# Patient Record
Sex: FEMALE | Race: WHITE | NOT HISPANIC OR LATINO | Employment: FULL TIME | URBAN - METROPOLITAN AREA
[De-identification: names, ages, dates, MRNs, and addresses within clinical notes are randomized per-mention and may not be internally consistent; named-entity substitution may affect disease eponyms.]

---

## 2017-01-06 ENCOUNTER — ALLSCRIPTS OFFICE VISIT (OUTPATIENT)
Dept: OTHER | Facility: OTHER | Age: 32
End: 2017-01-06

## 2017-01-06 PROCEDURE — G0145 SCR C/V CYTO,THINLAYER,RESCR: HCPCS | Performed by: OBSTETRICS & GYNECOLOGY

## 2017-01-06 PROCEDURE — 87624 HPV HI-RISK TYP POOLED RSLT: CPT | Performed by: OBSTETRICS & GYNECOLOGY

## 2017-01-10 ENCOUNTER — LAB CONVERSION - ENCOUNTER (OUTPATIENT)
Dept: OTHER | Facility: OTHER | Age: 32
End: 2017-01-10

## 2017-01-10 LAB — PAP (HISTORICAL): NORMAL

## 2017-01-15 ENCOUNTER — LAB REQUISITION (OUTPATIENT)
Dept: LAB | Facility: HOSPITAL | Age: 32
End: 2017-01-15
Payer: COMMERCIAL

## 2017-01-15 DIAGNOSIS — Z01.419 ENCOUNTER FOR GYNECOLOGICAL EXAMINATION WITHOUT ABNORMAL FINDING: ICD-10-CM

## 2017-01-15 DIAGNOSIS — Z11.51 ENCOUNTER FOR SCREENING FOR HUMAN PAPILLOMAVIRUS (HPV): ICD-10-CM

## 2017-01-19 LAB
HPV RRNA GENITAL QL NAA+PROBE: NORMAL
LAB AP GYN PRIMARY INTERPRETATION: NORMAL
Lab: NORMAL

## 2017-01-26 ENCOUNTER — HOSPITAL ENCOUNTER (INPATIENT)
Facility: HOSPITAL | Age: 32
LOS: 8 days | Discharge: HOME | DRG: 337 | End: 2017-02-05
Attending: EMERGENCY MEDICINE | Admitting: SURGERY
Payer: COMMERCIAL

## 2017-01-26 DIAGNOSIS — R10.13 EPIGASTRIC PAIN: ICD-10-CM

## 2017-01-26 DIAGNOSIS — R10.84 GENERALIZED ABDOMINAL PAIN: Primary | ICD-10-CM

## 2017-01-26 DIAGNOSIS — R11.0 NAUSEA: ICD-10-CM

## 2017-01-26 LAB
BASOPHILS # BLD AUTO: 0.04 THOUSANDS/ΜL (ref 0–0.1)
BASOPHILS NFR BLD AUTO: 0 % (ref 0–1)
EOSINOPHIL # BLD AUTO: 0.13 THOUSAND/ΜL (ref 0–0.61)
EOSINOPHIL NFR BLD AUTO: 1 % (ref 0–6)
ERYTHROCYTE [DISTWIDTH] IN BLOOD BY AUTOMATED COUNT: 12.4 % (ref 11.6–15.1)
EXT BILIRUBIN, UA: NORMAL
EXT BLOOD URINE: NORMAL
EXT GLUCOSE, UA: NORMAL
EXT KETONES: 160
EXT NITRITE, UA: NORMAL
EXT PH, UA: 5
EXT PROTEIN, UA: NORMAL
EXT SPECIFIC GRAVITY, UA: 1030
EXT UROBILINOGEN: 0.2
HCG UR QL: NEGATIVE
HCT VFR BLD AUTO: 41.9 % (ref 34.8–46.1)
HGB BLD-MCNC: 14.1 G/DL (ref 11.5–15.4)
LYMPHOCYTES # BLD AUTO: 1.09 THOUSANDS/ΜL (ref 0.6–4.47)
LYMPHOCYTES NFR BLD AUTO: 8 % (ref 14–44)
MCH RBC QN AUTO: 30.3 PG (ref 26.8–34.3)
MCHC RBC AUTO-ENTMCNC: 33.7 G/DL (ref 31.4–37.4)
MCV RBC AUTO: 90 FL (ref 82–98)
MONOCYTES # BLD AUTO: 0.48 THOUSAND/ΜL (ref 0.17–1.22)
MONOCYTES NFR BLD AUTO: 4 % (ref 4–12)
NEUTROPHILS # BLD AUTO: 11.81 THOUSANDS/ΜL (ref 1.85–7.62)
NEUTS SEG NFR BLD AUTO: 87 % (ref 43–75)
PLATELET # BLD AUTO: 201 THOUSANDS/UL (ref 149–390)
PMV BLD AUTO: 11.7 FL (ref 8.9–12.7)
RBC # BLD AUTO: 4.66 MILLION/UL (ref 3.81–5.12)
WBC # BLD AUTO: 13.55 THOUSAND/UL (ref 4.31–10.16)
WBC # BLD EST: NORMAL 10*3/UL

## 2017-01-26 PROCEDURE — 80053 COMPREHEN METABOLIC PANEL: CPT | Performed by: EMERGENCY MEDICINE

## 2017-01-26 PROCEDURE — 96374 THER/PROPH/DIAG INJ IV PUSH: CPT

## 2017-01-26 PROCEDURE — 83690 ASSAY OF LIPASE: CPT | Performed by: EMERGENCY MEDICINE

## 2017-01-26 PROCEDURE — 96375 TX/PRO/DX INJ NEW DRUG ADDON: CPT

## 2017-01-26 PROCEDURE — 81002 URINALYSIS NONAUTO W/O SCOPE: CPT | Performed by: EMERGENCY MEDICINE

## 2017-01-26 PROCEDURE — 85025 COMPLETE CBC W/AUTO DIFF WBC: CPT | Performed by: EMERGENCY MEDICINE

## 2017-01-26 PROCEDURE — 81025 URINE PREGNANCY TEST: CPT | Performed by: EMERGENCY MEDICINE

## 2017-01-26 PROCEDURE — 36415 COLL VENOUS BLD VENIPUNCTURE: CPT | Performed by: EMERGENCY MEDICINE

## 2017-01-26 RX ORDER — SPIRONOLACTONE 50 MG/1
50 TABLET, FILM COATED ORAL DAILY
COMMUNITY
End: 2018-02-09

## 2017-01-26 RX ORDER — DIPHENHYDRAMINE HYDROCHLORIDE 50 MG/ML
25 INJECTION INTRAMUSCULAR; INTRAVENOUS ONCE
Status: COMPLETED | OUTPATIENT
Start: 2017-01-26 | End: 2017-01-26

## 2017-01-26 RX ADMIN — HYDROMORPHONE HYDROCHLORIDE 1 MG: 1 INJECTION, SOLUTION INTRAMUSCULAR; INTRAVENOUS; SUBCUTANEOUS at 23:15

## 2017-01-26 RX ADMIN — DIPHENHYDRAMINE HYDROCHLORIDE 25 MG: 50 INJECTION, SOLUTION INTRAMUSCULAR; INTRAVENOUS at 23:14

## 2017-01-27 ENCOUNTER — APPOINTMENT (EMERGENCY)
Dept: CT IMAGING | Facility: HOSPITAL | Age: 32
DRG: 337 | End: 2017-01-27
Payer: COMMERCIAL

## 2017-01-27 PROBLEM — R10.9 ABDOMINAL PAIN: Status: ACTIVE | Noted: 2017-01-27

## 2017-01-27 PROBLEM — D72.829 LEUKOCYTOSIS: Status: ACTIVE | Noted: 2017-01-27

## 2017-01-27 LAB
ALBUMIN SERPL BCP-MCNC: 4.7 G/DL (ref 3.5–5)
ALP SERPL-CCNC: 48 U/L (ref 46–116)
ALT SERPL W P-5'-P-CCNC: 32 U/L (ref 12–78)
ANION GAP SERPL CALCULATED.3IONS-SCNC: 12 MMOL/L (ref 4–13)
ANION GAP SERPL CALCULATED.3IONS-SCNC: 9 MMOL/L (ref 4–13)
AST SERPL W P-5'-P-CCNC: 27 U/L (ref 5–45)
BILIRUB SERPL-MCNC: 0.7 MG/DL (ref 0.2–1)
BUN SERPL-MCNC: 14 MG/DL (ref 5–25)
BUN SERPL-MCNC: 9 MG/DL (ref 5–25)
CALCIUM SERPL-MCNC: 8.6 MG/DL (ref 8.3–10.1)
CALCIUM SERPL-MCNC: 9.4 MG/DL (ref 8.3–10.1)
CHLORIDE SERPL-SCNC: 100 MMOL/L (ref 100–108)
CHLORIDE SERPL-SCNC: 104 MMOL/L (ref 100–108)
CO2 SERPL-SCNC: 24 MMOL/L (ref 21–32)
CO2 SERPL-SCNC: 25 MMOL/L (ref 21–32)
CREAT SERPL-MCNC: 0.62 MG/DL (ref 0.6–1.3)
CREAT SERPL-MCNC: 0.75 MG/DL (ref 0.6–1.3)
ERYTHROCYTE [DISTWIDTH] IN BLOOD BY AUTOMATED COUNT: 12.3 % (ref 11.6–15.1)
GFR SERPL CREATININE-BSD FRML MDRD: >60 ML/MIN/1.73SQ M
GFR SERPL CREATININE-BSD FRML MDRD: >60 ML/MIN/1.73SQ M
GLUCOSE SERPL-MCNC: 111 MG/DL (ref 65–140)
GLUCOSE SERPL-MCNC: 130 MG/DL (ref 65–140)
HCT VFR BLD AUTO: 38.5 % (ref 34.8–46.1)
HGB BLD-MCNC: 12.7 G/DL (ref 11.5–15.4)
LIPASE SERPL-CCNC: 122 U/L (ref 73–393)
MCH RBC QN AUTO: 30 PG (ref 26.8–34.3)
MCHC RBC AUTO-ENTMCNC: 33 G/DL (ref 31.4–37.4)
MCV RBC AUTO: 91 FL (ref 82–98)
PLATELET # BLD AUTO: 191 THOUSANDS/UL (ref 149–390)
PMV BLD AUTO: 11 FL (ref 8.9–12.7)
POTASSIUM SERPL-SCNC: 3.8 MMOL/L (ref 3.5–5.3)
POTASSIUM SERPL-SCNC: 3.8 MMOL/L (ref 3.5–5.3)
PROT SERPL-MCNC: 8 G/DL (ref 6.4–8.2)
RBC # BLD AUTO: 4.24 MILLION/UL (ref 3.81–5.12)
SODIUM SERPL-SCNC: 137 MMOL/L (ref 136–145)
SODIUM SERPL-SCNC: 137 MMOL/L (ref 136–145)
WBC # BLD AUTO: 8 THOUSAND/UL (ref 4.31–10.16)

## 2017-01-27 PROCEDURE — 80048 BASIC METABOLIC PNL TOTAL CA: CPT | Performed by: HOSPITALIST

## 2017-01-27 PROCEDURE — C9113 INJ PANTOPRAZOLE SODIUM, VIA: HCPCS | Performed by: PHYSICIAN ASSISTANT

## 2017-01-27 PROCEDURE — 96375 TX/PRO/DX INJ NEW DRUG ADDON: CPT

## 2017-01-27 PROCEDURE — 74177 CT ABD & PELVIS W/CONTRAST: CPT

## 2017-01-27 PROCEDURE — 96361 HYDRATE IV INFUSION ADD-ON: CPT

## 2017-01-27 PROCEDURE — 99285 EMERGENCY DEPT VISIT HI MDM: CPT

## 2017-01-27 PROCEDURE — 96376 TX/PRO/DX INJ SAME DRUG ADON: CPT

## 2017-01-27 PROCEDURE — 85027 COMPLETE CBC AUTOMATED: CPT | Performed by: HOSPITALIST

## 2017-01-27 RX ORDER — ONDANSETRON 2 MG/ML
4 INJECTION INTRAMUSCULAR; INTRAVENOUS EVERY 6 HOURS PRN
Status: DISCONTINUED | OUTPATIENT
Start: 2017-01-27 | End: 2017-01-27

## 2017-01-27 RX ORDER — DEXTROSE AND SODIUM CHLORIDE 5; .45 G/100ML; G/100ML
150 INJECTION, SOLUTION INTRAVENOUS CONTINUOUS
Status: DISCONTINUED | OUTPATIENT
Start: 2017-01-27 | End: 2017-01-29

## 2017-01-27 RX ORDER — ONDANSETRON 2 MG/ML
INJECTION INTRAMUSCULAR; INTRAVENOUS
Status: DISPENSED
Start: 2017-01-27 | End: 2017-01-27

## 2017-01-27 RX ORDER — ACETAMINOPHEN 325 MG/1
650 TABLET ORAL EVERY 6 HOURS PRN
Status: DISCONTINUED | OUTPATIENT
Start: 2017-01-27 | End: 2017-02-05 | Stop reason: HOSPADM

## 2017-01-27 RX ORDER — SODIUM CHLORIDE 9 MG/ML
125 INJECTION, SOLUTION INTRAVENOUS CONTINUOUS
Status: DISCONTINUED | OUTPATIENT
Start: 2017-01-27 | End: 2017-01-29

## 2017-01-27 RX ORDER — DEXTROSE AND SODIUM CHLORIDE 5; .45 G/100ML; G/100ML
100 INJECTION, SOLUTION INTRAVENOUS CONTINUOUS
Status: DISCONTINUED | OUTPATIENT
Start: 2017-01-27 | End: 2017-01-27

## 2017-01-27 RX ORDER — PROMETHAZINE HYDROCHLORIDE 25 MG/ML
12.5 INJECTION, SOLUTION INTRAMUSCULAR; INTRAVENOUS EVERY 6 HOURS PRN
Status: DISCONTINUED | OUTPATIENT
Start: 2017-01-27 | End: 2017-02-05 | Stop reason: HOSPADM

## 2017-01-27 RX ORDER — SPIRONOLACTONE 25 MG/1
50 TABLET ORAL DAILY
Status: DISCONTINUED | OUTPATIENT
Start: 2017-01-27 | End: 2017-01-29

## 2017-01-27 RX ORDER — ONDANSETRON 2 MG/ML
4 INJECTION INTRAMUSCULAR; INTRAVENOUS ONCE
Status: COMPLETED | OUTPATIENT
Start: 2017-01-27 | End: 2017-01-27

## 2017-01-27 RX ORDER — ONDANSETRON 2 MG/ML
4 INJECTION INTRAMUSCULAR; INTRAVENOUS EVERY 4 HOURS PRN
Status: DISCONTINUED | OUTPATIENT
Start: 2017-01-27 | End: 2017-02-04

## 2017-01-27 RX ORDER — PANTOPRAZOLE SODIUM 40 MG/1
40 INJECTION, POWDER, FOR SOLUTION INTRAVENOUS EVERY 12 HOURS SCHEDULED
Status: DISCONTINUED | OUTPATIENT
Start: 2017-01-27 | End: 2017-02-05 | Stop reason: HOSPADM

## 2017-01-27 RX ADMIN — ONDANSETRON 4 MG: 2 INJECTION INTRAMUSCULAR; INTRAVENOUS at 10:48

## 2017-01-27 RX ADMIN — HYDROMORPHONE HYDROCHLORIDE 1 MG: 1 INJECTION, SOLUTION INTRAMUSCULAR; INTRAVENOUS; SUBCUTANEOUS at 04:08

## 2017-01-27 RX ADMIN — ONDANSETRON 4 MG: 2 INJECTION INTRAMUSCULAR; INTRAVENOUS at 04:08

## 2017-01-27 RX ADMIN — HYDROMORPHONE HYDROCHLORIDE 1 MG: 1 INJECTION, SOLUTION INTRAMUSCULAR; INTRAVENOUS; SUBCUTANEOUS at 01:44

## 2017-01-27 RX ADMIN — SODIUM CHLORIDE 1000 ML: 0.9 INJECTION, SOLUTION INTRAVENOUS at 00:53

## 2017-01-27 RX ADMIN — ONDANSETRON 4 MG: 2 INJECTION INTRAMUSCULAR; INTRAVENOUS at 01:44

## 2017-01-27 RX ADMIN — ONDANSETRON 4 MG: 2 INJECTION INTRAMUSCULAR; INTRAVENOUS at 07:32

## 2017-01-27 RX ADMIN — PANTOPRAZOLE SODIUM 40 MG: 40 INJECTION, POWDER, FOR SOLUTION INTRAVENOUS at 21:25

## 2017-01-27 RX ADMIN — ONDANSETRON 4 MG: 2 INJECTION INTRAMUSCULAR; INTRAVENOUS at 19:45

## 2017-01-27 RX ADMIN — HYDROMORPHONE HYDROCHLORIDE 1 MG: 1 INJECTION, SOLUTION INTRAMUSCULAR; INTRAVENOUS; SUBCUTANEOUS at 21:29

## 2017-01-27 RX ADMIN — IOHEXOL 50 ML: 240 INJECTION, SOLUTION INTRATHECAL; INTRAVASCULAR; INTRAVENOUS; ORAL at 00:53

## 2017-01-27 RX ADMIN — DEXTROSE AND SODIUM CHLORIDE 150 ML/HR: 5; .45 INJECTION, SOLUTION INTRAVENOUS at 13:03

## 2017-01-27 RX ADMIN — HYDROMORPHONE HYDROCHLORIDE 1 MG: 1 INJECTION, SOLUTION INTRAMUSCULAR; INTRAVENOUS; SUBCUTANEOUS at 17:52

## 2017-01-27 RX ADMIN — IOHEXOL 100 ML: 350 INJECTION, SOLUTION INTRAVENOUS at 02:47

## 2017-01-27 RX ADMIN — SODIUM CHLORIDE 125 ML/HR: 0.9 INJECTION, SOLUTION INTRAVENOUS at 04:09

## 2017-01-27 RX ADMIN — DEXTROSE AND SODIUM CHLORIDE 100 ML/HR: 5; .45 INJECTION, SOLUTION INTRAVENOUS at 05:27

## 2017-01-27 RX ADMIN — HYDROMORPHONE HYDROCHLORIDE 1 MG: 1 INJECTION, SOLUTION INTRAMUSCULAR; INTRAVENOUS; SUBCUTANEOUS at 07:33

## 2017-01-27 RX ADMIN — ONDANSETRON 4 MG: 2 INJECTION INTRAMUSCULAR; INTRAVENOUS at 14:49

## 2017-01-27 RX ADMIN — HYDROMORPHONE HYDROCHLORIDE 1 MG: 1 INJECTION, SOLUTION INTRAMUSCULAR; INTRAVENOUS; SUBCUTANEOUS at 14:51

## 2017-01-27 RX ADMIN — HYDROMORPHONE HYDROCHLORIDE 1 MG: 1 INJECTION, SOLUTION INTRAMUSCULAR; INTRAVENOUS; SUBCUTANEOUS at 10:48

## 2017-01-28 ENCOUNTER — APPOINTMENT (OUTPATIENT)
Dept: RADIOLOGY | Facility: HOSPITAL | Age: 32
DRG: 337 | End: 2017-01-28
Payer: COMMERCIAL

## 2017-01-28 PROCEDURE — C9113 INJ PANTOPRAZOLE SODIUM, VIA: HCPCS | Performed by: PHYSICIAN ASSISTANT

## 2017-01-28 PROCEDURE — 74022 RADEX COMPL AQT ABD SERIES: CPT

## 2017-01-28 RX ADMIN — HYDROMORPHONE HYDROCHLORIDE 1 MG: 1 INJECTION, SOLUTION INTRAMUSCULAR; INTRAVENOUS; SUBCUTANEOUS at 14:24

## 2017-01-28 RX ADMIN — HYDROMORPHONE HYDROCHLORIDE 1 MG: 1 INJECTION, SOLUTION INTRAMUSCULAR; INTRAVENOUS; SUBCUTANEOUS at 07:40

## 2017-01-28 RX ADMIN — PROMETHAZINE HYDROCHLORIDE 12.5 MG: 25 INJECTION INTRAMUSCULAR; INTRAVENOUS at 10:22

## 2017-01-28 RX ADMIN — HYDROMORPHONE HYDROCHLORIDE 1 MG: 1 INJECTION, SOLUTION INTRAMUSCULAR; INTRAVENOUS; SUBCUTANEOUS at 17:27

## 2017-01-28 RX ADMIN — ONDANSETRON 4 MG: 2 INJECTION INTRAMUSCULAR; INTRAVENOUS at 17:27

## 2017-01-28 RX ADMIN — ONDANSETRON 4 MG: 2 INJECTION INTRAMUSCULAR; INTRAVENOUS at 11:56

## 2017-01-28 RX ADMIN — HYDROMORPHONE HYDROCHLORIDE 1 MG: 1 INJECTION, SOLUTION INTRAMUSCULAR; INTRAVENOUS; SUBCUTANEOUS at 04:44

## 2017-01-28 RX ADMIN — HYDROMORPHONE HYDROCHLORIDE 1 MG: 1 INJECTION, SOLUTION INTRAMUSCULAR; INTRAVENOUS; SUBCUTANEOUS at 11:56

## 2017-01-28 RX ADMIN — SODIUM CHLORIDE 125 ML/HR: 0.9 INJECTION, SOLUTION INTRAVENOUS at 17:28

## 2017-01-28 RX ADMIN — PROMETHAZINE HYDROCHLORIDE 12.5 MG: 25 INJECTION INTRAMUSCULAR; INTRAVENOUS at 01:42

## 2017-01-28 RX ADMIN — PROMETHAZINE HYDROCHLORIDE 12.5 MG: 25 INJECTION INTRAMUSCULAR; INTRAVENOUS at 20:05

## 2017-01-28 RX ADMIN — PANTOPRAZOLE SODIUM 40 MG: 40 INJECTION, POWDER, FOR SOLUTION INTRAVENOUS at 10:22

## 2017-01-28 RX ADMIN — HYDROMORPHONE HYDROCHLORIDE 1 MG: 1 INJECTION, SOLUTION INTRAMUSCULAR; INTRAVENOUS; SUBCUTANEOUS at 20:06

## 2017-01-28 RX ADMIN — HYDROMORPHONE HYDROCHLORIDE 1 MG: 1 INJECTION, SOLUTION INTRAMUSCULAR; INTRAVENOUS; SUBCUTANEOUS at 22:38

## 2017-01-28 RX ADMIN — HYDROMORPHONE HYDROCHLORIDE 1 MG: 1 INJECTION, SOLUTION INTRAMUSCULAR; INTRAVENOUS; SUBCUTANEOUS at 01:43

## 2017-01-28 RX ADMIN — ONDANSETRON 4 MG: 2 INJECTION INTRAMUSCULAR; INTRAVENOUS at 22:38

## 2017-01-28 RX ADMIN — ONDANSETRON 4 MG: 2 INJECTION INTRAMUSCULAR; INTRAVENOUS at 07:40

## 2017-01-28 RX ADMIN — PANTOPRAZOLE SODIUM 40 MG: 40 INJECTION, POWDER, FOR SOLUTION INTRAVENOUS at 20:05

## 2017-01-29 ENCOUNTER — APPOINTMENT (INPATIENT)
Dept: CT IMAGING | Facility: HOSPITAL | Age: 32
DRG: 337 | End: 2017-01-29
Payer: COMMERCIAL

## 2017-01-29 ENCOUNTER — APPOINTMENT (INPATIENT)
Dept: RADIOLOGY | Facility: HOSPITAL | Age: 32
DRG: 337 | End: 2017-01-29
Payer: COMMERCIAL

## 2017-01-29 LAB
ALBUMIN SERPL BCP-MCNC: 3.4 G/DL (ref 3.5–5)
ALP SERPL-CCNC: 45 U/L (ref 46–116)
ALT SERPL W P-5'-P-CCNC: 18 U/L (ref 12–78)
ANION GAP SERPL CALCULATED.3IONS-SCNC: 7 MMOL/L (ref 4–13)
AST SERPL W P-5'-P-CCNC: 12 U/L (ref 5–45)
BILIRUB SERPL-MCNC: 0.5 MG/DL (ref 0.2–1)
BUN SERPL-MCNC: 5 MG/DL (ref 5–25)
CALCIUM SERPL-MCNC: 8.3 MG/DL (ref 8.3–10.1)
CHLORIDE SERPL-SCNC: 104 MMOL/L (ref 100–108)
CO2 SERPL-SCNC: 29 MMOL/L (ref 21–32)
CREAT SERPL-MCNC: 0.69 MG/DL (ref 0.6–1.3)
ERYTHROCYTE [DISTWIDTH] IN BLOOD BY AUTOMATED COUNT: 12.3 % (ref 11.6–15.1)
GFR SERPL CREATININE-BSD FRML MDRD: >60 ML/MIN/1.73SQ M
GLUCOSE SERPL-MCNC: 112 MG/DL (ref 65–140)
HCT VFR BLD AUTO: 39.4 % (ref 34.8–46.1)
HGB BLD-MCNC: 12.6 G/DL (ref 11.5–15.4)
MCH RBC QN AUTO: 29.5 PG (ref 26.8–34.3)
MCHC RBC AUTO-ENTMCNC: 32 G/DL (ref 31.4–37.4)
MCV RBC AUTO: 92 FL (ref 82–98)
PLATELET # BLD AUTO: 195 THOUSANDS/UL (ref 149–390)
PMV BLD AUTO: 11.2 FL (ref 8.9–12.7)
POTASSIUM SERPL-SCNC: 3.4 MMOL/L (ref 3.5–5.3)
PROT SERPL-MCNC: 6.5 G/DL (ref 6.4–8.2)
RBC # BLD AUTO: 4.27 MILLION/UL (ref 3.81–5.12)
SODIUM SERPL-SCNC: 140 MMOL/L (ref 136–145)
WBC # BLD AUTO: 6.41 THOUSAND/UL (ref 4.31–10.16)

## 2017-01-29 PROCEDURE — 74022 RADEX COMPL AQT ABD SERIES: CPT

## 2017-01-29 PROCEDURE — 85027 COMPLETE CBC AUTOMATED: CPT | Performed by: FAMILY MEDICINE

## 2017-01-29 PROCEDURE — C9113 INJ PANTOPRAZOLE SODIUM, VIA: HCPCS | Performed by: PHYSICIAN ASSISTANT

## 2017-01-29 PROCEDURE — 80053 COMPREHEN METABOLIC PANEL: CPT | Performed by: FAMILY MEDICINE

## 2017-01-29 PROCEDURE — 74177 CT ABD & PELVIS W/CONTRAST: CPT

## 2017-01-29 RX ORDER — POTASSIUM CHLORIDE 14.9 MG/ML
20 INJECTION INTRAVENOUS ONCE
Status: DISCONTINUED | OUTPATIENT
Start: 2017-01-29 | End: 2017-01-29

## 2017-01-29 RX ORDER — DEXTROSE, SODIUM CHLORIDE, AND POTASSIUM CHLORIDE 5; .45; .22 G/100ML; G/100ML; G/100ML
125 INJECTION INTRAVENOUS CONTINUOUS
Status: DISCONTINUED | OUTPATIENT
Start: 2017-01-29 | End: 2017-01-30

## 2017-01-29 RX ADMIN — HYDROMORPHONE HYDROCHLORIDE 1 MG: 1 INJECTION, SOLUTION INTRAMUSCULAR; INTRAVENOUS; SUBCUTANEOUS at 15:01

## 2017-01-29 RX ADMIN — HYDROMORPHONE HYDROCHLORIDE 1 MG: 1 INJECTION, SOLUTION INTRAMUSCULAR; INTRAVENOUS; SUBCUTANEOUS at 12:02

## 2017-01-29 RX ADMIN — HYDROMORPHONE HYDROCHLORIDE 1 MG: 1 INJECTION, SOLUTION INTRAMUSCULAR; INTRAVENOUS; SUBCUTANEOUS at 01:32

## 2017-01-29 RX ADMIN — PROMETHAZINE HYDROCHLORIDE 12.5 MG: 25 INJECTION INTRAMUSCULAR; INTRAVENOUS at 18:51

## 2017-01-29 RX ADMIN — PROMETHAZINE HYDROCHLORIDE 12.5 MG: 25 INJECTION INTRAMUSCULAR; INTRAVENOUS at 12:58

## 2017-01-29 RX ADMIN — ONDANSETRON 4 MG: 2 INJECTION INTRAMUSCULAR; INTRAVENOUS at 11:31

## 2017-01-29 RX ADMIN — ONDANSETRON 4 MG: 2 INJECTION INTRAMUSCULAR; INTRAVENOUS at 15:01

## 2017-01-29 RX ADMIN — PROMETHAZINE HYDROCHLORIDE 12.5 MG: 25 INJECTION INTRAMUSCULAR; INTRAVENOUS at 07:07

## 2017-01-29 RX ADMIN — ONDANSETRON 4 MG: 2 INJECTION INTRAMUSCULAR; INTRAVENOUS at 04:42

## 2017-01-29 RX ADMIN — HYDROMORPHONE HYDROCHLORIDE 1 MG: 1 INJECTION, SOLUTION INTRAMUSCULAR; INTRAVENOUS; SUBCUTANEOUS at 07:07

## 2017-01-29 RX ADMIN — HYDROMORPHONE HYDROCHLORIDE 1 MG: 1 INJECTION, SOLUTION INTRAMUSCULAR; INTRAVENOUS; SUBCUTANEOUS at 04:41

## 2017-01-29 RX ADMIN — Medication 1 SPRAY: at 09:33

## 2017-01-29 RX ADMIN — HYDROMORPHONE HYDROCHLORIDE 1 MG: 1 INJECTION, SOLUTION INTRAMUSCULAR; INTRAVENOUS; SUBCUTANEOUS at 09:26

## 2017-01-29 RX ADMIN — ONDANSETRON 4 MG: 2 INJECTION INTRAMUSCULAR; INTRAVENOUS at 09:26

## 2017-01-29 RX ADMIN — DEXTROSE, SODIUM CHLORIDE, AND POTASSIUM CHLORIDE 125 ML/HR: 5; .45; .22 INJECTION INTRAVENOUS at 09:20

## 2017-01-29 RX ADMIN — ONDANSETRON 4 MG: 2 INJECTION INTRAMUSCULAR; INTRAVENOUS at 21:48

## 2017-01-29 RX ADMIN — DEXTROSE, SODIUM CHLORIDE, AND POTASSIUM CHLORIDE 125 ML/HR: 5; .45; .22 INJECTION INTRAVENOUS at 18:51

## 2017-01-29 RX ADMIN — HYDROMORPHONE HYDROCHLORIDE 1 MG: 1 INJECTION, SOLUTION INTRAMUSCULAR; INTRAVENOUS; SUBCUTANEOUS at 18:50

## 2017-01-29 RX ADMIN — PANTOPRAZOLE SODIUM 40 MG: 40 INJECTION, POWDER, FOR SOLUTION INTRAVENOUS at 21:47

## 2017-01-29 RX ADMIN — SODIUM CHLORIDE 125 ML/HR: 0.9 INJECTION, SOLUTION INTRAVENOUS at 01:41

## 2017-01-29 RX ADMIN — IOHEXOL 100 ML: 350 INJECTION, SOLUTION INTRAVENOUS at 11:38

## 2017-01-29 RX ADMIN — PROMETHAZINE HYDROCHLORIDE 12.5 MG: 25 INJECTION INTRAMUSCULAR; INTRAVENOUS at 01:32

## 2017-01-29 RX ADMIN — PANTOPRAZOLE SODIUM 40 MG: 40 INJECTION, POWDER, FOR SOLUTION INTRAVENOUS at 09:27

## 2017-01-30 ENCOUNTER — APPOINTMENT (INPATIENT)
Dept: RADIOLOGY | Facility: HOSPITAL | Age: 32
DRG: 337 | End: 2017-01-30
Payer: COMMERCIAL

## 2017-01-30 LAB
ANION GAP SERPL CALCULATED.3IONS-SCNC: 7 MMOL/L (ref 4–13)
BUN SERPL-MCNC: 6 MG/DL (ref 5–25)
CALCIUM SERPL-MCNC: 8.6 MG/DL (ref 8.3–10.1)
CHLORIDE SERPL-SCNC: 102 MMOL/L (ref 100–108)
CO2 SERPL-SCNC: 27 MMOL/L (ref 21–32)
CREAT SERPL-MCNC: 0.65 MG/DL (ref 0.6–1.3)
GFR SERPL CREATININE-BSD FRML MDRD: >60 ML/MIN/1.73SQ M
GLUCOSE SERPL-MCNC: 122 MG/DL (ref 65–140)
GLUCOSE SERPL-MCNC: 84 MG/DL (ref 65–140)
MAGNESIUM SERPL-MCNC: 1.8 MG/DL (ref 1.6–2.6)
POTASSIUM SERPL-SCNC: 4 MMOL/L (ref 3.5–5.3)
SODIUM SERPL-SCNC: 136 MMOL/L (ref 136–145)

## 2017-01-30 PROCEDURE — C9113 INJ PANTOPRAZOLE SODIUM, VIA: HCPCS | Performed by: PHYSICIAN ASSISTANT

## 2017-01-30 PROCEDURE — 74022 RADEX COMPL AQT ABD SERIES: CPT

## 2017-01-30 PROCEDURE — 83735 ASSAY OF MAGNESIUM: CPT | Performed by: FAMILY MEDICINE

## 2017-01-30 PROCEDURE — 80048 BASIC METABOLIC PNL TOTAL CA: CPT | Performed by: FAMILY MEDICINE

## 2017-01-30 PROCEDURE — 82948 REAGENT STRIP/BLOOD GLUCOSE: CPT

## 2017-01-30 RX ORDER — SODIUM CHLORIDE, SODIUM LACTATE, POTASSIUM CHLORIDE, CALCIUM CHLORIDE 600; 310; 30; 20 MG/100ML; MG/100ML; MG/100ML; MG/100ML
125 INJECTION, SOLUTION INTRAVENOUS CONTINUOUS
Status: DISCONTINUED | OUTPATIENT
Start: 2017-01-30 | End: 2017-01-31

## 2017-01-30 RX ADMIN — HYDROMORPHONE HYDROCHLORIDE 1 MG: 1 INJECTION, SOLUTION INTRAMUSCULAR; INTRAVENOUS; SUBCUTANEOUS at 00:38

## 2017-01-30 RX ADMIN — PANTOPRAZOLE SODIUM 40 MG: 40 INJECTION, POWDER, FOR SOLUTION INTRAVENOUS at 21:09

## 2017-01-30 RX ADMIN — HYDROMORPHONE HYDROCHLORIDE 1 MG: 1 INJECTION, SOLUTION INTRAMUSCULAR; INTRAVENOUS; SUBCUTANEOUS at 22:09

## 2017-01-30 RX ADMIN — PROMETHAZINE HYDROCHLORIDE 12.5 MG: 25 INJECTION INTRAMUSCULAR; INTRAVENOUS at 00:34

## 2017-01-30 RX ADMIN — PROMETHAZINE HYDROCHLORIDE 12.5 MG: 25 INJECTION INTRAMUSCULAR; INTRAVENOUS at 22:16

## 2017-01-30 RX ADMIN — ONDANSETRON 4 MG: 2 INJECTION INTRAMUSCULAR; INTRAVENOUS at 11:02

## 2017-01-30 RX ADMIN — DEXTROSE, SODIUM CHLORIDE, AND POTASSIUM CHLORIDE 125 ML/HR: 5; .45; .22 INJECTION INTRAVENOUS at 03:19

## 2017-01-30 RX ADMIN — ONDANSETRON 4 MG: 2 INJECTION INTRAMUSCULAR; INTRAVENOUS at 14:46

## 2017-01-30 RX ADMIN — HYDROMORPHONE HYDROCHLORIDE 1 MG: 1 INJECTION, SOLUTION INTRAMUSCULAR; INTRAVENOUS; SUBCUTANEOUS at 14:47

## 2017-01-30 RX ADMIN — SODIUM CHLORIDE, SODIUM LACTATE, POTASSIUM CHLORIDE, AND CALCIUM CHLORIDE 125 ML/HR: .6; .31; .03; .02 INJECTION, SOLUTION INTRAVENOUS at 08:16

## 2017-01-30 RX ADMIN — SODIUM CHLORIDE, SODIUM LACTATE, POTASSIUM CHLORIDE, AND CALCIUM CHLORIDE 125 ML/HR: .6; .31; .03; .02 INJECTION, SOLUTION INTRAVENOUS at 16:50

## 2017-01-30 RX ADMIN — PROMETHAZINE HYDROCHLORIDE 12.5 MG: 25 INJECTION INTRAMUSCULAR; INTRAVENOUS at 06:36

## 2017-01-30 RX ADMIN — PANTOPRAZOLE SODIUM 40 MG: 40 INJECTION, POWDER, FOR SOLUTION INTRAVENOUS at 08:17

## 2017-01-31 ENCOUNTER — ANESTHESIA (INPATIENT)
Dept: PERIOP | Facility: HOSPITAL | Age: 32
DRG: 337 | End: 2017-01-31
Payer: COMMERCIAL

## 2017-01-31 ENCOUNTER — ANESTHESIA EVENT (INPATIENT)
Dept: PERIOP | Facility: HOSPITAL | Age: 32
DRG: 337 | End: 2017-01-31
Payer: COMMERCIAL

## 2017-01-31 ENCOUNTER — APPOINTMENT (INPATIENT)
Dept: RADIOLOGY | Facility: HOSPITAL | Age: 32
DRG: 337 | End: 2017-01-31
Payer: COMMERCIAL

## 2017-01-31 PROBLEM — K56.609 BOWEL OBSTRUCTION (HCC): Status: ACTIVE | Noted: 2017-01-31

## 2017-01-31 LAB
ABO GROUP BLD: NORMAL
ANION GAP SERPL CALCULATED.3IONS-SCNC: 11 MMOL/L (ref 4–13)
BILIRUB UR QL STRIP: NEGATIVE
BLD GP AB SCN SERPL QL: NEGATIVE
BUN SERPL-MCNC: 9 MG/DL (ref 5–25)
CALCIUM SERPL-MCNC: 9 MG/DL (ref 8.3–10.1)
CHLORIDE SERPL-SCNC: 100 MMOL/L (ref 100–108)
CLARITY UR: ABNORMAL
CO2 SERPL-SCNC: 26 MMOL/L (ref 21–32)
COLOR UR: YELLOW
CREAT SERPL-MCNC: 0.6 MG/DL (ref 0.6–1.3)
GFR SERPL CREATININE-BSD FRML MDRD: >60 ML/MIN/1.73SQ M
GLUCOSE SERPL-MCNC: 75 MG/DL (ref 65–140)
GLUCOSE UR STRIP-MCNC: NEGATIVE MG/DL
HGB UR QL STRIP.AUTO: ABNORMAL
KETONES UR STRIP-MCNC: NEGATIVE MG/DL
LEUKOCYTE ESTERASE UR QL STRIP: ABNORMAL
NITRITE UR QL STRIP: NEGATIVE
PH UR STRIP.AUTO: 5.5 [PH] (ref 4.5–8)
POTASSIUM SERPL-SCNC: 3.7 MMOL/L (ref 3.5–5.3)
PROT UR STRIP-MCNC: NEGATIVE MG/DL
RH BLD: POSITIVE
SODIUM SERPL-SCNC: 137 MMOL/L (ref 136–145)
SP GR UR STRIP.AUTO: 1.02 (ref 1–1.03)
UROBILINOGEN UR QL STRIP.AUTO: 0.2 E.U./DL

## 2017-01-31 PROCEDURE — C9113 INJ PANTOPRAZOLE SODIUM, VIA: HCPCS | Performed by: PHYSICIAN ASSISTANT

## 2017-01-31 PROCEDURE — 0DNS0ZZ RELEASE GREATER OMENTUM, OPEN APPROACH: ICD-10-PCS | Performed by: SURGERY

## 2017-01-31 PROCEDURE — 74022 RADEX COMPL AQT ABD SERIES: CPT

## 2017-01-31 PROCEDURE — 0DNH0ZZ RELEASE CECUM, OPEN APPROACH: ICD-10-PCS | Performed by: SURGERY

## 2017-01-31 PROCEDURE — 86900 BLOOD TYPING SEROLOGIC ABO: CPT | Performed by: PHYSICIAN ASSISTANT

## 2017-01-31 PROCEDURE — 0WQF0ZZ REPAIR ABDOMINAL WALL, OPEN APPROACH: ICD-10-PCS | Performed by: SURGERY

## 2017-01-31 PROCEDURE — 86850 RBC ANTIBODY SCREEN: CPT | Performed by: PHYSICIAN ASSISTANT

## 2017-01-31 PROCEDURE — 3E0M05Z INTRODUCTION OF ADHESION BARRIER INTO PERITONEAL CAVITY, OPEN APPROACH: ICD-10-PCS | Performed by: SURGERY

## 2017-01-31 PROCEDURE — 0DNB0ZZ RELEASE ILEUM, OPEN APPROACH: ICD-10-PCS | Performed by: SURGERY

## 2017-01-31 PROCEDURE — 80048 BASIC METABOLIC PNL TOTAL CA: CPT | Performed by: SURGERY

## 2017-01-31 PROCEDURE — 74000 HB X-RAY EXAM OF ABDOMEN (SINGLE ANTEROPOSTERIOR VIEW): CPT

## 2017-01-31 PROCEDURE — 86901 BLOOD TYPING SEROLOGIC RH(D): CPT | Performed by: PHYSICIAN ASSISTANT

## 2017-01-31 PROCEDURE — 0HB7XZZ EXCISION OF ABDOMEN SKIN, EXTERNAL APPROACH: ICD-10-PCS | Performed by: SURGERY

## 2017-01-31 PROCEDURE — C1765 ADHESION BARRIER: HCPCS | Performed by: SURGERY

## 2017-01-31 RX ORDER — SODIUM CHLORIDE, SODIUM LACTATE, POTASSIUM CHLORIDE, CALCIUM CHLORIDE 600; 310; 30; 20 MG/100ML; MG/100ML; MG/100ML; MG/100ML
75 INJECTION, SOLUTION INTRAVENOUS CONTINUOUS
Status: DISCONTINUED | OUTPATIENT
Start: 2017-01-31 | End: 2017-02-04

## 2017-01-31 RX ORDER — MAGNESIUM HYDROXIDE 1200 MG/15ML
LIQUID ORAL AS NEEDED
Status: DISCONTINUED | OUTPATIENT
Start: 2017-01-31 | End: 2017-01-31 | Stop reason: HOSPADM

## 2017-01-31 RX ORDER — METOCLOPRAMIDE HYDROCHLORIDE 5 MG/ML
5 INJECTION INTRAMUSCULAR; INTRAVENOUS EVERY 6 HOURS PRN
Status: DISCONTINUED | OUTPATIENT
Start: 2017-01-31 | End: 2017-02-05 | Stop reason: HOSPADM

## 2017-01-31 RX ORDER — CEFAZOLIN SODIUM 1 G/3ML
INJECTION, POWDER, FOR SOLUTION INTRAMUSCULAR; INTRAVENOUS AS NEEDED
Status: DISCONTINUED | OUTPATIENT
Start: 2017-01-31 | End: 2017-01-31 | Stop reason: SURG

## 2017-01-31 RX ORDER — MEPERIDINE HYDROCHLORIDE 25 MG/ML
12.5 INJECTION INTRAMUSCULAR; INTRAVENOUS; SUBCUTANEOUS ONCE AS NEEDED
Status: DISCONTINUED | OUTPATIENT
Start: 2017-01-31 | End: 2017-01-31 | Stop reason: HOSPADM

## 2017-01-31 RX ORDER — FENTANYL CITRATE 50 UG/ML
INJECTION, SOLUTION INTRAMUSCULAR; INTRAVENOUS AS NEEDED
Status: DISCONTINUED | OUTPATIENT
Start: 2017-01-31 | End: 2017-01-31 | Stop reason: SURG

## 2017-01-31 RX ORDER — ONDANSETRON 2 MG/ML
4 INJECTION INTRAMUSCULAR; INTRAVENOUS ONCE AS NEEDED
Status: DISCONTINUED | OUTPATIENT
Start: 2017-01-31 | End: 2017-01-31 | Stop reason: HOSPADM

## 2017-01-31 RX ORDER — LIDOCAINE HYDROCHLORIDE 10 MG/ML
INJECTION, SOLUTION INFILTRATION; PERINEURAL AS NEEDED
Status: DISCONTINUED | OUTPATIENT
Start: 2017-01-31 | End: 2017-01-31 | Stop reason: SURG

## 2017-01-31 RX ORDER — ROCURONIUM BROMIDE 10 MG/ML
INJECTION, SOLUTION INTRAVENOUS AS NEEDED
Status: DISCONTINUED | OUTPATIENT
Start: 2017-01-31 | End: 2017-01-31 | Stop reason: SURG

## 2017-01-31 RX ORDER — PROPOFOL 10 MG/ML
INJECTION, EMULSION INTRAVENOUS AS NEEDED
Status: DISCONTINUED | OUTPATIENT
Start: 2017-01-31 | End: 2017-01-31 | Stop reason: SURG

## 2017-01-31 RX ORDER — ONDANSETRON 2 MG/ML
INJECTION INTRAMUSCULAR; INTRAVENOUS AS NEEDED
Status: DISCONTINUED | OUTPATIENT
Start: 2017-01-31 | End: 2017-01-31 | Stop reason: SURG

## 2017-01-31 RX ORDER — MIDAZOLAM HYDROCHLORIDE 1 MG/ML
INJECTION INTRAMUSCULAR; INTRAVENOUS AS NEEDED
Status: DISCONTINUED | OUTPATIENT
Start: 2017-01-31 | End: 2017-01-31 | Stop reason: SURG

## 2017-01-31 RX ORDER — HEPARIN SODIUM 5000 [USP'U]/ML
5000 INJECTION, SOLUTION INTRAVENOUS; SUBCUTANEOUS EVERY 12 HOURS SCHEDULED
Status: DISCONTINUED | OUTPATIENT
Start: 2017-01-31 | End: 2017-02-04

## 2017-01-31 RX ORDER — SUCCINYLCHOLINE CHLORIDE 20 MG/ML
INJECTION INTRAMUSCULAR; INTRAVENOUS AS NEEDED
Status: DISCONTINUED | OUTPATIENT
Start: 2017-01-31 | End: 2017-01-31 | Stop reason: SURG

## 2017-01-31 RX ORDER — DIPHENHYDRAMINE HYDROCHLORIDE 50 MG/ML
25 INJECTION INTRAMUSCULAR; INTRAVENOUS EVERY 6 HOURS PRN
Status: DISCONTINUED | OUTPATIENT
Start: 2017-01-31 | End: 2017-02-05 | Stop reason: HOSPADM

## 2017-01-31 RX ORDER — ONDANSETRON 2 MG/ML
4 INJECTION INTRAMUSCULAR; INTRAVENOUS EVERY 4 HOURS PRN
Status: DISCONTINUED | OUTPATIENT
Start: 2017-01-31 | End: 2017-02-05 | Stop reason: HOSPADM

## 2017-01-31 RX ORDER — HYDROMORPHONE HYDROCHLORIDE 2 MG/ML
INJECTION, SOLUTION INTRAMUSCULAR; INTRAVENOUS; SUBCUTANEOUS AS NEEDED
Status: DISCONTINUED | OUTPATIENT
Start: 2017-01-31 | End: 2017-01-31 | Stop reason: SURG

## 2017-01-31 RX ORDER — GLYCOPYRROLATE 0.2 MG/ML
INJECTION INTRAMUSCULAR; INTRAVENOUS AS NEEDED
Status: DISCONTINUED | OUTPATIENT
Start: 2017-01-31 | End: 2017-01-31 | Stop reason: SURG

## 2017-01-31 RX ORDER — PROMETHAZINE HYDROCHLORIDE 25 MG/ML
25 INJECTION, SOLUTION INTRAMUSCULAR; INTRAVENOUS ONCE
Status: DISCONTINUED | OUTPATIENT
Start: 2017-01-31 | End: 2017-01-31 | Stop reason: HOSPADM

## 2017-01-31 RX ORDER — SODIUM CHLORIDE 9 MG/ML
INJECTION, SOLUTION INTRAVENOUS CONTINUOUS PRN
Status: DISCONTINUED | OUTPATIENT
Start: 2017-01-31 | End: 2017-01-31 | Stop reason: SURG

## 2017-01-31 RX ORDER — ALBUMIN, HUMAN INJ 5% 5 %
SOLUTION INTRAVENOUS CONTINUOUS PRN
Status: DISCONTINUED | OUTPATIENT
Start: 2017-01-31 | End: 2017-01-31 | Stop reason: SURG

## 2017-01-31 RX ORDER — NALOXONE HYDROCHLORIDE 0.4 MG/ML
0.1 INJECTION, SOLUTION INTRAMUSCULAR; INTRAVENOUS; SUBCUTANEOUS AS NEEDED
Status: DISCONTINUED | OUTPATIENT
Start: 2017-01-31 | End: 2017-02-04

## 2017-01-31 RX ORDER — LIDOCAINE HYDROCHLORIDE AND EPINEPHRINE 15; 5 MG/ML; UG/ML
INJECTION, SOLUTION EPIDURAL AS NEEDED
Status: DISCONTINUED | OUTPATIENT
Start: 2017-01-31 | End: 2017-01-31 | Stop reason: SURG

## 2017-01-31 RX ORDER — FENTANYL CITRATE/PF 50 MCG/ML
50 SYRINGE (ML) INJECTION
Status: DISCONTINUED | OUTPATIENT
Start: 2017-01-31 | End: 2017-01-31 | Stop reason: HOSPADM

## 2017-01-31 RX ADMIN — HYDROMORPHONE HYDROCHLORIDE 0.5 MG: 2 INJECTION, SOLUTION INTRAMUSCULAR; INTRAVENOUS; SUBCUTANEOUS at 11:11

## 2017-01-31 RX ADMIN — Medication 1 SPRAY: at 21:44

## 2017-01-31 RX ADMIN — HYDROMORPHONE HYDROCHLORIDE 1 MG: 1 INJECTION, SOLUTION INTRAMUSCULAR; INTRAVENOUS; SUBCUTANEOUS at 02:50

## 2017-01-31 RX ADMIN — ONDANSETRON 4 MG: 2 INJECTION INTRAMUSCULAR; INTRAVENOUS at 01:31

## 2017-01-31 RX ADMIN — PANTOPRAZOLE SODIUM 40 MG: 40 INJECTION, POWDER, FOR SOLUTION INTRAVENOUS at 21:44

## 2017-01-31 RX ADMIN — PROMETHAZINE HYDROCHLORIDE 12.5 MG: 25 INJECTION INTRAMUSCULAR; INTRAVENOUS at 04:26

## 2017-01-31 RX ADMIN — NEOSTIGMINE METHYLSULFATE 3 MG: 1 INJECTION INTRAMUSCULAR; INTRAVENOUS; SUBCUTANEOUS at 11:54

## 2017-01-31 RX ADMIN — ONDANSETRON 4 MG: 2 INJECTION INTRAMUSCULAR; INTRAVENOUS at 11:54

## 2017-01-31 RX ADMIN — METRONIDAZOLE 500 MG: 500 INJECTION, SOLUTION INTRAVENOUS at 19:28

## 2017-01-31 RX ADMIN — ALBUMIN HUMAN: 0.05 INJECTION, SOLUTION INTRAVENOUS at 11:54

## 2017-01-31 RX ADMIN — SUCCINYLCHOLINE CHLORIDE 100 MG: 20 INJECTION, SOLUTION INTRAMUSCULAR; INTRAVENOUS at 10:43

## 2017-01-31 RX ADMIN — SODIUM CHLORIDE, SODIUM LACTATE, POTASSIUM CHLORIDE, AND CALCIUM CHLORIDE 125 ML/HR: .6; .31; .03; .02 INJECTION, SOLUTION INTRAVENOUS at 14:26

## 2017-01-31 RX ADMIN — HYDROMORPHONE HYDROCHLORIDE 0.5 MG: 1 INJECTION, SOLUTION INTRAMUSCULAR; INTRAVENOUS; SUBCUTANEOUS at 13:18

## 2017-01-31 RX ADMIN — PROPOFOL 150 MG: 10 INJECTION, EMULSION INTRAVENOUS at 10:43

## 2017-01-31 RX ADMIN — PANTOPRAZOLE SODIUM 40 MG: 40 INJECTION, POWDER, FOR SOLUTION INTRAVENOUS at 08:13

## 2017-01-31 RX ADMIN — FENTANYL CITRATE 50 MCG: 50 INJECTION, SOLUTION INTRAMUSCULAR; INTRAVENOUS at 12:40

## 2017-01-31 RX ADMIN — DEXAMETHASONE SODIUM PHOSPHATE 10 MG: 10 INJECTION INTRAMUSCULAR; INTRAVENOUS at 11:00

## 2017-01-31 RX ADMIN — SODIUM CHLORIDE, SODIUM LACTATE, POTASSIUM CHLORIDE, AND CALCIUM CHLORIDE 125 ML/HR: .6; .31; .03; .02 INJECTION, SOLUTION INTRAVENOUS at 17:11

## 2017-01-31 RX ADMIN — GLYCOPYRROLATE 0.6 MG: 0.2 INJECTION INTRAMUSCULAR; INTRAVENOUS at 11:54

## 2017-01-31 RX ADMIN — SODIUM CHLORIDE, SODIUM LACTATE, POTASSIUM CHLORIDE, AND CALCIUM CHLORIDE: .6; .31; .03; .02 INJECTION, SOLUTION INTRAVENOUS at 12:07

## 2017-01-31 RX ADMIN — LIDOCAINE HYDROCHLORIDE AND EPINEPHRINE 5 ML: 15; 5 INJECTION, SOLUTION EPIDURAL at 11:35

## 2017-01-31 RX ADMIN — METRONIDAZOLE 500 MG: 500 SOLUTION INTRAVENOUS at 10:25

## 2017-01-31 RX ADMIN — FENTANYL CITRATE 100 MCG: 50 INJECTION, SOLUTION INTRAMUSCULAR; INTRAVENOUS at 10:43

## 2017-01-31 RX ADMIN — MIDAZOLAM HYDROCHLORIDE 1 MG: 1 INJECTION, SOLUTION INTRAMUSCULAR; INTRAVENOUS at 10:23

## 2017-01-31 RX ADMIN — SODIUM CHLORIDE: 0.9 INJECTION, SOLUTION INTRAVENOUS at 10:47

## 2017-01-31 RX ADMIN — CEFAZOLIN SODIUM 1000 MG: 1 SOLUTION INTRAVENOUS at 20:09

## 2017-01-31 RX ADMIN — Medication 1 SPRAY: at 19:29

## 2017-01-31 RX ADMIN — HEPARIN SODIUM 5000 UNITS: 5000 INJECTION, SOLUTION INTRAVENOUS; SUBCUTANEOUS at 15:01

## 2017-01-31 RX ADMIN — FENTANYL CITRATE 50 MCG: 50 INJECTION, SOLUTION INTRAMUSCULAR; INTRAVENOUS at 12:34

## 2017-01-31 RX ADMIN — MIDAZOLAM HYDROCHLORIDE 1 MG: 1 INJECTION, SOLUTION INTRAMUSCULAR; INTRAVENOUS at 10:20

## 2017-01-31 RX ADMIN — SODIUM CHLORIDE, SODIUM LACTATE, POTASSIUM CHLORIDE, AND CALCIUM CHLORIDE 125 ML/HR: .6; .31; .03; .02 INJECTION, SOLUTION INTRAVENOUS at 01:34

## 2017-01-31 RX ADMIN — HYDROMORPHONE HYDROCHLORIDE 0.5 MG: 1 INJECTION, SOLUTION INTRAMUSCULAR; INTRAVENOUS; SUBCUTANEOUS at 06:34

## 2017-01-31 RX ADMIN — CEFAZOLIN SODIUM 1000 MG: 1 POWDER, FOR SOLUTION INTRAMUSCULAR; INTRAVENOUS at 10:55

## 2017-01-31 RX ADMIN — SODIUM CHLORIDE 1000 ML: 0.9 INJECTION, SOLUTION INTRAVENOUS at 05:25

## 2017-01-31 RX ADMIN — HYDROMORPHONE HYDROCHLORIDE 1 MG: 1 INJECTION, SOLUTION INTRAMUSCULAR; INTRAVENOUS; SUBCUTANEOUS at 04:26

## 2017-01-31 RX ADMIN — LIDOCAINE HYDROCHLORIDE 50 MG: 10 INJECTION, SOLUTION INFILTRATION; PERINEURAL at 10:43

## 2017-01-31 RX ADMIN — HYDROMORPHONE HYDROCHLORIDE 0.5 MG: 1 INJECTION, SOLUTION INTRAMUSCULAR; INTRAVENOUS; SUBCUTANEOUS at 08:12

## 2017-01-31 RX ADMIN — FENTANYL CITRATE 50 MCG: 50 INJECTION, SOLUTION INTRAMUSCULAR; INTRAVENOUS at 11:00

## 2017-01-31 RX ADMIN — ROCURONIUM BROMIDE 30 MG: 10 INJECTION, SOLUTION INTRAVENOUS at 10:50

## 2017-01-31 RX ADMIN — HYDROMORPHONE HYDROCHLORIDE 0.5 MG: 1 INJECTION, SOLUTION INTRAMUSCULAR; INTRAVENOUS; SUBCUTANEOUS at 13:05

## 2017-01-31 RX ADMIN — FENTANYL CITRATE 100 MCG: 50 INJECTION, SOLUTION INTRAMUSCULAR; INTRAVENOUS at 10:21

## 2017-01-31 RX ADMIN — Medication: at 12:25

## 2017-02-01 LAB
ANION GAP SERPL CALCULATED.3IONS-SCNC: 7 MMOL/L (ref 4–13)
BASOPHILS # BLD AUTO: 0.02 THOUSANDS/ΜL (ref 0–0.1)
BASOPHILS NFR BLD AUTO: 0 % (ref 0–1)
BUN SERPL-MCNC: 11 MG/DL (ref 5–25)
CALCIUM SERPL-MCNC: 8.2 MG/DL (ref 8.3–10.1)
CHLORIDE SERPL-SCNC: 104 MMOL/L (ref 100–108)
CO2 SERPL-SCNC: 23 MMOL/L (ref 21–32)
CREAT SERPL-MCNC: 0.59 MG/DL (ref 0.6–1.3)
EOSINOPHIL # BLD AUTO: 0.31 THOUSAND/ΜL (ref 0–0.61)
EOSINOPHIL NFR BLD AUTO: 6 % (ref 0–6)
ERYTHROCYTE [DISTWIDTH] IN BLOOD BY AUTOMATED COUNT: 12 % (ref 11.6–15.1)
GFR SERPL CREATININE-BSD FRML MDRD: >60 ML/MIN/1.73SQ M
GLUCOSE SERPL-MCNC: 100 MG/DL (ref 65–140)
HCT VFR BLD AUTO: 36.8 % (ref 34.8–46.1)
HGB BLD-MCNC: 12.2 G/DL (ref 11.5–15.4)
LYMPHOCYTES # BLD AUTO: 0.88 THOUSANDS/ΜL (ref 0.6–4.47)
LYMPHOCYTES NFR BLD AUTO: 16 % (ref 14–44)
MCH RBC QN AUTO: 29.8 PG (ref 26.8–34.3)
MCHC RBC AUTO-ENTMCNC: 33.2 G/DL (ref 31.4–37.4)
MCV RBC AUTO: 90 FL (ref 82–98)
MONOCYTES # BLD AUTO: 0.78 THOUSAND/ΜL (ref 0.17–1.22)
MONOCYTES NFR BLD AUTO: 14 % (ref 4–12)
NEUTROPHILS # BLD AUTO: 3.48 THOUSANDS/ΜL (ref 1.85–7.62)
NEUTS SEG NFR BLD AUTO: 64 % (ref 43–75)
PLATELET # BLD AUTO: 195 THOUSANDS/UL (ref 149–390)
PMV BLD AUTO: 11.5 FL (ref 8.9–12.7)
POTASSIUM SERPL-SCNC: 3.7 MMOL/L (ref 3.5–5.3)
RBC # BLD AUTO: 4.09 MILLION/UL (ref 3.81–5.12)
SODIUM SERPL-SCNC: 134 MMOL/L (ref 136–145)
WBC # BLD AUTO: 5.47 THOUSAND/UL (ref 4.31–10.16)

## 2017-02-01 PROCEDURE — 80048 BASIC METABOLIC PNL TOTAL CA: CPT | Performed by: PHYSICIAN ASSISTANT

## 2017-02-01 PROCEDURE — 85025 COMPLETE CBC W/AUTO DIFF WBC: CPT | Performed by: PHYSICIAN ASSISTANT

## 2017-02-01 PROCEDURE — C9113 INJ PANTOPRAZOLE SODIUM, VIA: HCPCS | Performed by: PHYSICIAN ASSISTANT

## 2017-02-01 RX ADMIN — PANTOPRAZOLE SODIUM 40 MG: 40 INJECTION, POWDER, FOR SOLUTION INTRAVENOUS at 09:24

## 2017-02-01 RX ADMIN — SODIUM CHLORIDE, SODIUM LACTATE, POTASSIUM CHLORIDE, AND CALCIUM CHLORIDE 125 ML/HR: .6; .31; .03; .02 INJECTION, SOLUTION INTRAVENOUS at 01:44

## 2017-02-01 RX ADMIN — SODIUM CHLORIDE, SODIUM LACTATE, POTASSIUM CHLORIDE, AND CALCIUM CHLORIDE 125 ML/HR: .6; .31; .03; .02 INJECTION, SOLUTION INTRAVENOUS at 11:46

## 2017-02-01 RX ADMIN — Medication: at 12:50

## 2017-02-01 RX ADMIN — HYDROMORPHONE HYDROCHLORIDE 0.5 MG: 1 INJECTION, SOLUTION INTRAMUSCULAR; INTRAVENOUS; SUBCUTANEOUS at 11:45

## 2017-02-01 RX ADMIN — HEPARIN SODIUM 5000 UNITS: 5000 INJECTION, SOLUTION INTRAVENOUS; SUBCUTANEOUS at 18:04

## 2017-02-01 RX ADMIN — CEFAZOLIN SODIUM 1000 MG: 1 SOLUTION INTRAVENOUS at 02:02

## 2017-02-01 RX ADMIN — SODIUM CHLORIDE, SODIUM LACTATE, POTASSIUM CHLORIDE, AND CALCIUM CHLORIDE 125 ML/HR: .6; .31; .03; .02 INJECTION, SOLUTION INTRAVENOUS at 18:48

## 2017-02-01 RX ADMIN — METRONIDAZOLE 500 MG: 500 INJECTION, SOLUTION INTRAVENOUS at 02:58

## 2017-02-01 RX ADMIN — PANTOPRAZOLE SODIUM 40 MG: 40 INJECTION, POWDER, FOR SOLUTION INTRAVENOUS at 20:30

## 2017-02-02 LAB
ANION GAP SERPL CALCULATED.3IONS-SCNC: 11 MMOL/L (ref 4–13)
BUN SERPL-MCNC: 7 MG/DL (ref 5–25)
CALCIUM SERPL-MCNC: 7.9 MG/DL (ref 8.3–10.1)
CHLORIDE SERPL-SCNC: 102 MMOL/L (ref 100–108)
CO2 SERPL-SCNC: 25 MMOL/L (ref 21–32)
CREAT SERPL-MCNC: 0.48 MG/DL (ref 0.6–1.3)
ERYTHROCYTE [DISTWIDTH] IN BLOOD BY AUTOMATED COUNT: 11.9 % (ref 11.6–15.1)
GFR SERPL CREATININE-BSD FRML MDRD: >60 ML/MIN/1.73SQ M
GLUCOSE SERPL-MCNC: 68 MG/DL (ref 65–140)
HCT VFR BLD AUTO: 35 % (ref 34.8–46.1)
HGB BLD-MCNC: 11.4 G/DL (ref 11.5–15.4)
MAGNESIUM SERPL-MCNC: 1.9 MG/DL (ref 1.6–2.6)
MCH RBC QN AUTO: 29.3 PG (ref 26.8–34.3)
MCHC RBC AUTO-ENTMCNC: 32.6 G/DL (ref 31.4–37.4)
MCV RBC AUTO: 90 FL (ref 82–98)
PHOSPHATE SERPL-MCNC: 2.7 MG/DL (ref 2.7–4.5)
PLATELET # BLD AUTO: 174 THOUSANDS/UL (ref 149–390)
PMV BLD AUTO: 10.2 FL (ref 8.9–12.7)
POTASSIUM SERPL-SCNC: 3.3 MMOL/L (ref 3.5–5.3)
RBC # BLD AUTO: 3.89 MILLION/UL (ref 3.81–5.12)
SODIUM SERPL-SCNC: 138 MMOL/L (ref 136–145)
WBC # BLD AUTO: 6.64 THOUSAND/UL (ref 4.31–10.16)

## 2017-02-02 PROCEDURE — 85027 COMPLETE CBC AUTOMATED: CPT | Performed by: SURGERY

## 2017-02-02 PROCEDURE — C9113 INJ PANTOPRAZOLE SODIUM, VIA: HCPCS | Performed by: PHYSICIAN ASSISTANT

## 2017-02-02 PROCEDURE — 83735 ASSAY OF MAGNESIUM: CPT | Performed by: SURGERY

## 2017-02-02 PROCEDURE — 84100 ASSAY OF PHOSPHORUS: CPT | Performed by: SURGERY

## 2017-02-02 PROCEDURE — 80048 BASIC METABOLIC PNL TOTAL CA: CPT | Performed by: SURGERY

## 2017-02-02 RX ORDER — POTASSIUM CHLORIDE 14.9 MG/ML
20 INJECTION INTRAVENOUS
Status: COMPLETED | OUTPATIENT
Start: 2017-02-02 | End: 2017-02-03

## 2017-02-02 RX ORDER — MAGNESIUM SULFATE HEPTAHYDRATE 40 MG/ML
2 INJECTION, SOLUTION INTRAVENOUS ONCE
Status: COMPLETED | OUTPATIENT
Start: 2017-02-02 | End: 2017-02-02

## 2017-02-02 RX ADMIN — SODIUM CHLORIDE, SODIUM LACTATE, POTASSIUM CHLORIDE, AND CALCIUM CHLORIDE 125 ML/HR: .6; .31; .03; .02 INJECTION, SOLUTION INTRAVENOUS at 00:44

## 2017-02-02 RX ADMIN — SODIUM CHLORIDE, SODIUM LACTATE, POTASSIUM CHLORIDE, AND CALCIUM CHLORIDE 125 ML/HR: .6; .31; .03; .02 INJECTION, SOLUTION INTRAVENOUS at 17:31

## 2017-02-02 RX ADMIN — Medication: at 17:54

## 2017-02-02 RX ADMIN — HYDROMORPHONE HYDROCHLORIDE 1 MG: 1 INJECTION, SOLUTION INTRAMUSCULAR; INTRAVENOUS; SUBCUTANEOUS at 17:04

## 2017-02-02 RX ADMIN — HYDROMORPHONE HYDROCHLORIDE 1 MG: 1 INJECTION, SOLUTION INTRAMUSCULAR; INTRAVENOUS; SUBCUTANEOUS at 22:44

## 2017-02-02 RX ADMIN — POTASSIUM CHLORIDE 20 MEQ: 200 INJECTION, SOLUTION INTRAVENOUS at 18:35

## 2017-02-02 RX ADMIN — POTASSIUM CHLORIDE 20 MEQ: 200 INJECTION, SOLUTION INTRAVENOUS at 20:49

## 2017-02-02 RX ADMIN — PANTOPRAZOLE SODIUM 40 MG: 40 INJECTION, POWDER, FOR SOLUTION INTRAVENOUS at 09:00

## 2017-02-02 RX ADMIN — HEPARIN SODIUM 5000 UNITS: 5000 INJECTION, SOLUTION INTRAVENOUS; SUBCUTANEOUS at 17:39

## 2017-02-02 RX ADMIN — POTASSIUM CHLORIDE 20 MEQ: 200 INJECTION, SOLUTION INTRAVENOUS at 16:18

## 2017-02-02 RX ADMIN — PANTOPRAZOLE SODIUM 40 MG: 40 INJECTION, POWDER, FOR SOLUTION INTRAVENOUS at 22:14

## 2017-02-02 RX ADMIN — HYDROMORPHONE HYDROCHLORIDE 1 MG: 1 INJECTION, SOLUTION INTRAMUSCULAR; INTRAVENOUS; SUBCUTANEOUS at 05:30

## 2017-02-02 RX ADMIN — POTASSIUM CHLORIDE 20 MEQ: 200 INJECTION, SOLUTION INTRAVENOUS at 23:04

## 2017-02-02 RX ADMIN — MAGNESIUM SULFATE HEPTAHYDRATE 2 G: 40 INJECTION, SOLUTION INTRAVENOUS at 16:18

## 2017-02-03 LAB
ANION GAP SERPL CALCULATED.3IONS-SCNC: 13 MMOL/L (ref 4–13)
BUN SERPL-MCNC: 5 MG/DL (ref 5–25)
CALCIUM SERPL-MCNC: 8.7 MG/DL (ref 8.3–10.1)
CHLORIDE SERPL-SCNC: 98 MMOL/L (ref 100–108)
CO2 SERPL-SCNC: 21 MMOL/L (ref 21–32)
CREAT SERPL-MCNC: 0.56 MG/DL (ref 0.6–1.3)
ERYTHROCYTE [DISTWIDTH] IN BLOOD BY AUTOMATED COUNT: 11.9 % (ref 11.6–15.1)
GFR SERPL CREATININE-BSD FRML MDRD: >60 ML/MIN/1.73SQ M
GLUCOSE SERPL-MCNC: 68 MG/DL (ref 65–140)
HCT VFR BLD AUTO: 35.3 % (ref 34.8–46.1)
HGB BLD-MCNC: 11.9 G/DL (ref 11.5–15.4)
MAGNESIUM SERPL-MCNC: 2 MG/DL (ref 1.6–2.6)
MCH RBC QN AUTO: 29.7 PG (ref 26.8–34.3)
MCHC RBC AUTO-ENTMCNC: 33.7 G/DL (ref 31.4–37.4)
MCV RBC AUTO: 88 FL (ref 82–98)
PHOSPHATE SERPL-MCNC: 2.8 MG/DL (ref 2.7–4.5)
PLATELET # BLD AUTO: 223 THOUSANDS/UL (ref 149–390)
PMV BLD AUTO: 10.1 FL (ref 8.9–12.7)
POTASSIUM SERPL-SCNC: 4.1 MMOL/L (ref 3.5–5.3)
RBC # BLD AUTO: 4.01 MILLION/UL (ref 3.81–5.12)
SODIUM SERPL-SCNC: 132 MMOL/L (ref 136–145)
WBC # BLD AUTO: 6.87 THOUSAND/UL (ref 4.31–10.16)

## 2017-02-03 PROCEDURE — 83735 ASSAY OF MAGNESIUM: CPT | Performed by: SURGERY

## 2017-02-03 PROCEDURE — 80048 BASIC METABOLIC PNL TOTAL CA: CPT | Performed by: SURGERY

## 2017-02-03 PROCEDURE — 85027 COMPLETE CBC AUTOMATED: CPT | Performed by: SURGERY

## 2017-02-03 PROCEDURE — 84100 ASSAY OF PHOSPHORUS: CPT | Performed by: SURGERY

## 2017-02-03 PROCEDURE — C9113 INJ PANTOPRAZOLE SODIUM, VIA: HCPCS | Performed by: PHYSICIAN ASSISTANT

## 2017-02-03 RX ORDER — OXYCODONE HYDROCHLORIDE AND ACETAMINOPHEN 5; 325 MG/1; MG/1
2 TABLET ORAL EVERY 4 HOURS PRN
Status: DISCONTINUED | OUTPATIENT
Start: 2017-02-03 | End: 2017-02-05 | Stop reason: HOSPADM

## 2017-02-03 RX ADMIN — HYDROMORPHONE HYDROCHLORIDE 0.5 MG: 1 INJECTION, SOLUTION INTRAMUSCULAR; INTRAVENOUS; SUBCUTANEOUS at 06:23

## 2017-02-03 RX ADMIN — SODIUM CHLORIDE, SODIUM LACTATE, POTASSIUM CHLORIDE, AND CALCIUM CHLORIDE 75 ML/HR: .6; .31; .03; .02 INJECTION, SOLUTION INTRAVENOUS at 19:44

## 2017-02-03 RX ADMIN — HEPARIN SODIUM 5000 UNITS: 5000 INJECTION, SOLUTION INTRAVENOUS; SUBCUTANEOUS at 17:42

## 2017-02-03 RX ADMIN — PANTOPRAZOLE SODIUM 40 MG: 40 INJECTION, POWDER, FOR SOLUTION INTRAVENOUS at 21:31

## 2017-02-03 RX ADMIN — HYDROMORPHONE HYDROCHLORIDE 0.5 MG: 1 INJECTION, SOLUTION INTRAMUSCULAR; INTRAVENOUS; SUBCUTANEOUS at 17:34

## 2017-02-03 RX ADMIN — MAGNESIUM HYDROXIDE 30 ML: 400 SUSPENSION ORAL at 10:01

## 2017-02-03 RX ADMIN — PANTOPRAZOLE SODIUM 40 MG: 40 INJECTION, POWDER, FOR SOLUTION INTRAVENOUS at 08:31

## 2017-02-03 RX ADMIN — ONDANSETRON 4 MG: 2 INJECTION INTRAMUSCULAR; INTRAVENOUS at 17:34

## 2017-02-03 RX ADMIN — HEPARIN SODIUM 5000 UNITS: 5000 INJECTION, SOLUTION INTRAVENOUS; SUBCUTANEOUS at 06:11

## 2017-02-03 RX ADMIN — Medication: at 21:36

## 2017-02-03 RX ADMIN — SODIUM CHLORIDE, SODIUM LACTATE, POTASSIUM CHLORIDE, AND CALCIUM CHLORIDE 125 ML/HR: .6; .31; .03; .02 INJECTION, SOLUTION INTRAVENOUS at 00:17

## 2017-02-04 LAB
ANION GAP SERPL CALCULATED.3IONS-SCNC: 11 MMOL/L (ref 4–13)
BASOPHILS # BLD AUTO: 0.02 THOUSANDS/ΜL (ref 0–0.1)
BASOPHILS NFR BLD AUTO: 0 % (ref 0–1)
BUN SERPL-MCNC: 7 MG/DL (ref 5–25)
CALCIUM SERPL-MCNC: 8.6 MG/DL (ref 8.3–10.1)
CHLORIDE SERPL-SCNC: 101 MMOL/L (ref 100–108)
CO2 SERPL-SCNC: 24 MMOL/L (ref 21–32)
CREAT SERPL-MCNC: 0.57 MG/DL (ref 0.6–1.3)
EOSINOPHIL # BLD AUTO: 0.24 THOUSAND/ΜL (ref 0–0.61)
EOSINOPHIL NFR BLD AUTO: 4 % (ref 0–6)
ERYTHROCYTE [DISTWIDTH] IN BLOOD BY AUTOMATED COUNT: 12 % (ref 11.6–15.1)
GFR SERPL CREATININE-BSD FRML MDRD: >60 ML/MIN/1.73SQ M
GLUCOSE SERPL-MCNC: 86 MG/DL (ref 65–140)
HCT VFR BLD AUTO: 36.6 % (ref 34.8–46.1)
HGB BLD-MCNC: 12.6 G/DL (ref 11.5–15.4)
LYMPHOCYTES # BLD AUTO: 1.51 THOUSANDS/ΜL (ref 0.6–4.47)
LYMPHOCYTES NFR BLD AUTO: 27 % (ref 14–44)
MCH RBC QN AUTO: 30.1 PG (ref 26.8–34.3)
MCHC RBC AUTO-ENTMCNC: 34.4 G/DL (ref 31.4–37.4)
MCV RBC AUTO: 88 FL (ref 82–98)
MONOCYTES # BLD AUTO: 0.55 THOUSAND/ΜL (ref 0.17–1.22)
MONOCYTES NFR BLD AUTO: 10 % (ref 4–12)
NEUTROPHILS # BLD AUTO: 3.24 THOUSANDS/ΜL (ref 1.85–7.62)
NEUTS SEG NFR BLD AUTO: 59 % (ref 43–75)
PLATELET # BLD AUTO: 261 THOUSANDS/UL (ref 149–390)
PMV BLD AUTO: 10.2 FL (ref 8.9–12.7)
POTASSIUM SERPL-SCNC: 4.1 MMOL/L (ref 3.5–5.3)
RBC # BLD AUTO: 4.18 MILLION/UL (ref 3.81–5.12)
SODIUM SERPL-SCNC: 136 MMOL/L (ref 136–145)
WBC # BLD AUTO: 5.56 THOUSAND/UL (ref 4.31–10.16)

## 2017-02-04 PROCEDURE — C9113 INJ PANTOPRAZOLE SODIUM, VIA: HCPCS | Performed by: PHYSICIAN ASSISTANT

## 2017-02-04 PROCEDURE — 85025 COMPLETE CBC W/AUTO DIFF WBC: CPT | Performed by: PHYSICIAN ASSISTANT

## 2017-02-04 PROCEDURE — 80048 BASIC METABOLIC PNL TOTAL CA: CPT | Performed by: PHYSICIAN ASSISTANT

## 2017-02-04 RX ORDER — SODIUM CHLORIDE, SODIUM LACTATE, POTASSIUM CHLORIDE, CALCIUM CHLORIDE 600; 310; 30; 20 MG/100ML; MG/100ML; MG/100ML; MG/100ML
50 INJECTION, SOLUTION INTRAVENOUS CONTINUOUS
Status: DISCONTINUED | OUTPATIENT
Start: 2017-02-04 | End: 2017-02-05 | Stop reason: HOSPADM

## 2017-02-04 RX ADMIN — SODIUM CHLORIDE, SODIUM LACTATE, POTASSIUM CHLORIDE, AND CALCIUM CHLORIDE 50 ML/HR: .6; .31; .03; .02 INJECTION, SOLUTION INTRAVENOUS at 09:32

## 2017-02-04 RX ADMIN — PANTOPRAZOLE SODIUM 40 MG: 40 INJECTION, POWDER, FOR SOLUTION INTRAVENOUS at 09:32

## 2017-02-04 RX ADMIN — HEPARIN SODIUM 5000 UNITS: 5000 INJECTION, SOLUTION INTRAVENOUS; SUBCUTANEOUS at 06:30

## 2017-02-04 RX ADMIN — OXYCODONE AND ACETAMINOPHEN 2 TABLET: 5; 325 TABLET ORAL at 16:13

## 2017-02-04 RX ADMIN — PANTOPRAZOLE SODIUM 40 MG: 40 INJECTION, POWDER, FOR SOLUTION INTRAVENOUS at 21:27

## 2017-02-05 VITALS
HEART RATE: 70 BPM | SYSTOLIC BLOOD PRESSURE: 139 MMHG | DIASTOLIC BLOOD PRESSURE: 81 MMHG | BODY MASS INDEX: 22.43 KG/M2 | TEMPERATURE: 98.2 F | WEIGHT: 131.39 LBS | RESPIRATION RATE: 18 BRPM | OXYGEN SATURATION: 98 % | HEIGHT: 64 IN

## 2017-02-05 PROCEDURE — C9113 INJ PANTOPRAZOLE SODIUM, VIA: HCPCS | Performed by: PHYSICIAN ASSISTANT

## 2017-02-05 RX ORDER — OXYCODONE HYDROCHLORIDE AND ACETAMINOPHEN 5; 325 MG/1; MG/1
1-2 TABLET ORAL EVERY 4 HOURS PRN
Qty: 30 TABLET | Refills: 0 | Status: SHIPPED | OUTPATIENT
Start: 2017-02-05 | End: 2017-02-15

## 2017-02-05 RX ORDER — OXYCODONE HYDROCHLORIDE AND ACETAMINOPHEN 5; 325 MG/1; MG/1
1-2 TABLET ORAL EVERY 4 HOURS PRN
Qty: 30 TABLET | Refills: 0
Start: 2017-02-05 | End: 2017-02-05

## 2017-02-05 RX ADMIN — PANTOPRAZOLE SODIUM 40 MG: 40 INJECTION, POWDER, FOR SOLUTION INTRAVENOUS at 09:00

## 2017-02-05 RX ADMIN — OXYCODONE AND ACETAMINOPHEN 2 TABLET: 5; 325 TABLET ORAL at 06:01

## 2017-02-05 RX ADMIN — SODIUM CHLORIDE, SODIUM LACTATE, POTASSIUM CHLORIDE, AND CALCIUM CHLORIDE 50 ML/HR: .6; .31; .03; .02 INJECTION, SOLUTION INTRAVENOUS at 05:46

## 2017-06-15 ENCOUNTER — TRANSCRIBE ORDERS (OUTPATIENT)
Dept: LAB | Facility: HOSPITAL | Age: 32
End: 2017-06-15

## 2017-06-15 ENCOUNTER — APPOINTMENT (OUTPATIENT)
Dept: LAB | Facility: HOSPITAL | Age: 32
End: 2017-06-15
Payer: COMMERCIAL

## 2017-06-15 DIAGNOSIS — Z00.8 HEALTH EXAMINATION IN POPULATION SURVEY: Primary | ICD-10-CM

## 2017-06-15 DIAGNOSIS — Z00.8 HEALTH EXAMINATION IN POPULATION SURVEY: ICD-10-CM

## 2017-06-15 LAB
CHOLEST SERPL-MCNC: 142 MG/DL (ref 50–200)
EST. AVERAGE GLUCOSE BLD GHB EST-MCNC: 97 MG/DL
HBA1C MFR BLD: 5 % (ref 4.2–6.3)
HDLC SERPL-MCNC: 82 MG/DL (ref 40–60)
LDLC SERPL CALC-MCNC: 50 MG/DL (ref 0–100)
TRIGL SERPL-MCNC: 48 MG/DL

## 2017-06-15 PROCEDURE — 80061 LIPID PANEL: CPT

## 2017-06-15 PROCEDURE — 36415 COLL VENOUS BLD VENIPUNCTURE: CPT

## 2017-06-15 PROCEDURE — 83036 HEMOGLOBIN GLYCOSYLATED A1C: CPT

## 2017-08-01 ENCOUNTER — ALLSCRIPTS OFFICE VISIT (OUTPATIENT)
Dept: OTHER | Facility: OTHER | Age: 32
End: 2017-08-01

## 2017-08-01 ENCOUNTER — APPOINTMENT (OUTPATIENT)
Dept: LAB | Facility: MEDICAL CENTER | Age: 32
End: 2017-08-01
Payer: COMMERCIAL

## 2017-08-01 ENCOUNTER — OFFICE VISIT (OUTPATIENT)
Dept: URGENT CARE | Facility: MEDICAL CENTER | Age: 32
End: 2017-08-01
Payer: COMMERCIAL

## 2017-08-01 DIAGNOSIS — M79.10 MYALGIA: ICD-10-CM

## 2017-08-01 DIAGNOSIS — R53.83 OTHER FATIGUE: ICD-10-CM

## 2017-08-01 LAB
ALBUMIN SERPL BCP-MCNC: 4.6 G/DL (ref 3.5–5)
ALP SERPL-CCNC: 56 U/L (ref 46–116)
ALT SERPL W P-5'-P-CCNC: 28 U/L (ref 12–78)
ANION GAP SERPL CALCULATED.3IONS-SCNC: 6 MMOL/L (ref 4–13)
AST SERPL W P-5'-P-CCNC: 21 U/L (ref 5–45)
BASOPHILS # BLD AUTO: 0.03 THOUSANDS/ΜL (ref 0–0.1)
BASOPHILS NFR BLD AUTO: 1 % (ref 0–1)
BILIRUB SERPL-MCNC: 0.43 MG/DL (ref 0.2–1)
BUN SERPL-MCNC: 13 MG/DL (ref 5–25)
CALCIUM SERPL-MCNC: 9.2 MG/DL (ref 8.3–10.1)
CHLORIDE SERPL-SCNC: 106 MMOL/L (ref 100–108)
CK SERPL-CCNC: 108 U/L (ref 26–192)
CO2 SERPL-SCNC: 27 MMOL/L (ref 21–32)
CREAT SERPL-MCNC: 0.75 MG/DL (ref 0.6–1.3)
CRP SERPL QL: 10.6 MG/L
EOSINOPHIL # BLD AUTO: 0.09 THOUSAND/ΜL (ref 0–0.61)
EOSINOPHIL NFR BLD AUTO: 3 % (ref 0–6)
ERYTHROCYTE [DISTWIDTH] IN BLOOD BY AUTOMATED COUNT: 12.6 % (ref 11.6–15.1)
ERYTHROCYTE [SEDIMENTATION RATE] IN BLOOD: 13 MM/HOUR (ref 0–20)
GFR SERPL CREATININE-BSD FRML MDRD: 107 ML/MIN/1.73SQ M
GLUCOSE SERPL-MCNC: 71 MG/DL (ref 65–140)
HCT VFR BLD AUTO: 41.4 % (ref 34.8–46.1)
HGB BLD-MCNC: 13.8 G/DL (ref 11.5–15.4)
LYMPHOCYTES # BLD AUTO: 1.2 THOUSANDS/ΜL (ref 0.6–4.47)
LYMPHOCYTES NFR BLD AUTO: 33 % (ref 14–44)
MCH RBC QN AUTO: 30.3 PG (ref 26.8–34.3)
MCHC RBC AUTO-ENTMCNC: 33.3 G/DL (ref 31.4–37.4)
MCV RBC AUTO: 91 FL (ref 82–98)
MONOCYTES # BLD AUTO: 0.45 THOUSAND/ΜL (ref 0.17–1.22)
MONOCYTES NFR BLD AUTO: 12 % (ref 4–12)
NEUTROPHILS # BLD AUTO: 1.88 THOUSANDS/ΜL (ref 1.85–7.62)
NEUTS SEG NFR BLD AUTO: 51 % (ref 43–75)
NRBC BLD AUTO-RTO: 0 /100 WBCS
PLATELET # BLD AUTO: 167 THOUSANDS/UL (ref 149–390)
PMV BLD AUTO: 12.1 FL (ref 8.9–12.7)
POTASSIUM SERPL-SCNC: 4.1 MMOL/L (ref 3.5–5.3)
PROT SERPL-MCNC: 8.5 G/DL (ref 6.4–8.2)
RBC # BLD AUTO: 4.56 MILLION/UL (ref 3.81–5.12)
SODIUM SERPL-SCNC: 139 MMOL/L (ref 136–145)
TSH SERPL DL<=0.05 MIU/L-ACNC: 1.44 UIU/ML (ref 0.36–3.74)
WBC # BLD AUTO: 3.66 THOUSAND/UL (ref 4.31–10.16)

## 2017-08-01 PROCEDURE — 99203 OFFICE O/P NEW LOW 30 MIN: CPT

## 2017-08-01 PROCEDURE — 86140 C-REACTIVE PROTEIN: CPT

## 2017-08-01 PROCEDURE — 86431 RHEUMATOID FACTOR QUANT: CPT

## 2017-08-01 PROCEDURE — 86038 ANTINUCLEAR ANTIBODIES: CPT

## 2017-08-01 PROCEDURE — 36415 COLL VENOUS BLD VENIPUNCTURE: CPT

## 2017-08-01 PROCEDURE — 84443 ASSAY THYROID STIM HORMONE: CPT

## 2017-08-01 PROCEDURE — 85652 RBC SED RATE AUTOMATED: CPT

## 2017-08-01 PROCEDURE — S9088 SERVICES PROVIDED IN URGENT: HCPCS

## 2017-08-01 PROCEDURE — 86618 LYME DISEASE ANTIBODY: CPT

## 2017-08-01 PROCEDURE — 80053 COMPREHEN METABOLIC PANEL: CPT

## 2017-08-01 PROCEDURE — 82550 ASSAY OF CK (CPK): CPT

## 2017-08-01 PROCEDURE — 85025 COMPLETE CBC W/AUTO DIFF WBC: CPT

## 2017-08-01 PROCEDURE — 86430 RHEUMATOID FACTOR TEST QUAL: CPT

## 2017-08-02 LAB
B BURGDOR IGG SER IA-ACNC: 0.21
B BURGDOR IGM SER IA-ACNC: 0.26
CRYOGLOB RF SER-ACNC: ABNORMAL [IU]/ML
RHEUMATOID FACT SER QL LA: POSITIVE
RYE IGE QN: NEGATIVE

## 2017-08-03 ENCOUNTER — GENERIC CONVERSION - ENCOUNTER (OUTPATIENT)
Dept: OTHER | Facility: OTHER | Age: 32
End: 2017-08-03

## 2017-09-27 ENCOUNTER — TRANSCRIBE ORDERS (OUTPATIENT)
Dept: LAB | Facility: HOSPITAL | Age: 32
End: 2017-09-27

## 2017-09-27 ENCOUNTER — APPOINTMENT (OUTPATIENT)
Dept: LAB | Facility: HOSPITAL | Age: 32
End: 2017-09-27
Payer: COMMERCIAL

## 2017-09-27 DIAGNOSIS — M79.10 MYALGIA: Primary | ICD-10-CM

## 2017-09-27 DIAGNOSIS — M79.10 MYALGIA: ICD-10-CM

## 2017-09-27 LAB
CRP SERPL QL: <3 MG/L
ERYTHROCYTE [SEDIMENTATION RATE] IN BLOOD: 7 MM/HOUR (ref 0–20)

## 2017-09-27 PROCEDURE — 86200 CCP ANTIBODY: CPT

## 2017-09-27 PROCEDURE — 36415 COLL VENOUS BLD VENIPUNCTURE: CPT

## 2017-09-27 PROCEDURE — 85652 RBC SED RATE AUTOMATED: CPT

## 2017-09-27 PROCEDURE — 86140 C-REACTIVE PROTEIN: CPT

## 2017-09-29 LAB — CCP IGA+IGG SERPL IA-ACNC: 4 UNITS (ref 0–19)

## 2018-01-02 ENCOUNTER — ALLSCRIPTS OFFICE VISIT (OUTPATIENT)
Dept: OTHER | Facility: OTHER | Age: 33
End: 2018-01-02

## 2018-01-03 NOTE — PROGRESS NOTES
Assessment   1  Anxiety (300 00) (F41 9)    Plan   Anxiety    · BusPIRone HCl - 10 MG Oral Tablet; TAKE 1 TABLET TWICE DAILY   · Follow-up visit in 1 month Evaluation and Treatment  Follow-up  Status: Hold For -    Scheduling  Requested for: 02Jan2018   · *VB-Depression Screening; Status:Complete;   Done: [id number] 02:09PM    Discussion/Summary      Since patient has anxiety alone without depression I will avoid an SSRI and instead have her try buspirone  Will have her start 10 mg twice daily  Potential side effects discussed  If symptoms worsen, depression develops or suicidal ideation develops patient go to the ER right away  Follow-up in 1 month or sooner if needed  Possible side effects of new medications were reviewed with the patient/guardian today  The treatment plan was reviewed with the patient/guardian  The patient/guardian understands and agrees with the treatment plan      Chief Complaint   The patient is here today for a follow up for anxiety  History of Present Illness   Patient presents with complaint of anxiety  States she gets symptoms a couple times per week  Symptoms will include being very anxious at home and then the anxiety will lead to her yelling at her child in appropriately  This last episode was yesterday  Symptoms have been happening for about 8 years now  Patient was placed on Lexapro in the past but it caused nausea  States she was on Zoloft which did help but caused sexual side effects so she stopped it on her own  Would like to start medication to help keep her symptoms controlled  Denies any depression  Has no suicidal ideation  No loss of pleasure in doing things  Review of Systems        Constitutional: no fever  Cardiovascular: no chest pain  Respiratory: no shortness of breath  Over the past 2 weeks, how often have you been bothered by the following problems? 1 ) Little interest or pleasure in doing things?  Not at all       2 ) Feeling down, depressed or hopeless? 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 2)   2  History of Fibroadenoma of right breast (217) (D24 1)   3  History of  1   4  History of acute renal failure (V13 09) (Z87 448)   5  History of acute tubular necrosis (V13 09) (Z87 448)   6  History of Postpartum hemorrhage, unspecified type (666 10) (O72 1)   7  History of Vacuum extractor delivery, delivered (669 51) (O66 5)     The active problems and past medical history were reviewed and updated today  Surgical History   1  History of Cryosurgical Ablation Of Fibroadenoma   2  History of Exploratory Laparotomy   3  History of Gynecologic Services Intrauterine Device (IUD) Insertion   4  History of Left Breast Lumpectomy   5  History of Oral Surgery Tooth Extraction   6  History of Right Breast Lumpectomy    Family History   Mother    1  Family history of Diverticulitis   2  Family history of hyperlipidemia (V18 19) (Z83 49)   3  Family history of Hypertension   4  Family history of Hypothyroidism   5  Family history of Urinary incontinence  Father    6  Family history of Hepatitis A  Maternal Grandfather    7  Family history of Diverticulitis   8  Family history of cerebrovascular accident (CVA) (V17 1) (Z82 3)   9  Family history of Heart disease   10  Family history of Hypertension  Paternal Grandfather    6   Family history of IDDM (insulin dependent diabetes mellitus)    Social History · Always uses seat belt   · Currently sexually active   · Drinks beer (V49 89) (Z78 9)   · Drinks coffee   · Drinks wine (V49 89) (Z78 9)   · IUD contraception (V45 51) (Z97 5)   · Lack of exercise (V69 0) (Z72 3)   ·    · Never a smoker   · No drug use   · Social alcohol use (Z78 9)    Current Meds    1  Mirena (52 MG) 20 MCG/24HR Intrauterine Intrauterine Device; Therapy: ([phone]) to Recorded     The medication list was reviewed and updated today  Allergies   1  No Known Drug Allergies  2  Nickel    Vitals   Vital Signs    Recorded: 02EMN1175 01:45PM   Heart Rate 64   Respiration 16   Systolic 611   Diastolic 64   Weight 774 lb 5 oz   BMI Calculated 23 74   BSA Calculated 1 67     Physical Exam        Constitutional      General appearance: No acute distress, well appearing and well nourished  Psychiatric      Orientation to person, place, and time: Normal        Mood and affect: Normal           Future Appointments      Date/Time Provider Specialty Site   08/02/2018 09:00 AM Jodi Gil DO Family Medicine [facility]   01/24/2018 02:00 PM CARLTON Proctor   Obstetrics/Gynecology [facility]     Signatures    Electronically signed by : Umair Guevara DO; Jan 2 2018  2:11PM EST                       (Author)

## 2018-01-09 NOTE — RESULT NOTES
Verified Results  (1) CBC/PLT/DIFF [id number] 02:03PM Estrella Gambino Order Number: [id number]     Test Name Result Flag Reference   WBC COUNT 3 66 Thousand/uL L 4 31-10 16   RBC COUNT 4 56 Million/uL  3 81-5 12   HEMOGLOBIN 13 8 g/dL  11 5-15 4   HEMATOCRIT 41 4 %  34 8-46  1   MCV 91 fL  82-98   MCH 30 3 pg  26 8-34 3   MCHC 33 3 g/dL  31 4-37 4   RDW 12 6 %  11 6-15 1   MPV 12 1 fL  8 9-12 7   PLATELET COUNT 903 Thousands/uL  149-390   nRBC AUTOMATED 0 /100 WBCs     NEUTROPHILS RELATIVE PERCENT 51 %  43-75   LYMPHOCYTES RELATIVE PERCENT 33 %  14-44   MONOCYTES RELATIVE PERCENT 12 %  4-12   EOSINOPHILS RELATIVE PERCENT 3 %  0-6   BASOPHILS RELATIVE PERCENT 1 %  0-1   NEUTROPHILS ABSOLUTE COUNT 1 88 Thousands/? ??L  1 85-7 62   LYMPHOCYTES ABSOLUTE COUNT 1 20 Thousands/? ??L  0 60-4 47   MONOCYTES ABSOLUTE COUNT 0 45 Thousand/? ??L  0 17-1 22   EOSINOPHILS ABSOLUTE COUNT 0 09 Thousand/? ??L  0 00-0 61   BASOPHILS ABSOLUTE COUNT 0 03 Thousands/? ??L  0 00-0 10     (1) COMPREHENSIVE METABOLIC PANEL 67DOJ3060 98:81AM Estrella Gambino Order Number: [id number]     Test Name Result Flag Reference   GLUCOSE,RANDM 71 mg/dL     If the patient is fasting, the ADA then defines impaired fasting glucose as > 100 mg/dL and diabetes as > or equal to 123 mg/dL     SODIUM 139 mmol/L  136-145   POTASSIUM 4 1 mmol/L  3 5-5 3   CHLORIDE 106 mmol/L  100-108   CARBON DIOXIDE 27 mmol/L  21-32   ANION GAP (CALC) 6 mmol/L  4-13   BLOOD UREA NITROGEN 13 mg/dL  5-25   CREATININE 0 75 mg/dL  0 60-1 30   Standardized to IDMS reference method   CALCIUM 9 2 mg/dL  8 3-10 1   BILI, TOTAL 0 43 mg/dL  0 20-1 00   ALK PHOSPHATAS 56 U/L     ALT (SGPT) 28 U/L  12-78   AST(SGOT) 21 U/L  5-45   ALBUMIN 4 6 g/dL  3 5-5 0   TOTAL PROTEIN 8 5 g/dL H 6 4-8 2   eGFR 107 ml/min/1 73sq m     National Kidney Disease Education Program recommendations are as follows:  GFR calculation is accurate only with a steady state creatinine  Chronic Kidney disease less than 60 ml/min/1 73 sq  meters  Kidney failure less than 15 ml/min/1 73 sq  meters  (1) TSH WITH FT4 REFLEX 01Aug2017 02:03PM Strobe Order Number: [id number]     Test Name Result Flag Reference   TSH 1 440 uIU/mL  0 358-3 740   Patients undergoing fluorescein dye angiography may retain small amounts of fluorescein in the body for 48-72 hours post procedure  Samples containing fluorescein can produce falsely depressed TSH values  If the patient had this procedure,a specimen should be resubmitted post fluorescein clearance  The recommended reference ranges for TSH during pregnancy are as follows:  First trimester 0 1 to 2 5 uIU/mL  Second trimester  0 2 to 3 0 uIU/mL  Third trimester 0 3 to 3 0 uIU/m     (1) LYME ANTIBODY PROFILE W/REFLEX TO WESTERN BLOT 01Aug2017 02:03PM [facility] Order Number: [id number]     Test Name Result Flag Reference   LYME IGG 0 21  0 00-0 79   NEGATIVE(0 00-0 79)-Absence of detectable Borrelia IgG Antibodies  A negative result does not exclude the possibility of Borrelia infection  If early Lyme disease is suspected,a second sample should be collected & tested 4 weeks after initial testing  LYME IGM 0 26  0 00-0 79   NEGATIVE (0 00-0 79)-Absence of detectable Borrelia IgM antibodies  A negative result does not exclude the possibility of Borrelia infection   If early lyme disease is suspected, a second sample should be collected & tested 4 weeks after initial testing      (1) CK (CPK) 01Aug2017 02:03PM [facility] Order Number: [id number]     Test Name Result Flag Reference   CK (CPK) 108 U/L       (1) SED RATE 01Aug2017 02:03PM Sapna Muscat Order Number: [id number]     Test Name Result Flag Reference   SED RATE 13 mm/hour  0-20     (1) C-REACTIVE PROTEIN 01Aug2017 02:03PM [facility] Order Number: [id number]     Test Name Result Flag Reference C-REACT PROTEIN 10 6 mg/L H <3 0     (1) RHEUMATOID FACTOR SCREEN 25Gaz3213 02:03PM [facility] Order Number: [id number]     Test Name Result Flag Reference   RHEUMATOID FACTOR Positive A Negative   See RF Quantitation   RF QUANTITATION 10 IU/mL A (none)     (1) DENITA SCREEN W/REFLEX TO TITER/PATTERN 79Weh6368 02:03PM [facility] Order Number: [id number]     Test Name Result Flag Reference   DENITA SCREEN   Negative  Negative

## 2018-01-13 VITALS
BODY MASS INDEX: 21.51 KG/M2 | WEIGHT: 126 LBS | SYSTOLIC BLOOD PRESSURE: 120 MMHG | DIASTOLIC BLOOD PRESSURE: 74 MMHG | HEIGHT: 64 IN

## 2018-01-14 VITALS
TEMPERATURE: 98.4 F | RESPIRATION RATE: 14 BRPM | WEIGHT: 129.13 LBS | SYSTOLIC BLOOD PRESSURE: 106 MMHG | HEART RATE: 78 BPM | BODY MASS INDEX: 22.04 KG/M2 | HEIGHT: 64 IN | DIASTOLIC BLOOD PRESSURE: 68 MMHG

## 2018-01-18 NOTE — MISCELLANEOUS
Message   Recorded as Task   Date: 04/11/2016 10:12 AM, Created By: Tonia Armando   Task Name: Call Back   Assigned To: Palak Johns   Regarding Patient: Dianna Marie, Status: In Progress   Comment:    Kathleen Salinas - 11 Apr 2016 10:12 AM     TASK CREATED  Caller: Self; [phone] (Home); [phone] (Work)  HAVING SOME VAGINAL PAIN FROM DELIVERY IN [place], [place]  [address] TO USE  [phone]   Rupa Dear - 11 Apr 2016 11:11 AM     TASK IN PROGRESS   Rupa Dear - 11 Apr 2016 11:17 AM     TASK EDITED  Pt said the same area that hurt at annual visit - gets cracked and bleeds  Clobetesol no help and really burns  You also suggested estrogen cream??   Yolie Ron - 11 Apr 2016 7:45 PM     TASK REPLIED TO: Previously Assigned To Yolie Ron  Estrace cream   Can have her  a sample tube to start with and then ERx over to [place] if needed  Would start nightly for two weeks and then twice weekly for at least a month  Hopefully will be better at that point  Otherwise, may need a biopsy  Saritha Muller - 12 Apr 2016 8:09 AM     TASK EDITED  spoke with pt    left 3 sample boxes of estrace cream for pt to try    instructions give    she wcb when to send rx [facility]        Active Problems    1  Acne (706 1) (L70 9)   2  Acute kidney failure (584 9) (N17 9)   3  ATN (acute tubular necrosis) (584 5) (N17 0)   4  Dysuria (788 1) (R30 0)   5  Encounter for gynecological examination without abnormal finding (V72 31) (Z01 419)   6  Hernia, hiatal (553 3) (K44 9)   7  IUD contraception (V45 51) (Z97 5)   8  Migraine (346 90) (G43 909)   9  Vulvar itching (698 1) (L29 2)    Current Meds   1  Clobetasol Propionate 0 05 % External Ointment; APPLY AND GENTLY MASSAGE INTO   AFFECTED AREA(S) TWICE DAILY FOR TWO WEEKS; Therapy: 40VRE1760 to (Last Rx:43Jpk1284) Ordered    Allergies    1  No Known Drug Allergies    2  No Known Environmental Allergies   3   No Known Food Allergies    Signatures   Electronically signed by : Barron Ray, ; Apr 12 2016  8:09AM EST                       (Author)

## 2018-01-23 VITALS
SYSTOLIC BLOOD PRESSURE: 118 MMHG | BODY MASS INDEX: 23.74 KG/M2 | HEART RATE: 64 BPM | DIASTOLIC BLOOD PRESSURE: 64 MMHG | RESPIRATION RATE: 16 BRPM | WEIGHT: 138.31 LBS

## 2018-01-24 ENCOUNTER — OFFICE VISIT (OUTPATIENT)
Dept: OBGYN CLINIC | Facility: CLINIC | Age: 33
End: 2018-01-24
Payer: COMMERCIAL

## 2018-01-24 VITALS
HEIGHT: 64 IN | WEIGHT: 135 LBS | SYSTOLIC BLOOD PRESSURE: 120 MMHG | DIASTOLIC BLOOD PRESSURE: 80 MMHG | BODY MASS INDEX: 23.05 KG/M2

## 2018-01-24 DIAGNOSIS — Z01.419 ENCNTR FOR GYN EXAM (GENERAL) (ROUTINE) W/O ABN FINDINGS: Primary | ICD-10-CM

## 2018-01-24 PROBLEM — Z97.5 IUD (INTRAUTERINE DEVICE) IN PLACE: Chronic | Status: ACTIVE | Noted: 2018-01-24

## 2018-01-24 PROCEDURE — 99395 PREV VISIT EST AGE 18-39: CPT | Performed by: OBSTETRICS & GYNECOLOGY

## 2018-01-24 RX ORDER — SERTRALINE HYDROCHLORIDE 25 MG/1
50 TABLET, FILM COATED ORAL DAILY
COMMUNITY
End: 2018-02-09

## 2018-01-24 RX ORDER — SERTRALINE HYDROCHLORIDE 25 MG/1
1 TABLET, FILM COATED ORAL DAILY
COMMUNITY
Start: 2018-01-15 | End: 2018-05-04 | Stop reason: SDUPTHER

## 2018-01-24 RX ORDER — DOXYCYCLINE HYCLATE 50 MG/1
TABLET, FILM COATED ORAL
COMMUNITY
End: 2018-07-18 | Stop reason: ALTCHOICE

## 2018-01-24 NOTE — PROGRESS NOTES
Assessment/Plan:    Encntr for gyn exam (general) (routine) w/o abn findings  1- Patient happy with Mirena - will call if desires removal for pregnancy  2- Return for annual or prn      Subjective:      Patient ID: Alfredo Healy is a 33 y o  female  Patient returns for annual exam   She has been well since her last visit  She is with the same partner as last year-they just moved in together  They are not yet ready to consider pregnancy  She does have spotting after intercourse from her Mirena but otherwise does not have any bleeding  She is happy to continue use of her Mirena  She has no STD concerns  Gynecologic Exam   The patient's pertinent negatives include no genital itching, genital lesions, genital odor, genital rash, missed menses, pelvic pain, vaginal bleeding or vaginal discharge  Pertinent negatives include no abdominal pain, constipation, diarrhea, dysuria, frequency, hematuria or urgency  Past Medical History:   Diagnosis Date    Acute renal failure (HCC)     [id number] RESOLVED    Acute tubular necrosis (HCC)     Fibroadenoma of breast, left     Fibroadenoma of breast, right      1 para 1     Postpartum hemorrhage     UNSPECIFIED TYPE  RESOLVED     Vacuum extractor delivery, delivered      Past Surgical History:   Procedure Laterality Date    ABDOMINAL SURGERY      retroperitonial hematoma    BREAST FIBROADENOMA SURGERY      BREAST LUMPECTOMY      BREAST LUMPECTOMY Right     EXPLORATORY LAPAROTOMY W/ BOWEL RESECTION N/A 2017    Procedure: LAPAROTOMY EXPLORATORY W/ lysis of adhesions;  Surgeon: Stacy Walton DO;  [facility];  Service:     INTRAUTERINE DEVICE INSERTION      LAPAROTOMY      EXPLORATORY    MOUTH SURGERY       Social History     Social History    Marital status:      Spouse name: N/A    Number of children: N/A    Years of education: N/A     Occupational History    Not on file       Social History Main Topics    Smoking status: Never Smoker    Smokeless tobacco: Not on file    Alcohol use 3 6 oz/week     6 Glasses of wine per week    Drug use: No    Sexual activity: Not on file     Other Topics Concern    Not on file     Social History Narrative    No narrative on file     Family History   Problem Relation Age of Onset    Hypertension Mother      URINARY INCONTINENCE    Hyperlipidemia Mother     Diverticulitis Mother     Hyperthyroidism Mother     Hepatitis Father      A    Diverticulitis Maternal Grandmother     Stroke Maternal Grandfather     Heart disease Maternal Grandfather     Diabetes Paternal Grandfather      INSULIN DEPENDENT         Review of Systems   Gastrointestinal: Negative for abdominal distention, abdominal pain, constipation and diarrhea  Genitourinary: Negative for dysuria, frequency, hematuria, missed menses, pelvic pain, urgency and vaginal discharge  Objective:     Physical Exam   Constitutional: She appears well-developed and well-nourished  Neck: No thyromegaly present  Pulmonary/Chest: Right breast exhibits no inverted nipple, no mass, no nipple discharge, no skin change and no tenderness  Left breast exhibits no inverted nipple, no mass, no nipple discharge, no skin change and no tenderness  Breasts are symmetrical    Abdominal: She exhibits no distension  There is no tenderness  Genitourinary: Vagina normal and uterus normal  There is no rash, tenderness or lesion on the right labia  There is no rash, tenderness or lesion on the left labia  Uterus is not enlarged, not fixed and not tender  Cervix exhibits no motion tenderness and no discharge  Right adnexum displays no mass, no tenderness and no fullness  Left adnexum displays no mass, no tenderness and no fullness  No bleeding in the vagina  No vaginal discharge found  Lymphadenopathy:     She has no cervical adenopathy  Right: No inguinal adenopathy present  Left: No inguinal adenopathy present

## 2018-01-24 NOTE — ASSESSMENT & PLAN NOTE
1- Patient happy with Mirena - will call if desires removal for pregnancy  2- Return for annual or prn

## 2018-02-09 ENCOUNTER — OFFICE VISIT (OUTPATIENT)
Dept: FAMILY MEDICINE CLINIC | Facility: MEDICAL CENTER | Age: 33
End: 2018-02-09
Payer: COMMERCIAL

## 2018-02-09 VITALS
RESPIRATION RATE: 16 BRPM | SYSTOLIC BLOOD PRESSURE: 110 MMHG | BODY MASS INDEX: 23.52 KG/M2 | HEART RATE: 64 BPM | WEIGHT: 137 LBS | DIASTOLIC BLOOD PRESSURE: 70 MMHG

## 2018-02-09 DIAGNOSIS — F41.9 ANXIETY: Primary | ICD-10-CM

## 2018-02-09 PROBLEM — R76.8 ELEVATED RHEUMATOID FACTOR: Status: ACTIVE | Noted: 2017-08-03

## 2018-02-09 PROBLEM — M79.10 MYALGIA: Status: ACTIVE | Noted: 2017-08-01

## 2018-02-09 PROBLEM — L70.9 ACNE: Status: ACTIVE | Noted: 2017-08-01

## 2018-02-09 PROBLEM — R79.82 CRP ELEVATED: Status: ACTIVE | Noted: 2017-08-03

## 2018-02-09 PROBLEM — R53.83 FATIGUE: Status: ACTIVE | Noted: 2017-08-01

## 2018-02-09 PROCEDURE — 99213 OFFICE O/P EST LOW 20 MIN: CPT | Performed by: FAMILY MEDICINE

## 2018-02-09 NOTE — PROGRESS NOTES
Assessment/Plan:    No problem-specific Assessment & Plan notes found for this encounter  Diagnoses and all orders for this visit:    Anxiety  Appears to be well controlled  Continue current dose of sertraline for now  When patient is ready I encouraged her to increase to 50 milligrams daily to see if it helps control her symptoms better  If she does this she is to call the office to let us know so we can change in the med module  Follow-up as previously scheduled or sooner if needed  Subjective:      Patient ID: Demetri Del Cid is a 33 y o  female  Patient presents for follow-up of anxiety  Was started on buspirone at her last office visit but did not tolerate the medication well  Has since been switched to sertraline 25 milligrams at bedtime  States she has had some side effects but they have slowly improved  She does not she is sleeping better when she takes the medication and that her anxiety has improved  Believes she would benefit from a higher dose but does not want to increase at this time  Does not feel she needs counseling  Denies any symptoms of depression  Denies suicidal ideation  The following portions of the patient's history were reviewed and updated as appropriate: allergies, current medications, past family history, past medical history, past social history, past surgical history and problem list     Review of Systems   Constitutional: Negative for fever  Respiratory: Negative for shortness of breath  Cardiovascular: Negative for chest pain  Objective:    Vitals:    02/09/18 1626   BP: 110/70   Pulse: 64   Resp: 16        Physical Exam   Constitutional: She appears well-developed and well-nourished  Psychiatric: She has a normal mood and affect   Her behavior is normal  Judgment and thought content normal

## 2018-05-04 DIAGNOSIS — F41.9 ANXIETY: Primary | ICD-10-CM

## 2018-05-04 NOTE — TELEPHONE ENCOUNTER
Patient left a vm that you had increased her sertraline to 50mg daily at her last appt  She had been taking 2 25mg tabs but is now out, can you send in a new rx for the 50mg to [facility] in [place]

## 2018-07-03 ENCOUNTER — TRANSCRIBE ORDERS (OUTPATIENT)
Dept: LAB | Facility: HOSPITAL | Age: 33
End: 2018-07-03

## 2018-07-03 ENCOUNTER — APPOINTMENT (OUTPATIENT)
Dept: LAB | Facility: HOSPITAL | Age: 33
End: 2018-07-03
Payer: COMMERCIAL

## 2018-07-03 DIAGNOSIS — Z00.8 HEALTH EXAMINATION IN POPULATION SURVEY: ICD-10-CM

## 2018-07-03 DIAGNOSIS — Z00.8 HEALTH EXAMINATION IN POPULATION SURVEY: Primary | ICD-10-CM

## 2018-07-03 LAB
CHOLEST SERPL-MCNC: 154 MG/DL (ref 50–200)
EST. AVERAGE GLUCOSE BLD GHB EST-MCNC: 91 MG/DL
HBA1C MFR BLD: 4.8 % (ref 4.2–6.3)
HDLC SERPL-MCNC: 95 MG/DL (ref 40–60)
LDLC SERPL CALC-MCNC: 47 MG/DL (ref 0–100)
NONHDLC SERPL-MCNC: 59 MG/DL
TRIGL SERPL-MCNC: 58 MG/DL

## 2018-07-03 PROCEDURE — 83036 HEMOGLOBIN GLYCOSYLATED A1C: CPT

## 2018-07-03 PROCEDURE — 36415 COLL VENOUS BLD VENIPUNCTURE: CPT

## 2018-07-03 PROCEDURE — 80061 LIPID PANEL: CPT

## 2018-07-18 ENCOUNTER — OFFICE VISIT (OUTPATIENT)
Dept: FAMILY MEDICINE CLINIC | Facility: MEDICAL CENTER | Age: 33
End: 2018-07-18
Payer: COMMERCIAL

## 2018-07-18 VITALS
DIASTOLIC BLOOD PRESSURE: 82 MMHG | SYSTOLIC BLOOD PRESSURE: 122 MMHG | RESPIRATION RATE: 16 BRPM | HEART RATE: 68 BPM | BODY MASS INDEX: 24.17 KG/M2 | WEIGHT: 140.8 LBS

## 2018-07-18 DIAGNOSIS — Z00.00 PHYSICAL EXAM, ANNUAL: Primary | ICD-10-CM

## 2018-07-18 PROBLEM — M79.10 MYALGIA: Status: RESOLVED | Noted: 2017-08-01 | Resolved: 2018-07-18

## 2018-07-18 PROBLEM — R53.83 FATIGUE: Status: RESOLVED | Noted: 2017-08-01 | Resolved: 2018-07-18

## 2018-07-18 PROBLEM — R79.82 CRP ELEVATED: Status: RESOLVED | Noted: 2017-08-03 | Resolved: 2018-07-18

## 2018-07-18 PROBLEM — D72.829 LEUKOCYTOSIS: Status: RESOLVED | Noted: 2017-01-27 | Resolved: 2018-07-18

## 2018-07-18 PROBLEM — R10.9 ABDOMINAL PAIN: Status: RESOLVED | Noted: 2017-01-27 | Resolved: 2018-07-18

## 2018-07-18 PROBLEM — K56.609 BOWEL OBSTRUCTION (HCC): Status: RESOLVED | Noted: 2017-01-31 | Resolved: 2018-07-18

## 2018-07-18 PROCEDURE — 99395 PREV VISIT EST AGE 18-39: CPT | Performed by: FAMILY MEDICINE

## 2018-07-18 NOTE — PROGRESS NOTES
Assessment/Plan:    No problem-specific Assessment & Plan notes found for this encounter  Diagnoses and all orders for this visit:    Physical exam, annual  Patient appears to be doing well overall  Continuation of healthy lifestyle encouraged  I did review patient's labs that she had done including her lipid profile and A1c and both were nice and normal   No additional blood work at this time  I recommended continued follow-up with Gynecology  Follow-up in six months or sooner if needed  Subjective:      Patient ID: Celestine Schilder is a 33 y o  female  Presents for her [facility] wellness examination  She did have her labs done already for the wellness program     She is a nonsmoker  She drinks alcohol socially  She denies drug use  She does have a gynecologist and has an IUD for contraception  She offers no acute concerns today  The following portions of the patient's history were reviewed and updated as appropriate: allergies, current medications, past family history, past medical history, past social history, past surgical history and problem list     Review of Systems   Constitutional: Negative for fever  Respiratory: Negative for shortness of breath  Cardiovascular: Negative for chest pain  Gastrointestinal: Negative for abdominal pain and blood in stool  Genitourinary: Negative for dysuria  Musculoskeletal: Negative for arthralgias and myalgias  Skin: Negative for rash  Neurological: Negative for headaches  Objective:      /82 (BP Location: Left arm, Patient Position: Sitting, Cuff Size: Large)   Pulse 68   Resp 16   Wt 63 9 kg (140 lb 12 8 oz)   BMI 24 17 kg/m²          Physical Exam   Constitutional: She is oriented to person, place, and time  Vital signs are normal  She appears well-developed and well-nourished  HENT:   Head: Normocephalic and atraumatic     Right Ear: Tympanic membrane, external ear and ear canal normal  Left Ear: Tympanic membrane, external ear and ear canal normal    Nose: Nose normal    Mouth/Throat: Uvula is midline, oropharynx is clear and moist and mucous membranes are normal    Eyes: Conjunctivae, EOM and lids are normal  Pupils are equal, round, and reactive to light  Neck: Trachea normal  Neck supple  No thyromegaly present  Cardiovascular: Normal rate, regular rhythm, S1 normal and S2 normal     No murmur heard  Pulmonary/Chest: Effort normal and breath sounds normal    Abdominal: Soft  Bowel sounds are normal  There is no hepatosplenomegaly  There is no tenderness  Lymphadenopathy:     She has no cervical adenopathy  Neurological: She is alert and oriented to person, place, and time  Skin: Skin is warm and dry  Psychiatric: She has a normal mood and affect   Her speech is normal and behavior is normal  Judgment and thought content normal  Cognition and memory are normal

## 2019-01-03 DIAGNOSIS — F41.9 ANXIETY: ICD-10-CM

## 2019-01-30 ENCOUNTER — OFFICE VISIT (OUTPATIENT)
Dept: FAMILY MEDICINE CLINIC | Facility: MEDICAL CENTER | Age: 34
End: 2019-01-30
Payer: COMMERCIAL

## 2019-01-30 ENCOUNTER — APPOINTMENT (OUTPATIENT)
Dept: LAB | Facility: MEDICAL CENTER | Age: 34
End: 2019-01-30
Payer: COMMERCIAL

## 2019-01-30 VITALS
SYSTOLIC BLOOD PRESSURE: 118 MMHG | RESPIRATION RATE: 14 BRPM | HEART RATE: 64 BPM | WEIGHT: 149.38 LBS | BODY MASS INDEX: 25.64 KG/M2 | DIASTOLIC BLOOD PRESSURE: 70 MMHG

## 2019-01-30 DIAGNOSIS — R63.5 WEIGHT GAIN: ICD-10-CM

## 2019-01-30 DIAGNOSIS — F41.9 ANXIETY: Primary | ICD-10-CM

## 2019-01-30 LAB
T4 FREE SERPL-MCNC: 0.77 NG/DL (ref 0.76–1.46)
TSH SERPL DL<=0.05 MIU/L-ACNC: 2.21 UIU/ML (ref 0.36–3.74)

## 2019-01-30 PROCEDURE — 86376 MICROSOMAL ANTIBODY EACH: CPT

## 2019-01-30 PROCEDURE — 36415 COLL VENOUS BLD VENIPUNCTURE: CPT

## 2019-01-30 PROCEDURE — 84443 ASSAY THYROID STIM HORMONE: CPT

## 2019-01-30 PROCEDURE — 99214 OFFICE O/P EST MOD 30 MIN: CPT | Performed by: FAMILY MEDICINE

## 2019-01-30 PROCEDURE — 86800 THYROGLOBULIN ANTIBODY: CPT

## 2019-01-30 PROCEDURE — 84439 ASSAY OF FREE THYROXINE: CPT

## 2019-01-30 NOTE — PROGRESS NOTES
Assessment/Plan:    No problem-specific Assessment & Plan notes found for this encounter  Diagnoses and all orders for this visit:    Anxiety    Weight gain  -     TSH, 3rd generation; Future  -     T4, free; Future  -     Thyroid Antibodies Panel; Future    Anxiety is currently well controlled as patient is taking Zoloft 50 mg daily  She wishes to stop the medication due to desiring pregnancy later in the year once she is   She also believes the medication has caused weight gain  I agree we should stop the medication and provided patient with tapering instructions  Starting next week she will takes Zoloft every other day for one week, then she will take Zoloft every two days for one week, then she will take Zoloft every three days for one week and then stop  If anxiety does return patient is to call the office and we will determine at that time if medication needs to be restarted or alternative treatment needs to be initiated  Stopping her Zoloft will likely help with weight loss however I did recommend checking thyroid function to make sure this is not hypothyroidism causing her weight gain  Patient was in agreement and lab orders were placed  Follow-up in six months for a physical exam or sooner if needed  Subjective:      Patient ID: Evan Chavira is a 29 y o  female  Patient presents for follow-up  She has anxiety  Anxiety is currently treated with Zoloft 50 mg daily  Patient believes the stress of work and the stress of being a single mom did create her anxiety however Zoloft is helping to control her anxiety very well  No side effects from the medication such as dizziness or lightheadedness  No associated anxiety symptoms such as depression or insomnia  Patient has had weight gain which she believes is related to starting Zoloft  She will be getting  this coming June and wishes to start having children with her  to be soon after marrying    She is questioning whether she should continue Zoloft or stop taking the medication  She does have an appointment with her obstetrician later today  Would rather stop the medication so as to not have problems with the baby and to help her lose weight  The following portions of the patient's history were reviewed and updated as appropriate:   She   Patient Active Problem List    Diagnosis Date Noted    Encntr for gyn exam (general) (routine) w/o abn findings 01/24/2018    IUD (intrauterine device) in place 01/24/2018    Anxiety 01/02/2018    Elevated rheumatoid factor 08/03/2017    Acne 08/01/2017    Scar 10/17/2016    Hernia, hiatal 11/13/2014    Migraine 11/13/2014     Current Outpatient Prescriptions   Medication Sig Dispense Refill    levonorgestrel (MIRENA, 52 MG,) 20 MCG/24HR IUD by Intrauterine route       No current facility-administered medications for this visit  She is allergic to nickel       Review of Systems   Constitutional: Negative for fever  Respiratory: Negative for shortness of breath  Cardiovascular: Negative for chest pain  Objective:      /70   Pulse 64   Resp 14   Wt 67 8 kg (149 lb 6 oz)   BMI 25 64 kg/m²          Physical Exam   Constitutional: She appears well-developed and well-nourished  Cardiovascular: Normal rate, regular rhythm and normal heart sounds      Pulmonary/Chest: Effort normal and breath sounds normal

## 2019-01-31 LAB
THYROGLOB AB SERPL-ACNC: <1 IU/ML (ref 0–0.9)
THYROPEROXIDASE AB SERPL-ACNC: 8 IU/ML (ref 0–34)

## 2019-02-07 ENCOUNTER — ANNUAL EXAM (OUTPATIENT)
Dept: OBGYN CLINIC | Facility: CLINIC | Age: 34
End: 2019-02-07
Payer: COMMERCIAL

## 2019-02-07 VITALS
HEIGHT: 64 IN | WEIGHT: 148 LBS | SYSTOLIC BLOOD PRESSURE: 128 MMHG | BODY MASS INDEX: 25.27 KG/M2 | DIASTOLIC BLOOD PRESSURE: 90 MMHG

## 2019-02-07 DIAGNOSIS — Z01.419 ENCNTR FOR GYN EXAM (GENERAL) (ROUTINE) W/O ABN FINDINGS: Primary | ICD-10-CM

## 2019-02-07 PROCEDURE — 99395 PREV VISIT EST AGE 18-39: CPT | Performed by: OBSTETRICS & GYNECOLOGY

## 2019-02-07 NOTE — PROGRESS NOTES
Evan Chavira  1985      CC:  Yearly exam    S:  29 y o  female here for yearly exam  Her cycles are irregular, very light spotting for just a day or two since Mirena  She is sexually active  She uses Mirena for contraception  She is getting  in  and will plan on trying for children soon after  She is aware that her cruise may put her into contact with [place] - will check CDC page for recommendations when she returns  Aware that there is a rapid return to fertility with Mirena removal and to wait until ready to conceive  She has come off Zoloft in preparation for this to minimize what is in her system during pregnancy  She believes she had postpartum depression and may need to restart postpartum  Discussed how we better monitor for this now  She is aware that her last delivery may have been an AFE and if it is that there are other women who have had successful pregnancies without bleeding complications  She is interested in an elective primary  to avoid prolonged induction as she feels that this may have contributed to her bleeding  Dr Dolores Salas did her SBO surgery - I would want him to weight in on whether he is concerned about a risk of bowel injury with elective primary  She would want to reuse her midline vertical incision which would no doubt be surgery's preference  Last Pap 2017 - normal/negative HPV      Current Outpatient Prescriptions:     levonorgestrel (MIRENA, 52 MG,) 20 MCG/24HR IUD, by Intrauterine route, Disp: , Rfl:   Social History     Social History    Marital status:      Spouse name: N/A    Number of children: N/A    Years of education: N/A     Occupational History    Not on file       Social History Main Topics    Smoking status: Never Smoker    Smokeless tobacco: Never Used    Alcohol use 6 0 oz/week     10 Glasses of wine per week    Drug use: No    Sexual activity: Yes     Partners: Male     Birth control/ protection: IUD Other Topics Concern    Not on file     Social History Narrative    Always uses a seatbelt    Drinks Coffee - 2 cups a day    IUD Contraception - mirena    Lack of exercise     (per [employer])         Family History   Problem Relation Age of Onset    Hypertension Mother         URINARY INCONTINENCE    Hyperlipidemia Mother     Diverticulitis Mother     Hypothyroidism Mother     Hepatitis Father         A    Diverticulitis Maternal Grandmother     Stroke Maternal Grandfather         CVA    Heart disease Maternal Grandfather     Diverticulitis Maternal Grandfather     Hypertension Maternal Grandfather     Diabetes Paternal Grandfather         INSULIN DEPENDENT    No Known Problems Sister     No Known Problems Brother       Past Medical History:   Diagnosis Date    Acute renal failure ([facility] [address] )     [id number] RESOLVED    Acute tubular necrosis (HCC)     Bowel obstruction ([facility] [address] ) 2017    Fibroadenoma of breast, left     Fibroadenoma of breast, right      1 para 1     Postpartum hemorrhage     UNSPECIFIED TYPE  RESOLVED     Vacuum extractor delivery, delivered      daughter with postpartum hemorrhage and DIC        O:  Blood pressure 128/90, height 5' 3 5" (1 613 m), weight 67 1 kg (148 lb), not currently breastfeeding  Patient appears well and is not in distress  Neck is supple without masses  Breasts are symmetrical without mass, tenderness, nipple discharge, skin changes or adenopathy  Abdomen is soft and nontender without masses  External genitals are normal without lesions or rashes  Vagina is normal without discharge or bleeding  Cervix is normal without discharge or lesion  IUD strings seen  Uterus is normal, mobile, nontender without palpable mass  Adnexa are normal, nontender, without palpable mass  A:  Yearly exam      P:   Pap due    RTO one year for yearly exam or sooner as needed

## 2019-09-04 ENCOUNTER — OFFICE VISIT (OUTPATIENT)
Dept: OBGYN CLINIC | Facility: CLINIC | Age: 34
End: 2019-09-04
Payer: COMMERCIAL

## 2019-09-04 VITALS — BODY MASS INDEX: 25.11 KG/M2 | DIASTOLIC BLOOD PRESSURE: 78 MMHG | SYSTOLIC BLOOD PRESSURE: 116 MMHG | WEIGHT: 144 LBS

## 2019-09-04 DIAGNOSIS — Z30.432 ENCOUNTER FOR IUD REMOVAL: Primary | ICD-10-CM

## 2019-09-04 PROCEDURE — 58301 REMOVE INTRAUTERINE DEVICE: CPT | Performed by: OBSTETRICS & GYNECOLOGY

## 2019-09-04 NOTE — PROGRESS NOTES
Iud removal  Date/Time: 9/4/2019 1:19 PM  Performed by: Shadi Roman MD  Authorized by: Shadi Roman MD     Consent:     Consent obtained:  Verbal    Consent given by:  Patient    Procedure risks and benefits discussed: yes      Patient questions answered: yes      Patient agrees, verbalizes understanding, and wants to proceed: yes    Procedure:     Removed with no complications: yes      Other reason for removal:  Desires fertility

## 2020-02-11 ENCOUNTER — ANNUAL EXAM (OUTPATIENT)
Dept: OBGYN CLINIC | Facility: CLINIC | Age: 35
End: 2020-02-11
Payer: COMMERCIAL

## 2020-02-11 VITALS
SYSTOLIC BLOOD PRESSURE: 144 MMHG | BODY MASS INDEX: 24.07 KG/M2 | WEIGHT: 141 LBS | HEIGHT: 64 IN | DIASTOLIC BLOOD PRESSURE: 98 MMHG

## 2020-02-11 DIAGNOSIS — Z01.419 ENCOUNTER FOR GYNECOLOGICAL EXAMINATION WITHOUT ABNORMAL FINDING: Primary | ICD-10-CM

## 2020-02-11 PROBLEM — Z97.5 IUD (INTRAUTERINE DEVICE) IN PLACE: Chronic | Status: RESOLVED | Noted: 2018-01-24 | Resolved: 2020-02-11

## 2020-02-11 PROCEDURE — 99395 PREV VISIT EST AGE 18-39: CPT | Performed by: OBSTETRICS & GYNECOLOGY

## 2020-02-11 NOTE — PROGRESS NOTES
Kami Espinoza  1985      CC:  Yearly exam    S:  36 y o  female here for yearly exam  Her cycles are regular, not heavy or crampy  She has had 4 periods since her IUD was removed - 28-29 days  She is frustrated that she is not pregnant yet but aware it could just take time  This is compounded by the fact that her ex-'s wife is pregnant  Discussed the possibility of seeing a counselor  Sexual activity: She is sexually active without pain, bleeding or dryness  Contraception: She uses nothing for contraception  On PNV  Last Pap 1/16/2017 - normal/negative HPV    We reviewed ASCCP guidelines for Pap testing today  Current Outpatient Medications:     Prenatal MV-Min-Fe Fum-FA-DHA (PRENATAL 1 PO), Take by mouth, Disp: , Rfl:   Social History     Socioeconomic History    Marital status:      Spouse name: Not on file    Number of children: Not on file    Years of education: Not on file    Highest education level: Not on file   Occupational History    Not on file   Social Needs    Financial resource strain: Not on file    Food insecurity:     Worry: Not on file     Inability: Not on file    Transportation needs:     Medical: Not on file     Non-medical: Not on file   Tobacco Use    Smoking status: Never Smoker    Smokeless tobacco: Never Used   Substance and Sexual Activity    Alcohol use:  Yes     Alcohol/week: 5 0 standard drinks     Types: 5 Glasses of wine per week    Drug use: No    Sexual activity: Yes     Partners: Male     Birth control/protection: None     Comment: trying to conceive   Lifestyle    Physical activity:     Days per week: Not on file     Minutes per session: Not on file    Stress: Not on file   Relationships    Social connections:     Talks on phone: Not on file     Gets together: Not on file     Attends [employer] service: Not on file     Active member of club or organization: Not on file     Attends meetings of clubs or organizations: Not on file     Relationship status: Not on file    Intimate partner violence:     Fear of current or ex partner: Not on file     Emotionally abused: Not on file     Physically abused: Not on file     Forced sexual activity: Not on file   Other Topics Concern    Not on file   Social History Narrative    Always uses a seatbelt    Drinks Coffee - 2 cups a day    IUD Contraception - mirena    Lack of exercise     (per [employer])     Family History   Problem Relation Age of Onset    Hypertension Mother         URINARY INCONTINENCE    Hyperlipidemia Mother     Diverticulitis Mother     Hypothyroidism Mother     Hepatitis Father         A    Diverticulitis Maternal Grandmother     Stroke Maternal Grandfather         CVA    Heart disease Maternal Grandfather     Diverticulitis Maternal Grandfather     Hypertension Maternal Grandfather     Diabetes Paternal Grandfather         INSULIN DEPENDENT    No Known Problems Sister     No Known Problems Brother       Past Medical History:   Diagnosis Date    Acute renal failure ([facility] [address] )     [id number] RESOLVED    Acute tubular necrosis ([facility] [address] )     Bowel obstruction ([facility] [address] ) 2017    Fibroadenoma of breast, left     Fibroadenoma of breast, right      1 para 1     Postpartum hemorrhage     UNSPECIFIED TYPE  RESOLVED     Vacuum extractor delivery, delivered      daughter with postpartum hemorrhage and DIC        Review of Systems   Respiratory: Negative  Cardiovascular: Negative  Gastrointestinal: Negative for constipation and diarrhea  Genitourinary: Negative for difficulty urinating, pelvic pain, vaginal bleeding, vaginal discharge, itching or odor  O:  Blood pressure 144/98, height 5' 4" (1 626 m), weight 64 kg (141 lb), last menstrual period 2020, not currently breastfeeding      Patient appears well and is not in distress  Neck is supple without masses  Breasts are symmetrical without mass, tenderness, nipple discharge, skin changes or adenopathy  Abdomen is soft and nontender without masses  External genitals are normal without lesions or rashes  Urethral meatus and urethra are normal  Bladder is normal to palpation  Vagina is normal without discharge or bleeding  Cervix is normal without discharge or lesion  Uterus is normal, mobile, nontender without palpable mass  Adnexa are normal, nontender, without palpable mass  A:  Yearly exam      P:   Pap due 2022   Encouraged PNV, timed intercourse - call with pregnancy   RTO one year for yearly exam or sooner as needed

## 2020-02-17 ENCOUNTER — OFFICE VISIT (OUTPATIENT)
Dept: FAMILY MEDICINE CLINIC | Facility: MEDICAL CENTER | Age: 35
End: 2020-02-17
Payer: COMMERCIAL

## 2020-02-17 VITALS
DIASTOLIC BLOOD PRESSURE: 100 MMHG | OXYGEN SATURATION: 98 % | BODY MASS INDEX: 24.01 KG/M2 | HEART RATE: 76 BPM | HEIGHT: 64 IN | SYSTOLIC BLOOD PRESSURE: 170 MMHG | WEIGHT: 140.6 LBS

## 2020-02-17 DIAGNOSIS — F41.9 ANXIETY: Primary | ICD-10-CM

## 2020-02-17 DIAGNOSIS — R03.0 ELEVATED BP WITHOUT DIAGNOSIS OF HYPERTENSION: ICD-10-CM

## 2020-02-17 PROCEDURE — 1036F TOBACCO NON-USER: CPT | Performed by: FAMILY MEDICINE

## 2020-02-17 PROCEDURE — 99213 OFFICE O/P EST LOW 20 MIN: CPT | Performed by: FAMILY MEDICINE

## 2020-02-17 PROCEDURE — 3008F BODY MASS INDEX DOCD: CPT | Performed by: FAMILY MEDICINE

## 2020-02-17 NOTE — PROGRESS NOTES
Assessment/Plan:       Diagnoses and all orders for this visit:    Anxiety  -     sertraline (ZOLOFT) 50 mg tablet; Take 1 tablet (50 mg total) by mouth daily    Elevated BP without diagnosis of hypertension    Other orders  -     Probiotic Product (PROBIOTIC ADVANCED PO); Take by mouth    Patient does not have a history of hypertension  I do believe her anxiety is elevated her blood pressure  Blood pressure was elevated on repeat measurement  Patient is obviously anxious on exam   I recommended restarting Zoloft and she was agreeable  Continue with counseling  Close follow-up in one month  Follow-up in one month or sooner if needed  Subjective:      Patient ID: Bonnie Mcmanus is a 36 y o  female  Patient presents with a chief complaint of anxiety  Patient states that her  are trying to conceive  They are having difficulty  Patient does have a child from another marriage however  Patient states her daughter has been visiting her father and he and his new wife have been trying to conceive as well and has been success  Patient's daughter tells her mother how she is going to be a sister and that is causing patient anxiety as she is unable to conceive currently  She believes the anxiety is rating her blood pressure which is causing morning headaches  Patient would like to restart medication for anxiety  She is currently in counseling  Denies any depression  No suicidal ideation  The following portions of the patient's history were reviewed and updated as appropriate: She  has a past medical history of Acute renal failure ([address] ), Acute tubular necrosis ([address] ), Bowel obstruction ([address] ) (2017), Fibroadenoma of breast, left, Fibroadenoma of breast, right,  1 para 1, Postpartum hemorrhage, and Vacuum extractor delivery, delivered    She   Patient Active Problem List    Diagnosis Date Noted    Anxiety 2018    Elevated rheumatoid factor 2017    Acne 2017    Scar 10/17/2016    Hernia, hiatal 11/13/2014    Migraine 11/13/2014     She  has a past surgical history that includes Abdominal surgery; Exploratory laparotomy w/ bowel resection (N/A, 1/31/2017); Breast fibroadenoma surgery (2007); LAPAROTOMY (2015); Intrauterine device insertion; Breast lumpectomy (Left); Mouth surgery; Breast lumpectomy (Right); and Tooth extraction  Her family history includes Diabetes in her paternal grandfather; Diverticulitis in her maternal grandfather, maternal grandmother, and mother; Heart disease in her maternal grandfather; Hepatitis in her father; Hyperlipidemia in her mother; Hypertension in her maternal grandfather and mother; Hypothyroidism in her mother; No Known Problems in her brother and sister; Stroke in her maternal grandfather  She  reports that she has never smoked  She has never used smokeless tobacco  She reports that she drinks about 5 0 standard drinks of alcohol per week  She reports that she does not use drugs  Current Outpatient Medications   Medication Sig Dispense Refill    Prenatal MV-Min-Fe Fum-FA-DHA (PRENATAL 1 PO) Take by mouth      Probiotic Product (PROBIOTIC ADVANCED PO) Take by mouth      sertraline (ZOLOFT) 50 mg tablet Take 1 tablet (50 mg total) by mouth daily 30 tablet 1     No current facility-administered medications for this visit  Current Outpatient Medications on File Prior to Visit   Medication Sig    Prenatal MV-Min-Fe Fum-FA-DHA (PRENATAL 1 PO) Take by mouth    Probiotic Product (PROBIOTIC ADVANCED PO) Take by mouth     No current facility-administered medications on file prior to visit  She is allergic to nickel       Review of Systems   Constitutional: Negative for fever  Respiratory: Negative for shortness of breath  Cardiovascular: Negative for chest pain           Objective:      /100 (BP Location: Left arm, Patient Position: Sitting, Cuff Size: Large)   Pulse 76   Ht 5' 4" (1 626 m)   Wt 63 8 kg (140 lb 9 6 oz) SpO2 98%   BMI 24 13 kg/m²          Physical Exam   Constitutional: She appears well-developed and well-nourished  Psychiatric: Her mood appears anxious  She does not exhibit a depressed mood

## 2020-02-19 DIAGNOSIS — R03.0 ELEVATED BP WITHOUT DIAGNOSIS OF HYPERTENSION: Primary | ICD-10-CM

## 2020-02-20 ENCOUNTER — APPOINTMENT (OUTPATIENT)
Dept: LAB | Facility: MEDICAL CENTER | Age: 35
End: 2020-02-20
Payer: COMMERCIAL

## 2020-02-20 DIAGNOSIS — R03.0 ELEVATED BP WITHOUT DIAGNOSIS OF HYPERTENSION: ICD-10-CM

## 2020-02-20 LAB
ALBUMIN SERPL BCP-MCNC: 4.7 G/DL (ref 3.5–5)
ALP SERPL-CCNC: 58 U/L (ref 46–116)
ALT SERPL W P-5'-P-CCNC: 25 U/L (ref 12–78)
ANION GAP SERPL CALCULATED.3IONS-SCNC: 8 MMOL/L (ref 4–13)
AST SERPL W P-5'-P-CCNC: 15 U/L (ref 5–45)
BILIRUB SERPL-MCNC: 0.69 MG/DL (ref 0.2–1)
BUN SERPL-MCNC: 15 MG/DL (ref 5–25)
CALCIUM SERPL-MCNC: 9.5 MG/DL (ref 8.3–10.1)
CHLORIDE SERPL-SCNC: 106 MMOL/L (ref 100–108)
CHOLEST SERPL-MCNC: 154 MG/DL (ref 50–200)
CO2 SERPL-SCNC: 23 MMOL/L (ref 21–32)
CREAT SERPL-MCNC: 1.14 MG/DL (ref 0.6–1.3)
EST. AVERAGE GLUCOSE BLD GHB EST-MCNC: 91 MG/DL
GFR SERPL CREATININE-BSD FRML MDRD: 63 ML/MIN/1.73SQ M
GLUCOSE P FAST SERPL-MCNC: 109 MG/DL (ref 65–99)
HBA1C MFR BLD: 4.8 %
HDLC SERPL-MCNC: 73 MG/DL
LDLC SERPL CALC-MCNC: 70 MG/DL (ref 0–100)
POTASSIUM SERPL-SCNC: 3.5 MMOL/L (ref 3.5–5.3)
PROT SERPL-MCNC: 8.1 G/DL (ref 6.4–8.2)
SODIUM SERPL-SCNC: 137 MMOL/L (ref 136–145)
T4 FREE SERPL-MCNC: 0.99 NG/DL (ref 0.76–1.46)
TRIGL SERPL-MCNC: 56 MG/DL
TSH SERPL DL<=0.05 MIU/L-ACNC: 1.99 UIU/ML (ref 0.36–3.74)

## 2020-02-20 PROCEDURE — 36415 COLL VENOUS BLD VENIPUNCTURE: CPT

## 2020-02-20 PROCEDURE — 80053 COMPREHEN METABOLIC PANEL: CPT

## 2020-02-20 PROCEDURE — 80061 LIPID PANEL: CPT

## 2020-02-20 PROCEDURE — 84439 ASSAY OF FREE THYROXINE: CPT

## 2020-02-20 PROCEDURE — 83036 HEMOGLOBIN GLYCOSYLATED A1C: CPT

## 2020-02-20 PROCEDURE — 84443 ASSAY THYROID STIM HORMONE: CPT

## 2020-02-21 ENCOUNTER — TELEPHONE (OUTPATIENT)
Dept: FAMILY MEDICINE CLINIC | Facility: MEDICAL CENTER | Age: 35
End: 2020-02-21

## 2020-02-21 NOTE — TELEPHONE ENCOUNTER
Pt called to ask why all the bloodwork results were available to her except for one    Please call her with results

## 2020-03-10 ENCOUNTER — CONSULT (OUTPATIENT)
Dept: NEPHROLOGY | Facility: CLINIC | Age: 35
End: 2020-03-10
Payer: COMMERCIAL

## 2020-03-10 ENCOUNTER — APPOINTMENT (OUTPATIENT)
Dept: LAB | Facility: MEDICAL CENTER | Age: 35
End: 2020-03-10
Payer: COMMERCIAL

## 2020-03-10 VITALS
DIASTOLIC BLOOD PRESSURE: 108 MMHG | BODY MASS INDEX: 23.05 KG/M2 | HEART RATE: 110 BPM | WEIGHT: 135 LBS | HEIGHT: 64 IN | SYSTOLIC BLOOD PRESSURE: 170 MMHG | RESPIRATION RATE: 18 BRPM

## 2020-03-10 DIAGNOSIS — I10 HYPERTENSION, UNSPECIFIED TYPE: ICD-10-CM

## 2020-03-10 DIAGNOSIS — N17.9 AKI (ACUTE KIDNEY INJURY) (HCC): Primary | ICD-10-CM

## 2020-03-10 LAB
ANION GAP SERPL CALCULATED.3IONS-SCNC: 7 MMOL/L (ref 4–13)
BILIRUB UR QL STRIP: NEGATIVE
BUN SERPL-MCNC: 12 MG/DL (ref 5–25)
CALCIUM SERPL-MCNC: 9.5 MG/DL (ref 8.3–10.1)
CHLORIDE SERPL-SCNC: 107 MMOL/L (ref 100–108)
CHLORIDE UR-SCNC: 43 MMOL/L
CK SERPL-CCNC: 52 U/L (ref 26–192)
CLARITY UR: CLEAR
CO2 SERPL-SCNC: 26 MMOL/L (ref 21–32)
COLOR UR: YELLOW
CREAT SERPL-MCNC: 0.77 MG/DL (ref 0.6–1.3)
CREAT UR-MCNC: 71.8 MG/DL
CREAT UR-MCNC: 71.8 MG/DL
GFR SERPL CREATININE-BSD FRML MDRD: 102 ML/MIN/1.73SQ M
GLUCOSE SERPL-MCNC: 73 MG/DL (ref 65–140)
GLUCOSE UR STRIP-MCNC: NEGATIVE MG/DL
HGB UR QL STRIP.AUTO: NEGATIVE
KETONES UR STRIP-MCNC: ABNORMAL MG/DL
LEUKOCYTE ESTERASE UR QL STRIP: NEGATIVE
MICROALBUMIN UR-MCNC: 11.1 MG/L (ref 0–20)
MICROALBUMIN/CREAT 24H UR: 15 MG/G CREATININE (ref 0–30)
NITRITE UR QL STRIP: NEGATIVE
PH UR STRIP.AUTO: 6.5 [PH]
POTASSIUM SERPL-SCNC: 4.2 MMOL/L (ref 3.5–5.3)
PROT UR STRIP-MCNC: NEGATIVE MG/DL
SODIUM 24H UR-SCNC: 37 MOL/L
SODIUM SERPL-SCNC: 140 MMOL/L (ref 136–145)
SP GR UR STRIP.AUTO: 1.01 (ref 1–1.03)
UROBILINOGEN UR QL STRIP.AUTO: 0.2 E.U./DL

## 2020-03-10 PROCEDURE — 36415 COLL VENOUS BLD VENIPUNCTURE: CPT | Performed by: INTERNAL MEDICINE

## 2020-03-10 PROCEDURE — 84300 ASSAY OF URINE SODIUM: CPT | Performed by: INTERNAL MEDICINE

## 2020-03-10 PROCEDURE — 99244 OFF/OP CNSLTJ NEW/EST MOD 40: CPT | Performed by: INTERNAL MEDICINE

## 2020-03-10 PROCEDURE — 82550 ASSAY OF CK (CPK): CPT

## 2020-03-10 PROCEDURE — 82570 ASSAY OF URINE CREATININE: CPT | Performed by: INTERNAL MEDICINE

## 2020-03-10 PROCEDURE — 82043 UR ALBUMIN QUANTITATIVE: CPT | Performed by: INTERNAL MEDICINE

## 2020-03-10 PROCEDURE — 81003 URINALYSIS AUTO W/O SCOPE: CPT | Performed by: INTERNAL MEDICINE

## 2020-03-10 PROCEDURE — 82436 ASSAY OF URINE CHLORIDE: CPT | Performed by: INTERNAL MEDICINE

## 2020-03-10 PROCEDURE — 80048 BASIC METABOLIC PNL TOTAL CA: CPT | Performed by: INTERNAL MEDICINE

## 2020-03-10 NOTE — PROGRESS NOTES
NEPHROLOGY OUTPATIENT CONSULTATION   Vanesa River 36 y o  female MRN: [id number]  Date: 3/10/2020  Reason for consultation:   Chief Complaint   Patient presents with    Consult    Hypertension    Acute Renal Failure     ASSESSMENT AND PLAN:  DEANA, baseline creatinine 0 6 to 0 8, creatinine was 0 8 in early 2019 as per patient (do not have documentation for this lab)  -creatinine 1 1 increased from her baseline in February 2020  -no obvious clinical symptoms suggestive of prerenal etiology  -repeat BMP, UA with microscopy, urine microalbumin/creatinine ratio, urine electrolytes now  -if creatinine continued to worsen, will consider renal ultrasound  -uncontrolled hypertension? Contributing  -given relatively sudden onset of uncontrolled hypertension in last 1 to 2 months, increasing creatinine, prior history of positive rheumatoid factor, also another differential remains any intrinsic GN process, if urine analysis shows microscopic hematuria/proteinuria, this may need to be considered and may need further serological workup  -drink plenty of free water to stay hydrated  -check CK level as patient has been working out lately to exclude any component of rhabdomyolysis  -she has history of DEANA with peak creatinine 2 4 in 2015 when she had postpartum hemorrhage and required ex lap  Since then creatinine has improved back to her baseline  Uncontrolled hypertension  -blood pressure remains above goal in the office today   -she checks BP at home which are generally 140s to 160s/90s to 100s  -will set up for 24 hour ABPM  -patient is trying to conceive and if BP remains elevated above goal in ABPM, will likely need to start nifedipine XL  -she had issues with hypertension during hospitalization in 2015 and was able to come off nifedipine within a month    Since then she was on Aldactone for acne for several years  -her BP has been better controlled and did not require any other antihypertensive medications over last couple years  He lately over last couple months her BP seems to be significantly elevated   -she also has been going through some anxiety issues and was recently started on Zoloft 3 weeks ago    -may need to consider secondary hypertension workup    Prior urinalysis in 2017 showed trace blood, no proteinuria  HISTORY OF PRESENT ILLNESS:  Chika Malone is a 36y o  year old female without significant medical issues except component of anxiety , prior history of ex lap due to small bowel obstruction, also history of postpartum hemorrhage requiring blood transfusion in 2015 who presents for initial consultation for worsening renal failure  Old medical records were reviewed  Patient's prior creatinine values 0 6 to 0 8 with most recent creatinine 0 8 as per patient in early 2019 as part of routine checkup for life insurance  Her creatinine has increased to 1 1 most recent labs in February 2020  She admits to be taking ibuprofen occasionally  She has been working out lately  Denies any urinary complaint  She is trying to conceive  Was found to have recently uncontrolled hypertension and was also thought to have component of anxiety and was started recently on Zoloft three weeks ago  Since then her blood pressure has not much improved  Her home BP readings are generally 140s to 160s/90s to 100s  She denies any nausea, vomiting  She has occasional tension headache issues  She was found to have isolated rheumatoid factor positive when she was having some muscle pain in her elbow  Since then she was evaluated by Rheumatology and her symptoms much improved on couple weeks and no further intervention was done  She also has history of postpartum hemorrhage although denies having any preeclampsia or eclampsia issues during pregnancy in 2015  REVIEW OF SYSTEMS:    More than 10 point review of systems were obtained and discussed in length with the patient   Complete review of systems were negative / unremarkable except mentioned in the HPI section  PHYSICAL EXAM:  Vitals:    03/10/20 1253 03/10/20 1330   BP: (!) 170/102 (!) 170/108   BP Location: Left arm    Patient Position: Sitting    Cuff Size: Standard    Pulse: (!) 110    Resp: 18    Weight: 61 2 kg (135 lb)    Height: 5' 4" (1 626 m)      Body mass index is 23 17 kg/m²  Physical Exam   Constitutional: She is oriented to person, place, and time  She appears well-developed and well-nourished  HENT:   Head: Normocephalic and atraumatic  Right Ear: External ear normal    Left Ear: External ear normal    Eyes: Pupils are equal, round, and reactive to light  Conjunctivae and EOM are normal    Neck: Neck supple  No JVD present  Cardiovascular: Normal rate and normal heart sounds  Pulmonary/Chest: Effort normal and breath sounds normal  She has no wheezes  She has no rales  Abdominal: Soft  Bowel sounds are normal  She exhibits no distension  There is no tenderness  Musculoskeletal: She exhibits no edema or tenderness  Neurological: She is alert and oriented to person, place, and time  Skin: Skin is warm and dry  No rash noted  Psychiatric: She has a normal mood and affect  Her behavior is normal    Vitals reviewed      PAST MEDICAL HISTORY:  Past Medical History:   Diagnosis Date    Acute renal failure ([facility] [address] )     [id number] RESOLVED    Acute tubular necrosis (HCC)     Bowel obstruction ([facility] [address] ) 2017    Fibroadenoma of breast, left     Fibroadenoma of breast, right      1 para 1     Postpartum hemorrhage     UNSPECIFIED TYPE  RESOLVED     Vacuum extractor delivery, delivered     2015 daughter with postpartum hemorrhage and DIC       PAST SURGICAL HISTORY:  Past Surgical History:   Procedure Laterality Date    ABDOMINAL SURGERY      retroperitonial hematoma    BREAST FIBROADENOMA SURGERY  2007    Cryosurgical Ablation of Fibroadenoma    BREAST LUMPECTOMY Left     BREAST LUMPECTOMY Right     EXPLORATORY LAPAROTOMY W/ BOWEL RESECTION N/A 1/31/2017    Procedure: LAPAROTOMY EXPLORATORY W/ lysis of adhesions;  Surgeon: Anni Guevara DO;  [facility]: [facility];  Service:     INTRAUTERINE DEVICE INSERTION      LAPAROTOMY  2015    EXPLORATORY for DIC and hemorrhage postpartum    MOUTH SURGERY      TOOTH EXTRACTION      Impression: [id number] Myles Scott  2004       ALLERGIES:  Allergies   Allergen Reactions    Nickel        SOCIAL HISTORY:  Social History     Substance and Sexual Activity   Alcohol Use Yes    Alcohol/week: 5 0 standard drinks    Types: 5 Glasses of wine per week     Social History     Substance and Sexual Activity   Drug Use No     Social History     Tobacco Use   Smoking Status Never Smoker   Smokeless Tobacco Never Used       FAMILY HISTORY:  Family History   Problem Relation Age of Onset    Hypertension Mother         URINARY INCONTINENCE    Hyperlipidemia Mother     Diverticulitis Mother     Hypothyroidism Mother     Hepatitis Father         A    Diverticulitis Maternal Grandmother     Stroke Maternal Grandfather         CVA    Heart disease Maternal Grandfather     Diverticulitis Maternal Grandfather     Hypertension Maternal Grandfather     Diabetes Paternal Grandfather         INSULIN DEPENDENT    No Known Problems Sister     No Known Problems Brother        MEDICATIONS:    Current Outpatient Medications:     Prenatal MV-Min-Fe Fum-FA-DHA (PRENATAL 1 PO), Take by mouth, Disp: , Rfl:     Probiotic Product (PROBIOTIC ADVANCED PO), Take by mouth, Disp: , Rfl:     sertraline (ZOLOFT) 50 mg tablet, Take 1 tablet (50 mg total) by mouth daily, Disp: 30 tablet, Rfl: 1    Lab Results:   Results for orders placed or performed in visit on 02/20/20   Comprehensive metabolic panel   Result Value Ref Range    Sodium 137 136 - 145 mmol/L    Potassium 3 5 3 5 - 5 3 mmol/L    Chloride 106 100 - 108 mmol/L    CO2 23 21 - 32 mmol/L    ANION GAP 8 4 - 13 mmol/L    BUN 15 5 - 25 mg/dL    Creatinine 1 14 0 60 - 1 30 mg/dL    Glucose, Fasting 109 (H) 65 - 99 mg/dL    Calcium 9 5 8 3 - 10 1 mg/dL    AST 15 5 - 45 U/L    ALT 25 12 - 78 U/L    Alkaline Phosphatase 58 46 - 116 U/L    Total Protein 8 1 6 4 - 8 2 g/dL    Albumin 4 7 3 5 - 5 0 g/dL    Total Bilirubin 0 69 0 20 - 1 00 mg/dL    eGFR 63 ml/min/1 73sq m   Hemoglobin A1C   Result Value Ref Range    Hemoglobin A1C 4 8 Normal 3 8-5 6%; PreDiabetic 5 7-6 4%; Diabetic >=6 5%; Glycemic control for adults with diabetes <7 0% %    EAG 91 mg/dl   Lipid Panel with Direct LDL reflex   Result Value Ref Range    Cholesterol 154 50 - 200 mg/dL    Triglycerides 56 <=150 mg/dL    HDL, Direct 73 >=40 mg/dL    LDL Calculated 70 0 - 100 mg/dL   T4, free   Result Value Ref Range    Free T4 0 99 0 76 - 1 46 ng/dL   TSH, 3rd generation   Result Value Ref Range    TSH 3RD GENERATON 1 990 0 358 - 3 740 uIU/mL       Portions of the record may have been created with voice recognition software  Occasional wrong word or "sound a like" substitutions may have occurred due to the inherent limitations of voice recognition software  Read the chart carefully and recognize, using context, where substitutions have occurred

## 2020-03-11 ENCOUNTER — CLINICAL SUPPORT (OUTPATIENT)
Dept: NEPHROLOGY | Facility: CLINIC | Age: 35
End: 2020-03-11

## 2020-03-11 VITALS
BODY MASS INDEX: 23.39 KG/M2 | RESPIRATION RATE: 16 BRPM | DIASTOLIC BLOOD PRESSURE: 104 MMHG | WEIGHT: 137 LBS | SYSTOLIC BLOOD PRESSURE: 150 MMHG | HEART RATE: 78 BPM | HEIGHT: 64 IN

## 2020-03-11 DIAGNOSIS — I10 WHITE COAT SYNDROME WITH HYPERTENSION: Primary | ICD-10-CM

## 2020-03-11 PROCEDURE — PBNCHG PB NO CHARGE PLACEHOLDER

## 2020-03-11 NOTE — PATIENT INSTRUCTIONS
Patient briefed on responsibility form for safe keeping of ABMP machine and equipment  Patient signed responsibility form  Patient briefed on instructions for ABMP use and BP log use and documentation  Patient verbally acknowledged understanding all instructions  Patient left the office at 11 a m

## 2020-03-12 ENCOUNTER — TELEPHONE (OUTPATIENT)
Dept: NEPHROLOGY | Facility: CLINIC | Age: 35
End: 2020-03-12

## 2020-03-12 ENCOUNTER — CLINICAL SUPPORT (OUTPATIENT)
Dept: NEPHROLOGY | Facility: CLINIC | Age: 35
End: 2020-03-12
Payer: COMMERCIAL

## 2020-03-12 DIAGNOSIS — I10 HYPERTENSION, UNSPECIFIED TYPE: Primary | ICD-10-CM

## 2020-03-12 DIAGNOSIS — I10 WHITE COAT SYNDROME WITH DIAGNOSIS OF HYPERTENSION: Primary | ICD-10-CM

## 2020-03-12 PROCEDURE — 93784 AMBL BP MNTR W/SOFTWARE: CPT

## 2020-03-12 RX ORDER — NIFEDIPINE 30 MG/1
30 TABLET, EXTENDED RELEASE ORAL DAILY
Qty: 30 TABLET | Refills: 5 | Status: SHIPPED | OUTPATIENT
Start: 2020-03-12 | End: 2020-06-22 | Stop reason: ALTCHOICE

## 2020-03-12 NOTE — PROGRESS NOTES
24 hour ABPM readings:    24 hour BP average 139/92  Daytime BP average 143/95  Nighttime BP average 127/84  MAP night time dipping 9 65%    This suggest underlying hypertension  She will likely to start antihypertensive medication with nifedipine XL  Left voicemail to discuss this further with the patient  Awaiting call back

## 2020-03-12 NOTE — PATIENT INSTRUCTIONS
24 HR ABPM returned in working condition with all equipment  Patient has no additional questions or concerns

## 2020-03-12 NOTE — TELEPHONE ENCOUNTER
Spoke to patient regarding 24 hour ABPM results  Given elevated BP readings, will start patient on nifedipine XL 30 mg p o  Daily  Will do initial secondary hypertension workup including aldosterone, PRA, plasma metanephrine during next office visit in one month  aSlly Horner(Attending)

## 2020-03-25 ENCOUNTER — TELEPHONE (OUTPATIENT)
Dept: NEPHROLOGY | Facility: CLINIC | Age: 35
End: 2020-03-25

## 2020-03-25 NOTE — TELEPHONE ENCOUNTER
Spoke with the patient to confirm her appointment for 4/1/2020 with Dr Janak Fisher  The patient would like to do a tele-medicine visit and she is aware that her insurance will be billed for the visit        Pieter Chávez MA

## 2020-04-01 ENCOUNTER — TELEMEDICINE (OUTPATIENT)
Dept: NEPHROLOGY | Facility: CLINIC | Age: 35
End: 2020-04-01
Payer: COMMERCIAL

## 2020-04-01 DIAGNOSIS — I10 ESSENTIAL HYPERTENSION: Primary | ICD-10-CM

## 2020-04-01 PROBLEM — N17.9 AKI (ACUTE KIDNEY INJURY) (HCC): Status: RESOLVED | Noted: 2020-03-10 | Resolved: 2020-04-01

## 2020-04-01 PROCEDURE — 99442 PR PHYS/QHP TELEPHONE EVALUATION 11-20 MIN: CPT | Performed by: INTERNAL MEDICINE

## 2020-05-13 ENCOUNTER — ULTRASOUND (OUTPATIENT)
Dept: OBGYN CLINIC | Facility: CLINIC | Age: 35
End: 2020-05-13
Payer: COMMERCIAL

## 2020-05-13 VITALS
SYSTOLIC BLOOD PRESSURE: 122 MMHG | BODY MASS INDEX: 22.73 KG/M2 | DIASTOLIC BLOOD PRESSURE: 90 MMHG | TEMPERATURE: 98.4 F | WEIGHT: 132.4 LBS

## 2020-05-13 DIAGNOSIS — Z86.2 HISTORY OF DIC SYNDROME: ICD-10-CM

## 2020-05-13 DIAGNOSIS — N91.1 SECONDARY AMENORRHEA: Primary | ICD-10-CM

## 2020-05-13 DIAGNOSIS — O09.299 HISTORY OF POSTPARTUM HEMORRHAGE, CURRENTLY PREGNANT: ICD-10-CM

## 2020-05-13 PROCEDURE — 1036F TOBACCO NON-USER: CPT | Performed by: STUDENT IN AN ORGANIZED HEALTH CARE EDUCATION/TRAINING PROGRAM

## 2020-05-13 PROCEDURE — 99214 OFFICE O/P EST MOD 30 MIN: CPT | Performed by: STUDENT IN AN ORGANIZED HEALTH CARE EDUCATION/TRAINING PROGRAM

## 2020-05-13 PROCEDURE — 76817 TRANSVAGINAL US OBSTETRIC: CPT | Performed by: STUDENT IN AN ORGANIZED HEALTH CARE EDUCATION/TRAINING PROGRAM

## 2020-05-27 ENCOUNTER — TELEMEDICINE (OUTPATIENT)
Dept: OBGYN CLINIC | Facility: CLINIC | Age: 35
End: 2020-05-27

## 2020-05-27 DIAGNOSIS — Z34.81 ENCOUNTER FOR SUPERVISION OF NORMAL PREGNANCY IN MULTIGRAVIDA IN FIRST TRIMESTER: Primary | ICD-10-CM

## 2020-05-27 PROCEDURE — OBC: Performed by: STUDENT IN AN ORGANIZED HEALTH CARE EDUCATION/TRAINING PROGRAM

## 2020-05-29 ENCOUNTER — APPOINTMENT (OUTPATIENT)
Dept: LAB | Age: 35
End: 2020-05-29
Payer: COMMERCIAL

## 2020-05-29 DIAGNOSIS — Z34.81 ENCOUNTER FOR SUPERVISION OF NORMAL PREGNANCY IN MULTIGRAVIDA IN FIRST TRIMESTER: ICD-10-CM

## 2020-05-29 LAB
ABO GROUP BLD: NORMAL
ANION GAP SERPL CALCULATED.3IONS-SCNC: 7 MMOL/L (ref 4–13)
BACTERIA UR QL AUTO: NORMAL /HPF
BASOPHILS # BLD AUTO: 0.04 THOUSANDS/ΜL (ref 0–0.1)
BASOPHILS NFR BLD AUTO: 1 % (ref 0–1)
BILIRUB UR QL STRIP: NEGATIVE
BLD GP AB SCN SERPL QL: NEGATIVE
BUN SERPL-MCNC: 12 MG/DL (ref 5–25)
CALCIUM SERPL-MCNC: 9.5 MG/DL (ref 8.3–10.1)
CHLORIDE SERPL-SCNC: 109 MMOL/L (ref 100–108)
CLARITY UR: CLEAR
CO2 SERPL-SCNC: 22 MMOL/L (ref 21–32)
COLOR UR: YELLOW
CREAT SERPL-MCNC: 0.75 MG/DL (ref 0.6–1.3)
CREAT UR-MCNC: 102 MG/DL
EOSINOPHIL # BLD AUTO: 0.14 THOUSAND/ΜL (ref 0–0.61)
EOSINOPHIL NFR BLD AUTO: 2 % (ref 0–6)
ERYTHROCYTE [DISTWIDTH] IN BLOOD BY AUTOMATED COUNT: 12.1 % (ref 11.6–15.1)
GFR SERPL CREATININE-BSD FRML MDRD: 105 ML/MIN/1.73SQ M
GLUCOSE P FAST SERPL-MCNC: 101 MG/DL (ref 65–99)
GLUCOSE UR STRIP-MCNC: NEGATIVE MG/DL
HBV SURFACE AG SER QL: NORMAL
HCT VFR BLD AUTO: 36.4 % (ref 34.8–46.1)
HGB BLD-MCNC: 12.1 G/DL (ref 11.5–15.4)
HGB UR QL STRIP.AUTO: NEGATIVE
HYALINE CASTS #/AREA URNS LPF: NORMAL /LPF
IMM GRANULOCYTES # BLD AUTO: 0.03 THOUSAND/UL (ref 0–0.2)
IMM GRANULOCYTES NFR BLD AUTO: 1 % (ref 0–2)
KETONES UR STRIP-MCNC: NEGATIVE MG/DL
LEUKOCYTE ESTERASE UR QL STRIP: NEGATIVE
LYMPHOCYTES # BLD AUTO: 1.55 THOUSANDS/ΜL (ref 0.6–4.47)
LYMPHOCYTES NFR BLD AUTO: 25 % (ref 14–44)
MCH RBC QN AUTO: 30 PG (ref 26.8–34.3)
MCHC RBC AUTO-ENTMCNC: 33.2 G/DL (ref 31.4–37.4)
MCV RBC AUTO: 90 FL (ref 82–98)
MICROALBUMIN UR-MCNC: 13.5 MG/L (ref 0–20)
MICROALBUMIN/CREAT 24H UR: 13 MG/G CREATININE (ref 0–30)
MONOCYTES # BLD AUTO: 0.35 THOUSAND/ΜL (ref 0.17–1.22)
MONOCYTES NFR BLD AUTO: 6 % (ref 4–12)
NEUTROPHILS # BLD AUTO: 4.15 THOUSANDS/ΜL (ref 1.85–7.62)
NEUTS SEG NFR BLD AUTO: 65 % (ref 43–75)
NITRITE UR QL STRIP: NEGATIVE
NON-SQ EPI CELLS URNS QL MICRO: NORMAL /HPF
NRBC BLD AUTO-RTO: 0 /100 WBCS
PH UR STRIP.AUTO: 6.5 [PH]
PLATELET # BLD AUTO: 195 THOUSANDS/UL (ref 149–390)
PMV BLD AUTO: 12.2 FL (ref 8.9–12.7)
POTASSIUM SERPL-SCNC: 3.7 MMOL/L (ref 3.5–5.3)
PROT UR STRIP-MCNC: NEGATIVE MG/DL
RBC # BLD AUTO: 4.04 MILLION/UL (ref 3.81–5.12)
RBC #/AREA URNS AUTO: NORMAL /HPF
RH BLD: POSITIVE
RUBV IGG SERPL IA-ACNC: <0.2 IU/ML
SODIUM SERPL-SCNC: 138 MMOL/L (ref 136–145)
SP GR UR STRIP.AUTO: 1.02 (ref 1–1.03)
UROBILINOGEN UR QL STRIP.AUTO: 0.2 E.U./DL
WBC # BLD AUTO: 6.26 THOUSAND/UL (ref 4.31–10.16)
WBC #/AREA URNS AUTO: NORMAL /HPF

## 2020-05-29 PROCEDURE — 83835 ASSAY OF METANEPHRINES: CPT | Performed by: INTERNAL MEDICINE

## 2020-05-29 PROCEDURE — 82570 ASSAY OF URINE CREATININE: CPT | Performed by: INTERNAL MEDICINE

## 2020-05-29 PROCEDURE — 82088 ASSAY OF ALDOSTERONE: CPT | Performed by: INTERNAL MEDICINE

## 2020-05-29 PROCEDURE — 36415 COLL VENOUS BLD VENIPUNCTURE: CPT | Performed by: INTERNAL MEDICINE

## 2020-05-29 PROCEDURE — 84244 ASSAY OF RENIN: CPT | Performed by: INTERNAL MEDICINE

## 2020-05-29 PROCEDURE — 80081 OBSTETRIC PANEL INC HIV TSTG: CPT

## 2020-05-29 PROCEDURE — 82384 ASSAY THREE CATECHOLAMINES: CPT | Performed by: INTERNAL MEDICINE

## 2020-05-29 PROCEDURE — 80048 BASIC METABOLIC PNL TOTAL CA: CPT | Performed by: INTERNAL MEDICINE

## 2020-05-29 PROCEDURE — 82043 UR ALBUMIN QUANTITATIVE: CPT | Performed by: INTERNAL MEDICINE

## 2020-05-29 PROCEDURE — 81001 URINALYSIS AUTO W/SCOPE: CPT

## 2020-05-29 PROCEDURE — 87086 URINE CULTURE/COLONY COUNT: CPT

## 2020-05-30 LAB — BACTERIA UR CULT: NORMAL

## 2020-05-31 LAB — HIV 1+2 AB+HIV1 P24 AG SERPL QL IA: NORMAL

## 2020-06-01 LAB — RPR SER QL: NORMAL

## 2020-06-02 LAB
DOPAMINE 24H UR-MRATE: <30 PG/ML (ref 0–48)
EPINEPH PLAS-MCNC: 16 PG/ML (ref 0–62)
NOREPINEPH PLAS-MCNC: 247 PG/ML (ref 0–874)

## 2020-06-05 LAB
ALDOST SERPL-MCNC: 43.9 NG/DL (ref 0–30)
METANEPH FREE SERPL-MCNC: 21.7 PG/ML (ref 0–88)
NORMETANEPHRINE SERPL-MCNC: 54.2 PG/ML (ref 0–110.1)

## 2020-06-16 ENCOUNTER — TELEPHONE (OUTPATIENT)
Dept: NEPHROLOGY | Facility: CLINIC | Age: 35
End: 2020-06-16

## 2020-06-19 ENCOUNTER — TELEPHONE (OUTPATIENT)
Dept: PERINATAL CARE | Facility: CLINIC | Age: 35
End: 2020-06-19

## 2020-06-22 ENCOUNTER — DOCUMENTATION (OUTPATIENT)
Dept: PERINATAL CARE | Facility: CLINIC | Age: 35
End: 2020-06-22

## 2020-06-22 ENCOUNTER — INITIAL PRENATAL (OUTPATIENT)
Dept: OBGYN CLINIC | Facility: CLINIC | Age: 35
End: 2020-06-22

## 2020-06-22 ENCOUNTER — ROUTINE PRENATAL (OUTPATIENT)
Dept: PERINATAL CARE | Facility: CLINIC | Age: 35
End: 2020-06-22
Payer: COMMERCIAL

## 2020-06-22 VITALS
TEMPERATURE: 97.8 F | BODY MASS INDEX: 22.71 KG/M2 | WEIGHT: 133 LBS | HEART RATE: 77 BPM | DIASTOLIC BLOOD PRESSURE: 33 MMHG | SYSTOLIC BLOOD PRESSURE: 128 MMHG | HEIGHT: 64 IN

## 2020-06-22 VITALS — DIASTOLIC BLOOD PRESSURE: 78 MMHG | BODY MASS INDEX: 22.83 KG/M2 | SYSTOLIC BLOOD PRESSURE: 124 MMHG | WEIGHT: 133 LBS

## 2020-06-22 DIAGNOSIS — O09.291: ICD-10-CM

## 2020-06-22 DIAGNOSIS — Z3A.12 12 WEEKS GESTATION OF PREGNANCY: ICD-10-CM

## 2020-06-22 DIAGNOSIS — Z87.19 HISTORY OF SMALL BOWEL OBSTRUCTION: ICD-10-CM

## 2020-06-22 DIAGNOSIS — Z86.2 HISTORY OF DIC SYNDROME: ICD-10-CM

## 2020-06-22 DIAGNOSIS — O16.9 HYPERTENSION AFFECTING PREGNANCY, ANTEPARTUM: ICD-10-CM

## 2020-06-22 DIAGNOSIS — O10.919 CHRONIC HYPERTENSION AFFECTING PREGNANCY: ICD-10-CM

## 2020-06-22 DIAGNOSIS — O09.90 SUPERVISION OF HIGH RISK PREGNANCY, ANTEPARTUM: Primary | ICD-10-CM

## 2020-06-22 DIAGNOSIS — Z87.59 HISTORY OF VACUUM EXTRACTION ASSISTED DELIVERY: ICD-10-CM

## 2020-06-22 DIAGNOSIS — Z36.82 NUCHAL TRANSLUCENCY OF FETUS ON PRENATAL ULTRASOUND: ICD-10-CM

## 2020-06-22 DIAGNOSIS — Z34.92 ENCOUNTER FOR PREGNANCY RELATED EXAMINATION IN SECOND TRIMESTER: ICD-10-CM

## 2020-06-22 DIAGNOSIS — Z12.4 ENCOUNTER FOR PAPANICOLAOU SMEAR FOR CERVICAL CANCER SCREENING: ICD-10-CM

## 2020-06-22 DIAGNOSIS — O09.91 HIGH-RISK PREGNANCY IN FIRST TRIMESTER: ICD-10-CM

## 2020-06-22 DIAGNOSIS — Z11.51 SCREENING FOR HPV (HUMAN PAPILLOMAVIRUS): ICD-10-CM

## 2020-06-22 PROBLEM — I15.9 SECONDARY HYPERTENSION: Status: ACTIVE | Noted: 2020-03-10

## 2020-06-22 PROBLEM — Z34.90 ENCOUNTER FOR SUPERVISION OF NORMAL PREGNANCY, ANTEPARTUM: Status: ACTIVE | Noted: 2020-06-22

## 2020-06-22 LAB
SL AMB  POCT GLUCOSE, UA: NEGATIVE
SL AMB POCT URINE PROTEIN: NEGATIVE

## 2020-06-22 PROCEDURE — 76801 OB US < 14 WKS SINGLE FETUS: CPT | Performed by: OBSTETRICS & GYNECOLOGY

## 2020-06-22 PROCEDURE — 76813 OB US NUCHAL MEAS 1 GEST: CPT | Performed by: OBSTETRICS & GYNECOLOGY

## 2020-06-22 PROCEDURE — PNV: Performed by: NURSE PRACTITIONER

## 2020-06-22 PROCEDURE — 87624 HPV HI-RISK TYP POOLED RSLT: CPT | Performed by: NURSE PRACTITIONER

## 2020-06-22 PROCEDURE — 87491 CHLMYD TRACH DNA AMP PROBE: CPT | Performed by: NURSE PRACTITIONER

## 2020-06-22 PROCEDURE — 99241 PR OFFICE CONSULTATION NEW/ESTAB PATIENT 15 MIN: CPT | Performed by: OBSTETRICS & GYNECOLOGY

## 2020-06-22 PROCEDURE — G0145 SCR C/V CYTO,THINLAYER,RESCR: HCPCS | Performed by: NURSE PRACTITIONER

## 2020-06-22 PROCEDURE — 87591 N.GONORRHOEAE DNA AMP PROB: CPT | Performed by: NURSE PRACTITIONER

## 2020-06-25 LAB
C TRACH DNA SPEC QL NAA+PROBE: NEGATIVE
HPV HR 12 DNA CVX QL NAA+PROBE: NEGATIVE
HPV16 DNA CVX QL NAA+PROBE: NEGATIVE
HPV18 DNA CVX QL NAA+PROBE: NEGATIVE
N GONORRHOEA DNA SPEC QL NAA+PROBE: NEGATIVE

## 2020-06-26 ENCOUNTER — TELEPHONE (OUTPATIENT)
Dept: PERINATAL CARE | Facility: CLINIC | Age: 35
End: 2020-06-26

## 2020-06-26 LAB
LAB AP GYN PRIMARY INTERPRETATION: NORMAL
Lab: NORMAL

## 2020-07-15 ENCOUNTER — TELEPHONE (OUTPATIENT)
Dept: OBGYN CLINIC | Facility: CLINIC | Age: 35
End: 2020-07-15

## 2020-07-15 NOTE — TELEPHONE ENCOUNTER
Pt is currently 16wks pregnant pt states she is experiencing cramps w/ nausea in the evening for the last 1-2 wks  Pt explained that it feels like her uterus is "tight"  The cramping will last for a few minutes feels like "susan villavicencio contractions"   No bleeding/spotting/discharge no pain w/ intercourse please advise

## 2020-07-15 NOTE — TELEPHONE ENCOUNTER
Pt 16 wks, G2  She had a remarkable delivery - DIC syndrome, small bowel obstruction, massive hemorrhage  Pt having normal bms, but for past 3 weeks, her abdomen gets rock hard in evening and cramping  Happens every night  Usually after dinner  Feels like Jeanella Kidney ? ?? Also, gets nauseated  Has an appt on Mon  Checks BP at home - fine  Occurs whether she works or not   Because of her delivery Hx is the only reason I am concerned   She works in [employer] [facility]  [birth year]    Than you

## 2020-07-22 ENCOUNTER — LAB (OUTPATIENT)
Dept: LAB | Facility: HOSPITAL | Age: 35
End: 2020-07-22
Payer: COMMERCIAL

## 2020-07-22 ENCOUNTER — ROUTINE PRENATAL (OUTPATIENT)
Dept: OBGYN CLINIC | Facility: CLINIC | Age: 35
End: 2020-07-22

## 2020-07-22 VITALS
TEMPERATURE: 98.3 F | WEIGHT: 133.2 LBS | BODY MASS INDEX: 22.86 KG/M2 | DIASTOLIC BLOOD PRESSURE: 84 MMHG | SYSTOLIC BLOOD PRESSURE: 120 MMHG

## 2020-07-22 DIAGNOSIS — O10.919 CHRONIC HYPERTENSION AFFECTING PREGNANCY: ICD-10-CM

## 2020-07-22 DIAGNOSIS — O09.899 RUBELLA NON-IMMUNE STATUS, ANTEPARTUM: ICD-10-CM

## 2020-07-22 DIAGNOSIS — Z86.2 HISTORY OF DIC SYNDROME: ICD-10-CM

## 2020-07-22 DIAGNOSIS — Z36.9 UNSPECIFIED ANTENATAL SCREENING: ICD-10-CM

## 2020-07-22 DIAGNOSIS — O16.9 HYPERTENSION AFFECTING PREGNANCY, ANTEPARTUM: ICD-10-CM

## 2020-07-22 DIAGNOSIS — Z87.19 HISTORY OF SMALL BOWEL OBSTRUCTION: ICD-10-CM

## 2020-07-22 DIAGNOSIS — Z87.59 HISTORY OF VACUUM EXTRACTION ASSISTED DELIVERY: ICD-10-CM

## 2020-07-22 DIAGNOSIS — O09.90 SUPERVISION OF HIGH RISK PREGNANCY, ANTEPARTUM: Primary | ICD-10-CM

## 2020-07-22 DIAGNOSIS — Z28.39 RUBELLA NON-IMMUNE STATUS, ANTEPARTUM: ICD-10-CM

## 2020-07-22 DIAGNOSIS — Z33.1 PREGNANT STATE, INCIDENTAL: Primary | ICD-10-CM

## 2020-07-22 LAB
ALBUMIN SERPL BCP-MCNC: 3.2 G/DL (ref 3.5–5)
ALP SERPL-CCNC: 33 U/L (ref 46–116)
ALT SERPL W P-5'-P-CCNC: 16 U/L (ref 12–78)
ANION GAP SERPL CALCULATED.3IONS-SCNC: 3 MMOL/L (ref 4–13)
AST SERPL W P-5'-P-CCNC: 9 U/L (ref 5–45)
BILIRUB SERPL-MCNC: 0.35 MG/DL (ref 0.2–1)
BUN SERPL-MCNC: 10 MG/DL (ref 5–25)
CALCIUM SERPL-MCNC: 9.8 MG/DL (ref 8.3–10.1)
CHLORIDE SERPL-SCNC: 109 MMOL/L (ref 100–108)
CO2 SERPL-SCNC: 26 MMOL/L (ref 21–32)
CREAT SERPL-MCNC: 0.73 MG/DL (ref 0.6–1.3)
CREAT UR-MCNC: 129 MG/DL
GFR SERPL CREATININE-BSD FRML MDRD: 109 ML/MIN/1.73SQ M
GLUCOSE P FAST SERPL-MCNC: 69 MG/DL (ref 65–99)
POTASSIUM SERPL-SCNC: 3.6 MMOL/L (ref 3.5–5.3)
PROT SERPL-MCNC: 6.8 G/DL (ref 6.4–8.2)
PROT UR-MCNC: 12 MG/DL
PROT/CREAT UR: 0.09 MG/G{CREAT} (ref 0–0.1)
SL AMB  POCT GLUCOSE, UA: NORMAL
SL AMB POCT URINE PROTEIN: NORMAL
SODIUM SERPL-SCNC: 138 MMOL/L (ref 136–145)
URATE SERPL-MCNC: 3.6 MG/DL (ref 2–6.8)

## 2020-07-22 PROCEDURE — PNV: Performed by: NURSE PRACTITIONER

## 2020-07-22 PROCEDURE — 82570 ASSAY OF URINE CREATININE: CPT

## 2020-07-22 PROCEDURE — 36415 COLL VENOUS BLD VENIPUNCTURE: CPT

## 2020-07-22 PROCEDURE — 80053 COMPREHEN METABOLIC PANEL: CPT

## 2020-07-22 PROCEDURE — 84156 ASSAY OF PROTEIN URINE: CPT

## 2020-07-22 PROCEDURE — 84550 ASSAY OF BLOOD/URIC ACID: CPT

## 2020-07-22 RX ORDER — ASPIRIN 81 MG/1
162 TABLET, CHEWABLE ORAL DAILY
COMMUNITY
End: 2020-12-19 | Stop reason: HOSPADM

## 2020-07-22 NOTE — PATIENT INSTRUCTIONS
Pregnancy at 15 to 18 Weeks   [address]:   What changes are happening in my body? Now that you are in your second trimester, you have more energy  You may also feel hungrier than usual  You may start to experience other symptoms, such as heartburn or dizziness  You may be gaining about ½ to 1 pound a week, and your pregnancy is beginning to show  You may need to start wearing maternity clothes  How do I care for myself at this stage of my pregnancy? · Manage heartburn  by eating 4 or 5 small meals each day instead of large meals  Avoid spicy foods  Avoid eating right before bedtime  · Manage nausea and vomiting  Avoid fatty and spicy foods  Eat small meals throughout the day instead of large meals  Delmy may help to decrease nausea  Ask your healthcare provider about other ways of decreasing nausea and vomiting  · Eat a variety of healthy foods  Healthy foods include fruits, vegetables, whole-grain breads, low-fat dairy foods, beans, lean meats, and fish  Drink liquids as directed  Ask how much liquid to drink each day and which liquids are best for you  Limit caffeine to less than 200 milligrams each day  Limit your intake of fish to 2 servings each week  Choose fish low in mercury such as canned light tuna, shrimp, salmon, cod, or tilapia  Do not  eat fish high in mercury such as swordfish, tilefish, alfred mackerel, and shark  · Take prenatal vitamins as directed  Your need for certain vitamins and minerals, such as folic acid, increases during pregnancy  Prenatal vitamins provide some of the extra vitamins and minerals you need  Prenatal vitamins may also help to decrease the risk of certain birth defects  · Do not smoke  If you smoke, it is never too late to quit  Smoking increases your risk of a miscarriage and other health problems during your pregnancy  Smoking can cause your baby to be born too early or weigh less at birth   Ask your healthcare provider for information if you need help quitting  · Do not drink alcohol  Alcohol passes from your body to your baby through the placenta  It can affect your baby's brain development and cause fetal alcohol syndrome (FAS)  FAS is a group of conditions that causes mental, behavior, and growth problems  · Talk to your healthcare provider before you take any medicines  Many medicines may harm your baby if you take them when you are pregnant  Do not take any medicines, vitamins, herbs, or supplements without first talking to your healthcare provider  Never use illegal or street drugs (such as marijuana or cocaine) while you are pregnant  What are some safety tips during pregnancy? · Avoid hot tubs and saunas  Do not use a hot tub or sauna while you are pregnant, especially during your first trimester  Hot tubs and saunas may raise your baby's temperature and increase the risk of birth defects  · Avoid toxoplasmosis  This is an infection caused by eating raw meat or being around infected cat feces  It can cause birth defects, miscarriages, and other problems  Wash your hands after you touch raw meat  Make sure any meat is well-cooked before you eat it  Avoid raw eggs and unpasteurized milk  Use gloves or ask someone else to clean your cat's litter box while you are pregnant  What changes are happening with my baby? By 18 weeks, your baby may be about 6 inches long from the top of the head to the rump (baby's bottom)  Your baby may weigh about 11 ounces  You may be able to feel your baby's movement at about 18 weeks or later  The first movements may not be that noticeable  They may feel like a fluttering sensation  Your baby also makes sucking movements and can hear certain sounds  What do I need to know about prenatal care? During the first 28 weeks of your pregnancy, you will see your healthcare provider once a month  Your healthcare provider will check your blood pressure and weight   You may also need any of the following:  · A urine test  may also be done to check for sugar and protein  These can be signs of gestational diabetes or infection  · A blood test  may be done to check for anemia (low iron level)  · Fundal height check  is a measurement of your uterus to check your baby's growth  This number is usually the same as the number of weeks that you have been pregnant  · An ultrasound  may be done to check your baby's development  Your healthcare provider may be able to tell you what your baby's gender is during the ultrasound  · Your baby's heart rate  will be checked  When should I seek immediate care? · You have pain or cramping in your abdomen or low back  · You have heavy vaginal bleeding or clotting  · You pass material that looks like tissue or large clots  Collect the material and bring it with you  When should I contact my healthcare provider? · You cannot keep food or drinks down, and you are losing weight  · You have light bleeding  · You have chills or a fever  · You have vaginal itching, burning, or pain  · You have yellow, green, white, or foul-smelling vaginal discharge  · You have pain or burning when you urinate, less urine than usual, or pink or bloody urine  · You have questions or concerns about your condition or care  CARE AGREEMENT:   You have the right to help plan your care  Learn about your health condition and how it may be treated  Discuss treatment options with your caregivers to decide what care you want to receive  You always have the right to refuse treatment  The above information is an  only  It is not intended as medical advice for individual conditions or treatments  Talk to your doctor, nurse or pharmacist before following any medical regimen to see if it is safe and effective for you    © 2017 Michael[employer] Pavan Rojas Information is for End User's use only and may not be sold, redistributed or otherwise used for commercial purposes  All illustrations and images included in CareNotes® are the copyrighted property of A D A M , Inc  or Osman Moses

## 2020-07-22 NOTE — ASSESSMENT & PLAN NOTE
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

## 2020-07-22 NOTE — ASSESSMENT & PLAN NOTE
She and Dr Tushar Sabillon have previously discussed plan for scheduled C/s attended by General Surgery due to 2005 delivery with manual extraction of the placenta, PPH, retroperitoneal bleeding, exploratory laparotomy and IR embolization of the anterior iliac and massive transfusion  During this admission she also developed an acute kidney injury and hypertension   She has her next appt with Dr Tushra Sabillon and will discuss further at that visit

## 2020-07-22 NOTE — PROGRESS NOTES
Chronic hypertension affecting pregnancy  Followed by Nephrology for chronic hypertesnion as well as elevated creatinine, elevated rheum factor, h/o DEANA  Had MFM consult  Taking ASA  MFM recommended 3rd trimester growth scan  Baseline PIH labs drawn and results are pending  History of vacuum extraction assisted delivery  She and Dr Brooke Chung have previously discussed plan for scheduled C/s attended by General Surgery due to 2005 delivery with manual extraction of the placenta, PPH, retroperitoneal bleeding, exploratory laparotomy and IR embolization of the anterior iliac and massive transfusion  During this admission she also developed an acute kidney injury and hypertension  She has her next appt with Dr Brooke Chung and will discuss further at that visit         History of DIC syndrome  Following PP hemorrhage -see MFM note    History of small bowel obstruction  H/o SBO in 2017  Ex lap bowel resection, lysis of adhesions, incisional hernia repair and revision of midline scar was performed by Dr Leana Goldberg  Rubella non-immune status, antepartum  Aware needs vaccine PP    Supervision of high risk pregnancy, antepartum    Kristin Flatness  is a 36 y o  Felix Dior @17w0d who presents for routine prenatal visit  Had some cramping that has since resolved  Encouraged to stay hydrated  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

## 2020-07-22 NOTE — ASSESSMENT & PLAN NOTE
Followed by Nephrology for chronic hypertesnion as well as elevated creatinine, elevated rheum factor, h/o DEANA  Had MFM consult  Taking ASA  MFM recommended 3rd trimester growth scan  Baseline PIH labs drawn and results are pending

## 2020-07-22 NOTE — ASSESSMENT & PLAN NOTE
H/o SBO in 2017  Ex lap bowel resection, lysis of adhesions, incisional hernia repair and revision of midline scar was performed by Dr Nato Augustine

## 2020-07-23 LAB — SCAN RESULT: NORMAL

## 2020-07-24 ENCOUNTER — TELEPHONE (OUTPATIENT)
Dept: OBGYN CLINIC | Facility: CLINIC | Age: 35
End: 2020-07-24

## 2020-07-24 DIAGNOSIS — N30.90 CYSTITIS: ICD-10-CM

## 2020-07-24 DIAGNOSIS — Z34.82 PRENATAL CARE, SUBSEQUENT PREGNANCY, SECOND TRIMESTER: Primary | ICD-10-CM

## 2020-07-24 NOTE — RESULT ENCOUNTER NOTE
Patient had blood work drawn and formal results are expected 7-10 days from the blood draw  Nursing will have formal result reviewed by a [facility] provider when it returns and then will call patient with her result    Yordy Sabillon MD

## 2020-07-24 NOTE — TELEPHONE ENCOUNTER
Patient is 17 weeks pregnant  Reports pelvic pressure/bladder irritation, urinary frequency  No burning with urination  Urine is clear, no odor  Afebrile  Reviewed allergies and pharmacy  Routed to CS  UA/Cs ordered

## 2020-07-25 ENCOUNTER — APPOINTMENT (OUTPATIENT)
Dept: LAB | Facility: MEDICAL CENTER | Age: 35
End: 2020-07-25
Payer: COMMERCIAL

## 2020-07-25 DIAGNOSIS — Z34.82 PRENATAL CARE, SUBSEQUENT PREGNANCY, SECOND TRIMESTER: ICD-10-CM

## 2020-07-25 LAB
BACTERIA UR QL AUTO: NORMAL /HPF
BILIRUB UR QL STRIP: NEGATIVE
CLARITY UR: CLEAR
COLOR UR: YELLOW
GLUCOSE UR STRIP-MCNC: NEGATIVE MG/DL
HGB UR QL STRIP.AUTO: NEGATIVE
KETONES UR STRIP-MCNC: NEGATIVE MG/DL
LEUKOCYTE ESTERASE UR QL STRIP: NEGATIVE
NITRITE UR QL STRIP: NEGATIVE
NON-SQ EPI CELLS URNS QL MICRO: NORMAL /HPF
PH UR STRIP.AUTO: 6.5 [PH]
PROT UR STRIP-MCNC: NEGATIVE MG/DL
RBC #/AREA URNS AUTO: NORMAL /HPF
SP GR UR STRIP.AUTO: 1.02 (ref 1–1.03)
UROBILINOGEN UR QL STRIP.AUTO: 1 E.U./DL
WBC #/AREA URNS AUTO: NORMAL /HPF

## 2020-07-25 PROCEDURE — 81001 URINALYSIS AUTO W/SCOPE: CPT

## 2020-07-25 PROCEDURE — 87086 URINE CULTURE/COLONY COUNT: CPT

## 2020-07-26 LAB — BACTERIA UR CULT: NORMAL

## 2020-07-28 ENCOUNTER — TELEPHONE (OUTPATIENT)
Dept: OBGYN CLINIC | Facility: CLINIC | Age: 35
End: 2020-07-28

## 2020-07-28 NOTE — TELEPHONE ENCOUNTER
----- Message from Paulo Guy MD sent at 7/28/2020  9:27 AM EDT -----  Please let Melani Adjutant know that she does not have UTI  Symptoms may be due to fetal/uterine growth  If continued should make appt to be seen   thanks

## 2020-08-19 ENCOUNTER — TELEPHONE (OUTPATIENT)
Dept: PERINATAL CARE | Facility: CLINIC | Age: 35
End: 2020-08-19

## 2020-08-19 NOTE — TELEPHONE ENCOUNTER
-------------------------------------------------------------    Attempted to reach patient by phone and left voicemail to confirm appointment for MFM ultrasound  1 support person ( must be over the age of 15) may accompany you for your appointment  If you or your support person have traveled outside the state in the past 2 weeks, please call and notify our office today #[phone]  You and your support person must wear a mask ,covering nose and mouth,during your entire visit  You and your support person will have temperature screened upon arrival     To minimize your exposure in our waiting room, please call our office prior to entering the building  Check in and rooming questions will be done via phone  We will give you directions when to enter for your appointment  Inside office # provided:    Ace line:  [phone]    IF you are not feeling well- cough, fever, shortness of breath or any flu like symptoms, contact your primary care physician or [phone] [place] [address]    Any questions with these instructions please call Maternal Fetal Medicine nurse line today @ # [phone]

## 2020-08-20 ENCOUNTER — ROUTINE PRENATAL (OUTPATIENT)
Dept: OBGYN CLINIC | Facility: CLINIC | Age: 35
End: 2020-08-20

## 2020-08-20 ENCOUNTER — ROUTINE PRENATAL (OUTPATIENT)
Dept: PERINATAL CARE | Facility: CLINIC | Age: 35
End: 2020-08-20
Payer: COMMERCIAL

## 2020-08-20 VITALS
SYSTOLIC BLOOD PRESSURE: 130 MMHG | HEIGHT: 64 IN | DIASTOLIC BLOOD PRESSURE: 76 MMHG | HEART RATE: 65 BPM | WEIGHT: 137.4 LBS | BODY MASS INDEX: 23.46 KG/M2 | TEMPERATURE: 98 F

## 2020-08-20 VITALS — DIASTOLIC BLOOD PRESSURE: 78 MMHG | SYSTOLIC BLOOD PRESSURE: 124 MMHG | WEIGHT: 137.4 LBS | BODY MASS INDEX: 23.58 KG/M2

## 2020-08-20 DIAGNOSIS — Z34.82 PRENATAL CARE, SUBSEQUENT PREGNANCY, SECOND TRIMESTER: Primary | ICD-10-CM

## 2020-08-20 DIAGNOSIS — Z86.2 HISTORY OF DIC SYNDROME: ICD-10-CM

## 2020-08-20 DIAGNOSIS — Z3A.21 21 WEEKS GESTATION OF PREGNANCY: Primary | ICD-10-CM

## 2020-08-20 LAB
SL AMB  POCT GLUCOSE, UA: NORMAL
SL AMB POCT URINE PROTEIN: NORMAL

## 2020-08-20 PROCEDURE — PNV: Performed by: OBSTETRICS & GYNECOLOGY

## 2020-08-20 PROCEDURE — 76817 TRANSVAGINAL US OBSTETRIC: CPT | Performed by: OBSTETRICS & GYNECOLOGY

## 2020-08-20 PROCEDURE — 76811 OB US DETAILED SNGL FETUS: CPT | Performed by: OBSTETRICS & GYNECOLOGY

## 2020-08-20 NOTE — LETTER
2020     Nataliechemo Telma, [address]  [address]    Patient: Christin Linares   YOB: 1985   Date of Visit: 2020       Dear Dr Paige Peer: Thank you for referring Christin Linares to me for evaluation  Below are my notes for this consultation  If you have questions, please do not hesitate to call me  I look forward to following your patient along with you  Sincerely,        Mariela Rosario MD        CC: No Recipients  Mariela Rosario MD  2020  1:12 PM  Sign when Signing Visit  Ob ultrasound    Ms Mehul Bowen is a 37 yo  patient  at  24 1/7 weeks gestation  Reduced risk for fetal aneuploidy and ONTD's by sequential screen  LII ultrasound at 21 1/7  weeks gestation who presents for a fetal anatomical examination and TV cervical length  She is asymptomatic  See Ob procedures in EPIC  Ultrasound:  1  Fetal size = dates  2   No fetal anomalies observed  limited fetal anatomical survey  3  Normal; TV cervical length= 5 2 cm; No funneling, no debris at the internal os  I reviewed the results of this ultrasound and answered  all the questions  Recommendations:      1  Follow-up ultrasound in 12 weeks for evaluation of fetal growth and completion of fetal anatomy  Thank you for referring your patient to our offices  The limitations of ultrasound to detect all anomalies was reviewed and how it is not  a test to rule out aneuploidy  If you have any further questions do not hesitate to contact us as [phone]      Mariela Rosario MD

## 2020-08-20 NOTE — PROGRESS NOTES
Level 2 done today  It's a girl  Things otherwise look good per patient (report not available)  She discussed delivery route with Dr Nevaeh Rivera at her last visit with [facility]  She would prefer a scheduled   She is aware that I have no reason to think that an AFE would recur and no way of preventing that with   She is also aware that her risk of bowel injury could be significant with  if adhesions recurred after her SBO and lysis of adhesions  She just had too much trauma from her last delivery and does not want another vaginal delivery  We discussed the possibility of bowel injury either with entry  We also discussed that we can only leave her baby unmonitored safely for so long as mothers can have hypotension after the spinal while they are laying flat  If her scar tissue is extensive, we may have to make a decision at some point to get the baby safely delivered and then deal with scar tissue and/or injury once the baby is out and safe  We would like to plan the surgery right at 39 weeks to allow for general surgery to plan to be present in the room in case of above  She understands these risks  Her new  was in the room with her and also understood the above  They would like to proceed with scheduling the delivery with me  We will try for  which is when she turns 39 weeks - aware we do not usually schedule c-sections on  but that we likely will that week secondary to the holiday falling in a weekday  I will check with nursing and then schedule/coordinate with Dr Marilyn Blanco once I get confirmation  Denies LOF, VB  C/o occ BH ctx - reassured and discussed threshold to call  GFM  Bilateral swelling in legs after working multiple days in a row  Will start using compression stockings  Nothing present today

## 2020-08-20 NOTE — PROGRESS NOTES
A transvaginal ultrasound was performed  Sonographer note on use of High Level Disinfection Process (Trophon) for transvaginal probe# 1 used, serial [id number]    Malia Garcia RDMS

## 2020-08-21 PROBLEM — Z3A.21 21 WEEKS GESTATION OF PREGNANCY: Status: ACTIVE | Noted: 2020-08-21

## 2020-08-21 NOTE — PROGRESS NOTES
Ob ultrasound    Ms Aurora Woods is a 37 yo  patient  at  24 1/7 weeks gestation  Reduced risk for fetal aneuploidy and ONTD's by sequential screen  LII ultrasound at 21 1/7  weeks gestation who presents for a fetal anatomical examination and TV cervical length  She is asymptomatic  See Ob procedures in EPIC  Ultrasound:  1  Fetal size = dates  2   No fetal anomalies observed  limited fetal anatomical survey  3  Normal; TV cervical length= 5 2 cm; No funneling, no debris at the internal os  I reviewed the results of this ultrasound and answered  all the questions  Recommendations:      1  Follow-up ultrasound in 12 weeks for evaluation of fetal growth and completion of fetal anatomy  Thank you for referring your patient to our offices  The limitations of ultrasound to detect all anomalies was reviewed and how it is not  a test to rule out aneuploidy  If you have any further questions do not hesitate to contact us as [phone]      Sukhi Tijerina MD

## 2020-09-08 ENCOUNTER — TELEPHONE (OUTPATIENT)
Dept: OBGYN CLINIC | Facility: CLINIC | Age: 35
End: 2020-09-08

## 2020-09-08 NOTE — TELEPHONE ENCOUNTER
----- Message from Chika Helms sent at 9/4/2020  8:53 PM EDT -----  Regarding: Visit Follow-Up Question  Contact: [phone]  [facility]! Just checking in to see if you were able to schedule a C section date with general surgery and OB scheduling?     Thank you,  Cj Mccurdy

## 2020-09-15 ENCOUNTER — ROUTINE PRENATAL (OUTPATIENT)
Dept: OBGYN CLINIC | Facility: CLINIC | Age: 35
End: 2020-09-15

## 2020-09-15 VITALS
WEIGHT: 142.8 LBS | DIASTOLIC BLOOD PRESSURE: 60 MMHG | TEMPERATURE: 97 F | BODY MASS INDEX: 24.51 KG/M2 | SYSTOLIC BLOOD PRESSURE: 110 MMHG

## 2020-09-15 DIAGNOSIS — Z34.82 PRENATAL CARE, SUBSEQUENT PREGNANCY, SECOND TRIMESTER: Primary | ICD-10-CM

## 2020-09-15 LAB
SL AMB  POCT GLUCOSE, UA: NORMAL
SL AMB POCT URINE PROTEIN: NORMAL

## 2020-09-15 PROCEDURE — PNV: Performed by: OBSTETRICS & GYNECOLOGY

## 2020-09-15 NOTE — PROGRESS NOTES
Patricia Ritter is doing well  Plan is for  with Dr Tejas Benedict, this is being coordinated  Has follow up growth scan scheduled  Baby is moving - discussed kick counts to start at 28 weeks  Occasional cramping, but nothing consistent or bothersome  Discussed increasing hydration when able  28 wk lab slip given

## 2020-09-17 ENCOUNTER — TELEPHONE (OUTPATIENT)
Dept: NEPHROLOGY | Facility: CLINIC | Age: 35
End: 2020-09-17

## 2020-09-24 NOTE — TELEPHONE ENCOUNTER
Please let Alysha Terrazas know that I finally heard from nursing and anesthesia that I am okay to schedule this on 12/23  So I will contact Dr Gilberto Sanchez now to confirm he is okay for that date  Please apologize to her for the fact that it took this long; nursing took forever to confirm      Thanks!!

## 2020-09-25 ENCOUNTER — APPOINTMENT (OUTPATIENT)
Dept: LAB | Facility: MEDICAL CENTER | Age: 35
End: 2020-09-25
Payer: COMMERCIAL

## 2020-09-25 DIAGNOSIS — Z34.82 PRENATAL CARE, SUBSEQUENT PREGNANCY, SECOND TRIMESTER: ICD-10-CM

## 2020-09-25 LAB
BASOPHILS # BLD AUTO: 0.06 THOUSANDS/ΜL (ref 0–0.1)
BASOPHILS NFR BLD AUTO: 1 % (ref 0–1)
EOSINOPHIL # BLD AUTO: 0.21 THOUSAND/ΜL (ref 0–0.61)
EOSINOPHIL NFR BLD AUTO: 2 % (ref 0–6)
ERYTHROCYTE [DISTWIDTH] IN BLOOD BY AUTOMATED COUNT: 13 % (ref 11.6–15.1)
GLUCOSE 1H P 50 G GLC PO SERPL-MCNC: 89 MG/DL
HCT VFR BLD AUTO: 33.1 % (ref 34.8–46.1)
HGB BLD-MCNC: 10.9 G/DL (ref 11.5–15.4)
IMM GRANULOCYTES # BLD AUTO: 0.09 THOUSAND/UL (ref 0–0.2)
IMM GRANULOCYTES NFR BLD AUTO: 1 % (ref 0–2)
LYMPHOCYTES # BLD AUTO: 1.71 THOUSANDS/ΜL (ref 0.6–4.47)
LYMPHOCYTES NFR BLD AUTO: 18 % (ref 14–44)
MCH RBC QN AUTO: 31.1 PG (ref 26.8–34.3)
MCHC RBC AUTO-ENTMCNC: 32.9 G/DL (ref 31.4–37.4)
MCV RBC AUTO: 94 FL (ref 82–98)
MONOCYTES # BLD AUTO: 0.56 THOUSAND/ΜL (ref 0.17–1.22)
MONOCYTES NFR BLD AUTO: 6 % (ref 4–12)
NEUTROPHILS # BLD AUTO: 6.69 THOUSANDS/ΜL (ref 1.85–7.62)
NEUTS SEG NFR BLD AUTO: 72 % (ref 43–75)
NRBC BLD AUTO-RTO: 0 /100 WBCS
PLATELET # BLD AUTO: 199 THOUSANDS/UL (ref 149–390)
PMV BLD AUTO: 11.4 FL (ref 8.9–12.7)
RBC # BLD AUTO: 3.51 MILLION/UL (ref 3.81–5.12)
RPR SER QL: NORMAL
WBC # BLD AUTO: 9.32 THOUSAND/UL (ref 4.31–10.16)

## 2020-09-25 PROCEDURE — 82950 GLUCOSE TEST: CPT

## 2020-09-25 PROCEDURE — 36415 COLL VENOUS BLD VENIPUNCTURE: CPT

## 2020-09-25 PROCEDURE — 86592 SYPHILIS TEST NON-TREP QUAL: CPT

## 2020-09-25 PROCEDURE — 85025 COMPLETE CBC W/AUTO DIFF WBC: CPT

## 2020-10-09 ENCOUNTER — ROUTINE PRENATAL (OUTPATIENT)
Dept: OBGYN CLINIC | Facility: CLINIC | Age: 35
End: 2020-10-09
Payer: COMMERCIAL

## 2020-10-09 VITALS — WEIGHT: 146 LBS | SYSTOLIC BLOOD PRESSURE: 116 MMHG | BODY MASS INDEX: 25.06 KG/M2 | DIASTOLIC BLOOD PRESSURE: 68 MMHG

## 2020-10-09 DIAGNOSIS — Z23 NEED FOR INFLUENZA VACCINATION: ICD-10-CM

## 2020-10-09 DIAGNOSIS — O09.90 SUPERVISION OF HIGH RISK PREGNANCY, ANTEPARTUM: Primary | ICD-10-CM

## 2020-10-09 DIAGNOSIS — Z3A.28 28 WEEKS GESTATION OF PREGNANCY: ICD-10-CM

## 2020-10-09 DIAGNOSIS — Z23 NEED FOR TDAP VACCINATION: ICD-10-CM

## 2020-10-09 DIAGNOSIS — Z34.82 PRENATAL CARE, SUBSEQUENT PREGNANCY, SECOND TRIMESTER: ICD-10-CM

## 2020-10-09 LAB
SL AMB  POCT GLUCOSE, UA: NORMAL
SL AMB POCT URINE PROTEIN: NORMAL

## 2020-10-09 PROCEDURE — 90472 IMMUNIZATION ADMIN EACH ADD: CPT | Performed by: NURSE PRACTITIONER

## 2020-10-09 PROCEDURE — PNV: Performed by: NURSE PRACTITIONER

## 2020-10-09 PROCEDURE — 90715 TDAP VACCINE 7 YRS/> IM: CPT | Performed by: NURSE PRACTITIONER

## 2020-10-09 PROCEDURE — 90686 IIV4 VACC NO PRSV 0.5 ML IM: CPT | Performed by: NURSE PRACTITIONER

## 2020-10-09 PROCEDURE — 90471 IMMUNIZATION ADMIN: CPT | Performed by: NURSE PRACTITIONER

## 2020-10-09 RX ORDER — DOXYCYCLINE HYCLATE 50 MG/1
324 CAPSULE, GELATIN COATED ORAL
COMMUNITY
End: 2020-12-19 | Stop reason: HOSPADM

## 2020-10-20 ENCOUNTER — ROUTINE PRENATAL (OUTPATIENT)
Dept: OBGYN CLINIC | Facility: CLINIC | Age: 35
End: 2020-10-20

## 2020-10-20 VITALS — BODY MASS INDEX: 25.03 KG/M2 | DIASTOLIC BLOOD PRESSURE: 76 MMHG | SYSTOLIC BLOOD PRESSURE: 113 MMHG | WEIGHT: 145.8 LBS

## 2020-10-20 DIAGNOSIS — Z86.2 HISTORY OF DIC SYNDROME: ICD-10-CM

## 2020-10-20 DIAGNOSIS — Z87.59 HISTORY OF VACUUM EXTRACTION ASSISTED DELIVERY: ICD-10-CM

## 2020-10-20 DIAGNOSIS — O09.899 RUBELLA NON-IMMUNE STATUS, ANTEPARTUM: ICD-10-CM

## 2020-10-20 DIAGNOSIS — Z87.19 HISTORY OF SMALL BOWEL OBSTRUCTION: ICD-10-CM

## 2020-10-20 DIAGNOSIS — Z28.39 RUBELLA NON-IMMUNE STATUS, ANTEPARTUM: ICD-10-CM

## 2020-10-20 DIAGNOSIS — Z34.83 PRENATAL CARE, SUBSEQUENT PREGNANCY IN THIRD TRIMESTER: Primary | ICD-10-CM

## 2020-10-20 DIAGNOSIS — O10.919 CHRONIC HYPERTENSION AFFECTING PREGNANCY: ICD-10-CM

## 2020-10-20 LAB
SL AMB  POCT GLUCOSE, UA: NORMAL
SL AMB POCT URINE PROTEIN: NORMAL

## 2020-10-20 PROCEDURE — PNV: Performed by: STUDENT IN AN ORGANIZED HEALTH CARE EDUCATION/TRAINING PROGRAM

## 2020-11-05 ENCOUNTER — TELEPHONE (OUTPATIENT)
Dept: FAMILY MEDICINE CLINIC | Facility: MEDICAL CENTER | Age: 35
End: 2020-11-05

## 2020-11-05 DIAGNOSIS — Z20.822 SUSPECTED COVID-19 VIRUS INFECTION: Primary | ICD-10-CM

## 2020-11-05 DIAGNOSIS — Z3A.33 33 WEEKS GESTATION OF PREGNANCY: Primary | ICD-10-CM

## 2020-11-05 DIAGNOSIS — Z20.822 SUSPECTED COVID-19 VIRUS INFECTION: ICD-10-CM

## 2020-11-05 PROCEDURE — U0003 INFECTIOUS AGENT DETECTION BY NUCLEIC ACID (DNA OR RNA); SEVERE ACUTE RESPIRATORY SYNDROME CORONAVIRUS 2 (SARS-COV-2) (CORONAVIRUS DISEASE [COVID-19]), AMPLIFIED PROBE TECHNIQUE, MAKING USE OF HIGH THROUGHPUT TECHNOLOGIES AS DESCRIBED BY CMS-2020-01-R: HCPCS | Performed by: FAMILY MEDICINE

## 2020-11-06 ENCOUNTER — TELEPHONE (OUTPATIENT)
Dept: FAMILY MEDICINE CLINIC | Facility: MEDICAL CENTER | Age: 35
End: 2020-11-06

## 2020-11-06 LAB — SARS-COV-2 RNA SPEC QL NAA+PROBE: NOT DETECTED

## 2020-11-11 ENCOUNTER — ROUTINE PRENATAL (OUTPATIENT)
Dept: OBGYN CLINIC | Facility: CLINIC | Age: 35
End: 2020-11-11

## 2020-11-11 ENCOUNTER — TELEPHONE (OUTPATIENT)
Dept: PERINATAL CARE | Facility: CLINIC | Age: 35
End: 2020-11-11

## 2020-11-11 VITALS
BODY MASS INDEX: 26.09 KG/M2 | SYSTOLIC BLOOD PRESSURE: 120 MMHG | TEMPERATURE: 96.8 F | DIASTOLIC BLOOD PRESSURE: 70 MMHG | WEIGHT: 152 LBS

## 2020-11-11 DIAGNOSIS — Z3A.28 28 WEEKS GESTATION OF PREGNANCY: ICD-10-CM

## 2020-11-11 DIAGNOSIS — Z86.2 HISTORY OF DIC SYNDROME: ICD-10-CM

## 2020-11-11 DIAGNOSIS — Z87.19 HISTORY OF SMALL BOWEL OBSTRUCTION: ICD-10-CM

## 2020-11-11 DIAGNOSIS — Z28.39 RUBELLA NON-IMMUNE STATUS, ANTEPARTUM: ICD-10-CM

## 2020-11-11 DIAGNOSIS — O10.919 CHRONIC HYPERTENSION AFFECTING PREGNANCY: ICD-10-CM

## 2020-11-11 DIAGNOSIS — Z87.59 HISTORY OF VACUUM EXTRACTION ASSISTED DELIVERY: ICD-10-CM

## 2020-11-11 DIAGNOSIS — Z34.83 PRENATAL CARE, SUBSEQUENT PREGNANCY IN THIRD TRIMESTER: Primary | ICD-10-CM

## 2020-11-11 DIAGNOSIS — O09.899 RUBELLA NON-IMMUNE STATUS, ANTEPARTUM: ICD-10-CM

## 2020-11-11 DIAGNOSIS — O09.90 SUPERVISION OF HIGH RISK PREGNANCY, ANTEPARTUM: ICD-10-CM

## 2020-11-11 LAB
SL AMB  POCT GLUCOSE, UA: NEGATIVE
SL AMB POCT URINE PROTEIN: POSITIVE

## 2020-11-11 PROCEDURE — PNV: Performed by: STUDENT IN AN ORGANIZED HEALTH CARE EDUCATION/TRAINING PROGRAM

## 2020-11-12 ENCOUNTER — ULTRASOUND (OUTPATIENT)
Dept: PERINATAL CARE | Facility: CLINIC | Age: 35
End: 2020-11-12
Payer: COMMERCIAL

## 2020-11-12 ENCOUNTER — TELEPHONE (OUTPATIENT)
Dept: PERINATAL CARE | Facility: CLINIC | Age: 35
End: 2020-11-12

## 2020-11-12 VITALS
WEIGHT: 151.6 LBS | BODY MASS INDEX: 25.88 KG/M2 | DIASTOLIC BLOOD PRESSURE: 89 MMHG | SYSTOLIC BLOOD PRESSURE: 131 MMHG | HEIGHT: 64 IN | TEMPERATURE: 97.3 F | HEART RATE: 92 BPM

## 2020-11-12 DIAGNOSIS — O10.919 CHRONIC HYPERTENSION AFFECTING PREGNANCY: ICD-10-CM

## 2020-11-12 DIAGNOSIS — Z36.89 ENCOUNTER FOR ULTRASOUND TO ASSESS FETAL GROWTH: ICD-10-CM

## 2020-11-12 DIAGNOSIS — O36.8390 FETAL TACHYCARDIA AFFECTING MANAGEMENT OF MOTHER: Primary | ICD-10-CM

## 2020-11-12 DIAGNOSIS — Z3A.33 33 WEEKS GESTATION OF PREGNANCY: ICD-10-CM

## 2020-11-12 DIAGNOSIS — O40.9XX0 POLYHYDRAMNIOS AFFECTING PREGNANCY: ICD-10-CM

## 2020-11-12 PROCEDURE — 59025 FETAL NON-STRESS TEST: CPT | Performed by: OBSTETRICS & GYNECOLOGY

## 2020-11-12 PROCEDURE — 76816 OB US FOLLOW-UP PER FETUS: CPT | Performed by: OBSTETRICS & GYNECOLOGY

## 2020-11-12 PROCEDURE — 99213 OFFICE O/P EST LOW 20 MIN: CPT | Performed by: OBSTETRICS & GYNECOLOGY

## 2020-11-13 ENCOUNTER — ROUTINE PRENATAL (OUTPATIENT)
Dept: PERINATAL CARE | Facility: OTHER | Age: 35
End: 2020-11-13
Payer: COMMERCIAL

## 2020-11-13 VITALS
TEMPERATURE: 96.5 F | HEART RATE: 93 BPM | BODY MASS INDEX: 26.31 KG/M2 | SYSTOLIC BLOOD PRESSURE: 129 MMHG | WEIGHT: 154.1 LBS | HEIGHT: 64 IN | DIASTOLIC BLOOD PRESSURE: 83 MMHG

## 2020-11-13 DIAGNOSIS — Z3A.33 33 WEEKS GESTATION OF PREGNANCY: Primary | ICD-10-CM

## 2020-11-13 DIAGNOSIS — O36.8390 FETAL TACHYCARDIA AFFECTING MANAGEMENT OF MOTHER: ICD-10-CM

## 2020-11-13 PROCEDURE — 59025 FETAL NON-STRESS TEST: CPT | Performed by: OBSTETRICS & GYNECOLOGY

## 2020-11-19 ENCOUNTER — ROUTINE PRENATAL (OUTPATIENT)
Dept: OBGYN CLINIC | Facility: CLINIC | Age: 35
End: 2020-11-19

## 2020-11-19 VITALS — BODY MASS INDEX: 26.43 KG/M2 | WEIGHT: 154 LBS | DIASTOLIC BLOOD PRESSURE: 70 MMHG | SYSTOLIC BLOOD PRESSURE: 126 MMHG

## 2020-11-19 DIAGNOSIS — Z34.83 PRENATAL CARE, SUBSEQUENT PREGNANCY IN THIRD TRIMESTER: Primary | ICD-10-CM

## 2020-11-19 LAB
SL AMB  POCT GLUCOSE, UA: NEGATIVE
SL AMB POCT URINE PROTEIN: NEGATIVE

## 2020-11-19 PROCEDURE — PNV: Performed by: OBSTETRICS & GYNECOLOGY

## 2020-12-01 ENCOUNTER — ROUTINE PRENATAL (OUTPATIENT)
Dept: OBGYN CLINIC | Facility: CLINIC | Age: 35
End: 2020-12-01

## 2020-12-01 VITALS — WEIGHT: 155 LBS | DIASTOLIC BLOOD PRESSURE: 80 MMHG | SYSTOLIC BLOOD PRESSURE: 118 MMHG | BODY MASS INDEX: 26.61 KG/M2

## 2020-12-01 DIAGNOSIS — O40.9XX0 POLYHYDRAMNIOS AFFECTING PREGNANCY: ICD-10-CM

## 2020-12-01 DIAGNOSIS — O09.90 SUPERVISION OF HIGH RISK PREGNANCY, ANTEPARTUM: Primary | ICD-10-CM

## 2020-12-01 DIAGNOSIS — Z87.19 HISTORY OF SMALL BOWEL OBSTRUCTION: ICD-10-CM

## 2020-12-01 DIAGNOSIS — Z86.2 HISTORY OF DIC SYNDROME: ICD-10-CM

## 2020-12-01 DIAGNOSIS — O10.919 CHRONIC HYPERTENSION AFFECTING PREGNANCY: ICD-10-CM

## 2020-12-01 LAB
EXTERNAL GROUP B STREP ANTIGEN: POSITIVE
SL AMB  POCT GLUCOSE, UA: NORMAL
SL AMB POCT URINE PROTEIN: NORMAL

## 2020-12-01 PROCEDURE — 87081 CULTURE SCREEN ONLY: CPT

## 2020-12-01 PROCEDURE — PNV: Performed by: STUDENT IN AN ORGANIZED HEALTH CARE EDUCATION/TRAINING PROGRAM

## 2020-12-04 ENCOUNTER — TELEPHONE (OUTPATIENT)
Dept: PERINATAL CARE | Facility: CLINIC | Age: 35
End: 2020-12-04

## 2020-12-05 LAB
GP B STREP GENITAL QL CULT: ABNORMAL
GP B STREP GENITAL QL CULT: ABNORMAL

## 2020-12-07 ENCOUNTER — ULTRASOUND (OUTPATIENT)
Dept: PERINATAL CARE | Facility: OTHER | Age: 35
End: 2020-12-07
Payer: COMMERCIAL

## 2020-12-07 VITALS
WEIGHT: 155.6 LBS | BODY MASS INDEX: 26.56 KG/M2 | SYSTOLIC BLOOD PRESSURE: 128 MMHG | HEART RATE: 80 BPM | HEIGHT: 64 IN | DIASTOLIC BLOOD PRESSURE: 77 MMHG

## 2020-12-07 DIAGNOSIS — O35.8XX0 FETAL HYDRONEPHROSIS DURING PREGNANCY, ANTEPARTUM, SINGLE OR UNSPECIFIED FETUS: ICD-10-CM

## 2020-12-07 DIAGNOSIS — O40.9XX0 POLYHYDRAMNIOS AFFECTING PREGNANCY: ICD-10-CM

## 2020-12-07 DIAGNOSIS — O10.919 CHRONIC HYPERTENSION AFFECTING PREGNANCY: Primary | ICD-10-CM

## 2020-12-07 DIAGNOSIS — Z3A.36 36 WEEKS GESTATION OF PREGNANCY: ICD-10-CM

## 2020-12-07 PROCEDURE — 76816 OB US FOLLOW-UP PER FETUS: CPT | Performed by: OBSTETRICS & GYNECOLOGY

## 2020-12-07 PROCEDURE — 59025 FETAL NON-STRESS TEST: CPT | Performed by: OBSTETRICS & GYNECOLOGY

## 2020-12-07 PROCEDURE — 99212 OFFICE O/P EST SF 10 MIN: CPT | Performed by: OBSTETRICS & GYNECOLOGY

## 2020-12-08 ENCOUNTER — CLINICAL SUPPORT (OUTPATIENT)
Dept: PERINATAL CARE | Facility: CLINIC | Age: 35
End: 2020-12-08

## 2020-12-08 ENCOUNTER — TELEPHONE (OUTPATIENT)
Dept: PERINATAL CARE | Facility: CLINIC | Age: 35
End: 2020-12-08

## 2020-12-08 DIAGNOSIS — Z87.19 HISTORY OF SMALL BOWEL OBSTRUCTION: ICD-10-CM

## 2020-12-08 DIAGNOSIS — Z36.9 ENCOUNTER FOR ANTENATAL SCREENING OF MOTHER: Primary | ICD-10-CM

## 2020-12-09 PROBLEM — Z3A.36 36 WEEKS GESTATION OF PREGNANCY: Status: ACTIVE | Noted: 2020-08-21

## 2020-12-09 PROBLEM — O35.EXX0 FETAL HYDRONEPHROSIS IN PREGNANCY, ANTEPARTUM CONDITION: Status: ACTIVE | Noted: 2020-12-09

## 2020-12-09 PROBLEM — O35.8XX0 FETAL HYDRONEPHROSIS IN PREGNANCY, ANTEPARTUM CONDITION: Status: ACTIVE | Noted: 2020-12-09

## 2020-12-10 ENCOUNTER — ROUTINE PRENATAL (OUTPATIENT)
Dept: OBGYN CLINIC | Facility: CLINIC | Age: 35
End: 2020-12-10

## 2020-12-10 VITALS — BODY MASS INDEX: 27.22 KG/M2 | SYSTOLIC BLOOD PRESSURE: 114 MMHG | DIASTOLIC BLOOD PRESSURE: 82 MMHG | WEIGHT: 158.6 LBS

## 2020-12-10 DIAGNOSIS — O09.90 SUPERVISION OF HIGH RISK PREGNANCY, ANTEPARTUM: Primary | ICD-10-CM

## 2020-12-10 DIAGNOSIS — O99.013 ANEMIA DURING PREGNANCY IN THIRD TRIMESTER: ICD-10-CM

## 2020-12-10 DIAGNOSIS — O09.899 RUBELLA NON-IMMUNE STATUS, ANTEPARTUM: ICD-10-CM

## 2020-12-10 DIAGNOSIS — Z28.39 RUBELLA NON-IMMUNE STATUS, ANTEPARTUM: ICD-10-CM

## 2020-12-10 DIAGNOSIS — O10.919 CHRONIC HYPERTENSION AFFECTING PREGNANCY: ICD-10-CM

## 2020-12-10 DIAGNOSIS — O35.8XX0 FETAL HYDRONEPHROSIS DURING PREGNANCY, ANTEPARTUM, SINGLE OR UNSPECIFIED FETUS: ICD-10-CM

## 2020-12-10 DIAGNOSIS — B95.1 POSITIVE GBS TEST: ICD-10-CM

## 2020-12-10 DIAGNOSIS — Z86.2 HISTORY OF DIC SYNDROME: ICD-10-CM

## 2020-12-10 DIAGNOSIS — O40.9XX0 POLYHYDRAMNIOS AFFECTING PREGNANCY: ICD-10-CM

## 2020-12-10 DIAGNOSIS — Z87.19 HISTORY OF SMALL BOWEL OBSTRUCTION: ICD-10-CM

## 2020-12-10 LAB
SL AMB  POCT GLUCOSE, UA: NORMAL
SL AMB POCT URINE PROTEIN: NORMAL

## 2020-12-10 PROCEDURE — PNV: Performed by: NURSE PRACTITIONER

## 2020-12-11 ENCOUNTER — APPOINTMENT (OUTPATIENT)
Dept: LAB | Facility: MEDICAL CENTER | Age: 35
End: 2020-12-11
Payer: COMMERCIAL

## 2020-12-11 DIAGNOSIS — O99.013 ANEMIA DURING PREGNANCY IN THIRD TRIMESTER: ICD-10-CM

## 2020-12-11 LAB
BASOPHILS # BLD AUTO: 0.07 THOUSANDS/ΜL (ref 0–0.1)
BASOPHILS NFR BLD AUTO: 1 % (ref 0–1)
EOSINOPHIL # BLD AUTO: 0.2 THOUSAND/ΜL (ref 0–0.61)
EOSINOPHIL NFR BLD AUTO: 2 % (ref 0–6)
ERYTHROCYTE [DISTWIDTH] IN BLOOD BY AUTOMATED COUNT: 13.1 % (ref 11.6–15.1)
HCT VFR BLD AUTO: 37.2 % (ref 34.8–46.1)
HGB BLD-MCNC: 11.6 G/DL (ref 11.5–15.4)
IMM GRANULOCYTES # BLD AUTO: 0.14 THOUSAND/UL (ref 0–0.2)
IMM GRANULOCYTES NFR BLD AUTO: 1 % (ref 0–2)
LYMPHOCYTES # BLD AUTO: 1.95 THOUSANDS/ΜL (ref 0.6–4.47)
LYMPHOCYTES NFR BLD AUTO: 19 % (ref 14–44)
MCH RBC QN AUTO: 29.4 PG (ref 26.8–34.3)
MCHC RBC AUTO-ENTMCNC: 31.2 G/DL (ref 31.4–37.4)
MCV RBC AUTO: 94 FL (ref 82–98)
MONOCYTES # BLD AUTO: 0.56 THOUSAND/ΜL (ref 0.17–1.22)
MONOCYTES NFR BLD AUTO: 6 % (ref 4–12)
NEUTROPHILS # BLD AUTO: 7.15 THOUSANDS/ΜL (ref 1.85–7.62)
NEUTS SEG NFR BLD AUTO: 71 % (ref 43–75)
NRBC BLD AUTO-RTO: 0 /100 WBCS
PLATELET # BLD AUTO: 181 THOUSANDS/UL (ref 149–390)
PMV BLD AUTO: 11.3 FL (ref 8.9–12.7)
RBC # BLD AUTO: 3.94 MILLION/UL (ref 3.81–5.12)
WBC # BLD AUTO: 10.07 THOUSAND/UL (ref 4.31–10.16)

## 2020-12-11 PROCEDURE — 36415 COLL VENOUS BLD VENIPUNCTURE: CPT

## 2020-12-11 PROCEDURE — 85025 COMPLETE CBC W/AUTO DIFF WBC: CPT

## 2020-12-13 ENCOUNTER — TELEPHONE (OUTPATIENT)
Dept: OBGYN CLINIC | Facility: MEDICAL CENTER | Age: 35
End: 2020-12-13

## 2020-12-15 ENCOUNTER — TELEPHONE (OUTPATIENT)
Dept: PERINATAL CARE | Facility: CLINIC | Age: 35
End: 2020-12-15

## 2020-12-15 ENCOUNTER — ROUTINE PRENATAL (OUTPATIENT)
Dept: OBGYN CLINIC | Facility: CLINIC | Age: 35
End: 2020-12-15

## 2020-12-15 VITALS — DIASTOLIC BLOOD PRESSURE: 72 MMHG | BODY MASS INDEX: 27.29 KG/M2 | WEIGHT: 159 LBS | SYSTOLIC BLOOD PRESSURE: 110 MMHG

## 2020-12-15 DIAGNOSIS — O09.90 SUPERVISION OF HIGH RISK PREGNANCY, ANTEPARTUM: Primary | ICD-10-CM

## 2020-12-15 DIAGNOSIS — O35.8XX0 FETAL HYDRONEPHROSIS DURING PREGNANCY, ANTEPARTUM, SINGLE OR UNSPECIFIED FETUS: ICD-10-CM

## 2020-12-15 DIAGNOSIS — O99.013 ANEMIA DURING PREGNANCY IN THIRD TRIMESTER: ICD-10-CM

## 2020-12-15 DIAGNOSIS — Z87.19 HISTORY OF SMALL BOWEL OBSTRUCTION: ICD-10-CM

## 2020-12-15 DIAGNOSIS — O10.919 CHRONIC HYPERTENSION AFFECTING PREGNANCY: ICD-10-CM

## 2020-12-15 DIAGNOSIS — Z28.39 RUBELLA NON-IMMUNE STATUS, ANTEPARTUM: ICD-10-CM

## 2020-12-15 DIAGNOSIS — B95.1 POSITIVE GBS TEST: ICD-10-CM

## 2020-12-15 DIAGNOSIS — O09.899 RUBELLA NON-IMMUNE STATUS, ANTEPARTUM: ICD-10-CM

## 2020-12-15 DIAGNOSIS — Z86.2 HISTORY OF DIC SYNDROME: ICD-10-CM

## 2020-12-15 PROBLEM — Z3A.36 36 WEEKS GESTATION OF PREGNANCY: Status: RESOLVED | Noted: 2020-08-21 | Resolved: 2020-12-15

## 2020-12-15 LAB
SL AMB  POCT GLUCOSE, UA: NORMAL
SL AMB POCT URINE PROTEIN: NORMAL

## 2020-12-15 PROCEDURE — PNV: Performed by: PHYSICIAN ASSISTANT

## 2020-12-16 ENCOUNTER — ULTRASOUND (OUTPATIENT)
Dept: PERINATAL CARE | Facility: CLINIC | Age: 35
End: 2020-12-16
Payer: COMMERCIAL

## 2020-12-16 ENCOUNTER — HOSPITAL ENCOUNTER (INPATIENT)
Facility: HOSPITAL | Age: 35
LOS: 3 days | Discharge: HOME | End: 2020-12-19
Attending: OBSTETRICS & GYNECOLOGY | Admitting: OBSTETRICS & GYNECOLOGY
Payer: COMMERCIAL

## 2020-12-16 ENCOUNTER — ANESTHESIA (INPATIENT)
Dept: LABOR AND DELIVERY | Facility: HOSPITAL | Age: 35
End: 2020-12-16
Payer: COMMERCIAL

## 2020-12-16 ENCOUNTER — ANESTHESIA EVENT (INPATIENT)
Dept: LABOR AND DELIVERY | Facility: HOSPITAL | Age: 35
End: 2020-12-16
Payer: COMMERCIAL

## 2020-12-16 ENCOUNTER — TELEPHONE (OUTPATIENT)
Dept: OBGYN CLINIC | Facility: CLINIC | Age: 35
End: 2020-12-16

## 2020-12-16 VITALS
WEIGHT: 158.4 LBS | HEIGHT: 64 IN | BODY MASS INDEX: 27.04 KG/M2 | DIASTOLIC BLOOD PRESSURE: 86 MMHG | SYSTOLIC BLOOD PRESSURE: 133 MMHG | HEART RATE: 88 BPM

## 2020-12-16 VITALS — HEART RATE: 82 BPM

## 2020-12-16 DIAGNOSIS — Z98.891 S/P PRIMARY LOW TRANSVERSE C-SECTION: ICD-10-CM

## 2020-12-16 DIAGNOSIS — O35.8XX0 ENCOUNTER FOR REPEAT ULTRASOUND OF FETAL PYELECTASIS, ANTEPARTUM, SINGLE OR UNSPECIFIED FETUS: ICD-10-CM

## 2020-12-16 DIAGNOSIS — O34.219 PREVIOUS CESAREAN SECTION COMPLICATING PREGNANCY: ICD-10-CM

## 2020-12-16 DIAGNOSIS — O10.919 CHRONIC HYPERTENSION AFFECTING PREGNANCY: Primary | ICD-10-CM

## 2020-12-16 DIAGNOSIS — O40.9XX0 POLYHYDRAMNIOS AFFECTING PREGNANCY: Primary | ICD-10-CM

## 2020-12-16 DIAGNOSIS — Z3A.38 38 WEEKS GESTATION OF PREGNANCY: ICD-10-CM

## 2020-12-16 DIAGNOSIS — O40.9XX0 POLYHYDRAMNIOS AFFECTING PREGNANCY: ICD-10-CM

## 2020-12-16 DIAGNOSIS — Z87.19 HISTORY OF SMALL BOWEL OBSTRUCTION: ICD-10-CM

## 2020-12-16 PROBLEM — O36.8390 FETAL TACHYCARDIA AFFECTING MANAGEMENT OF MOTHER: Status: RESOLVED | Noted: 2020-11-12 | Resolved: 2020-12-16

## 2020-12-16 PROBLEM — I15.9 SECONDARY HYPERTENSION: Status: RESOLVED | Noted: 2020-03-10 | Resolved: 2020-12-16

## 2020-12-16 LAB
ABO GROUP BLD: NORMAL
AMORPH PHOS CRY URNS QL MICRO: ABNORMAL /HPF
BACTERIA UR QL AUTO: ABNORMAL /HPF
BASE EXCESS BLDCOA CALC-SCNC: -2.8 MMOL/L (ref 3–11)
BASE EXCESS BLDCOV CALC-SCNC: -1.2 MMOL/L (ref 1–9)
BILIRUB UR QL STRIP: NEGATIVE
BLD GP AB SCN SERPL QL: NEGATIVE
CLARITY UR: CLEAR
COLOR UR: ABNORMAL
CREAT UR-MCNC: 24 MG/DL
ERYTHROCYTE [DISTWIDTH] IN BLOOD BY AUTOMATED COUNT: 12.6 % (ref 11.6–15.1)
ERYTHROCYTE [DISTWIDTH] IN BLOOD BY AUTOMATED COUNT: 13 % (ref 11.6–15.1)
GLUCOSE UR STRIP-MCNC: ABNORMAL MG/DL
HCO3 BLDCOA-SCNC: 24.5 MMOL/L (ref 17.3–27.3)
HCO3 BLDCOV-SCNC: 22.9 MMOL/L (ref 12.2–28.6)
HCT VFR BLD AUTO: 35.5 % (ref 34.8–46.1)
HCT VFR BLD AUTO: 38.4 % (ref 34.8–46.1)
HGB BLD-MCNC: 11.6 G/DL (ref 11.5–15.4)
HGB BLD-MCNC: 12.7 G/DL (ref 11.5–15.4)
HGB UR QL STRIP.AUTO: NEGATIVE
KETONES UR STRIP-MCNC: NEGATIVE MG/DL
LEUKOCYTE ESTERASE UR QL STRIP: NEGATIVE
MCH RBC QN AUTO: 29.7 PG (ref 26.8–34.3)
MCH RBC QN AUTO: 29.7 PG (ref 26.8–34.3)
MCHC RBC AUTO-ENTMCNC: 32.7 G/DL (ref 31.4–37.4)
MCHC RBC AUTO-ENTMCNC: 33.1 G/DL (ref 31.4–37.4)
MCV RBC AUTO: 90 FL (ref 82–98)
MCV RBC AUTO: 91 FL (ref 82–98)
NITRITE UR QL STRIP: NEGATIVE
NON-SQ EPI CELLS URNS QL MICRO: ABNORMAL /HPF
O2 CT VFR BLDCOA CALC: 6.3 ML/DL
OXYHGB MFR BLDCOA: 28 %
OXYHGB MFR BLDCOV: 49.7 %
PCO2 BLDCOA: 51.8 MM[HG] (ref 30–60)
PCO2 BLDCOV: 36.6 MM HG (ref 27–43)
PH BLDCOA: 7.29 [PH] (ref 7.23–7.43)
PH BLDCOV: 7.41 [PH] (ref 7.19–7.49)
PH UR STRIP.AUTO: 7.5 [PH]
PLATELET # BLD AUTO: 192 THOUSANDS/UL (ref 149–390)
PLATELET # BLD AUTO: 202 THOUSANDS/UL (ref 149–390)
PMV BLD AUTO: 10.7 FL (ref 8.9–12.7)
PMV BLD AUTO: 10.7 FL (ref 8.9–12.7)
PO2 BLDCOA: 14.7 MM HG (ref 5–25)
PO2 BLDCOV: 19.1 MM HG (ref 15–45)
PROT UR STRIP-MCNC: NEGATIVE MG/DL
PROT UR-MCNC: 7 MG/DL
PROT/CREAT UR: 0.29 MG/G{CREAT} (ref 0–0.1)
RBC # BLD AUTO: 3.91 MILLION/UL (ref 3.81–5.12)
RBC # BLD AUTO: 4.27 MILLION/UL (ref 3.81–5.12)
RBC #/AREA URNS AUTO: ABNORMAL /HPF
RH BLD: POSITIVE
SAO2 % BLDCOV: 10.8 ML/DL
SP GR UR STRIP.AUTO: 1.01 (ref 1–1.03)
SPECIMEN EXPIRATION DATE: NORMAL
UROBILINOGEN UR QL STRIP.AUTO: 0.2 E.U./DL
WBC # BLD AUTO: 11.37 THOUSAND/UL (ref 4.31–10.16)
WBC # BLD AUTO: 18.51 THOUSAND/UL (ref 4.31–10.16)
WBC #/AREA URNS AUTO: ABNORMAL /HPF

## 2020-12-16 PROCEDURE — 86901 BLOOD TYPING SEROLOGIC RH(D): CPT | Performed by: OBSTETRICS & GYNECOLOGY

## 2020-12-16 PROCEDURE — NC001 PR NO CHARGE: Performed by: OBSTETRICS & GYNECOLOGY

## 2020-12-16 PROCEDURE — 85027 COMPLETE CBC AUTOMATED: CPT | Performed by: OBSTETRICS & GYNECOLOGY

## 2020-12-16 PROCEDURE — 82805 BLOOD GASES W/O2 SATURATION: CPT | Performed by: STUDENT IN AN ORGANIZED HEALTH CARE EDUCATION/TRAINING PROGRAM

## 2020-12-16 PROCEDURE — 81001 URINALYSIS AUTO W/SCOPE: CPT | Performed by: OBSTETRICS & GYNECOLOGY

## 2020-12-16 PROCEDURE — 88307 TISSUE EXAM BY PATHOLOGIST: CPT | Performed by: PATHOLOGY

## 2020-12-16 PROCEDURE — NC001 PR NO CHARGE: Performed by: STUDENT IN AN ORGANIZED HEALTH CARE EDUCATION/TRAINING PROGRAM

## 2020-12-16 PROCEDURE — 99212 OFFICE O/P EST SF 10 MIN: CPT | Performed by: OBSTETRICS & GYNECOLOGY

## 2020-12-16 PROCEDURE — 4A1HXCZ MONITORING OF PRODUCTS OF CONCEPTION, CARDIAC RATE, EXTERNAL APPROACH: ICD-10-PCS | Performed by: STUDENT IN AN ORGANIZED HEALTH CARE EDUCATION/TRAINING PROGRAM

## 2020-12-16 PROCEDURE — 93005 ELECTROCARDIOGRAM TRACING: CPT

## 2020-12-16 PROCEDURE — 86900 BLOOD TYPING SEROLOGIC ABO: CPT | Performed by: OBSTETRICS & GYNECOLOGY

## 2020-12-16 PROCEDURE — 59025 FETAL NON-STRESS TEST: CPT | Performed by: OBSTETRICS & GYNECOLOGY

## 2020-12-16 PROCEDURE — 99213 OFFICE O/P EST LOW 20 MIN: CPT

## 2020-12-16 PROCEDURE — 84156 ASSAY OF PROTEIN URINE: CPT | Performed by: OBSTETRICS & GYNECOLOGY

## 2020-12-16 PROCEDURE — 86850 RBC ANTIBODY SCREEN: CPT | Performed by: OBSTETRICS & GYNECOLOGY

## 2020-12-16 PROCEDURE — 84484 ASSAY OF TROPONIN QUANT: CPT | Performed by: OBSTETRICS & GYNECOLOGY

## 2020-12-16 PROCEDURE — 59510 CESAREAN DELIVERY: CPT | Performed by: STUDENT IN AN ORGANIZED HEALTH CARE EDUCATION/TRAINING PROGRAM

## 2020-12-16 PROCEDURE — 82570 ASSAY OF URINE CREATININE: CPT | Performed by: OBSTETRICS & GYNECOLOGY

## 2020-12-16 PROCEDURE — 80053 COMPREHEN METABOLIC PANEL: CPT | Performed by: OBSTETRICS & GYNECOLOGY

## 2020-12-16 PROCEDURE — 76815 OB US LIMITED FETUS(S): CPT | Performed by: OBSTETRICS & GYNECOLOGY

## 2020-12-16 PROCEDURE — 86592 SYPHILIS TEST NON-TREP QUAL: CPT | Performed by: OBSTETRICS & GYNECOLOGY

## 2020-12-16 PROCEDURE — 86923 COMPATIBILITY TEST ELECTRIC: CPT

## 2020-12-16 PROCEDURE — C1765 ADHESION BARRIER: HCPCS | Performed by: STUDENT IN AN ORGANIZED HEALTH CARE EDUCATION/TRAINING PROGRAM

## 2020-12-16 RX ORDER — KETOROLAC TROMETHAMINE 30 MG/ML
30 INJECTION, SOLUTION INTRAMUSCULAR; INTRAVENOUS EVERY 6 HOURS
Status: COMPLETED | OUTPATIENT
Start: 2020-12-17 | End: 2020-12-17

## 2020-12-16 RX ORDER — SODIUM CHLORIDE 9 MG/ML
125 INJECTION, SOLUTION INTRAVENOUS CONTINUOUS
Status: DISCONTINUED | OUTPATIENT
Start: 2020-12-16 | End: 2020-12-16

## 2020-12-16 RX ORDER — SIMETHICONE 80 MG
80 TABLET,CHEWABLE ORAL EVERY 6 HOURS PRN
Status: DISCONTINUED | OUTPATIENT
Start: 2020-12-16 | End: 2020-12-17

## 2020-12-16 RX ORDER — CEFAZOLIN SODIUM 2 G/50ML
SOLUTION INTRAVENOUS AS NEEDED
Status: DISCONTINUED | OUTPATIENT
Start: 2020-12-16 | End: 2020-12-16

## 2020-12-16 RX ORDER — DIPHENHYDRAMINE HYDROCHLORIDE 50 MG/ML
50 INJECTION INTRAMUSCULAR; INTRAVENOUS EVERY 6 HOURS PRN
Status: DISCONTINUED | OUTPATIENT
Start: 2020-12-16 | End: 2020-12-17

## 2020-12-16 RX ORDER — FENTANYL CITRATE/PF 50 MCG/ML
25 SYRINGE (ML) INJECTION
Status: COMPLETED | OUTPATIENT
Start: 2020-12-16 | End: 2020-12-16

## 2020-12-16 RX ORDER — DEXAMETHASONE SODIUM PHOSPHATE 4 MG/ML
INJECTION, SOLUTION INTRA-ARTICULAR; INTRALESIONAL; INTRAMUSCULAR; INTRAVENOUS; SOFT TISSUE AS NEEDED
Status: DISCONTINUED | OUTPATIENT
Start: 2020-12-16 | End: 2020-12-16

## 2020-12-16 RX ORDER — ONDANSETRON 2 MG/ML
4 INJECTION INTRAMUSCULAR; INTRAVENOUS EVERY 8 HOURS PRN
Status: DISCONTINUED | OUTPATIENT
Start: 2020-12-16 | End: 2020-12-16

## 2020-12-16 RX ORDER — ONDANSETRON 2 MG/ML
4 INJECTION INTRAMUSCULAR; INTRAVENOUS EVERY 4 HOURS PRN
Status: ACTIVE | OUTPATIENT
Start: 2020-12-16 | End: 2020-12-17

## 2020-12-16 RX ORDER — DIPHENHYDRAMINE HCL 25 MG
25 TABLET ORAL EVERY 6 HOURS PRN
Status: DISCONTINUED | OUTPATIENT
Start: 2020-12-16 | End: 2020-12-17

## 2020-12-16 RX ORDER — ONDANSETRON 2 MG/ML
INJECTION INTRAMUSCULAR; INTRAVENOUS AS NEEDED
Status: DISCONTINUED | OUTPATIENT
Start: 2020-12-16 | End: 2020-12-16

## 2020-12-16 RX ORDER — HYDROMORPHONE HCL/PF 1 MG/ML
0.2 SYRINGE (ML) INJECTION
Status: DISCONTINUED | OUTPATIENT
Start: 2020-12-16 | End: 2020-12-19 | Stop reason: HOSPADM

## 2020-12-16 RX ORDER — HYDROMORPHONE HCL/PF 1 MG/ML
0.5 SYRINGE (ML) INJECTION EVERY 2 HOUR PRN
Status: DISCONTINUED | OUTPATIENT
Start: 2020-12-16 | End: 2020-12-19 | Stop reason: HOSPADM

## 2020-12-16 RX ORDER — BUPIVACAINE HYDROCHLORIDE 7.5 MG/ML
INJECTION, SOLUTION INTRASPINAL AS NEEDED
Status: DISCONTINUED | OUTPATIENT
Start: 2020-12-16 | End: 2020-12-16

## 2020-12-16 RX ORDER — ONDANSETRON 2 MG/ML
4 INJECTION INTRAMUSCULAR; INTRAVENOUS ONCE AS NEEDED
Status: DISCONTINUED | OUTPATIENT
Start: 2020-12-16 | End: 2020-12-19 | Stop reason: HOSPADM

## 2020-12-16 RX ORDER — OXYTOCIN/RINGER'S LACTATE 30/500 ML
62.5 PLASTIC BAG, INJECTION (ML) INTRAVENOUS CONTINUOUS
Status: ACTIVE | OUTPATIENT
Start: 2020-12-16 | End: 2020-12-17

## 2020-12-16 RX ORDER — DIPHENHYDRAMINE HYDROCHLORIDE 50 MG/ML
INJECTION INTRAMUSCULAR; INTRAVENOUS AS NEEDED
Status: DISCONTINUED | OUTPATIENT
Start: 2020-12-16 | End: 2020-12-16

## 2020-12-16 RX ORDER — CALCIUM CARBONATE 200(500)MG
1000 TABLET,CHEWABLE ORAL 3 TIMES DAILY PRN
Status: DISCONTINUED | OUTPATIENT
Start: 2020-12-16 | End: 2020-12-19 | Stop reason: HOSPADM

## 2020-12-16 RX ORDER — NALOXONE HYDROCHLORIDE 0.4 MG/ML
0.1 INJECTION, SOLUTION INTRAMUSCULAR; INTRAVENOUS; SUBCUTANEOUS
Status: ACTIVE | OUTPATIENT
Start: 2020-12-16 | End: 2020-12-17

## 2020-12-16 RX ORDER — ROPIVACAINE HYDROCHLORIDE 2 MG/ML
INJECTION, SOLUTION EPIDURAL; INFILTRATION; PERINEURAL
Status: DISCONTINUED
Start: 2020-12-16 | End: 2020-12-16 | Stop reason: WASHOUT

## 2020-12-16 RX ORDER — METOCLOPRAMIDE HYDROCHLORIDE 5 MG/ML
5 INJECTION INTRAMUSCULAR; INTRAVENOUS EVERY 6 HOURS PRN
Status: ACTIVE | OUTPATIENT
Start: 2020-12-16 | End: 2020-12-17

## 2020-12-16 RX ORDER — OXYTOCIN/RINGER'S LACTATE 30/500 ML
PLASTIC BAG, INJECTION (ML) INTRAVENOUS CONTINUOUS PRN
Status: DISCONTINUED | OUTPATIENT
Start: 2020-12-16 | End: 2020-12-16

## 2020-12-16 RX ORDER — ACETAMINOPHEN 325 MG/1
650 TABLET ORAL EVERY 6 HOURS
Status: DISCONTINUED | OUTPATIENT
Start: 2020-12-16 | End: 2020-12-17 | Stop reason: SDUPTHER

## 2020-12-16 RX ORDER — CEFAZOLIN SODIUM 2 G/50ML
2000 SOLUTION INTRAVENOUS ONCE
Status: DISCONTINUED | OUTPATIENT
Start: 2020-12-16 | End: 2020-12-16

## 2020-12-16 RX ORDER — LABETALOL 20 MG/4 ML (5 MG/ML) INTRAVENOUS SYRINGE
20 ONCE
Status: COMPLETED | OUTPATIENT
Start: 2020-12-16 | End: 2020-12-16

## 2020-12-16 RX ORDER — DEXAMETHASONE SODIUM PHOSPHATE 10 MG/ML
8 INJECTION, SOLUTION INTRAMUSCULAR; INTRAVENOUS ONCE AS NEEDED
Status: ACTIVE | OUTPATIENT
Start: 2020-12-16 | End: 2020-12-17

## 2020-12-16 RX ORDER — KETOROLAC TROMETHAMINE 30 MG/ML
INJECTION, SOLUTION INTRAMUSCULAR; INTRAVENOUS AS NEEDED
Status: DISCONTINUED | OUTPATIENT
Start: 2020-12-16 | End: 2020-12-16

## 2020-12-16 RX ORDER — FENTANYL CITRATE 50 UG/ML
INJECTION, SOLUTION INTRAMUSCULAR; INTRAVENOUS AS NEEDED
Status: DISCONTINUED | OUTPATIENT
Start: 2020-12-16 | End: 2020-12-16

## 2020-12-16 RX ORDER — SODIUM CHLORIDE, SODIUM LACTATE, POTASSIUM CHLORIDE, CALCIUM CHLORIDE 600; 310; 30; 20 MG/100ML; MG/100ML; MG/100ML; MG/100ML
125 INJECTION, SOLUTION INTRAVENOUS CONTINUOUS
Status: DISCONTINUED | OUTPATIENT
Start: 2020-12-16 | End: 2020-12-16

## 2020-12-16 RX ORDER — MORPHINE SULFATE 0.5 MG/ML
INJECTION, SOLUTION EPIDURAL; INTRATHECAL; INTRAVENOUS AS NEEDED
Status: DISCONTINUED | OUTPATIENT
Start: 2020-12-16 | End: 2020-12-16

## 2020-12-16 RX ADMIN — SODIUM CHLORIDE, SODIUM LACTATE, POTASSIUM CHLORIDE, AND CALCIUM CHLORIDE 125 ML/HR: .6; .31; .03; .02 INJECTION, SOLUTION INTRAVENOUS at 13:12

## 2020-12-16 RX ADMIN — LABETALOL 20 MG/4 ML (5 MG/ML) INTRAVENOUS SYRINGE 20 MG: at 23:37

## 2020-12-16 RX ADMIN — FENTANYL CITRATE 25 MCG: 50 INJECTION INTRAMUSCULAR; INTRAVENOUS at 18:40

## 2020-12-16 RX ADMIN — BUPIVACAINE HYDROCHLORIDE IN DEXTROSE 1.6 ML: 7.5 INJECTION, SOLUTION SUBARACHNOID at 16:05

## 2020-12-16 RX ADMIN — DEXAMETHASONE SODIUM PHOSPHATE 4 MG: 4 INJECTION INTRA-ARTICULAR; INTRALESIONAL; INTRAMUSCULAR; INTRAVENOUS; SOFT TISSUE at 16:19

## 2020-12-16 RX ADMIN — CEFAZOLIN SODIUM 2000 MG: 2 SOLUTION INTRAVENOUS at 16:03

## 2020-12-16 RX ADMIN — TRANEXAMIC ACID 1000 MG: 1 INJECTION, SOLUTION INTRAVENOUS at 18:29

## 2020-12-16 RX ADMIN — Medication 62.5 MILLI-UNITS/MIN: at 18:59

## 2020-12-16 RX ADMIN — DIPHENHYDRAMINE HYDROCHLORIDE 50 MG: 50 INJECTION, SOLUTION INTRAMUSCULAR; INTRAVENOUS at 22:33

## 2020-12-16 RX ADMIN — DIPHENHYDRAMINE HYDROCHLORIDE 25 MG: 50 INJECTION, SOLUTION INTRAMUSCULAR; INTRAVENOUS at 17:27

## 2020-12-16 RX ADMIN — ONDANSETRON 4 MG: 2 INJECTION INTRAMUSCULAR; INTRAVENOUS at 16:19

## 2020-12-16 RX ADMIN — FENTANYL CITRATE 25 MCG: 50 INJECTION INTRAMUSCULAR; INTRAVENOUS at 18:30

## 2020-12-16 RX ADMIN — ONDANSETRON 4 MG: 2 INJECTION INTRAMUSCULAR; INTRAVENOUS at 16:46

## 2020-12-16 RX ADMIN — FENTANYL CITRATE 50 MCG: 50 INJECTION, SOLUTION INTRAMUSCULAR; INTRAVENOUS at 16:12

## 2020-12-16 RX ADMIN — Medication 250 MILLI-UNITS/MIN: at 16:37

## 2020-12-16 RX ADMIN — FENTANYL CITRATE 25 MCG: 50 INJECTION INTRAMUSCULAR; INTRAVENOUS at 18:58

## 2020-12-16 RX ADMIN — SODIUM CHLORIDE, SODIUM LACTATE, POTASSIUM CHLORIDE, AND CALCIUM CHLORIDE: .6; .31; .03; .02 INJECTION, SOLUTION INTRAVENOUS at 17:09

## 2020-12-16 RX ADMIN — FENTANYL CITRATE 25 MCG: 50 INJECTION INTRAMUSCULAR; INTRAVENOUS at 18:21

## 2020-12-16 RX ADMIN — CALCIUM CARBONATE (ANTACID) CHEW TAB 500 MG 1000 MG: 500 CHEW TAB at 23:30

## 2020-12-16 RX ADMIN — ROPIVACAINE HYDROCHLORIDE: 5 INJECTION, SOLUTION EPIDURAL; INFILTRATION; PERINEURAL at 19:10

## 2020-12-16 RX ADMIN — PHENYLEPHRINE HYDROCHLORIDE 30 MCG/MIN: 10 INJECTION INTRAVENOUS at 16:06

## 2020-12-16 RX ADMIN — MORPHINE SULFATE 3 MG: 0.5 INJECTION, SOLUTION EPIDURAL; INTRATHECAL; INTRAVENOUS at 16:44

## 2020-12-16 RX ADMIN — KETOROLAC TROMETHAMINE 30 MG: 30 INJECTION, SOLUTION INTRAMUSCULAR at 16:50

## 2020-12-16 RX ADMIN — SODIUM CHLORIDE, SODIUM LACTATE, POTASSIUM CHLORIDE, AND CALCIUM CHLORIDE 125 ML/HR: .6; .31; .03; .02 INJECTION, SOLUTION INTRAVENOUS at 15:54

## 2020-12-16 RX ADMIN — SODIUM CHLORIDE, SODIUM LACTATE, POTASSIUM CHLORIDE, AND CALCIUM CHLORIDE 125 ML/HR: .6; .31; .03; .02 INJECTION, SOLUTION INTRAVENOUS at 13:13

## 2020-12-17 LAB
ABO GROUP BLD BPU: NORMAL
ALBUMIN SERPL BCP-MCNC: 2.8 G/DL (ref 3.5–5)
ALP SERPL-CCNC: 79 U/L (ref 46–116)
ALT SERPL W P-5'-P-CCNC: 16 U/L (ref 12–78)
ANION GAP SERPL CALCULATED.3IONS-SCNC: 11 MMOL/L (ref 4–13)
AST SERPL W P-5'-P-CCNC: 21 U/L (ref 5–45)
BILIRUB SERPL-MCNC: 0.3 MG/DL (ref 0.2–1)
BPU ID: NORMAL
BUN SERPL-MCNC: 7 MG/DL (ref 5–25)
CALCIUM ALBUM COR SERPL-MCNC: 10.2 MG/DL (ref 8.3–10.1)
CALCIUM SERPL-MCNC: 9.2 MG/DL (ref 8.3–10.1)
CHLORIDE SERPL-SCNC: 102 MMOL/L (ref 100–108)
CO2 SERPL-SCNC: 25 MMOL/L (ref 21–32)
CREAT SERPL-MCNC: 0.87 MG/DL (ref 0.6–1.3)
GFR SERPL CREATININE-BSD FRML MDRD: 88 ML/MIN/1.73SQ M
GLUCOSE SERPL-MCNC: 114 MG/DL (ref 65–140)
POTASSIUM SERPL-SCNC: 4.1 MMOL/L (ref 3.5–5.3)
PROT SERPL-MCNC: 6.7 G/DL (ref 6.4–8.2)
SODIUM SERPL-SCNC: 138 MMOL/L (ref 136–145)
TROPONIN I SERPL-MCNC: <0.02 NG/ML
UNIT DISPENSE STATUS: NORMAL
UNIT PRODUCT CODE: NORMAL
UNIT RH: NORMAL

## 2020-12-17 PROCEDURE — 99024 POSTOP FOLLOW-UP VISIT: CPT | Performed by: STUDENT IN AN ORGANIZED HEALTH CARE EDUCATION/TRAINING PROGRAM

## 2020-12-17 RX ORDER — DIPHENHYDRAMINE HCL 25 MG
12.5 TABLET ORAL EVERY 6 HOURS PRN
Status: DISCONTINUED | OUTPATIENT
Start: 2020-12-17 | End: 2020-12-19 | Stop reason: HOSPADM

## 2020-12-17 RX ORDER — FAMOTIDINE 20 MG/1
20 TABLET, FILM COATED ORAL 2 TIMES DAILY
Status: DISCONTINUED | OUTPATIENT
Start: 2020-12-17 | End: 2020-12-19 | Stop reason: HOSPADM

## 2020-12-17 RX ORDER — ACETAMINOPHEN 325 MG/1
975 TABLET ORAL EVERY 6 HOURS
Status: DISCONTINUED | OUTPATIENT
Start: 2020-12-17 | End: 2020-12-19 | Stop reason: HOSPADM

## 2020-12-17 RX ORDER — ONDANSETRON 2 MG/ML
4 INJECTION INTRAMUSCULAR; INTRAVENOUS EVERY 8 HOURS PRN
Status: DISCONTINUED | OUTPATIENT
Start: 2020-12-17 | End: 2020-12-19 | Stop reason: HOSPADM

## 2020-12-17 RX ORDER — IBUPROFEN 600 MG/1
600 TABLET ORAL EVERY 6 HOURS PRN
Status: DISCONTINUED | OUTPATIENT
Start: 2020-12-17 | End: 2020-12-17

## 2020-12-17 RX ORDER — IBUPROFEN 600 MG/1
600 TABLET ORAL EVERY 6 HOURS SCHEDULED
Status: DISCONTINUED | OUTPATIENT
Start: 2020-12-17 | End: 2020-12-19 | Stop reason: HOSPADM

## 2020-12-17 RX ORDER — OXYCODONE HYDROCHLORIDE 10 MG/1
10 TABLET ORAL EVERY 4 HOURS PRN
Status: DISCONTINUED | OUTPATIENT
Start: 2020-12-17 | End: 2020-12-19 | Stop reason: HOSPADM

## 2020-12-17 RX ORDER — BISACODYL 10 MG
10 SUPPOSITORY, RECTAL RECTAL DAILY PRN
Status: DISCONTINUED | OUTPATIENT
Start: 2020-12-17 | End: 2020-12-19 | Stop reason: HOSPADM

## 2020-12-17 RX ORDER — SENNOSIDES 8.6 MG
1 TABLET ORAL DAILY PRN
Status: DISCONTINUED | OUTPATIENT
Start: 2020-12-17 | End: 2020-12-19 | Stop reason: HOSPADM

## 2020-12-17 RX ORDER — OXYCODONE HYDROCHLORIDE 5 MG/1
5 TABLET ORAL EVERY 4 HOURS PRN
Status: DISCONTINUED | OUTPATIENT
Start: 2020-12-17 | End: 2020-12-19 | Stop reason: HOSPADM

## 2020-12-17 RX ORDER — DOCUSATE SODIUM 100 MG/1
100 CAPSULE, LIQUID FILLED ORAL 2 TIMES DAILY
Status: DISCONTINUED | OUTPATIENT
Start: 2020-12-17 | End: 2020-12-19 | Stop reason: HOSPADM

## 2020-12-17 RX ORDER — SIMETHICONE 80 MG
80 TABLET,CHEWABLE ORAL 4 TIMES DAILY PRN
Status: DISCONTINUED | OUTPATIENT
Start: 2020-12-17 | End: 2020-12-19 | Stop reason: HOSPADM

## 2020-12-17 RX ADMIN — ACETAMINOPHEN 650 MG: 325 TABLET, FILM COATED ORAL at 12:00

## 2020-12-17 RX ADMIN — KETOROLAC TROMETHAMINE 30 MG: 30 INJECTION, SOLUTION INTRAMUSCULAR at 05:19

## 2020-12-17 RX ADMIN — HYDROMORPHONE HYDROCHLORIDE 0.5 MG: 1 INJECTION, SOLUTION INTRAMUSCULAR; INTRAVENOUS; SUBCUTANEOUS at 10:58

## 2020-12-17 RX ADMIN — OXYCODONE HYDROCHLORIDE 10 MG: 10 TABLET ORAL at 21:20

## 2020-12-17 RX ADMIN — ACETAMINOPHEN 650 MG: 325 TABLET, FILM COATED ORAL at 05:19

## 2020-12-17 RX ADMIN — DOCUSATE SODIUM 100 MG: 100 CAPSULE ORAL at 13:23

## 2020-12-17 RX ADMIN — ACETAMINOPHEN 650 MG: 325 TABLET, FILM COATED ORAL at 00:22

## 2020-12-17 RX ADMIN — DIPHENHYDRAMINE HCL 12.5 MG: 25 TABLET ORAL at 11:22

## 2020-12-17 RX ADMIN — KETOROLAC TROMETHAMINE 30 MG: 30 INJECTION, SOLUTION INTRAMUSCULAR at 11:59

## 2020-12-17 RX ADMIN — FAMOTIDINE 20 MG: 20 TABLET, FILM COATED ORAL at 08:26

## 2020-12-17 RX ADMIN — KETOROLAC TROMETHAMINE 30 MG: 30 INJECTION, SOLUTION INTRAMUSCULAR at 00:22

## 2020-12-17 RX ADMIN — ACETAMINOPHEN 975 MG: 325 TABLET, FILM COATED ORAL at 18:41

## 2020-12-17 RX ADMIN — FAMOTIDINE 20 MG: 20 TABLET, FILM COATED ORAL at 17:16

## 2020-12-17 RX ADMIN — IBUPROFEN 600 MG: 600 TABLET, FILM COATED ORAL at 23:12

## 2020-12-17 RX ADMIN — DOCUSATE SODIUM 100 MG: 100 CAPSULE ORAL at 17:16

## 2020-12-17 RX ADMIN — ROPIVACAINE HYDROCHLORIDE: 5 INJECTION, SOLUTION EPIDURAL; INFILTRATION; PERINEURAL at 05:54

## 2020-12-17 RX ADMIN — SIMETHICONE 80 MG: 80 TABLET, CHEWABLE ORAL at 00:22

## 2020-12-17 RX ADMIN — FAMOTIDINE 20 MG: 20 TABLET, FILM COATED ORAL at 00:22

## 2020-12-17 RX ADMIN — IBUPROFEN 600 MG: 600 TABLET, FILM COATED ORAL at 17:16

## 2020-12-18 LAB
ABO GROUP BLD BPU: NORMAL
ABO GROUP BLD BPU: NORMAL
ATRIAL RATE: 76 BPM
BASOPHILS # BLD AUTO: 0.09 THOUSANDS/ΜL (ref 0–0.1)
BASOPHILS NFR BLD AUTO: 1 % (ref 0–1)
BPU ID: NORMAL
BPU ID: NORMAL
CROSSMATCH: NORMAL
CROSSMATCH: NORMAL
EOSINOPHIL # BLD AUTO: 0.29 THOUSAND/ΜL (ref 0–0.61)
EOSINOPHIL NFR BLD AUTO: 3 % (ref 0–6)
ERYTHROCYTE [DISTWIDTH] IN BLOOD BY AUTOMATED COUNT: 12.9 % (ref 11.6–15.1)
HCT VFR BLD AUTO: 31.5 % (ref 34.8–46.1)
HGB BLD-MCNC: 10 G/DL (ref 11.5–15.4)
IMM GRANULOCYTES # BLD AUTO: 0.11 THOUSAND/UL (ref 0–0.2)
IMM GRANULOCYTES NFR BLD AUTO: 1 % (ref 0–2)
LYMPHOCYTES # BLD AUTO: 2.28 THOUSANDS/ΜL (ref 0.6–4.47)
LYMPHOCYTES NFR BLD AUTO: 21 % (ref 14–44)
MCH RBC QN AUTO: 29.1 PG (ref 26.8–34.3)
MCHC RBC AUTO-ENTMCNC: 31.7 G/DL (ref 31.4–37.4)
MCV RBC AUTO: 92 FL (ref 82–98)
MONOCYTES # BLD AUTO: 0.75 THOUSAND/ΜL (ref 0.17–1.22)
MONOCYTES NFR BLD AUTO: 7 % (ref 4–12)
NEUTROPHILS # BLD AUTO: 7.14 THOUSANDS/ΜL (ref 1.85–7.62)
NEUTS SEG NFR BLD AUTO: 67 % (ref 43–75)
NRBC BLD AUTO-RTO: 0 /100 WBCS
P AXIS: 38 DEGREES
PLATELET # BLD AUTO: 176 THOUSANDS/UL (ref 149–390)
PMV BLD AUTO: 10.5 FL (ref 8.9–12.7)
PR INTERVAL: 146 MS
QRS AXIS: 9 DEGREES
QRSD INTERVAL: 88 MS
QT INTERVAL: 372 MS
QTC INTERVAL: 418 MS
RBC # BLD AUTO: 3.44 MILLION/UL (ref 3.81–5.12)
RPR SER QL: NORMAL
T WAVE AXIS: 23 DEGREES
UNIT DISPENSE STATUS: NORMAL
UNIT DISPENSE STATUS: NORMAL
UNIT PRODUCT CODE: NORMAL
UNIT PRODUCT CODE: NORMAL
UNIT RH: NORMAL
UNIT RH: NORMAL
VENTRICULAR RATE: 76 BPM
WBC # BLD AUTO: 10.66 THOUSAND/UL (ref 4.31–10.16)

## 2020-12-18 PROCEDURE — 99024 POSTOP FOLLOW-UP VISIT: CPT | Performed by: STUDENT IN AN ORGANIZED HEALTH CARE EDUCATION/TRAINING PROGRAM

## 2020-12-18 PROCEDURE — 93010 ELECTROCARDIOGRAM REPORT: CPT | Performed by: INTERNAL MEDICINE

## 2020-12-18 PROCEDURE — 85025 COMPLETE CBC W/AUTO DIFF WBC: CPT | Performed by: OBSTETRICS & GYNECOLOGY

## 2020-12-18 RX ADMIN — IBUPROFEN 600 MG: 600 TABLET, FILM COATED ORAL at 12:06

## 2020-12-18 RX ADMIN — ACETAMINOPHEN 975 MG: 325 TABLET, FILM COATED ORAL at 12:06

## 2020-12-18 RX ADMIN — ACETAMINOPHEN 975 MG: 325 TABLET, FILM COATED ORAL at 00:15

## 2020-12-18 RX ADMIN — IBUPROFEN 600 MG: 600 TABLET, FILM COATED ORAL at 05:01

## 2020-12-18 RX ADMIN — DOCUSATE SODIUM 100 MG: 100 CAPSULE ORAL at 09:24

## 2020-12-18 RX ADMIN — ACETAMINOPHEN 975 MG: 325 TABLET, FILM COATED ORAL at 18:44

## 2020-12-18 RX ADMIN — OXYCODONE HYDROCHLORIDE 5 MG: 5 TABLET ORAL at 16:01

## 2020-12-18 RX ADMIN — DOCUSATE SODIUM 100 MG: 100 CAPSULE ORAL at 18:45

## 2020-12-18 RX ADMIN — ACETAMINOPHEN 975 MG: 325 TABLET, FILM COATED ORAL at 06:24

## 2020-12-18 RX ADMIN — FAMOTIDINE 20 MG: 20 TABLET, FILM COATED ORAL at 09:24

## 2020-12-18 RX ADMIN — IBUPROFEN 600 MG: 600 TABLET, FILM COATED ORAL at 18:44

## 2020-12-19 VITALS
TEMPERATURE: 98.5 F | BODY MASS INDEX: 26.98 KG/M2 | HEIGHT: 64 IN | RESPIRATION RATE: 20 BRPM | DIASTOLIC BLOOD PRESSURE: 76 MMHG | OXYGEN SATURATION: 97 % | SYSTOLIC BLOOD PRESSURE: 121 MMHG | HEART RATE: 70 BPM | WEIGHT: 158 LBS

## 2020-12-19 PROCEDURE — 90707 MMR VACCINE SC: CPT | Performed by: OBSTETRICS & GYNECOLOGY

## 2020-12-19 PROCEDURE — 99024 POSTOP FOLLOW-UP VISIT: CPT | Performed by: STUDENT IN AN ORGANIZED HEALTH CARE EDUCATION/TRAINING PROGRAM

## 2020-12-19 RX ORDER — OXYCODONE HYDROCHLORIDE 5 MG/1
5 TABLET ORAL EVERY 4 HOURS PRN
Qty: 10 TABLET | Refills: 0 | Status: SHIPPED | OUTPATIENT
Start: 2020-12-19 | End: 2020-12-31

## 2020-12-19 RX ORDER — IBUPROFEN 600 MG/1
600 TABLET ORAL EVERY 6 HOURS SCHEDULED
Qty: 30 TABLET | Refills: 0 | Status: SHIPPED | OUTPATIENT
Start: 2020-12-19 | End: 2021-03-01

## 2020-12-19 RX ORDER — ACETAMINOPHEN 325 MG/1
975 TABLET ORAL EVERY 6 HOURS
Qty: 30 TABLET | Refills: 0 | Status: SHIPPED | OUTPATIENT
Start: 2020-12-19 | End: 2021-08-02

## 2020-12-19 RX ADMIN — MEASLES, MUMPS, AND RUBELLA VIRUS VACCINE LIVE 0.5 ML: 1000; 12500; 1000 INJECTION, POWDER, LYOPHILIZED, FOR SUSPENSION SUBCUTANEOUS at 14:45

## 2020-12-19 RX ADMIN — IBUPROFEN 600 MG: 600 TABLET, FILM COATED ORAL at 06:26

## 2020-12-19 RX ADMIN — ACETAMINOPHEN 975 MG: 325 TABLET, FILM COATED ORAL at 11:47

## 2020-12-19 RX ADMIN — ACETAMINOPHEN 975 MG: 325 TABLET, FILM COATED ORAL at 00:21

## 2020-12-19 RX ADMIN — FAMOTIDINE 20 MG: 20 TABLET, FILM COATED ORAL at 08:37

## 2020-12-19 RX ADMIN — DOCUSATE SODIUM 100 MG: 100 CAPSULE ORAL at 08:37

## 2020-12-19 RX ADMIN — IBUPROFEN 600 MG: 600 TABLET, FILM COATED ORAL at 00:22

## 2020-12-19 RX ADMIN — ACETAMINOPHEN 975 MG: 325 TABLET, FILM COATED ORAL at 06:26

## 2020-12-19 RX ADMIN — IBUPROFEN 600 MG: 600 TABLET, FILM COATED ORAL at 11:47

## 2020-12-22 ENCOUNTER — TELEPHONE (OUTPATIENT)
Dept: CASE MANAGEMENT | Facility: HOSPITAL | Age: 35
End: 2020-12-22

## 2020-12-22 ENCOUNTER — TRANSITIONAL CARE MANAGEMENT (OUTPATIENT)
Dept: FAMILY MEDICINE CLINIC | Facility: MEDICAL CENTER | Age: 35
End: 2020-12-22

## 2020-12-23 ENCOUNTER — POSTPARTUM VISIT (OUTPATIENT)
Dept: OBGYN CLINIC | Facility: CLINIC | Age: 35
End: 2020-12-23

## 2020-12-23 ENCOUNTER — TELEPHONE (OUTPATIENT)
Dept: OBGYN CLINIC | Facility: CLINIC | Age: 35
End: 2020-12-23

## 2020-12-23 ENCOUNTER — IMMUNIZATIONS (OUTPATIENT)
Dept: FAMILY MEDICINE CLINIC | Facility: HOSPITAL | Age: 35
End: 2020-12-23
Payer: COMMERCIAL

## 2020-12-23 VITALS — SYSTOLIC BLOOD PRESSURE: 150 MMHG | BODY MASS INDEX: 23.17 KG/M2 | WEIGHT: 135 LBS | DIASTOLIC BLOOD PRESSURE: 90 MMHG

## 2020-12-23 DIAGNOSIS — Z23 ENCOUNTER FOR IMMUNIZATION: ICD-10-CM

## 2020-12-23 PROBLEM — O35.8XX0 ENCOUNTER FOR REPEAT ULTRASOUND OF FETAL PYELECTASIS, ANTEPARTUM: Status: RESOLVED | Noted: 2020-12-09 | Resolved: 2020-12-23

## 2020-12-23 PROBLEM — O35.EXX0 ENCOUNTER FOR REPEAT ULTRASOUND OF FETAL PYELECTASIS, ANTEPARTUM: Status: RESOLVED | Noted: 2020-12-09 | Resolved: 2020-12-23

## 2020-12-23 PROCEDURE — 91300 SARS-COV-2 / COVID-19 MRNA VACCINE (PFIZER-BIONTECH) 30 MCG: CPT

## 2020-12-23 PROCEDURE — 99024 POSTOP FOLLOW-UP VISIT: CPT | Performed by: OBSTETRICS & GYNECOLOGY

## 2020-12-23 PROCEDURE — 0001A SARS-COV-2 / COVID-19 MRNA VACCINE (PFIZER-BIONTECH) 30 MCG: CPT

## 2020-12-28 ENCOUNTER — TELEPHONE (OUTPATIENT)
Dept: POSTPARTUM | Facility: CLINIC | Age: 35
End: 2020-12-28

## 2021-01-07 ENCOUNTER — TELEPHONE (OUTPATIENT)
Dept: OBGYN CLINIC | Facility: CLINIC | Age: 36
End: 2021-01-07

## 2021-01-07 ENCOUNTER — POSTPARTUM VISIT (OUTPATIENT)
Dept: OBGYN CLINIC | Facility: CLINIC | Age: 36
End: 2021-01-07

## 2021-01-07 VITALS — SYSTOLIC BLOOD PRESSURE: 132 MMHG | BODY MASS INDEX: 22.49 KG/M2 | WEIGHT: 131 LBS | DIASTOLIC BLOOD PRESSURE: 84 MMHG

## 2021-01-07 PROCEDURE — 99024 POSTOP FOLLOW-UP VISIT: CPT | Performed by: OBSTETRICS & GYNECOLOGY

## 2021-01-07 NOTE — TELEPHONE ENCOUNTER
[facility]  I scheduled her for 1/26/21 @ 4:00 with Dr Heladio Cheney  Patient abstaining

## 2021-01-12 ENCOUNTER — IMMUNIZATIONS (OUTPATIENT)
Dept: FAMILY MEDICINE CLINIC | Facility: HOSPITAL | Age: 36
End: 2021-01-12

## 2021-01-12 DIAGNOSIS — Z23 ENCOUNTER FOR IMMUNIZATION: ICD-10-CM

## 2021-01-12 PROCEDURE — 0002A SARS-COV-2 / COVID-19 MRNA VACCINE (PFIZER-BIONTECH) 30 MCG: CPT

## 2021-01-12 PROCEDURE — 91300 SARS-COV-2 / COVID-19 MRNA VACCINE (PFIZER-BIONTECH) 30 MCG: CPT

## 2021-01-15 NOTE — PROGRESS NOTES
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[facility] at 10months of age  For now, she has limited ability to breath when she eats secondary to one blocked nostril and another that has an NGT in it  She thinks it is likely César Demarco will need/get a G-tube which will enable her to come home  Elierolo Knight is comfortable with managing a G-tube and relieved at the idea of just getting her daughter home  She feels considerably better than the last time as they now have a possible diagnosis that makes sense to wrap their minds around  Her lochia has resolved  She is Using breast pump without problems  Her pregnancy was complicated by: chronic HTN, polyhydramnios, previous pregnancy as above  She denies postpartum blues/depression  Bradford score was 5  We discussed contraceptive options in detail including birth control pills, patches, NuvaRing, DepoProvera, Mirena/Kyleena IUD, Nexplanon in detail  At this point she would like a Mirena (which she used previously)  O:   She appears well and in no distress  Abdomen is soft and nontender; well-healed midline incision    A/P:  Postpartum visit  She will return in 3 weeks for Mirena insertion and 3 months postpartum for her yearly exam, sooner PRN

## 2021-01-28 NOTE — PATIENT INSTRUCTIONS
All questions asked and answered  The patient has been instructed to call office at [phone] with any questions or concerns      The patient has been instructed to obtain prescribed blood work and urine studies prior to next appointment  Avoid NSAID products to include Motrin, Ibuprofen, Aleve, Naprosyn, or naproxen   Return in 3 months with updated blood work and urine studies  Low sodium diet   Will repeat BMP, renin, metanephrines, aldosterone and urine protein now  Recommend home blood pressure monitoring 2 times daily for one week and send in

## 2021-01-28 NOTE — PROGRESS NOTES
OFFICE FOLLOW UP - Nephrology   Michelle [id number] 30 y o  female MRN: [id number]       ASSESSMENT and PLAN:  Ras Philip was seen today for hypertension  Diagnoses and all orders for this visit:    Chronic hypertension affecting pregnancy  -     Aldosterone; Future  -     Metanephrine, Fractionated Plasma Free; Future  -     Renin Direct Assay; Future  -     Basic metabolic panel; Future  -     Magnesium; Future  -     Phosphorus; Future  -     Vitamin D 25 hydroxy; Future  -     Protein / creatinine ratio, urine      Hypertension:  Currently not on any antihypertensive medications secondary to recent pregnancy and now postpartum  -previously on nifedipine XL 30 mg daily-currently off  -current blood pressure 128/88  -History of 24 hourABPM in March of 2020  -encourage low-sodium diet  -encourage home blood pressure monitoring-patient to take blood pressure 2 times daily for one week and send in  -will repeat aldosterone, metanephrines, renin and updated blood work as per recommendation by Dr Barry Petersen  -blood patient return in three months for follow-up    Postpartum:  Following OBGYN    Patient Instructions   All questions asked and answered  The patient has been instructed to call office at [phone] with any questions or concerns  The patient has been instructed to obtain prescribed blood work and urine studies prior to next appointment  Avoid NSAID products to include Motrin, Ibuprofen, Aleve, Naprosyn, or naproxen   Return in 3 months with updated blood work and urine studies  Low sodium diet   Will repeat BMP, renin, metanephrines, aldosterone and urine protein now  Recommend home blood pressure monitoring 2 times daily for one week and send in        HPI: Jeannie Veras is a 30 y o  female who is here for Hypertension  Patient returns to office for renal and hypertension follow-up  She was last seen on 04/01/2020 with Dr Barry Petersen and blood pressure and renal function both were stable    She does have a history of component of anxiety, prior history of ex lap due to small bowel obstruction, DEANA in the setting of postpartum hemorrhage requiring blood transfusion in    In 2020 she underwent 24 hour ABPM in which nifedipine XL 30 mg was initiated and in 2020 aldosterone and renin activity were higher, however since blood pressure was acceptable he chose to continue to monitor for now during pregnancy  Most recent blood work on 2020 while admitted revealed creatinine 0 87 with stable electrolytes and acid-base balance  Urinalysis was negative for hematuria,proteinuria and UPCR 0 09  Medication list reviewed  After review medical records, she is currently postpartum s/p elective  on 2020 with a baby girl  On discussion, she reports home BP running around mid 130/80  Her new infant, spent several weeks in NICU and shock and just returned home recently  During the time she was in [facility], she reported eating takeout and cafeteria food which may have been high in sodium  Currently she denies chest pain, shortness of breath, dizziness, lightheadedness, nausea, vomiting, urinary issues, fevers, chills  Her family is all home now and is now eating better meals  ROS:   All the systems were reviewed and were negative except as documented on the HPI      Allergies: Nickel    Medications:   Current Outpatient Medications:     ibuprofen (MOTRIN) 600 mg tablet, Take 1 tablet (600 mg total) by mouth every 6 (six) hours, Disp: 30 tablet, Rfl: 0    Lecithin 1200 MG CAPS, Take by mouth daily, Disp: , Rfl:     Prenatal MV-Min-Fe Fum-FA-DHA (PRENATAL 1 PO), Take by mouth, Disp: , Rfl:     acetaminophen (TYLENOL) 325 mg tablet, Take 3 tablets (975 mg total) by mouth every 6 (six) hours (Patient not taking: Reported on 2021), Disp: 30 tablet, Rfl: 0    Past Medical History:   Diagnosis Date    Acute renal failure (HCC)     [id number] RESOLVED    Acute tubular necrosis (HCC)     Anemia during the pregnancy     Bowel obstruction ([facility] [address] ) 2017    Fibroadenoma of breast, left     Fibroadenoma of breast, right      1 para 1     History of transfusion 2015    After delivery     Hypertension     no medication     Postpartum hemorrhage     UNSPECIFIED TYPE  RESOLVED     Urinary tract infection     yes in the last ten years    Vacuum extractor delivery, delivered      daughter with postpartum hemorrhage and DIC    Varicella      Past Surgical History:   Procedure Laterality Date    BREAST FIBROADENOMA SURGERY  2007    Cryosurgical Ablation of Fibroadenoma    BREAST LUMPECTOMY Left     BREAST LUMPECTOMY Right     EXPLORATORY LAPAROTOMY W/ BOWEL RESECTION N/A 2017    Procedure: LAPAROTOMY EXPLORATORY W/ lysis of adhesions;  Surgeon: Lory Forrester DO;  [facility];  Service:    [address] [place] [address]      LAPAROTOMY      EXPLORATORY for DIC and hemorrhage postpartum retroperitoneal hematoma    HI  DELIVERY ONLY N/A 2020    Procedure:  SECTION () REPEAT;  Surgeon: Maine Grace MD;  Location: [facility] ;  Service: Obstetrics    TOOTH EXTRACTION      Impression: [id number] Gabi Atwood       Family History   Problem Relation Age of Onset    Hypertension Mother         URINARY INCONTINENCE    Hyperlipidemia Mother     Diverticulitis Mother     Hypothyroidism Mother     Hepatitis Father         A    Diverticulitis Maternal Grandmother     Stroke Maternal Grandfather         CVA    Heart disease Maternal Grandfather     Diverticulitis Maternal Grandfather     Hypertension Maternal Grandfather     Diabetes Paternal Grandfather         INSULIN DEPENDENT    No Known Problems Sister     No Known Problems Brother     No Known Problems Daughter       reports that she has never smoked  She has never used smokeless tobacco  She reports previous alcohol use of about 5 0 standard drinks of alcohol per week   She reports that she does not use drugs  Physical Exam:   Vitals:    01/29/21 0958   BP: 128/88   BP Location: Left arm   Patient Position: Sitting   Cuff Size: Standard   Pulse: 78   Resp: 18   Weight: 58 3 kg (128 lb 9 6 oz)   Height: 5' 4" (1 626 m)     Body mass index is 22 07 kg/m²  General: cooperative, in not acute distress  Eyes: conjunctivae pink, anicteric sclerae  ENT: lips and mucous membranes moist  Neck: supple, no JVD  Chest: clear breath sounds bilateral, no crackles, ronchus or wheezings  CVS: distinct S1 & S2, normal rate, regular rhythm  Abdomen: soft, non-tender, non-distended, normoactive bowel sounds  Back: no CVA tenderness  Extremities: no edema of both legs  Skin: no rash  Neuro: awake, alert, oriented      Lab Results:              Portions of the record may have been created with voice recognition software  Occasional wrong word or "sound a like" substitutions may have occurred due to the inherent limitations of voice recognition software   Read the chart carefully and recognize,

## 2021-01-29 ENCOUNTER — OFFICE VISIT (OUTPATIENT)
Dept: NEPHROLOGY | Facility: CLINIC | Age: 36
End: 2021-01-29
Payer: COMMERCIAL

## 2021-01-29 VITALS
SYSTOLIC BLOOD PRESSURE: 128 MMHG | DIASTOLIC BLOOD PRESSURE: 88 MMHG | HEIGHT: 64 IN | BODY MASS INDEX: 21.95 KG/M2 | RESPIRATION RATE: 18 BRPM | HEART RATE: 78 BPM | WEIGHT: 128.6 LBS

## 2021-01-29 DIAGNOSIS — O10.919 CHRONIC HYPERTENSION AFFECTING PREGNANCY: Primary | ICD-10-CM

## 2021-01-29 PROCEDURE — 99213 OFFICE O/P EST LOW 20 MIN: CPT | Performed by: NURSE PRACTITIONER

## 2021-01-29 RX ORDER — DIAPER,BRIEF,ADULT, DISPOSABLE
EACH MISCELLANEOUS DAILY
COMMUNITY
End: 2021-08-02

## 2021-02-09 ENCOUNTER — PROCEDURE VISIT (OUTPATIENT)
Dept: OBGYN CLINIC | Facility: CLINIC | Age: 36
End: 2021-02-09
Payer: COMMERCIAL

## 2021-02-09 VITALS — SYSTOLIC BLOOD PRESSURE: 150 MMHG | WEIGHT: 130 LBS | BODY MASS INDEX: 22.31 KG/M2 | DIASTOLIC BLOOD PRESSURE: 90 MMHG

## 2021-02-09 DIAGNOSIS — Z30.430 ENCOUNTER FOR IUD INSERTION: Primary | ICD-10-CM

## 2021-02-09 LAB — SL AMB POCT URINE HCG: NEGATIVE

## 2021-02-09 PROCEDURE — 81025 URINE PREGNANCY TEST: CPT | Performed by: OBSTETRICS & GYNECOLOGY

## 2021-02-09 PROCEDURE — 58300 INSERT INTRAUTERINE DEVICE: CPT | Performed by: OBSTETRICS & GYNECOLOGY

## 2021-02-11 ENCOUNTER — LAB (OUTPATIENT)
Dept: LAB | Facility: MEDICAL CENTER | Age: 36
End: 2021-02-11
Payer: COMMERCIAL

## 2021-02-11 DIAGNOSIS — O10.919 CHRONIC HYPERTENSION AFFECTING PREGNANCY: ICD-10-CM

## 2021-02-11 LAB
25(OH)D3 SERPL-MCNC: 32.3 NG/ML (ref 30–100)
ANION GAP SERPL CALCULATED.3IONS-SCNC: 5 MMOL/L (ref 4–13)
BUN SERPL-MCNC: 20 MG/DL (ref 5–25)
CALCIUM SERPL-MCNC: 9.1 MG/DL (ref 8.3–10.1)
CHLORIDE SERPL-SCNC: 108 MMOL/L (ref 100–108)
CO2 SERPL-SCNC: 28 MMOL/L (ref 21–32)
CREAT SERPL-MCNC: 0.83 MG/DL (ref 0.6–1.3)
CREAT UR-MCNC: 131 MG/DL
GFR SERPL CREATININE-BSD FRML MDRD: 92 ML/MIN/1.73SQ M
GLUCOSE SERPL-MCNC: 98 MG/DL (ref 65–140)
MAGNESIUM SERPL-MCNC: 2.4 MG/DL (ref 1.6–2.6)
PHOSPHATE SERPL-MCNC: 4.2 MG/DL (ref 2.7–4.5)
POTASSIUM SERPL-SCNC: 3.7 MMOL/L (ref 3.5–5.3)
PROT UR-MCNC: 11 MG/DL
PROT/CREAT UR: 0.08 MG/G{CREAT} (ref 0–0.1)
SODIUM SERPL-SCNC: 141 MMOL/L (ref 136–145)

## 2021-02-11 PROCEDURE — 82570 ASSAY OF URINE CREATININE: CPT | Performed by: NURSE PRACTITIONER

## 2021-02-11 PROCEDURE — 84244 ASSAY OF RENIN: CPT

## 2021-02-11 PROCEDURE — 80048 BASIC METABOLIC PNL TOTAL CA: CPT

## 2021-02-11 PROCEDURE — 82088 ASSAY OF ALDOSTERONE: CPT

## 2021-02-11 PROCEDURE — 84156 ASSAY OF PROTEIN URINE: CPT | Performed by: NURSE PRACTITIONER

## 2021-02-11 PROCEDURE — 82306 VITAMIN D 25 HYDROXY: CPT

## 2021-02-11 PROCEDURE — 83735 ASSAY OF MAGNESIUM: CPT

## 2021-02-11 PROCEDURE — 36415 COLL VENOUS BLD VENIPUNCTURE: CPT

## 2021-02-11 PROCEDURE — 83835 ASSAY OF METANEPHRINES: CPT

## 2021-02-11 PROCEDURE — 84100 ASSAY OF PHOSPHORUS: CPT

## 2021-02-12 ENCOUNTER — TELEPHONE (OUTPATIENT)
Dept: NEPHROLOGY | Facility: CLINIC | Age: 36
End: 2021-02-12

## 2021-02-12 NOTE — TELEPHONE ENCOUNTER
Spoke with pt and made her aware of lab results  She has verbalized understanding and has no questions or concerns at this time

## 2021-02-12 NOTE — TELEPHONE ENCOUNTER
----- Message from [address] sent at 2/11/2021  3:31 PM EST -----  Hi! Please let patient know BMP reviewed renal function stable with creatinine 0 83 with stable electrolytes    Vitamin-D level within normal limits at 32 3  Thank you  Justice Singh

## 2021-02-19 LAB
ALDOST SERPL-MCNC: 4.2 NG/DL (ref 0–30)
RENIN PLAS-CCNC: 2.03 NG/ML/HR (ref 0.17–5.38)

## 2021-02-22 LAB
METANEPH FREE SERPL-MCNC: 27.8 PG/ML (ref 0–88)
NORMETANEPHRINE SERPL-MCNC: 70.4 PG/ML (ref 0–110.1)

## 2021-03-01 ENCOUNTER — TELEPHONE (OUTPATIENT)
Dept: NEPHROLOGY | Facility: CLINIC | Age: 36
End: 2021-03-01

## 2021-03-01 DIAGNOSIS — I10 ESSENTIAL HYPERTENSION: Primary | ICD-10-CM

## 2021-03-01 RX ORDER — NIFEDIPINE 30 MG/1
30 TABLET, EXTENDED RELEASE ORAL DAILY
Qty: 30 TABLET | Refills: 5 | Status: SHIPPED | OUTPATIENT
Start: 2021-03-01 | End: 2021-09-13

## 2021-03-01 NOTE — TELEPHONE ENCOUNTER
Pt has been made aware of secondary workup being unremarkable  Pt states her home BP's have been running high 130's over high 80's

## 2021-03-01 NOTE — TELEPHONE ENCOUNTER
Spoke to patient over the phone regarding her higher BP readings at home which is generally 130s SBP  She currently off all antihypertensive medications  She is  Past 11 week since delivery  she has been following lifestyle modifications  Will start nifedipine XL 30 mg daily again  Continue to monitor BP and advised her to bring BP machine during office visit

## 2021-03-01 NOTE — TELEPHONE ENCOUNTER
----- Message from Soundra Merlin, [place] sent at 2/24/2021  9:41 AM EST -----  Hi! Please let Love Lena know that repeat secondary workup unremarkable  Please inquire how her home BP readings have been?   Thanks  Richy Rea

## 2021-03-17 LAB — RENIN PLAS-CCNC: 3.25 NG/ML/HR (ref 0.17–5.38)

## 2021-04-07 ENCOUNTER — ANNUAL EXAM (OUTPATIENT)
Dept: OBGYN CLINIC | Facility: CLINIC | Age: 36
End: 2021-04-07
Payer: COMMERCIAL

## 2021-04-07 VITALS
HEIGHT: 64 IN | BODY MASS INDEX: 22.02 KG/M2 | WEIGHT: 129 LBS | DIASTOLIC BLOOD PRESSURE: 82 MMHG | SYSTOLIC BLOOD PRESSURE: 130 MMHG

## 2021-04-07 DIAGNOSIS — Z01.419 ENCOUNTER FOR GYNECOLOGICAL EXAMINATION (GENERAL) (ROUTINE) WITHOUT ABNORMAL FINDINGS: Primary | ICD-10-CM

## 2021-04-07 PROBLEM — O40.9XX0 POLYHYDRAMNIOS AFFECTING PREGNANCY: Status: RESOLVED | Noted: 2020-11-12 | Resolved: 2021-04-07

## 2021-04-07 PROBLEM — O09.90 SUPERVISION OF HIGH RISK PREGNANCY, ANTEPARTUM: Status: RESOLVED | Noted: 2020-06-22 | Resolved: 2021-04-07

## 2021-04-07 PROBLEM — O09.899 RUBELLA NON-IMMUNE STATUS, ANTEPARTUM: Status: RESOLVED | Noted: 2020-07-22 | Resolved: 2021-04-07

## 2021-04-07 PROBLEM — B95.1 POSITIVE GBS TEST: Status: RESOLVED | Noted: 2020-12-10 | Resolved: 2021-04-07

## 2021-04-07 PROBLEM — O10.919 CHRONIC HYPERTENSION AFFECTING PREGNANCY: Status: RESOLVED | Noted: 2020-06-22 | Resolved: 2021-04-07

## 2021-04-07 PROBLEM — Z98.891 S/P PRIMARY LOW TRANSVERSE C-SECTION: Status: RESOLVED | Noted: 2020-12-16 | Resolved: 2021-04-07

## 2021-04-07 PROBLEM — Z28.39 RUBELLA NON-IMMUNE STATUS, ANTEPARTUM: Status: RESOLVED | Noted: 2020-07-22 | Resolved: 2021-04-07

## 2021-04-07 PROBLEM — Z87.59 HISTORY OF VACUUM EXTRACTION ASSISTED DELIVERY: Status: RESOLVED | Noted: 2020-06-22 | Resolved: 2021-04-07

## 2021-04-07 PROBLEM — O99.013 ANEMIA DURING PREGNANCY IN THIRD TRIMESTER: Status: RESOLVED | Noted: 2020-12-10 | Resolved: 2021-04-07

## 2021-04-07 PROCEDURE — 99395 PREV VISIT EST AGE 18-39: CPT | Performed by: OBSTETRICS & GYNECOLOGY

## 2021-04-07 NOTE — PROGRESS NOTES
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  Overall, Daryl Jones is satisfied with how things are going as she heads back to work  She is still breastfeeding but less and may stop entirely soon  Sexual activity: She is sexually active without pain, bleeding or dryness  Contraception: She uses Mirena for contraception  Last Pap 6/22/2020 - normal/negative HPV  Last Mammo - never    We reviewed ASCCP guidelines for Pap testing today         Current Outpatient Medications:     Lecithin 1200 MG CAPS, Take by mouth daily, Disp: , Rfl:     NIFEdipine (PROCARDIA XL) 30 mg 24 hr tablet, Take 1 tablet (30 mg total) by mouth daily, Disp: 30 tablet, Rfl: 5    Prenatal MV-Min-Fe Fum-FA-DHA (PRENATAL 1 PO), Take by mouth, Disp: , Rfl:     acetaminophen (TYLENOL) 325 mg tablet, Take 3 tablets (975 mg total) by mouth every 6 (six) hours (Patient not taking: Reported on 4/7/2021), Disp: 30 tablet, Rfl: 0    Docusate Sodium (COLACE PO), Take by mouth, Disp: , Rfl:   Social History     Socioeconomic History    Marital status: /Civil Union     Spouse name: Not on file    Number of children: Not on file    Years of education: Not on file    Highest education level: Not on file   Occupational History    Not on file   Social Needs    Financial resource strain: Not on file    Food insecurity     Worry: Not on file     Inability: Not on file   [employer] needs     Medical: Not on file     Non-medical: Not on file   Tobacco Use    Smoking status: Never Smoker    Smokeless tobacco: Never Used   Substance and Sexual Activity    Alcohol use: Yes     Alcohol/week: 2 0 standard drinks     Types: 2 Glasses of wine per week     Frequency: 2-3 times a week     Drinks per session: 1 or 2     Binge frequency: Never    Drug use: No    Sexual activity: Yes     Partners: Male     Birth control/protection: I U D       Comment: Mirena   Lifestyle    Physical activity     Days per week: Not on file     Minutes per session: Not on file    Stress: Not on file   Relationships    Social connections     Talks on phone: Not on file     Gets together: Not on file     Attends [employer] service: Not on file     Active member of club or organization: Not on file     Attends meetings of clubs or organizations: Not on file     Relationship status: Not on file    Intimate partner violence     Fear of current or ex partner: Not on file     Emotionally abused: Not on file     Physically abused: Not on file     Forced sexual activity: Not on file   Other Topics Concern    Not on file   Social History Narrative    Always uses a seatbelt    Drinks Coffee - 2 cups a day    IUD Contraception - mirena    Lack of exercise     (per [employer])     Family History   Problem Relation Age of Onset    Hypertension Mother         URINARY INCONTINENCE    Hyperlipidemia Mother     Diverticulitis Mother     Hypothyroidism Mother     Hepatitis Father         A    Diverticulitis Maternal Grandmother     Colon cancer Maternal Grandmother     Stroke Maternal Grandfather         CVA    Heart disease Maternal Grandfather     Diverticulitis Maternal Grandfather     Hypertension Maternal Grandfather     Diabetes Paternal Grandfather         INSULIN DEPENDENT    No Known Problems Sister     No Known Problems Brother     No Known Problems Daughter       Past Medical History:   Diagnosis Date    Acute renal failure ([address] )     [id number] RESOLVED    Acute tubular necrosis ([address] )     Anemia     during the pregnancy     Bowel obstruction ([facility] [address] ) 1/31/2017    Fibroadenoma of breast, left     Fibroadenoma of breast, right      1 para 1     History of transfusion 2015    After delivery     Hypertension     no medication     Postpartum hemorrhage     UNSPECIFIED TYPE  RESOLVED     Urinary tract infection     yes in the last ten years    Vacuum extractor delivery, delivered     2015 daughter with postpartum hemorrhage and DIC    Varicella         Review of Systems   Respiratory: Negative  Cardiovascular: Negative  Gastrointestinal: Negative for constipation and diarrhea  Genitourinary: Negative for difficulty urinating, pelvic pain, vaginal bleeding, vaginal discharge, itching or odor  O:  Blood pressure 130/82, height 5' 3 5" (1 613 m), weight 58 5 kg (129 lb), currently breastfeeding  Patient appears well and is not in distress  Neck is supple without masses  Breasts are symmetrical without mass, tenderness, nipple discharge, skin changes or adenopathy  Abdomen is soft and nontender without masses  External genitals are normal without lesions or rashes  Urethral meatus and urethra are normal  Bladder is normal to palpation  Vagina is normal without discharge or bleeding  Cervix is normal without discharge or lesion  IUD strings seen  Uterus is normal, mobile, nontender without palpable mass  Adnexa are normal, nontender, without palpable mass  A:  Yearly exam      P:   Pap due    Mammo at age 37   RTO one year for yearly exam or sooner as needed

## 2021-04-30 ENCOUNTER — OFFICE VISIT (OUTPATIENT)
Dept: NEPHROLOGY | Facility: CLINIC | Age: 36
End: 2021-04-30
Payer: COMMERCIAL

## 2021-04-30 VITALS
HEART RATE: 92 BPM | DIASTOLIC BLOOD PRESSURE: 88 MMHG | WEIGHT: 129 LBS | HEIGHT: 63 IN | BODY MASS INDEX: 22.86 KG/M2 | SYSTOLIC BLOOD PRESSURE: 126 MMHG

## 2021-04-30 DIAGNOSIS — I10 ESSENTIAL HYPERTENSION: Primary | ICD-10-CM

## 2021-04-30 PROCEDURE — 99213 OFFICE O/P EST LOW 20 MIN: CPT | Performed by: INTERNAL MEDICINE

## 2021-04-30 NOTE — PATIENT INSTRUCTIONS
Low-Sodium Diet   WHAT YOU NEED TO KNOW:   A low-sodium diet limits foods that are high in sodium (salt)  You will need to follow a low-sodium diet if you have high blood pressure, kidney disease, or heart failure  You may also need to follow this diet if you have a condition that is causing your body to retain (hold) extra fluid  You may need to limit the amount of sodium you eat in a day to 1,500 to 2,000 mg  Ask your healthcare provider how much sodium you can have each day  DISCHARGE INSTRUCTIONS:   How to use food labels to choose foods that are low in sodium:  Read food labels to find the amount of sodium they contain  The amount of sodium is listed in milligrams (mg)  The % Daily Value (DV) column tells you how much of your daily needs are met by 1 serving of the food for each nutrient listed  Choose foods that have less than 5% of the DV of sodium  These foods are considered low in sodium  Foods that have 20% or more of the DV of sodium are considered high in sodium  Some food labels may also list any of the following terms that tell you about the sodium content in the food:  · Sodium-free:  Less than 5 mg in each serving    · Very low sodium:  35 mg of sodium or less in each serving    · Low sodium:  140 mg of sodium or less in each serving    · Reduced sodium: At least 25% less sodium in each serving than the regular type    · Light in sodium:  50% less sodium in each serving    · Unsalted or no added salt:  No extra salt is added during processing (the food may still contain sodium)     Foods to avoid:  Salty foods are high in sodium  You should avoid the following:  · Processed foods:      ? Mixes for cornbread, biscuits, cake, and pudding     ? Instant foods, such as potatoes, cereals, noodles, and rice     ? Packaged foods, such as bread stuffing, rice and pasta mixes, snack dip mixes, and macaroni and cheese     ?  Canned foods, such as canned vegetables, soups, broths, sauces, and vegetable or tomato juice    ? Snack foods, such as salted chips, popcorn, pretzels, pork rinds, salted crackers, and salted nuts    ? Frozen foods, such as dinners, entrees, vegetables with sauces, and breaded meats    ? Sauerkraut, pickled vegetables, and other foods prepared in brine    · Meats and cheeses:      ? Smoked or cured meat, such as corned beef, reyes, ham, hot dogs, and sausage    ? Canned meats or spreads, such as potted meats, sardines, anchovies, and imitation seafood    ? Deli or lunch meats, such as bologna, ham, turkey, and roast beef    ? Processed cheese, such as American cheese and cheese spreads    · Condiments, sauces, and seasonings:      ? Salt (¼ teaspoon of salt contains 575 mg of sodium)    ? Seasonings made with salt, such as garlic salt, celery salt, onion salt, and seasoned salt    ? Regular soy sauce, barbecue sauce, teriyaki sauce, steak sauce, Worcestershire sauce, and most flavored vinegars    ? Canned gravy and mixes     ? Regular condiments, such as mustard, ketchup, and salad dressings    ? Pickles and olives    ? Meat tenderizers and monosodium glutamate (MSG)    Foods to include:  Read the food label to find the amount of sodium in each serving  · Bread and cereal:  Try to choose breads with less than 80 mg of sodium per serving  ? Bread, roll, kaylee, tortilla, or unsalted crackers  ? Ready-to-eat cereals with less than 5% DV of sodium (examples include shredded wheat and puffed rice)    ? Pasta    · Vegetables and fruits:      ? Unsalted fresh, frozen, or canned vegetables    ? Fresh, frozen, or canned fruits    ? Fruit juice    · Dairy:  One serving has about 150 mg of sodium  ? Milk, all types    ? Yogurt    ? Hard cheese, such as cheddar, Swiss, [employer], or mozzarella    · Meat and other protein foods:  Some raw meats may have added sodium  ? Plain meats, fish, and poultry     ? Egg    · Other foods:      ? Homemade pudding    ?  Unsalted nuts, popcorn, or pretzels    ? Unsalted butter or margarine    Ways to decrease sodium:   · Add spices and herbs to foods instead of salt during cooking  Use salt-free seasonings to add flavor to foods  Examples include onion powder, garlic powder, basil, dunn powder, paprika, and parsley  Try lemon or lime juice or vinegar to give foods a tart flavor  Use hot peppers, pepper, or cayenne pepper to add a spicy flavor  · Do not keep a salt shaker at your kitchen table  This may help keep you from adding salt to food at the table  A teaspoon of salt has 2,300 mg of sodium  It may take time to get used to enjoying the natural flavor of food instead of adding salt  Talk to your healthcare provider before you use salt substitutes  Some salt substitutes have a high amount of potassium and need to be avoided if you have kidney disease  · Choose low-sodium foods at restaurants  Meals from restaurants are often high in sodium  Some restaurants have nutrition information on the menu that tells you the amount of sodium in their foods  If possible, ask for your food to be prepared with less, or no salt  · Shop for unsalted or low-sodium foods and snacks at the grocery store  Examples include unsalted or low-sodium broths, soups, and canned vegetables  Choose fresh or frozen vegetables instead  Choose unsalted nuts or seeds or fresh fruits or vegetables as snacks  Read food labels and choose salt-free, very low-sodium, or low-sodium foods  © Copyright [address] Information is for End User's use only and may not be sold, redistributed or otherwise used for commercial purposes  All illustrations and images included in CareNotes® are the copyrighted property of A D A M , Inc  or [facility] Ryena Resendiz   The above information is an  only  It is not intended as medical advice for individual conditions or treatments   Talk to your doctor, nurse or pharmacist before following any medical regimen to see if it is safe and effective for you

## 2021-04-30 NOTE — PROGRESS NOTES
NEPHROLOGY OUTPATIENT PROGRESS NOTE   Christin Linares 30 y o  female MRN: [id number]  DATE: 4/30/2021  Reason for visit:   Chief Complaint   Patient presents with    Follow-up    Hypertension     ASSESSMENT and PLAN:  baseline creatinine 0 6 to 0 8  -creatinine 0 8 in February 2021 stable at baseline  -UA bland without hematuria or proteinuria in December 2020  Upc ratio nonsignificant   -avoid nephrotoxins or NSAIDs  Continue to closely monitor renal function   -she has history of DEANA with peak creatinine 2 4 in 2015 when she had postpartum hemorrhage and required ex lap       Hypertension  -24 hour ABPM readings in March 2020:  24 hour BP average 139/92  Daytime BP average 143/95  Nighttime BP average 127/84  MAP night time dipping 9 65%  -blood pressure overall acceptable but diastolic readings seems to be above goal often at home   -home BP readings are generally 120/high 80s with occasional DBP 90     -continue nifedipine XL 30 mg daily   -continue to monitor blood pressure closely and call back if remains persistently greater than 130/85  -workup include serum aldosterone 4 2, PRA 2 03 (prior serum aldosterone 43 9 with PR3 0 2)  -will do repeat serum aldosterone, PRA before next visit  -prior CT scan shows unremarkable adrenal glands    -plasma metanephrine/catecholamine unremarkable   -she has he done high salt intake in her diet, frequent process food intake  Advised to follow low salt diet  Lifestyle modification recommended  If blood pressure still remains above goal, consider increasing nifedipine XL further   -check serum a m  cortisol  Diagnoses and all orders for this visit:    Essential hypertension  -     Basic metabolic panel; Future  -     CBC; Future  -     Microalbumin / creatinine urine ratio; Future  -     Aldosterone; Future  -     Renin Direct Assay; Future  -     Cortisol Level, AM Specimen;  Future          SUBJECTIVE / HPI:  Joanne AYALA 31 y o  year old female without significant medical issues except component of anxiety , prior history of ex lap due to small bowel obstruction, also history of postpartum hemorrhage requiring blood transfusion in 2015 who presents for regular follow-up of hypertension  She remains on nifedipine XL and tolerating well  She admits to have higher salt intake in her diet given frequent processed food intake  No recent NSAID exposure  Denies any urinary complaint  No recent NSAID exposure  Home BP readings as mentioned above  She was found to have isolated rheumatoid factor positive when she was having some muscle pain in her elbow   Since then she was evaluated by Rheumatology and her symptoms much improved  No further intervention was done  Joan Trent also has history of postpartum hemorrhage although denies having any preeclampsia or eclampsia issues during pregnancy in 2015  REVIEW OF SYSTEMS:  More than 10 point review of systems were obtained and discussed in length with the patient  Complete review of systems were negative / unremarkable except mentioned above  PHYSICAL EXAM:  Vitals:    04/30/21 1525 04/30/21 1702   BP: 120/70 126/88   BP Location: Left arm    Patient Position: Sitting    Cuff Size: Large    Pulse: 92    Weight: 58 5 kg (129 lb)    Height: 5' 3" (1 6 m)      Body mass index is 22 85 kg/m²  Physical Exam  Vitals signs reviewed  Constitutional:       Appearance: She is well-developed  HENT:      Head: Normocephalic and atraumatic  Right Ear: External ear normal       Left Ear: External ear normal       Nose: Nose normal    Eyes:      Conjunctiva/sclera: Conjunctivae normal    Cardiovascular:      Comments: S1, S2 present  Pulmonary:      Effort: Pulmonary effort is normal       Breath sounds: Normal breath sounds  No wheezing or rales  Abdominal:      General: Bowel sounds are normal  There is no distension  Palpations: Abdomen is soft  Tenderness: There is no abdominal tenderness  Musculoskeletal:         General: No tenderness  Right lower leg: No edema  Left lower leg: No edema  Lymphadenopathy:      Cervical: No cervical adenopathy  Skin:     Findings: No rash  Neurological:      Mental Status: She is alert and oriented to person, place, and time     Psychiatric:         Behavior: Behavior normal          PAST MEDICAL HISTORY:  Past Medical History:   Diagnosis Date    Acute renal failure ([facility] [address] )     [id number] RESOLVED    Acute tubular necrosis (HCC)     Anemia     during the pregnancy     Bowel obstruction ([facility] [address] ) 2017    Fibroadenoma of breast, left     Fibroadenoma of breast, right      1 para 1     History of transfusion 2015    After delivery     Hypertension     no medication     Postpartum hemorrhage     UNSPECIFIED TYPE  RESOLVED     Urinary tract infection     yes in the last ten years    Vacuum extractor delivery, delivered      daughter with postpartum hemorrhage and DIC    Varicella        PAST SURGICAL HISTORY:  Past Surgical History:   Procedure Laterality Date    BREAST FIBROADENOMA SURGERY      Cryosurgical Ablation of Fibroadenoma    BREAST LUMPECTOMY Left     BREAST LUMPECTOMY Right     EXPLORATORY LAPAROTOMY W/ BOWEL RESECTION N/A 2017    Procedure: LAPAROTOMY EXPLORATORY W/ lysis of adhesions;  Surgeon: Bella Kelley DO;  [facility];  Service:    [address] [place] [address]      LAPAROTOMY      EXPLORATORY for DIC and hemorrhage postpartum retroperitoneal hematoma    [place]  DELIVERY ONLY N/A 2020    Procedure:  SECTION () REPEAT;  Surgeon: Jacqui Reynolds MD;  Location: [facility] ;  Service: Obstetrics    TOOTH EXTRACTION      Impression: [id number] Nicole Sawyer         SOCIAL HISTORY:  Social History     Substance and Sexual Activity   Alcohol Use Yes    Alcohol/week: 2 0 standard drinks    Types: 2 Glasses of wine per week    Frequency: 2-3 times a week  Drinks per session: 1 or 2    Binge frequency: Never     Social History     Substance and Sexual Activity   Drug Use No     Social History     Tobacco Use   Smoking Status Never Smoker   Smokeless Tobacco Never Used       FAMILY HISTORY:  Family History   Problem Relation Age of Onset    Hypertension Mother         URINARY INCONTINENCE    Hyperlipidemia Mother     Diverticulitis Mother     Hypothyroidism Mother     Hepatitis Father         A    Diverticulitis Maternal Grandmother     Colon cancer Maternal Grandmother     Stroke Maternal Grandfather         CVA    Heart disease Maternal Grandfather     Diverticulitis Maternal Grandfather     Hypertension Maternal Grandfather     Diabetes Paternal Grandfather         INSULIN DEPENDENT    No Known Problems Sister     No Known Problems Brother     No Known Problems Daughter        MEDICATIONS:    Current Outpatient Medications:     Lecithin 1200 MG CAPS, Take by mouth daily, Disp: , Rfl:     NIFEdipine (PROCARDIA XL) 30 mg 24 hr tablet, Take 1 tablet (30 mg total) by mouth daily, Disp: 30 tablet, Rfl: 5    Prenatal MV-Min-Fe Fum-FA-DHA (PRENATAL 1 PO), Take by mouth, Disp: , Rfl:     acetaminophen (TYLENOL) 325 mg tablet, Take 3 tablets (975 mg total) by mouth every 6 (six) hours (Patient not taking: Reported on 4/7/2021), Disp: 30 tablet, Rfl: 0    Docusate Sodium (COLACE PO), Take by mouth, Disp: , Rfl:     Lab Results:   Results for orders placed or performed in visit on 02/11/21   Aldosterone   Result Value Ref Range    Aldosterone 4 2 0 0 - 30 0 ng/dL   Metanephrine, Fractionated Plasma Free   Result Value Ref Range    Normetanephrine, Free 70 4 0 0 - 110 1 pg/mL    Metanephrine, Free 27 8 0 0 - 88 0 pg/mL   Renin Direct Assay   Result Value Ref Range    Renin 2 034 0 167 - 5 380 ng/mL/hr   Basic metabolic panel   Result Value Ref Range    Sodium 141 136 - 145 mmol/L    Potassium 3 7 3 5 - 5 3 mmol/L    Chloride 108 100 - 108 mmol/L    CO2 28 21 - 32 mmol/L    ANION GAP 5 4 - 13 mmol/L    BUN 20 5 - 25 mg/dL    Creatinine 0 83 0 60 - 1 30 mg/dL    Glucose 98 65 - 140 mg/dL    Calcium 9 1 8 3 - 10 1 mg/dL    eGFR 92 ml/min/1 73sq m   Magnesium   Result Value Ref Range    Magnesium 2 4 1 6 - 2 6 mg/dL   Phosphorus   Result Value Ref Range    Phosphorus 4 2 2 7 - 4 5 mg/dL   Vitamin D 25 hydroxy   Result Value Ref Range    Vit D, 25-Hydroxy 32 3 30 0 - 100 0 ng/mL

## 2021-05-17 ENCOUNTER — APPOINTMENT (OUTPATIENT)
Dept: LAB | Facility: HOSPITAL | Age: 36
End: 2021-05-17

## 2021-05-17 ENCOUNTER — TRANSCRIBE ORDERS (OUTPATIENT)
Dept: LAB | Facility: HOSPITAL | Age: 36
End: 2021-05-17

## 2021-05-17 DIAGNOSIS — Z00.8 HEALTH EXAMINATION IN POPULATION SURVEYS: Primary | ICD-10-CM

## 2021-05-17 DIAGNOSIS — Z00.8 HEALTH EXAMINATION IN POPULATION SURVEYS: ICD-10-CM

## 2021-05-17 LAB
CHOLEST SERPL-MCNC: 158 MG/DL (ref 50–200)
EST. AVERAGE GLUCOSE BLD GHB EST-MCNC: 91 MG/DL
HBA1C MFR BLD: 4.8 %
HDLC SERPL-MCNC: 96 MG/DL
LDLC SERPL CALC-MCNC: 42 MG/DL (ref 0–100)
NONHDLC SERPL-MCNC: 62 MG/DL
TRIGL SERPL-MCNC: 99 MG/DL

## 2021-05-17 PROCEDURE — 80061 LIPID PANEL: CPT

## 2021-05-17 PROCEDURE — 83036 HEMOGLOBIN GLYCOSYLATED A1C: CPT

## 2021-05-17 PROCEDURE — 36415 COLL VENOUS BLD VENIPUNCTURE: CPT

## 2021-06-11 ENCOUNTER — DOCUMENTATION (OUTPATIENT)
Dept: NEPHROLOGY | Facility: CLINIC | Age: 36
End: 2021-06-11

## 2021-06-11 NOTE — PROGRESS NOTES
Discussed with radiologist Dr Oumou Lake, her renal arteries are patent based on CT scan of abdomen with contrast in 2017

## 2021-07-12 ENCOUNTER — TELEPHONE (OUTPATIENT)
Dept: OTHER | Facility: HOSPITAL | Age: 36
End: 2021-07-12

## 2021-07-12 DIAGNOSIS — I10 ESSENTIAL HYPERTENSION: Primary | ICD-10-CM

## 2021-07-13 NOTE — TELEPHONE ENCOUNTER
Yes agree with doing serum aldosterone and plasma renin activity before starting aldectone  Can you please have her do both of above?

## 2021-07-13 NOTE — TELEPHONE ENCOUNTER
----- Message from Jessica Mas sent at 7/12/2021  9:22 AM EDT -----  Regarding: FW: Non-Urgent Medical Question  Contact: [phone]    ----- Message -----  From: Luís Bella  Sent: 7/12/2021   9:19 AM EDT  To: [facility]  Subject: Non-Urgent Medical Question                      I met with my dermatologist today who would like me to go back on aldactone  I had been taking this in [id number] for acne  He feels it is the best option  I know we are planning to do bloodwork in September to recheck aldosterone levels  Wanted to see if I should have the labs before starting the medication  Also wanted to make sure you knew I was going to start this  I will keep an eye on my BP and make sure I don't need to cut back on the nifedipine  Thank you!

## 2021-07-14 DIAGNOSIS — I10 ESSENTIAL HYPERTENSION: Primary | ICD-10-CM

## 2021-07-14 NOTE — TELEPHONE ENCOUNTER
I attempted to contact patient however mailbox full and can not leave message  I also attempted patients contact at [phone] however message states call can not be completed as dialed, I tried calling 3 times with the same message appearing each time  Will try patient again later today  Thank you

## 2021-07-15 ENCOUNTER — APPOINTMENT (OUTPATIENT)
Dept: LAB | Facility: HOSPITAL | Age: 36
End: 2021-07-15
Attending: INTERNAL MEDICINE
Payer: COMMERCIAL

## 2021-07-15 DIAGNOSIS — I10 ESSENTIAL HYPERTENSION: ICD-10-CM

## 2021-07-15 PROCEDURE — 36415 COLL VENOUS BLD VENIPUNCTURE: CPT | Performed by: INTERNAL MEDICINE

## 2021-07-15 PROCEDURE — 84244 ASSAY OF RENIN: CPT | Performed by: INTERNAL MEDICINE

## 2021-07-15 PROCEDURE — 82088 ASSAY OF ALDOSTERONE: CPT

## 2021-07-19 LAB — ALDOST SERPL-MCNC: 11.7 NG/DL (ref 0–30)

## 2021-07-21 ENCOUNTER — TELEPHONE (OUTPATIENT)
Dept: NEPHROLOGY | Facility: CLINIC | Age: 36
End: 2021-07-21

## 2021-07-21 LAB — RENIN PLAS-CCNC: 3.71 NG/ML/HR (ref 0.17–5.38)

## 2021-07-21 NOTE — TELEPHONE ENCOUNTER
Please let her know her serum aldosterone 11 7 with PRA not lower, overall unremarkable  Okay to start Aldactone from renal standpoint

## 2021-08-02 ENCOUNTER — OFFICE VISIT (OUTPATIENT)
Dept: FAMILY MEDICINE CLINIC | Facility: MEDICAL CENTER | Age: 36
End: 2021-08-02
Payer: COMMERCIAL

## 2021-08-02 VITALS
OXYGEN SATURATION: 99 % | DIASTOLIC BLOOD PRESSURE: 68 MMHG | SYSTOLIC BLOOD PRESSURE: 110 MMHG | TEMPERATURE: 98.4 F | HEART RATE: 67 BPM | HEIGHT: 63 IN | BODY MASS INDEX: 23.21 KG/M2 | WEIGHT: 131 LBS

## 2021-08-02 DIAGNOSIS — Z00.00 PHYSICAL EXAM, ANNUAL: Primary | ICD-10-CM

## 2021-08-02 PROBLEM — F41.9 ANXIETY: Status: RESOLVED | Noted: 2018-01-02 | Resolved: 2021-08-02

## 2021-08-02 PROCEDURE — 99395 PREV VISIT EST AGE 18-39: CPT | Performed by: FAMILY MEDICINE

## 2021-08-02 RX ORDER — SPIRONOLACTONE 50 MG/1
100 TABLET, FILM COATED ORAL DAILY
COMMUNITY
End: 2022-07-13 | Stop reason: ALTCHOICE

## 2021-09-28 ENCOUNTER — IMMUNIZATIONS (OUTPATIENT)
Dept: FAMILY MEDICINE CLINIC | Facility: HOSPITAL | Age: 36
End: 2021-09-28

## 2021-09-28 DIAGNOSIS — Z23 ENCOUNTER FOR IMMUNIZATION: Primary | ICD-10-CM

## 2021-09-28 PROCEDURE — 0001A SARS-COV-2 / COVID-19 MRNA VACCINE (PFIZER-BIONTECH) 30 MCG: CPT

## 2021-09-28 PROCEDURE — 91300 SARS-COV-2 / COVID-19 MRNA VACCINE (PFIZER-BIONTECH) 30 MCG: CPT

## 2021-12-02 ENCOUNTER — APPOINTMENT (OUTPATIENT)
Dept: LAB | Facility: MEDICAL CENTER | Age: 36
End: 2021-12-02
Payer: COMMERCIAL

## 2021-12-02 DIAGNOSIS — I10 ESSENTIAL HYPERTENSION: ICD-10-CM

## 2021-12-02 LAB
ANION GAP SERPL CALCULATED.3IONS-SCNC: 8 MMOL/L (ref 4–13)
BUN SERPL-MCNC: 16 MG/DL (ref 5–25)
CALCIUM SERPL-MCNC: 9.4 MG/DL (ref 8.3–10.1)
CHLORIDE SERPL-SCNC: 106 MMOL/L (ref 100–108)
CO2 SERPL-SCNC: 24 MMOL/L (ref 21–32)
CORTIS AM PEAK SERPL-MCNC: 15.6 UG/DL (ref 4.2–22.4)
CREAT SERPL-MCNC: 0.89 MG/DL (ref 0.6–1.3)
CREAT UR-MCNC: 286 MG/DL
ERYTHROCYTE [DISTWIDTH] IN BLOOD BY AUTOMATED COUNT: 12 % (ref 11.6–15.1)
GFR SERPL CREATININE-BSD FRML MDRD: 85 ML/MIN/1.73SQ M
GLUCOSE P FAST SERPL-MCNC: 109 MG/DL (ref 65–99)
HCT VFR BLD AUTO: 43.3 % (ref 34.8–46.1)
HGB BLD-MCNC: 14.4 G/DL (ref 11.5–15.4)
MCH RBC QN AUTO: 30.9 PG (ref 26.8–34.3)
MCHC RBC AUTO-ENTMCNC: 33.3 G/DL (ref 31.4–37.4)
MCV RBC AUTO: 93 FL (ref 82–98)
MICROALBUMIN UR-MCNC: 30.8 MG/L (ref 0–20)
MICROALBUMIN/CREAT 24H UR: 11 MG/G CREATININE (ref 0–30)
PLATELET # BLD AUTO: 215 THOUSANDS/UL (ref 149–390)
PMV BLD AUTO: 11.8 FL (ref 8.9–12.7)
POTASSIUM SERPL-SCNC: 3.8 MMOL/L (ref 3.5–5.3)
RBC # BLD AUTO: 4.66 MILLION/UL (ref 3.81–5.12)
SODIUM SERPL-SCNC: 138 MMOL/L (ref 136–145)
WBC # BLD AUTO: 5.88 THOUSAND/UL (ref 4.31–10.16)

## 2021-12-02 PROCEDURE — 82533 TOTAL CORTISOL: CPT

## 2021-12-02 PROCEDURE — 82043 UR ALBUMIN QUANTITATIVE: CPT

## 2021-12-02 PROCEDURE — 85027 COMPLETE CBC AUTOMATED: CPT

## 2021-12-02 PROCEDURE — 82570 ASSAY OF URINE CREATININE: CPT

## 2021-12-02 PROCEDURE — 82088 ASSAY OF ALDOSTERONE: CPT

## 2021-12-02 PROCEDURE — 36415 COLL VENOUS BLD VENIPUNCTURE: CPT

## 2021-12-02 PROCEDURE — 80048 BASIC METABOLIC PNL TOTAL CA: CPT

## 2021-12-07 ENCOUNTER — OFFICE VISIT (OUTPATIENT)
Dept: NEPHROLOGY | Facility: CLINIC | Age: 36
End: 2021-12-07
Payer: COMMERCIAL

## 2021-12-07 VITALS
BODY MASS INDEX: 23.92 KG/M2 | SYSTOLIC BLOOD PRESSURE: 115 MMHG | DIASTOLIC BLOOD PRESSURE: 90 MMHG | HEART RATE: 89 BPM | WEIGHT: 135 LBS | HEIGHT: 63 IN

## 2021-12-07 DIAGNOSIS — I10 ESSENTIAL HYPERTENSION: Primary | ICD-10-CM

## 2021-12-07 PROCEDURE — 99213 OFFICE O/P EST LOW 20 MIN: CPT | Performed by: INTERNAL MEDICINE

## 2021-12-14 LAB — ALDOST SERPL-MCNC: 27.4 NG/DL (ref 0–30)

## 2021-12-19 ENCOUNTER — HOSPITAL ENCOUNTER (EMERGENCY)
Facility: HOSPITAL | Age: 36
Discharge: HOME | End: 2021-12-19
Attending: EMERGENCY MEDICINE | Admitting: EMERGENCY MEDICINE
Payer: COMMERCIAL

## 2021-12-19 VITALS
DIASTOLIC BLOOD PRESSURE: 99 MMHG | HEART RATE: 87 BPM | RESPIRATION RATE: 16 BRPM | SYSTOLIC BLOOD PRESSURE: 160 MMHG | TEMPERATURE: 98 F | OXYGEN SATURATION: 97 %

## 2021-12-19 DIAGNOSIS — B34.9 VIRAL SYNDROME: ICD-10-CM

## 2021-12-19 DIAGNOSIS — Z20.822 ENCOUNTER FOR SCREENING LABORATORY TESTING FOR COVID-19 VIRUS: Primary | ICD-10-CM

## 2021-12-19 LAB
FLUAV RNA RESP QL NAA+PROBE: NEGATIVE
FLUBV RNA RESP QL NAA+PROBE: NEGATIVE
RSV RNA RESP QL NAA+PROBE: NEGATIVE
SARS-COV-2 RNA RESP QL NAA+PROBE: NEGATIVE

## 2021-12-19 PROCEDURE — 99282 EMERGENCY DEPT VISIT SF MDM: CPT | Performed by: EMERGENCY MEDICINE

## 2021-12-19 PROCEDURE — 0241U HB NFCT DS VIR RESP RNA 4 TRGT: CPT | Performed by: EMERGENCY MEDICINE

## 2021-12-19 PROCEDURE — 99283 EMERGENCY DEPT VISIT LOW MDM: CPT

## 2022-01-04 ENCOUNTER — PROCEDURE VISIT (OUTPATIENT)
Dept: OBGYN CLINIC | Facility: CLINIC | Age: 37
End: 2022-01-04
Payer: COMMERCIAL

## 2022-01-04 VITALS — WEIGHT: 136 LBS | DIASTOLIC BLOOD PRESSURE: 70 MMHG | BODY MASS INDEX: 24.09 KG/M2 | SYSTOLIC BLOOD PRESSURE: 130 MMHG

## 2022-01-04 DIAGNOSIS — Z30.432 ENCOUNTER FOR REMOVAL OF INTRAUTERINE CONTRACEPTIVE DEVICE (IUD): Primary | ICD-10-CM

## 2022-01-04 PROCEDURE — 58301 REMOVE INTRAUTERINE DEVICE: CPT | Performed by: OBSTETRICS & GYNECOLOGY

## 2022-01-04 RX ORDER — CLINDAMYCIN PHOSPHATE 10 UG/ML
LOTION TOPICAL
COMMUNITY
Start: 2021-10-23

## 2022-01-04 NOTE — PROGRESS NOTES
Iud removal    Date/Time: 1/4/2022 10:37 AM  Performed by: Farnaz Hernandez MD  Authorized by: Farnaz Hernandez MD   Walker Protocol:  Consent: Verbal consent obtained    Risks and benefits: risks, benefits and alternatives were discussed  Consent given by: patient      Procedure:     Removed with no complications: yes (desires pregnancy)        Given instructions about when to call with positive home pregnancy test

## 2022-03-03 ENCOUNTER — TELEPHONE (OUTPATIENT)
Dept: PSYCHIATRY | Facility: CLINIC | Age: 37
End: 2022-03-03

## 2022-03-03 NOTE — TELEPHONE ENCOUNTER
PT called to shed np appointment, PT has been added to wait list  Pt has [facility]

## 2022-03-30 ENCOUNTER — APPOINTMENT (OUTPATIENT)
Dept: LAB | Facility: MEDICAL CENTER | Age: 37
End: 2022-03-30

## 2022-03-30 DIAGNOSIS — Z00.8 HEALTH EXAMINATION IN POPULATION SURVEY: ICD-10-CM

## 2022-03-30 LAB
CHOLEST SERPL-MCNC: 172 MG/DL
EST. AVERAGE GLUCOSE BLD GHB EST-MCNC: 91 MG/DL
HBA1C MFR BLD: 4.8 %
HDLC SERPL-MCNC: 90 MG/DL
LDLC SERPL CALC-MCNC: 74 MG/DL (ref 0–100)
NONHDLC SERPL-MCNC: 82 MG/DL
TRIGL SERPL-MCNC: 42 MG/DL

## 2022-03-30 PROCEDURE — 36415 COLL VENOUS BLD VENIPUNCTURE: CPT

## 2022-03-30 PROCEDURE — 83036 HEMOGLOBIN GLYCOSYLATED A1C: CPT

## 2022-03-30 PROCEDURE — 80061 LIPID PANEL: CPT

## 2022-05-02 ENCOUNTER — TELEPHONE (OUTPATIENT)
Dept: OBGYN CLINIC | Facility: CLINIC | Age: 37
End: 2022-05-02

## 2022-05-02 NOTE — TELEPHONE ENCOUNTER
----- Message from Nathaly Dailey sent at 5/2/2022  1:26 PM EDT -----  Regarding: Pregnancy   On day 30 of my cycle (Thursday), I had a positive pregnancy test  2 days later (Saturday) I check another and it was negative  I started bleeding yesterday  I know this was non-viable pregnancy but Im just reaching out to see if there is anything I need to do or just continue on trying for next cycle  Thank you!

## 2022-05-02 NOTE — TELEPHONE ENCOUNTER
No need for quant - would just have her repeat HPT in a week to confirm negative, OK to keep trying with next cycle

## 2022-05-19 ENCOUNTER — OFFICE VISIT (OUTPATIENT)
Dept: URGENT CARE | Age: 37
End: 2022-05-19
Payer: COMMERCIAL

## 2022-05-19 VITALS
TEMPERATURE: 97.9 F | RESPIRATION RATE: 18 BRPM | HEIGHT: 63 IN | WEIGHT: 136 LBS | OXYGEN SATURATION: 98 % | HEART RATE: 98 BPM | BODY MASS INDEX: 24.1 KG/M2

## 2022-05-19 DIAGNOSIS — J02.9 PHARYNGITIS, UNSPECIFIED ETIOLOGY: Primary | ICD-10-CM

## 2022-05-19 LAB — S PYO AG THROAT QL: NEGATIVE

## 2022-05-19 PROCEDURE — 99213 OFFICE O/P EST LOW 20 MIN: CPT | Performed by: PHYSICIAN ASSISTANT

## 2022-05-19 PROCEDURE — 87880 STREP A ASSAY W/OPTIC: CPT | Performed by: PHYSICIAN ASSISTANT

## 2022-05-19 PROCEDURE — 87636 SARSCOV2 & INF A&B AMP PRB: CPT | Performed by: PHYSICIAN ASSISTANT

## 2022-05-19 RX ORDER — AMOXICILLIN 500 MG/1
500 CAPSULE ORAL EVERY 12 HOURS SCHEDULED
Qty: 20 CAPSULE | Refills: 0 | Status: SHIPPED | OUTPATIENT
Start: 2022-05-19 | End: 2022-05-29

## 2022-05-19 NOTE — LETTER
[facility] [address]  [address] [address]  Dept: [phone]    May 19, 2022    Patient: Clau Sue  YOB: 1985    Clau Sue was seen and evaluated at our [facility]  Please note if Covid and Flu tests are negative, they may return to work when fever free for 24 hours without the use of a fever reducing agent  If Covid or Flu test is positive, they may return to work on 05/22/2022, as this is 5 days from the onset of symptoms  Upon return, they must then adhere to strict masking for an additional 5 days      Sincerely,    Dereck López PA-C

## 2022-05-19 NOTE — PROGRESS NOTES
[address] [facility]        NAME: Olesya Araujo is a 29 y o  female  : 1985    MRN: [id number]  DATE: May 19, 2022  TIME: 11:47 AM    Assessment and Plan   Pharyngitis, unspecified etiology [J02 9]  1  Pharyngitis, unspecified etiology  POCT rapid strepA    [facility] at [facility] or [facility]    dexamethasone oral liquid 10 mg 1 mL    amoxicillin (AMOXIL) 500 mg capsule   Pt presents with symptoms consistent with possible COVID 19 or flu infection  Pt will be tested in accordance with CDC guidelines and recommendations for symptomatic individuals regardless of vaccination status  We discussed quarantine protocols and symptomatic treatments  Pt presents with complaints of sore throat and examination concerning for strep  Rapid strep in the clinic is negative; however, she [facility] for empiric treatment  Pt will be started on Amoxicillin to treat as this is the treatment of choice for strep  Pt allergies were reviewed and they have no allergy to penicillins  We also discussed Decadron and associated side effects to help reduce tonsilar swelling and throat pain  Decadron administereed at patient request  Pt is instructed that they are considered contagious until they have been on antibiotics for 24 hours  They should not attend work or school during that time  The patient is provided with a note(s) to this effect  We discussed that after they have been on antibiotics 48 hours they need to throw away their current toothbrush and replace with a new toothbrush to prevent re-colonizing themselves with strep bacteria  It was discussed with the patient that I cannot rule out more serious conditions such as peritonsilar, tonsilar, and retropharyngeal abscess at this location that would require ED evaluation; however serious, these are rare conditions and unlikely based on my examination and I do not recommend ED evaluation at this time   The patient will follow-up with their PCP in 2-3 days if symptoms are not improved on antibiotics  They will report to the emergency room if symptoms worsen or new symptoms such as jaw pain, difficulty swallowing, anterior neck pain, or if voice becomes muffled  Patient Instructions     Patient Instructions   Check or sign up for [facility] my Chart to view your results  We do not call patient's with negative results  Go directly home after today's visit, quarantine until you receive a negative result  Isolate from others as much as possible until your result comes back  If you have a positive result you need to quarantine at home for a minimum of 5 days from the date your symptoms started  You may end your quarantine when you are symptoms free without medications to reduce fever (e g  acetaminophen/Tylenol) for 24 hours  Anyone whom you have been in close regular contact (unmasked > 15 minute intervals) since you began having symptoms should be tested within 5-7 days of your positive test regardless of vaccination status or symptoms  Masks should be worn at all times whenever indoors until 10 days after your exposure or positive result  Recommend over the counter antihistamines such as Claratin, Allegra, Zyrtec, or Benadryl for congestion  You may also use over the counter nasal sprays such as Flonase for this  Over the counter lozenges such as Cepacol, Ricola, Halls, or Chloroseptic can be used for sore throat symptoms  Recommend Vitamin C 1,000 mg twice daily, Vitamin D3 2000 IU daily, multivitamin and Zinc for immune support  If your symptoms worsen or you develop shortness of breath report to the nearest emergency room  Check [facility] for the most up to date information as we continue to learn more about the virus   Take antibiotics as prescribed  Make sure to take all the antibiotic even after you start feeling better   If you stop them too soon, you risk developing a resistant infection that is more difficult and expensive to treat  Never save antibiotics or take antibiotics without the recommendation of a healthcare provider or dental professional  Note that if you are taking birth control medications, antibiotics can decrease their effectiveness  Recommend abstinence or back up method while on antibiotics and for 3-5 days after completing the antibiotic course   You are considered contagious until you have been on the antibiotic for 24 hours  You should not share food or drinks with others and should not kiss on the mouth in that time  You should also stay home from work or school to avoid spreading the infection to others   You need to switch to a brand new, never used toothbrush after 48 hours (or 2 days on the antibiotic) to prevent giving strep back to yourself again   If symptoms are not improved after 2-3 days on the antibiotics, follow-up with your primary care provider   If symptoms worsen or new symptoms such as drooling, difficulty swallowing, voice changes, anterior neck pain, or jaw pain develop report to the emergency room as these are symptoms of an abscess having formed in your throat or neck  Strep Throat   AMBULATORY CARE:   Strep throat  is a throat infection caused by bacteria  It is easily spread from person to person  Common symptoms include the following:   · Sore, red, and swollen throat    · Fever and headache     · Upset stomach, abdominal pain, or vomiting    · White or yellow patches or blisters in the back of your throat    · Tender, swollen lumps on the sides of your neck or jaw    · Throat pain when you swallow    Call 911 for any of the following:   · You have trouble breathing  Seek care immediately if:   · You have new symptoms like a bad headache, stiff neck, chest pain, or vomiting  · You are drooling because you cannot swallow your spit  Contact your healthcare provider if:   · You have a fever  · You have a rash or ear pain      · You have green, yellow-brown, or bloody mucus when you cough or blow your nose  · You are unable to drink anything  · You have questions or concerns about your condition or care  Treatment for strep throat  may include antibiotic medicine to treat your strep throat  You should feel better within 2 to 3 days after you start antibiotics  You may return to work or school 24 hours after you start antibiotics  Manage strep throat:   · Use lozenges, ice, soft foods, or popsicles  to soothe your throat  · Drink juice, milk shakes, or soup  if your throat is too sore to eat solid food  Drinking liquids can also help prevent dehydration  · Gargle with salt water  Mix ¼ teaspoon salt in a glass of warm water and gargle  This may help reduce swelling in your throat  · Do not smoke  Nicotine and other chemicals in cigarettes and cigars can cause lung damage and make your symptoms worse  Ask your healthcare provider for information if you currently smoke and need help to quit  E-cigarettes or smokeless tobacco still contain nicotine  Talk to your healthcare provider before you use these products  Prevent the spread of strep throat:   · Wash your hands often  Use soap and water  Wash your hands after you use the bathroom, change a child's diapers, or sneeze  Wash your hands before you prepare or eat food  · Do not share food or drinks  Replace your toothbrush after you have taken antibiotics for 24 hours  Follow up with your doctor as directed:  Write down your questions so you remember to ask them during your visits  © [employer] 2022 Information is for End User's use only and may not be sold, redistributed or otherwise used for commercial purposes  All illustrations and images included in CareNotes® are the copyrighted property of A [employer] A M , Inc  or [employer]  The above information is an  only   It is not intended as medical advice for individual conditions or treatments  Talk to your doctor, nurse or pharmacist before following any medical regimen to see if it is safe and effective for you  Follow up with PCP in 3-5 days  Proceed to  ER if symptoms worsen  Chief Complaint     Chief Complaint   Patient presents with    Sore Throat     Started with sore throat on Tuesday, woke up yesterday with fever, Patient also complains of cough and body aches  Patient is vaccinated  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  Patient did home test Wednesday with negative result  History of Present Illness       29year old female presents with complaint of     Sore Throat   This is a new problem  The current episode started yesterday  The problem has been gradually worsening  Neither side of throat is experiencing more pain than the other  The maximum temperature recorded prior to her arrival was 101 - 101 9 F (t max 101)  The fever has been present for 1 to 2 days  The pain is at a severity of 6/10  The pain is moderate  Associated symptoms include coughing and swollen glands  Pertinent negatives include no congestion, diarrhea, drooling, headaches, shortness of breath, trouble swallowing or vomiting  She has had no exposure to strep or mono  She has tried cool liquids and gargles for the symptoms  The treatment provided no relief  Review of Systems   Review of Systems   Constitutional: Positive for chills, fatigue and fever  HENT: Positive for sore throat and voice change (reports laryngitis)  Negative for congestion, drooling, postnasal drip, rhinorrhea and trouble swallowing  Respiratory: Positive for cough  Negative for shortness of breath  Gastrointestinal: Negative for diarrhea, nausea and vomiting  Musculoskeletal: Positive for myalgias  Neurological: Negative for headaches           Current Medications       Current Outpatient Medications:     amoxicillin (AMOXIL) 500 mg capsule, Take 1 capsule (500 mg total) by mouth every 12 (twelve) hours for 10 days, Disp: 20 capsule, Rfl: 0    clindamycin (CLEOCIN T) 1 % lotion, , Disp: , Rfl:     Docusate Sodium (COLACE PO), Take by mouth as needed  , Disp: , Rfl:     NIFEdipine (PROCARDIA XL) 30 mg 24 hr tablet, Take 1 tablet (30 mg total) by mouth 2 (two) times a day, Disp: 180 tablet, Rfl: 3    Prenatal MV-Min-Fe Fum-FA-DHA (PRENATAL 1 PO), Take by mouth, Disp: , Rfl:     tretinoin (RETIN-A) 0 025 % cream, , Disp: , Rfl:     spironolactone (ALDACTONE) 50 mg tablet, Take 100 mg by mouth daily   (Patient not taking: Reported on 2022), Disp: , Rfl:   No current facility-administered medications for this visit      Current Allergies     Allergies as of 2022 - Reviewed 2022   Allergen Reaction Noted    Nickel  2017            The following portions of the patient's history were reviewed and updated as appropriate: allergies, current medications, past family history, past medical history, past social history, past surgical history and problem list      Past Medical History:   Diagnosis Date    Acute renal failure ([address] )     [id number] RESOLVED    Acute tubular necrosis ([address] )     Anemia     during the pregnancy     Bowel obstruction ([address] ) 2017    Chronic kidney disease     Fibroadenoma of breast, left     Fibroadenoma of breast, right      1 para 1     History of transfusion 2015    After delivery     Hypertension     no medication     Kidney stone     Postpartum hemorrhage     UNSPECIFIED TYPE  RESOLVED     Urinary tract infection     yes in the last ten years    Vacuum extractor delivery, delivered      daughter with postpartum hemorrhage and DIC    Varicella        Past Surgical History:   Procedure Laterality Date    BREAST FIBROADENOMA SURGERY      Cryosurgical Ablation of Fibroadenoma    BREAST LUMPECTOMY Left     BREAST LUMPECTOMY Right     EXPLORATORY LAPAROTOMY W/ BOWEL RESECTION N/A 2017    Procedure: LAPAROTOMY EXPLORATORY W/ lysis of adhesions;  Surgeon: Cortney Pedro DO;  [facility];  Service:    [address] [place] [address]      LAPAROTOMY  2015    EXPLORATORY for DIC and hemorrhage postpartum retroperitoneal hematoma    AK  DELIVERY ONLY N/A 2020    Procedure: Arthea Bennett () REPEAT;  Surgeon: North Vora MD;  Location: [facility];  Service: Obstetrics    TOOTH EXTRACTION      Impression: [id number] Kale Slipper  2004       Family History   Problem Relation Age of Onset    Hypertension Mother         URINARY INCONTINENCE    Hyperlipidemia Mother     Diverticulitis Mother     Hypothyroidism Mother     Hepatitis Father         A    Diverticulitis Maternal Grandmother     Colon cancer Maternal Grandmother     Stroke Maternal Grandfather         CVA    Heart disease Maternal Grandfather     Diverticulitis Maternal Grandfather     Hypertension Maternal Grandfather     Diabetes Paternal Grandfather         INSULIN DEPENDENT    No Known Problems Sister     No Known Problems Brother     No Known Problems Daughter          Medications have been verified  Objective   Pulse 98   Temp 97 9 °F (36 6 °C) (Tympanic)   Resp 18   Ht 5' 3" (1 6 m)   Wt 61 7 kg (136 lb)   SpO2 98%   BMI 24 09 kg/m²   No LMP recorded  Patient has had an implant  Physical Exam     Physical Exam  Vitals and nursing note reviewed  Constitutional:       General: She is awake  She is not in acute distress  Appearance: Normal appearance  She is well-developed and well-groomed  She is not ill-appearing, toxic-appearing or diaphoretic  HENT:      Head: Normocephalic and atraumatic  Jaw: No trismus  Right Ear: Hearing, tympanic membrane, ear canal and external ear normal       Left Ear: Hearing, tympanic membrane, ear canal and external ear normal       Nose: Nose normal  No congestion or rhinorrhea  Right Nostril: No foreign body, epistaxis, septal hematoma or occlusion  Left Nostril: No foreign body, epistaxis, septal hematoma or occlusion  Right Turbinates: Not enlarged, swollen or pale  Left Turbinates: Not enlarged, swollen or pale  Mouth/Throat:      Lips: Pink  No lesions  Mouth: Mucous membranes are moist       Dentition: Normal dentition  Tongue: No lesions  Tongue does not deviate from midline  Palate: No mass and lesions  Pharynx: Uvula midline  Pharyngeal swelling and posterior oropharyngeal erythema present  No oropharyngeal exudate or uvula swelling  Tonsils: Tonsillar exudate present  No tonsillar abscesses  2+ on the right  2+ on the left  Eyes:      General: Lids are normal  Vision grossly intact  Gaze aligned appropriately  Neck:      Trachea: Trachea and phonation normal  No tracheal tenderness or tracheal deviation  Cardiovascular:      Rate and Rhythm: Normal rate and regular rhythm  Heart sounds: Normal heart sounds, S1 normal and S2 normal  Heart sounds not distant  No murmur heard  No friction rub  No gallop  Pulmonary:      Effort: Pulmonary effort is normal       Breath sounds: Normal breath sounds  No decreased breath sounds, wheezing, rhonchi or rales  Comments: Patient is speaking in full sentences with no increased respiratory effort  No audible wheezing or stridor  Musculoskeletal:      Cervical back: Normal range of motion  Lymphadenopathy:      Cervical: Cervical adenopathy present  Right cervical: Superficial cervical adenopathy present  No deep or posterior cervical adenopathy  Left cervical: Superficial cervical adenopathy present  No deep or posterior cervical adenopathy  Skin:     General: Skin is warm and dry  Neurological:      Mental Status: She is alert and oriented to person, place, and time  Coordination: Coordination is intact  Gait: Gait is intact     Psychiatric:         Attention and Perception: Attention and perception normal          Mood and Affect: Mood and affect normal          Speech: Speech normal          Behavior: Behavior normal  Behavior is cooperative  Note: Portions of this record may have been created with voice recognition software  Occasional wrong word or "sound a like" substitutions may have occurred due to the inherent limitations of voice recognition software  Please read the chart carefully and recognize, using context, where substitutions have occurred  *

## 2022-05-19 NOTE — PATIENT INSTRUCTIONS
Check or sign up for [facility]'s my Chart to view your results  We do not call patient's with negative results  Go directly home after today's visit, quarantine until you receive a negative result  Isolate from others as much as possible until your result comes back  If you have a positive result you need to quarantine at home for a minimum of 5 days from the date your symptoms started  You may end your quarantine when you are symptoms free without medications to reduce fever (e g  acetaminophen/Tylenol) for 24 hours  Anyone whom you have been in close regular contact (unmasked > 15 minute intervals) since you began having symptoms should be tested within 5-7 days of your positive test regardless of vaccination status or symptoms  Masks should be worn at all times whenever indoors until 10 days after your exposure or positive result  Recommend over the counter antihistamines such as Claratin, Allegra, Zyrtec, or Benadryl for congestion  You may also use over the counter nasal sprays such as Flonase for this  Over the counter lozenges such as Cepacol, Ricola, Halls, or Chloroseptic can be used for sore throat symptoms  Recommend Vitamin C 1,000 mg twice daily, Vitamin D3 2000 IU daily, multivitamin and Zinc for immune support  If your symptoms worsen or you develop shortness of breath report to the nearest emergency room  Check [facility] for the most up to date information as we continue to learn more about the virus  Take antibiotics as prescribed  Make sure to take all the antibiotic even after you start feeling better  If you stop them too soon, you risk developing a resistant infection that is more difficult and expensive to treat  Never save antibiotics or take antibiotics without the recommendation of a healthcare provider or dental professional  Note that if you are taking birth control medications, antibiotics can decrease their effectiveness   Recommend abstinence or back up method while on antibiotics and for 3-5 days after completing the antibiotic course  You are considered contagious until you have been on the antibiotic for 24 hours  You should not share food or drinks with others and should not kiss on the mouth in that time  You should also stay home from work or school to avoid spreading the infection to others  You need to switch to a brand new, never used toothbrush after 48 hours (or 2 days on the antibiotic) to prevent giving strep back to yourself again  If symptoms are not improved after 2-3 days on the antibiotics, follow-up with your primary care provider  If symptoms worsen or new symptoms such as drooling, difficulty swallowing, voice changes, anterior neck pain, or jaw pain develop report to the emergency room as these are symptoms of an abscess having formed in your throat or neck  Strep Throat   AMBULATORY CARE:   Strep throat  is a throat infection caused by bacteria  It is easily spread from person to person  Common symptoms include the following:   Sore, red, and swollen throat    Fever and headache     Upset stomach, abdominal pain, or vomiting    White or yellow patches or blisters in the back of your throat    Tender, swollen lumps on the sides of your neck or jaw    Throat pain when you swallow    Call 911 for any of the following: You have trouble breathing  Seek care immediately if:   You have new symptoms like a bad headache, stiff neck, chest pain, or vomiting  You are drooling because you cannot swallow your spit  Contact your healthcare provider if:   You have a fever  You have a rash or ear pain  You have green, yellow-brown, or bloody mucus when you cough or blow your nose  You are unable to drink anything  You have questions or concerns about your condition or care  Treatment for strep throat  may include antibiotic medicine to treat your strep throat   You should feel better within 2 to 3 days after you start antibiotics  You may return to work or school 24 hours after you start antibiotics  Manage strep throat:   Use lozenges, ice, soft foods, or popsicles  to soothe your throat  Drink juice, milk shakes, or soup  if your throat is too sore to eat solid food  Drinking liquids can also help prevent dehydration  Gargle with salt water  Mix ¼ teaspoon salt in a glass of warm water and gargle  This may help reduce swelling in your throat  Do not smoke  Nicotine and other chemicals in cigarettes and cigars can cause lung damage and make your symptoms worse  Ask your healthcare provider for information if you currently smoke and need help to quit  E-cigarettes or smokeless tobacco still contain nicotine  Talk to your healthcare provider before you use these products  Prevent the spread of strep throat:   Wash your hands often  Use soap and water  Wash your hands after you use the bathroom, change a child's diapers, or sneeze  Wash your hands before you prepare or eat food  Do not share food or drinks  Replace your toothbrush after you have taken antibiotics for 24 hours  Follow up with your doctor as directed:  Write down your questions so you remember to ask them during your visits  © Copyright [employer] 2022 Information is for End User's use only and may not be sold, redistributed or otherwise used for commercial purposes  All illustrations and images included in CareNotes® are the copyrighted property of [employer]  or [employer]   The above information is an  only  It is not intended as medical advice for individual conditions or treatments  Talk to your doctor, nurse or pharmacist before following any medical regimen to see if it is safe and effective for you

## 2022-05-20 LAB
FLUAV RNA RESP QL NAA+PROBE: NEGATIVE
FLUBV RNA RESP QL NAA+PROBE: NEGATIVE
SARS-COV-2 RNA RESP QL NAA+PROBE: NEGATIVE

## 2022-06-09 ENCOUNTER — OFFICE VISIT (OUTPATIENT)
Dept: PSYCHIATRY | Facility: CLINIC | Age: 37
End: 2022-06-09
Payer: COMMERCIAL

## 2022-06-09 DIAGNOSIS — F41.1 GAD (GENERALIZED ANXIETY DISORDER): Primary | ICD-10-CM

## 2022-06-09 DIAGNOSIS — F39 MOOD DISORDER (HCC): ICD-10-CM

## 2022-06-09 PROCEDURE — 90792 PSYCH DIAG EVAL W/MED SRVCS: CPT | Performed by: NURSE PRACTITIONER

## 2022-06-09 RX ORDER — BUSPIRONE HYDROCHLORIDE 5 MG/1
5 TABLET ORAL 3 TIMES DAILY
Qty: 90 TABLET | Refills: 1 | Status: SHIPPED | OUTPATIENT
Start: 2022-06-09 | End: 2022-07-07 | Stop reason: ALTCHOICE

## 2022-06-09 RX ORDER — CLONAZEPAM 0.5 MG/1
TABLET ORAL
Qty: 30 TABLET | Refills: 1 | Status: SHIPPED | OUTPATIENT
Start: 2022-06-09 | End: 2022-07-07 | Stop reason: ALTCHOICE

## 2022-06-09 NOTE — PSYCH
[address]    Name and Date of Birth:  Kami Espinoza 29 y o  1985    Date of Visit: 06/09/22    Reason for visit:  Psychiatric evaluation for treatment of anxiety  HPI patient is a 50-year-old female has history of anxiety  She has history of treatment from her PCP  She said she has had anxiety for some time but after college she sought treatment  Her PCP had prescribed Lexapro which just made her nauseous and give her the jitters  She then went on Zoloft and did well on that for time being but then stopped it for sexual side effect reasons and then restarted again but developed significant side effects of sweating, nausea and weakness  She then became pregnant and was on no medication for anxiety  Of note, all the medications listed above were prescribed by PCP  The patient is here because she is reporting days of feeling irritable and edgy  She tells me that at times, she becomes so frustrated with her family that she starts to yell at them and then regrets her actions  She has a very busy schedule  She works full-time and has a daughter who is age 9 and another daughter 21 months  The youngest daughter has multiple medical issues and is diagnosed with charge syndrome  She tells me that they will be traveling out to [facility] for complete workup for her daughter  She found that there is a clinic in [facility] that is very well-versed on charge syndrome  She is happily  but she and her  do tend to get into verbal battles and at times, they do say things to each other they regret  She reports constant rumination and worry about upcoming events and all of this plays out in her head  When she is interrupted with these thoughts, this is when she becomes irritable and edgy  She is now presenting with tingling in her hands and dizziness  She is seeking relief from her anxiety    The past psychiatric history:  Patient has been trialed on Zoloft and Lexapro both causing side effects  She has been treated with these to help reduce anxiety  She is on no psychiatric medication now  Family psychiatric history:  Both patient's parents suffer from anxiety  Both were on Zoloft  She is not sure if her father is still on it or not  She knows her mother is not on Zoloft anymore  There is no history of suicide in the family  Access to weapons:  None  Drug and alcohol history:  Patient has no history of drug or alcohol abuse  She does admit to drinking 1-2 glasses of wine per night to wind down   She tells me does help her to relax  Otherwise, she is not relaxing at work or home  Past medical history:  Patient does suffer from hypertension  She is treated by Nephrology  No known drug allergies  Psychosocial history:  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 [employer]  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  Her job is quite demanding  And at times, she is not sure days of the week or what our she will be working  She has chosen now, for since the predictability, to work weekends  For now, this works better with her  and her 2 children  This is her 2nd marriage  Her 1st marriage ended in divorce  She has a daughter, Maren Schaefer from her 1st marriage  She has a 7  She has a daughter, Nina Mcclure from her current marriage to her  Swatiia Jose  She describes her relationship is good  They do get along well  They do have their moments where they disagree and argue but they are much less than what they were  They were in couples counseling but have not been in couples counseling recently  I asked about individual counseling and she tells me that she is able to talk things out with most of her friends and she feels she gets resolution from that  The marital relationship is strong  The couple does love one another deeply    Jenifer Garcia also works for a [employer] and he is very busy most of the time working from home     Gunnar Cnode is a 29 y o  female with a history of Generalized Anxiety Disorder who presents for psychiatric evaluation due to anxiety  Primary complaints include ANXIETY SYMPTOMS: daily anxiety symptoms  Symptoms first started gradual starting Several years ago and gradually worsening over the last several months  Stressors preceding visit included ongoing anxiety  Gunnar Conde is a 28-year-old female who presents today for treatment of anxiety  There is a long history of anxiety in this patient  She was treated in the past with 2 SSRIs that did not work out well for her  She is looking for some relief because she is now experiencing increased symptoms of anxiety  She is reporting more tingling in her extremities especially her hands  She is also developing dizziness at times  Did discuss treatment options and we are going to start low-dose BuSpar 5 mg t i d  and she will be able to take Klonopin 0 25 mg every 12 hours if needed for severe anxiety breakthrough  I am hoping once the BuSpar takes hold, at the correct dose, we will no longer need the clonazepam   She would like to avoid SSRIs for now since she had a poor response the min past     She denies suicidal ideation, intent or plan at present, denies suicidal ideation, intent or plan  She denies auditory hallucinations, denies visual hallucinations, denies overt delusions  She Denies any other side effects from psychiatric medications  History of side effects from  SSRIs  [facility] ROS Appetite Changes and Sleep: normal sleep, normal appetite, normal energy level    Psychiatric Review Of Systems:    Sleep changes:  No change  Appetite changes: no change  Weight changes: no change  Energy/anergy: no change  Interest/pleasure/anhedonia: no change  Somatic symptoms: no  Anxiety/panic: yes, panic attacks  Angelica: no  Guilty/hopeless: no  Self injurious behavior/risky behavior: no  Suicidal ideation: no  Homicidal ideation: no  Auditory hallucinations: no  Visual hallucinations: no  Other hallucinations: no  Delusional thinking: no  Eating disorder history: no  Obsessive/compulsive symptoms: no    Review Of Systems:    Mood Anxiety   Behavior Anxious most days   Thought Content Ruminates and worries   General Emotional Problems   Personality Change in Personality   Other Psych Symptoms Reports more irritability of late   Constitutional negative   ENT negative   Cardiovascular History of blood pressure issues   Respiratory negative   Gastrointestinal negative   Genitourinary negative   Musculoskeletal negative   Integumentary negative   Neurological negative   Endocrine negative   Other Symptoms none       Past Psychiatric History:     Past Inpatient Psychiatric Treatment:   No history of past inpatient psychiatric admissions  Past Outpatient Psychiatric Treatment:    Meds were prescribed by PCP  Past Suicide Attempts: no  Past Violent Behavior: no  Past Psychiatric Medication Trials: Zoloft and Lexapro    Family Psychiatric History:     Family History   Problem Relation Age of Onset    Hypertension Mother         URINARY INCONTINENCE    Hyperlipidemia Mother     Diverticulitis Mother     Hypothyroidism Mother     Hepatitis Father         A    Diverticulitis Maternal Grandmother     Colon cancer Maternal Grandmother     Stroke Maternal Grandfather         CVA    Heart disease Maternal Grandfather     Diverticulitis Maternal Grandfather     Hypertension Maternal Grandfather     Diabetes Paternal Grandfather         INSULIN DEPENDENT    No Known Problems Sister     No Known Problems Brother     No Known Problems Daughter        Social History     Substance and Sexual Activity   Drug Use No     Social History     Socioeconomic History    Marital status: /Civil Union     Spouse name: Not on file    Number of children: Not on file    Years of education: Not on file    Highest education level: Not on file   Occupational History    Not on file Tobacco Use    Smoking status: Never Smoker    Smokeless tobacco: Never Used   Vaping Use    Vaping Use: Never used   Substance and Sexual Activity    Alcohol use: Yes     Alcohol/week: 5 0 standard drinks     Types: 5 Glasses of wine per week    Drug use: No    Sexual activity: Yes     Partners: Male     Birth control/protection: I U D       Comment: Mirena   Other Topics Concern    Not on file   Social History Narrative    Always uses a seatbelt    Drinks Coffee - 2 cups a day    IUD Contraception - mirena    Lack of exercise     (per [employer])     Social Determinants of Health     Financial Resource Strain: Not on file   Food Insecurity: Not on file   Transportation Needs: Not on file   Physical Activity: Not on file   Stress: Not on file   Social Connections: Not on file   Intimate Partner Violence: Not on file   Housing Stability: Not on file     Social History     Social History Narrative    Always uses a seatbelt    Drinks Coffee - 2 cups a day    IUD Contraception - mirena    Lack of exercise     (per [employer])       Traumatic History:     Abuse: No reported history  Other Traumatic Events: No reported history    History Review:    normal bulk    OBJECTIVE:     Mental Status Evaluation:    Appearance age appropriate, casually dressed   Behavior pleasant, cooperative   Speech normal volume, normal pitch   Mood anxious   Affect appropriate   Thought Processes goal directed   Associations intact associations   Thought Content no overt delusions   Perceptual Disturbances: none   Abnormal Thoughts  Risk Potential Suicidal ideation - None  Homicidal ideation - None  Potential for aggression - No   Orientation oriented to person, place and time/date   Memory recent and remote memory grossly intact   Cosciousness alert and awake   Attention Span attention span and concentration are age appropriate   Intellect Appears to be of Average Intelligence   Insight good   Judgement good   Muscle Strength and  Gait normal muscle strength and normal muscle tone, normal gait and normal balance   Language no difficulty naming common objects   Fund of Knowledge displays adequate knowledge of current events   Pain none   Pain Scale 0       Laboratory Results: No results found for this or any previous visit  Assessment/Plan:      Diagnoses and all orders for this visit:    SRAA (generalized anxiety disorder)  -     busPIRone (BUSPAR) 5 mg tablet; Take 1 tablet (5 mg total) by mouth 3 (three) times a day  -     clonazePAM (KlonoPIN) 0 5 mg tablet; 1/2-1 tab every 12 hours PRN anxiety    Mood disorder (HCC)          Treatment Recommendations/Precautions:    Start BuSpar 5 mg t i d   Klonopin 0 5 mg, may take 1/2 tab to 1 tab every 12 hours p r n  for severe anxiety   Follow-up in 4 weeks    Risks/Benefits      Risks, Benefits And Possible Side Effects Of Medications:    Risks, benefits, and possible side effects of medications explained to patient and patient verbalizes understanding and agreement for treatment       Risk assessment:  Based on today's interview, the patient is not a risk for self-harm or harm to others    Controlled Medication Discussion:     Aga Dulce has been filling controlled prescriptions on time as prescribed according to [address], [address] [facility]

## 2022-06-09 NOTE — PSYCH
TREATMENT PLAN (Medication Management Only)  Treatment Plan done but not signed at time of office visit due to:  Plan reviewed by phone or in person  and verbal consent given due to [address]    Name and Date of Birth:  Alethia Shone 29 y o  1985  Date of Treatment Plan: June 9, 2022  Diagnosis/Diagnoses:    1  SARA (generalized anxiety disorder)    2  Mood disorder [facility])      Strengths/Personal Resources for Self-Care: supportive family, supportive friends  Area/Areas of need (in own words): anxiety symptoms  1  Long Term Goal: improve control of acceptable anxiety level  Target Date: 6 months - 12/9/2022  Person/Persons responsible for completion of goal: Michelle  2  Short Term Objective (s) - How will we reach this goal?:   A  Provider new recommended medication/dosage changes and/or continue medication(s): continue current medications as prescribed  B   N/A  Target Date: 6 months - 12/9/2022  Person/Persons Responsible for Completion of Goal: Michelle  Progress Towards Goals: starting treatment  Treatment Modality: medication education at every visit  Review due 6 months from date of this plan: 6 months - 12/9/2022  Expected length of service: ongoing treatment unless revised  My Physician/PA/NP and I have developed this plan together and I agree to work on the goals and objectives  I understand the treatment goals that were developed for my treatment

## 2022-06-17 ENCOUNTER — TELEPHONE (OUTPATIENT)
Dept: NEPHROLOGY | Facility: CLINIC | Age: 37
End: 2022-06-17

## 2022-06-17 DIAGNOSIS — I10 ESSENTIAL HYPERTENSION: ICD-10-CM

## 2022-06-17 RX ORDER — NIFEDIPINE 30 MG/1
30 TABLET, EXTENDED RELEASE ORAL 2 TIMES DAILY
Qty: 180 TABLET | Refills: 3 | Status: SHIPPED | OUTPATIENT
Start: 2022-06-17

## 2022-06-17 NOTE — TELEPHONE ENCOUNTER
Medication Refills   Person Calling In  Name if not patient Patient    Medication name  Nifedipine    Medication Dosage  Medication Frequency 30 mg 1 tab 2x a day    [facility]    Additional Information

## 2022-06-22 ENCOUNTER — APPOINTMENT (OUTPATIENT)
Dept: LAB | Facility: HOSPITAL | Age: 37
End: 2022-06-22
Payer: COMMERCIAL

## 2022-06-22 DIAGNOSIS — I10 ESSENTIAL HYPERTENSION: ICD-10-CM

## 2022-06-22 LAB
ANION GAP SERPL CALCULATED.3IONS-SCNC: 11 MMOL/L (ref 4–13)
BUN SERPL-MCNC: 16 MG/DL (ref 5–25)
CALCIUM SERPL-MCNC: 9.2 MG/DL (ref 8.4–10.2)
CHLORIDE SERPL-SCNC: 103 MMOL/L (ref 96–108)
CO2 SERPL-SCNC: 25 MMOL/L (ref 21–32)
CREAT SERPL-MCNC: 0.81 MG/DL (ref 0.6–1.3)
CREAT UR-MCNC: 176 MG/DL
GFR SERPL CREATININE-BSD FRML MDRD: 94 ML/MIN/1.73SQ M
GLUCOSE P FAST SERPL-MCNC: 89 MG/DL (ref 65–99)
MICROALBUMIN UR-MCNC: 20.1 MG/L (ref 0–20)
MICROALBUMIN/CREAT 24H UR: 11 MG/G CREATININE (ref 0–30)
POTASSIUM SERPL-SCNC: 3.9 MMOL/L (ref 3.5–5.3)
SODIUM SERPL-SCNC: 139 MMOL/L (ref 135–147)

## 2022-06-22 PROCEDURE — 82570 ASSAY OF URINE CREATININE: CPT

## 2022-06-22 PROCEDURE — 82043 UR ALBUMIN QUANTITATIVE: CPT

## 2022-06-22 PROCEDURE — 36415 COLL VENOUS BLD VENIPUNCTURE: CPT

## 2022-06-22 PROCEDURE — 80048 BASIC METABOLIC PNL TOTAL CA: CPT

## 2022-07-07 ENCOUNTER — TELEPHONE (OUTPATIENT)
Dept: PERINATAL CARE | Facility: CLINIC | Age: 37
End: 2022-07-07

## 2022-07-07 ENCOUNTER — OFFICE VISIT (OUTPATIENT)
Dept: PSYCHIATRY | Facility: CLINIC | Age: 37
End: 2022-07-07
Payer: COMMERCIAL

## 2022-07-07 DIAGNOSIS — F41.1 GAD (GENERALIZED ANXIETY DISORDER): Primary | ICD-10-CM

## 2022-07-07 PROCEDURE — 99214 OFFICE O/P EST MOD 30 MIN: CPT | Performed by: NURSE PRACTITIONER

## 2022-07-07 RX ORDER — SERTRALINE HYDROCHLORIDE 25 MG/1
TABLET, FILM COATED ORAL
Qty: 15 TABLET | Refills: 1 | Status: SHIPPED | OUTPATIENT
Start: 2022-07-07 | End: 2022-07-13 | Stop reason: ALTCHOICE

## 2022-07-07 NOTE — PSYCH
PROGRESS NOTE        [facility]      Name and Date of Birth:  Delores Marquez 29 y o  1985    Date of Visit: 07/07/22    SUBJECTIVE:  Patient is a 43-year-old female has a history of generalized anxiety disorder  No depressive disorder most recently, she found out that she is pregnant  I did make a referral to maternal fetal medicine but that will need to come from her OB if needed  In the meantime, I did contact her OB if we could have the patient take Zoloft 12 5 mg daily verses buspirone  Buspirone at 5 mg t i d  was not working for the patient so we would have to increase the dose  Will see what her OB says  I did provide her with a prescription for the Zoloft in the event that she is able to take  Follow-up with me will be every 4-6 weeks  Also discussed the need for individual therapy  Patient agrees if she is unable to secure a therapist, will search for 1 through the system for her  She denies suicidal ideation, intent or plan at present, has no suicidal ideation, intent or plan at present  She denies any auditory hallucinations and visual hallucinations, denies any other delusional thinking, denies any delusional thinking  She denies any side effects from medications    HPI ROS Appetite Changes and Sleep: normal appetite, normal sleep    Review Of Systems:      Constitutional Negative   ENT Negative   Cardiovascular Negative   Respiratory As Noted in HPI   Gastrointestinal Negative   Genitourinary Negative   Musculoskeletal Negative   Integumentary Negative   Neurological Negative   Endocrine Negative   Other Symptoms Negative and None       Laboratory Results: No results found for this or any previous visit      Substance Abuse History:    Social History     Substance and Sexual Activity   Drug Use No       Family Psychiatric History:     Family History   Problem Relation Age of Onset    Hypertension Mother         URINARY INCONTINENCE    Hyperlipidemia Mother     Diverticulitis Mother     Hypothyroidism Mother     Hepatitis Father         A    Diverticulitis Maternal Grandmother     Colon cancer Maternal Grandmother     Stroke Maternal Grandfather         CVA    Heart disease Maternal Grandfather     Diverticulitis Maternal Grandfather     Hypertension Maternal Grandfather     Diabetes Paternal Grandfather         INSULIN DEPENDENT    No Known Problems Sister     No Known Problems Brother     No Known Problems Daughter        The following portions of the patient's history were reviewed and updated as appropriate: past family history, past medical history, past social history, past surgical history and problem list     Social History     Socioeconomic History    Marital status: /Civil Union     Spouse name: Not on file    Number of children: Not on file    Years of education: Not on file    Highest education level: Not on file   Occupational History    Not on file   Tobacco Use    Smoking status: Never Smoker    Smokeless tobacco: Never Used   Vaping Use    Vaping Use: Never used   Substance and Sexual Activity    Alcohol use: Yes     Alcohol/week: 5 0 standard drinks     Types: 5 Glasses of wine per week    Drug use: No    Sexual activity: Yes     Partners: Male     Birth control/protection: I U D       Comment: Mirena   Other Topics Concern    Not on file   Social History Narrative    Always uses a seatbelt    Drinks Coffee - 2 cups a day    IUD Contraception - mirena    Lack of exercise     (per [employer])     Social Determinants of Health     Financial Resource Strain: Not on file   Food Insecurity: Not on file   Transportation Needs: Not on file   Physical Activity: Not on file   Stress: Not on file   Social Connections: Not on file   Intimate Partner Violence: Not on file   Housing Stability: Not on file     Social History     Social History Narrative    Always uses a seatbelt    Drinks Coffee - 2 cups a day    IUD Contraception - mirena    Lack of exercise     (per [employer])        Social History     Tobacco History     Smoking Status  Never Smoker    Smokeless Tobacco Use  Never Used          Alcohol History     Alcohol Use Status  Yes Drinks/Week  5 Glasses of wine, 0 Cans of beer, 0 Shots of liquor, 0 Standard drinks or equivalent per week Amount  5 0 standard drinks of alcohol/wk          Drug Use     Drug Use Status  No          Sexual Activity     Sexually Active  Yes Partners  Male Birth Control/Protection  I U D  Comment  Mirena          Activities of Daily Living    Not Asked                     OBJECTIVE:     Mental Status Evaluation:    Appearance age appropriate, casually dressed   Behavior pleasant, cooperative   Speech normal volume, normal pitch   Mood Anxiety persists   Affect brighter   Thought Processes logical   Associations intact associations   Thought Content normal   Perceptual Disturbances: none   Abnormal Thoughts  Risk Potential Suicidal ideation - None  Homicidal ideation - None  Potential for aggression - Yes   Orientation oriented to person, place, time/date and situation   Memory recent and remote memory grossly intact   Cosciousness alert and awake   Attention Span attention span and concentration are age appropriate   Intellect Appears to be of Average Intelligence   Insight age appropriate and improved   Judgement good and improved   Muscle Strength and  Gait muscle strength and tone were normal   Language no difficulty naming common objects   Fund of Knowledge displays adequate knowledge of current events   Pain none   Pain Scale 2       Assessment/Plan:       Diagnoses and all orders for this visit:    SAAR (generalized anxiety disorder)  -     Ambulatory Referral to Maternal Fetal Medicine;  Future  -     sertraline (Zoloft) 25 mg tablet; 1/2 tab Daily          Treatment Recommendations/Precautions:    Continue current medications:  If okay with OB, start Zoloft 12 5 mg daily verses buspirone increase  Follow-up in 4-6 weeks  Patient to seek out individual therapy    Risks/Benefits      Risks, Benefits And Possible Side Effects Of Medications:    Risks, benefits, and possible side effects of medications explained to patient and patient verbalizes understanding and agreement for treatment      Controlled Medication Discussion:     Not applicable    Psychotherapy Provided:     Individual psychotherapy provided: No

## 2022-07-07 NOTE — TELEPHONE ENCOUNTER
Blanche Santana called - she entered a referral into the system and had a question about what anxiety medications pt could take  Pt has just found out she is pregnant  I told Keke All that we would ordinarily see pt based on referral from OB  Pt does have current OB care, advised Keke All any questions related to meds should be addressed to OB  Keke All will contact them to confirm

## 2022-07-11 DIAGNOSIS — F41.1 GAD (GENERALIZED ANXIETY DISORDER): Primary | ICD-10-CM

## 2022-07-11 RX ORDER — BUSPIRONE HYDROCHLORIDE 15 MG/1
15 TABLET ORAL 2 TIMES DAILY
Qty: 60 TABLET | Refills: 2 | Status: SHIPPED | OUTPATIENT
Start: 2022-07-11 | End: 2022-10-24 | Stop reason: SDUPTHER

## 2022-07-12 ENCOUNTER — TELEPHONE (OUTPATIENT)
Dept: PSYCHIATRY | Facility: CLINIC | Age: 37
End: 2022-07-12

## 2022-07-12 NOTE — TELEPHONE ENCOUNTER
Behavorial Health Outpatient Intake Questions    Referred by: Carlos Patton       Please advised interviewee that they need to answer all questions truthfully to allow for best care and any misrepresentations of information may affect their ability to be seen at this clinic   => Was this discussed? Yes     Behavorial Health Outpatient Intake History -     Presenting Problem (in patient's words): anxiety, patient is pregnant and is very anxious     Are there any developmental disabilities? ? If yes, can they speak to you on the phone? If they are too limited to speak to you on phone, refer out No    Are you taking any psychiatric medications? Yes    => If yes, who prescribes? If yes, are they injectable medications? buspar = brittany Thurston   Does the patient have a language barrier or hearing impairment? No    Have you been treated at [address] [place] [address] by a therapist or a doctor in the past? If yes, who? Yes  Carlosradha Patton     Has the patient been hospitalized for mental health? No   If yes, how long ago was last hospitalization and where was it? Do you actively use alcohol or marijuana or illegal substances? If yes, what and how much - refer out to Drug and alcohol treatment if use is excessive or daily use of illegal substances No concerns of substance abuse are reported  Do you have a community treatment team or ? No    Legal History-     Does the patient have any history of arrests, nursing home/shelter time, or DUIs? No  If Yes-  1) What types of charges? 2) When were they last incarcerated? 3) Are they currently on parole or probation? Minor Child-    Who has custody of the child? Is there a custody agreement? If there is a custody agreement remind parent that they must bring a copy to the first appt or they will not be seen       Intake Team, please check with provider before scheduling if flags come up such as:  - complex case  - legal history (other than DUI)  - communication barrier concerns are present  - if, in your judgment, this needs further review    ACCEPTED as a patient Yes  => Appointment Date: 07/27/2022 @ [address]     Referred Elsewhere? No    Name of Madhuri Segundo Reshma #[id number]    Insurance Phone #  If ins is primary or secondary [employer]   ID # [id number]  If patient is a minor, parents information such as Name, D  O B of guarantor  normal...

## 2022-07-13 ENCOUNTER — OFFICE VISIT (OUTPATIENT)
Dept: NEPHROLOGY | Facility: CLINIC | Age: 37
End: 2022-07-13
Payer: COMMERCIAL

## 2022-07-13 VITALS
HEART RATE: 89 BPM | DIASTOLIC BLOOD PRESSURE: 78 MMHG | SYSTOLIC BLOOD PRESSURE: 110 MMHG | HEIGHT: 63 IN | OXYGEN SATURATION: 94 % | BODY MASS INDEX: 24.8 KG/M2 | WEIGHT: 140 LBS

## 2022-07-13 DIAGNOSIS — I10 ESSENTIAL HYPERTENSION: Primary | ICD-10-CM

## 2022-07-13 PROCEDURE — 99213 OFFICE O/P EST LOW 20 MIN: CPT | Performed by: INTERNAL MEDICINE

## 2022-07-13 NOTE — PROGRESS NOTES
NEPHROLOGY OUTPATIENT PROGRESS NOTE   Annie Wolfe 29 y o  female MRN: [id number]  DATE: 7/13/2022  Reason for visit:   Chief Complaint   Patient presents with    Follow-up    Hypertension     ASSESSMENT and PLAN:  baseline creatinine 0 6 to 0 8  -creatinine 0 8 in  June 2022  stable at baseline  -UA bland without hematuria or proteinuria in December 2020  Most recent urine microalbumin/creatinine ratio nonsignificant in June 2022    -avoid nephrotoxins or NSAIDs   Continue to closely monitor renal function   -she has history of DEANA with peak creatinine 2 4 in 2015 when she had postpartum hemorrhage and required ex lap       Hypertension  -24 hour ABPM readings in March 2020:  24 hour BP average 139/92  Daytime BP average 143/95  Nighttime BP average 127/84  MAP night time dipping 9 65%  -since last visit remains off Aldactone as she is two weeks pregnant currently  (was on Aldactone prior for acne by dermatology)  -nifedipine was increased up to 30 mg b i d  -home BP readings are much better controlled 110s to 120s/70s to low 80s  -BP well controlled in the office today    -workup include serum aldosterone 11 7 (prior 4 2, 43 9), PRA 3 71 (prior 2 03, 0 2)  -prior CT scan shows unremarkable adrenal glands    -plasma metanephrine/catecholamine unremarkable   -she claims to be more compliant with low-salt diet although still may have hidden salt intake while eating frequently outside due to work schedule     -serum a m  cortisol 15 6    Diagnoses and all orders for this visit:    Essential hypertension  -     Basic metabolic panel; Future  -     Microalbumin / creatinine urine ratio; Future  -     CBC; Future          SUBJECTIVE / HPI:  Michelle Gonzales is a 36 y o  female without significant medical issues except component of anxiety , prior history of ex lap due to small bowel obstruction, also history of postpartum hemorrhage requiring blood transfusion in 2015 who presents for regular follow-up of hypertension  since last visit, Aldactone was discontinued as patient is now two weeks pregnant  Also nifedipine XL was increased with overall much improved BP control  Home BP readings are better controlled as mentioned above  She does have some constipation issues but manageable with Colace  Denies any leg edema  No recent NSAID exposure  Denies any urinary complaint      History of isolated rheumatoid factor positive, she was evaluated by Rheumatology and No further intervention was done in the past   She also has history of postpartum hemorrhage although denies having any preeclampsia or eclampsia issues during pregnancy in 2015      Family history includes both parents having hypertension controlled with single agent diagnosed in their 45s  Also her brother having hypertension in his early 35s  No obvious family history of CVA  REVIEW OF SYSTEMS:  More than 10 point review of systems were obtained and discussed in length with the patient  Complete review of systems were negative / unremarkable except mentioned above  PHYSICAL EXAM:  Vitals:    07/13/22 1253   BP: 110/78   BP Location: Left arm   Patient Position: Sitting   Cuff Size: Adult   Pulse: 89   SpO2: 94%   Weight: 63 5 kg (140 lb)   Height: 5' 3" (1 6 m)     Body mass index is 24 8 kg/m²  Physical Exam  Vitals reviewed  Constitutional:       Appearance: She is well-developed  HENT:      Head: Normocephalic and atraumatic  Right Ear: External ear normal       Left Ear: External ear normal    Eyes:      Conjunctiva/sclera: Conjunctivae normal    Cardiovascular:      Comments: No significant edema in legs, S1, S2 present  Pulmonary:      Effort: Pulmonary effort is normal       Breath sounds: Normal breath sounds  No wheezing or rales  Abdominal:      General: Bowel sounds are normal       Palpations: Abdomen is soft  Tenderness: There is no abdominal tenderness  Musculoskeletal:         General: No deformity        Right lower leg: No edema  Left lower leg: No edema  Lymphadenopathy:      Cervical: No cervical adenopathy  Skin:     Findings: No rash  Neurological:      Mental Status: She is alert and oriented to person, place, and time     Psychiatric:         Behavior: Behavior normal          PAST MEDICAL HISTORY:  Past Medical History:   Diagnosis Date    Acute renal failure ([address] )     [id number] RESOLVED    Acute tubular necrosis (HCC)     Anemia     during the pregnancy     Bowel obstruction ([address] ) 2017    Chronic kidney disease     Fibroadenoma of breast, left     Fibroadenoma of breast, right      1 para 1     History of transfusion 2015    After delivery     Hypertension     no medication     Kidney stone     Postpartum hemorrhage     UNSPECIFIED TYPE  RESOLVED     Urinary tract infection     yes in the last ten years    Vacuum extractor delivery, delivered      daughter with postpartum hemorrhage and DIC    Varicella        PAST SURGICAL HISTORY:  Past Surgical History:   Procedure Laterality Date    BREAST FIBROADENOMA SURGERY      Cryosurgical Ablation of Fibroadenoma    BREAST LUMPECTOMY Left     BREAST LUMPECTOMY Right     EXPLORATORY LAPAROTOMY W/ BOWEL RESECTION N/A 2017    Procedure: LAPAROTOMY EXPLORATORY W/ lysis of adhesions;  Surgeon: Andrew Freitas DO;  [facility];  Service:     INTRAUTERINE DEVICE INSERTION      LAPAROTOMY      EXPLORATORY for DIC and hemorrhage postpartum retroperitoneal hematoma    MD  DELIVERY ONLY N/A 2020    Procedure:  SECTION () REPEAT;  Surgeon: Celestina Carlson MD;  [facility];  Service: Obstetrics    TOOTH EXTRACTION      Impression: [id number] Mariella Thurman  2004       SOCIAL HISTORY:  Social History     Substance and Sexual Activity   Alcohol Use Yes    Alcohol/week: 5 0 standard drinks    Types: 5 Glasses of wine per week     Social History     Substance and Sexual Activity   Drug Use No     Social History     Tobacco Use   Smoking Status Never Smoker   Smokeless Tobacco Never Used       FAMILY HISTORY:  Family History   Problem Relation Age of Onset    Hypertension Mother         URINARY INCONTINENCE    Hyperlipidemia Mother     Diverticulitis Mother     Hypothyroidism Mother     Hepatitis Father         A    Diverticulitis Maternal Grandmother     Colon cancer Maternal Grandmother     Stroke Maternal Grandfather         CVA    Heart disease Maternal Grandfather     Diverticulitis Maternal Grandfather     Hypertension Maternal Grandfather     Diabetes Paternal Grandfather         INSULIN DEPENDENT    No Known Problems Sister     No Known Problems Brother     No Known Problems Daughter        MEDICATIONS:    Current Outpatient Medications:     busPIRone (BUSPAR) 15 mg tablet, Take 1 tablet (15 mg total) by mouth 2 (two) times a day, Disp: 60 tablet, Rfl: 2    clindamycin (CLEOCIN T) 1 % lotion, , Disp: , Rfl:     Docusate Sodium (COLACE PO), Take by mouth as needed  , Disp: , Rfl:     NIFEdipine (PROCARDIA XL) 30 mg 24 hr tablet, Take 1 tablet (30 mg total) by mouth 2 (two) times a day, Disp: 180 tablet, Rfl: 3    Prenatal MV-Min-Fe Fum-FA-DHA (PRENATAL 1 PO), Take by mouth, Disp: , Rfl:     Lab Results:   Results for orders placed or performed in visit on 38/17/30   Basic metabolic panel   Result Value Ref Range    Sodium 139 135 - 147 mmol/L    Potassium 3 9 3 5 - 5 3 mmol/L    Chloride 103 96 - 108 mmol/L    CO2 25 21 - 32 mmol/L    ANION GAP 11 4 - 13 mmol/L    BUN 16 5 - 25 mg/dL    Creatinine 0 81 0 60 - 1 30 mg/dL    Glucose, Fasting 89 65 - 99 mg/dL    Calcium 9 2 8 4 - 10 2 mg/dL    eGFR 94 ml/min/1 73sq m   Microalbumin / creatinine urine ratio   Result Value Ref Range    Creatinine, Ur 176 0 mg/dL    Microalbum  ,U,Random 20 1 (H) 0 0 - 20 0 mg/L    Microalb Creat Ratio 11 0 - 30 mg/g creatinine

## 2022-07-26 ENCOUNTER — TELEPHONE (OUTPATIENT)
Dept: PSYCHIATRY | Facility: CLINIC | Age: 37
End: 2022-07-26

## 2022-07-26 NOTE — TELEPHONE ENCOUNTER
Pt called to get her appt for 7/27/22 with Cathryn Godinez switched to a virtual visit due to testing positive for covid  Writer switched the appt, pt has [facility] active  Thank you

## 2022-07-27 ENCOUNTER — TELEMEDICINE (OUTPATIENT)
Dept: BEHAVIORAL/MENTAL HEALTH CLINIC | Facility: CLINIC | Age: 37
End: 2022-07-27
Payer: COMMERCIAL

## 2022-07-27 DIAGNOSIS — F41.1 GAD (GENERALIZED ANXIETY DISORDER): Primary | ICD-10-CM

## 2022-07-27 PROCEDURE — 90791 PSYCH DIAGNOSTIC EVALUATION: CPT | Performed by: COUNSELOR

## 2022-07-27 NOTE — PSYCH
Virtual AWV Consent    Verification of patient location:    Patient is located in the following state in which I hold an active license [place]    Reason for visit is therapy follow up    Encounter provider Jose E Reid    Provider located at [address] [place] [address]  [address] [facility] [id number] [phone]      Recent Visits  Date Type Provider Dept   07/26/22 Telephone Jyotsna Isabelle, [address] recent visits within past 7 days and meeting all other requirements  Future Appointments  No visits were found meeting these conditions  Showing future appointments within next 150 days and meeting all other requirements       After connecting through [employer], the patient was identified by name and date of birth  Sejal Robb was informed that this is a telemedicine visit and that the visit is being conducted through [address] [place] [address] Road [facility] and patient was informed that this is a secure, HIPAA-compliant platform  She agrees to proceed  My office door was closed  No one else was in the room  She acknowledged consent and understanding of privacy and security of the video platform  Sejal Robb verbally agrees to participate in [employer]  Pt is aware that [employer] could be limited without vital signs or the ability to perform a full hands-on physical exam  Michelle Scott understands she or the provider may request at any time to terminate the video visit and request the patient to seek care or treatment in person  Patient is aware this is a billable service  Assessment/Plan:      There are no diagnoses linked to this encounter  Subjective:      Patient ID: Sejal Robb is a 29 y o  female  HPI:     Pre-morbid level of function and History of Present Illness: Ibrahima Quevedo is a 29year old female presenting in treatment due to anxiety and mood management   She has a history of trying couples' counseling and processing her divorce in her late 25s  She kept seeing that therapist and has started seeing a couples' counselor more recently  She reports that she has a lot of stress and there is some mood dysregulation  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  Her  has a full-time job  Previous Psychiatric/psychological treatment/year: Pratima Espinal has been to therapy and under medication management care for sometime  Current Psychiatrist/Therapist: Pratima Espinal is prescribed medication by her PCP  Outpatient and/or Partial and Other [employer] Used (CTT, ICM, VNA): No other community levels of care have been utilized  Problem Assessment:     SOCIAL/VOCATION:  Family Constellation (include parents, relationship with each and pertinent Psych/Medical History):     Family History   Problem Relation Age of Onset    Hypertension Mother         URINARY INCONTINENCE    Hyperlipidemia Mother     Diverticulitis Mother     Hypothyroidism Mother     Hepatitis Father         A    Diverticulitis Maternal Grandmother     Colon cancer Maternal Grandmother     Stroke Maternal Grandfather         CVA    Heart disease Maternal Grandfather     Diverticulitis Maternal Grandfather     Hypertension Maternal Grandfather     Diabetes Paternal Grandfather         INSULIN DEPENDENT    No Known Problems Sister     No Known Problems Brother     No Known Problems Daughter        Mother: Live about 1 5 hours away  Spouse: Has a good relationship with her   Father: Pratima Espinal has the support of her family  Children: Pratima Espinal has two children; Lesterroxi Nunez age 9 and [de-identified], 25 months (CHARGE syndrom)   Sibling: Has a sister who is supportive  Sibling: NA   Children: NA   Other: NA    Michelle relates best to her  and some friends  she lives with her  and two daughters  she does not live alone       Domestic Violence: No past history of domestic violence    Additional Comments related to family/relationships/peer support: Sergey Alegria   School or Work History (strengths/limitations/needs): PA for [employer]    Her highest grade level achieved was: Masters Degree     history includes: NA    Financial status includes: NA    LEISURE ASSESSMENT (Include past and present hobbies/interests and level of involvement (Ex: Group/Club Affiliations): Sergey Alegria reports that she struggles to realize what she enjoys  She would like to work on that in session  her primary language is [place]  Preferred language is [place]  Ethnic considerations are NA  Religions affiliations and level of involvement NA   Does spirituality help you cope? No    FUNCTIONAL STATUS: There has been a recent change in Michelle ability to do the following: does not need can service    Level of Assistance Needed/By Whom?: NA    Michelle learns best by  reading, listening and demonstration    SUBSTANCE ABUSE ASSESSMENT: no substance abuse    Substance/Route/Age/Amount/Frequency/Last Use: NA    DETOX HISTORY: NA    Previous detox/rehab treatment: NA    HEALTH ASSESSMENT: She is pregnant    LEGAL: No Mental Health Advance Directive or Power of  on file    Prenatal History: N/A    Delivery History: N/A    Developmental Milestones: N/A  Temperament as an infant was N/A  Temperament as a toddler was N/A  Temperament at school age was N/A  Temperament as a teenager was N/A      Risk Assessment:   The following ratings are based on my N/A    Risk of Harm to Self:   Demographic risk factors include   Historical Risk Factors include NA  Recent Specific Risk Factors include diagnosis of depression   Additional Factors for a Child or Adolescent gender: female (more likely to attempt)    Risk of Harm to Others:   Demographic Risk Factors include NA  Historical Risk Factors include NA  Recent Specific Risk Factors include identified victim     Access to Weapons:   Keila  has access to the following weapons: Denies  The following steps have been taken to ensure weapons are properly secured: Denies    Based on the above information, the client presents the following risk of harm to self or others:  low    The following interventions are recommended:   no intervention changes    Notes regarding this Risk Assessment: Patricia Ritter denies any thoughts or behaviors aimed at harming herself or others  She denies intent or plans to harm self or others  She does not present as an imminent danger to self or others at this time  She will be continuously assessed          Review Of Systems:     Mood Normal   Behavior Normal    Thought Content Normal   General Relationship Problems   Personality Normal   Other Psych Symptoms Normal   Constitutional As Noted in HPI   ENT As Noted in HPI   Cardiovascular As Noted in HPI   Respiratory As Noted in HPI   Gastrointestinal Normal   Genitourinary Normal    Musculoskeletal As Noted in HPI   Integumentary Normal  and As Noted in HPI   Neurological Normal    Endocrine Normal          Mental status:  Appearance calm and cooperative  and adequate hygiene and grooming   Mood mood appropriate   Affect affect appropriate    Speech a normal rate   Thought Processes coherent/organized and normal thought processes   Hallucinations no hallucinations present    Thought Content no delusions   Abnormal Thoughts no suicidal thoughts  and no homicidal thoughts    Orientation  oriented to person, oriented to place and oriented to time   Remote Memory short term memory intact   Attention Span concentration intact   Intellect Appears to be of Average Intelligence   Fund of Knowledge displays adequate knowledge of current events, adequate fund of knowledge regarding past history and adequate fund of knowledge regarding vocabulary    Insight [employer]ight intact   Judgement judgment was intact   Muscle Strength Muscle strength and tone were normal and Normal gait    Language no difficulty naming common objects   Pain none   Pain Scale 0

## 2022-08-01 ENCOUNTER — ULTRASOUND (OUTPATIENT)
Dept: OBGYN CLINIC | Facility: MEDICAL CENTER | Age: 37
End: 2022-08-01
Payer: COMMERCIAL

## 2022-08-01 VITALS
BODY MASS INDEX: 24.88 KG/M2 | DIASTOLIC BLOOD PRESSURE: 86 MMHG | WEIGHT: 140.4 LBS | HEIGHT: 63 IN | SYSTOLIC BLOOD PRESSURE: 126 MMHG

## 2022-08-01 DIAGNOSIS — O36.80X1 ENCOUNTER TO DETERMINE FETAL VIABILITY OF PREGNANCY, FETUS 1: Primary | ICD-10-CM

## 2022-08-01 PROCEDURE — 76817 TRANSVAGINAL US OBSTETRIC: CPT | Performed by: STUDENT IN AN ORGANIZED HEALTH CARE EDUCATION/TRAINING PROGRAM

## 2022-08-01 NOTE — PROGRESS NOTES
Single viable DDO=894 BPM  CRL=20 1mm=8w4d    EDC=3/9/2023    UT=94 x 64 x 76 mm  RT OV=26 x 16 x 22 mm  LT OV=26 x 15 x 23 mm

## 2022-08-04 ENCOUNTER — TELEPHONE (OUTPATIENT)
Dept: OBGYN CLINIC | Facility: CLINIC | Age: 37
End: 2022-08-04

## 2022-08-04 DIAGNOSIS — Z34.81 PRENATAL CARE, SUBSEQUENT PREGNANCY, FIRST TRIMESTER: Primary | ICD-10-CM

## 2022-08-09 ENCOUNTER — INITIAL PRENATAL (OUTPATIENT)
Dept: OBGYN CLINIC | Facility: CLINIC | Age: 37
End: 2022-08-09

## 2022-08-09 ENCOUNTER — SOCIAL WORK (OUTPATIENT)
Dept: BEHAVIORAL/MENTAL HEALTH CLINIC | Facility: CLINIC | Age: 37
End: 2022-08-09
Payer: COMMERCIAL

## 2022-08-09 DIAGNOSIS — Z34.81 PRENATAL CARE, SUBSEQUENT PREGNANCY, FIRST TRIMESTER: Primary | ICD-10-CM

## 2022-08-09 DIAGNOSIS — F41.9 ANXIETY: Primary | ICD-10-CM

## 2022-08-09 PROCEDURE — OBC: Performed by: OBSTETRICS & GYNECOLOGY

## 2022-08-09 PROCEDURE — 90834 PSYTX W PT 45 MINUTES: CPT | Performed by: COUNSELOR

## 2022-08-09 RX ORDER — LANOLIN ALCOHOL/MO/W.PET/CERES
50 CREAM (GRAM) TOPICAL DAILY
COMMUNITY

## 2022-08-09 NOTE — PROGRESS NOTES
OB INTAKE INTERVIEW  Patient is 29 y o    who presents for OB intake at 9-5 wks  She is accompanied by: phone interview  The father of her baby Sarmad Mistry) is involved in the pregnancy and they are     Last Menstrual Period: 2022  Ultrasound: Measured 8 weeks 4 days on 2022  Estimated Date of Delivery: 3/9/2023 via US     Signs/Symptoms of Pregnancy  Current pregnancy symptoms: nausea  Constipation no  Headaches no  Cramping/spotting no  PICA cravings no    Diabetes-    If patient has 1 or more, please order early 1 hour GTT  History of GDM no  BMI >35 no  History of PCOS or current metformin use no  History of LGA/macrosomic infant (4000g/9lbs) no    If patient has 2 or more, please order early 1 hour GTT  BMI>30 no  AMA YES  First degree relative with type 2 diabetes no  History of chronic HTN, hyperlipidemia, elevated A1C yes  High risk race (, , ,  or ) no    Hypertension- if you answer yes, please order preeclampsia labs (cbc, comprehensive metabolic panel, urine protein creatinine ratio, uric acid)  History of of chronic HTN yes  History of gestational HTN no  History of preeclampsia, eclampsia, or HELLP syndrome no  History of diabetes no  History of lupus, autoimmune disease, kidney disease no    Thyroid- if yes order TSH with reflex T4  History of thyroid disease no    Bleeding Disorder or Hx of DVT-patient or first degree relative with history of  Order the following if not done previously     (Factor V, antithrombin III, prothrombin gene mutation, protein C and S Ag, lupus anticoagulant, anticardiolipin, beta-2 glycoprotein)   no    OB/GYN-  History of abnormal pap smear no  History of HPV no  History of Herpes/HSV no  History of other STI (gonorrhea, chlamydia, trich) no  History of prior  YES  History of prior  YES  History of  delivery prior to 36 weeks 6 days no  History of blood transfusion YES  Ok for blood transfusion yes    Substance screening- if yes outside of tobacco for her or anyone in her home-order urine drug screen  History of tobacco use no  Currently using tobacco no  Currently using alcohol no  Presently using drugs no  Past drug use  no  IV drug use-If yes add Hep C antibody to labs no  Partner drug use no  Parent/Family drug use no    MRSA Screening-   Does the pt have a hx of MRSA? no  If yes- please follow MRSA protocol and obtain a nasal swab for MRSA culture    Immunizations:  Influenza vaccine given this season yes  Discussed Tdap vaccine yes  Discussed COVID Vaccine yes    Genetic/MFM-  Do you or your partner have a history of any of the following in yourselves or first degree relatives? Cystic fibrosis no  Spinal muscular atrophy no  Hemoglobinopathy/Sickle Cell/Thalassemia no  Fragile X Intellectual Disability no    If yes, discuss carrier screening and recommend consultation with [facility]/genetic counseling  If no, discuss option for carrier screening and/or genetic testing with Nuchal Ul[facility]  Patient interested yes  Appointment at [facility] made yes    Interview education  [facility] Pregnancy Essentials Book reviewed and discussed yes    Nurse/Family Partnership- patient may qualify no; referral placed no    Prenatal lab work scripts yes  Extra labs ordered:  One hour glucola, HTN bldwk    The patient has a history now or in prior pregnancy notable for:    Hx of VAVD, -   Hx of PP hemorrhage, Laparatomy, bld transfusion, DIC syndrome,  Hx of C/S,  Hx of HTN, but BP is wnl during both pregnancies, also with this pregnancy  , AMA,  BMI 24 8, 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 , [address]  Hx of depression- takes buspar 15 mg daily  Details that I feel the provider should be aware of: Pt received both Flu & Covid vaccines & booster  PN1 visit scheduled  The patient was oriented to our practice, reviewed delivering physicians and [facility] for Delivery   All questions were answered  PN phone interview completed  Pt happy with pregnancy  PN bldwk ordered in epic, including HTN & one hour glucola  Encouraged pt to have bldwk drawn prior to PN1 appt scheduled for 9/2/2022  Referral entered for [facility]- pt has appts for 8/16/22, & 9/2/22  Advised pt to call with any further questions/concerns        Interviewed by: Bhavani Simpson RN, [address] [place] [address]

## 2022-08-09 NOTE — BH TREATMENT PLAN
Charmayne Poli  1985       Date of Initial Treatment Plan: 8/9/2022   Date of Current Treatment Plan: 08/09/22    Treatment Plan Number 1     Strengths/Personal Resources for Self Care: Dru is very intelligent, highly motivated, and has supportive family and peers  Diagnosis:   No diagnosis found  Area of Needs: Anxiety Management, Mood Management       Long Term Goal 1: Anxiety Management    Target Date: 2/9/2023  Completion Date: N/A         Short Term Objectives for Goal 1: Dru has identified that she feels that she struggles with anxiety and how to cope with her anxious thoughts  Dru will work through CBT protocol in order to address her anxieties, learn the cognitive distortions and work to learning how to cope on a daily basis with her distorted thoughts  Dru is also interested in finding healthy outlets to release stress and frustration  Long Term Goal 2: Mood Management    Target Date: 2/9/2023  Completion Date: N/A    Short Term Objectives for Goal 2: Dru is very busy between work, her family, and their routine  She reports a lack of support with her children and that childcare duties are between she and her   Dru is interested in working through her stressors in order to find time to engage in strategies to help her boost her mood  Dru would like to develop a schedule for behavioral activation that can be easily woven into her hectic lifestyle to boost her mood           Long Term Goal # 3: N/A     Target Date: N/A  Completion Date: N/A    Short Term Objectives for Goal 3: N/A    GOAL 1: Modality: The person(s) responsible for carrying out the plan is  andrew Gonsales therapist     GOAL 2: Modality: The person(s) responsible for carrying out the plan is  andrew Gonsales therapist       GOAL 3: Modality: The person(s) responsible for carrying out the plan is  DUTCH      [facility]: Diagnosis and Treatment Plan explained to Shaneka Gonzales relates understanding diagnosis and is agreeable to Treatment Plan  Client Comments : Please share your thoughts, feelings, need and/or experiences regarding your treatment plan: Melani Adjutant did not report any questions or concerns related to her treatment plan

## 2022-08-10 NOTE — PSYCH
Psychotherapy Provided: Individual Psychotherapy 45 minutes     Length of time in session: 45 minutes, follow up in 2 week    No diagnosis found  Goals addressed in session: Goal 1 ; treatment planning     Pain:      none    0    Current suicide risk : Low     D: Blade Mitchell presented in office for her session  The session was focused on setting goals for therapy  She would like to work on coping strategies and managing her anxiety  Provided a thought log for her for homework  Will continue to meet biweekly  A: Blade Mitchell presented as oriented x3  She denies SI/HI/SIB  Her mood is overall stable but she does present as highly overwhelmed and struggling with getting what she needs for herself  P: Blade Mitchell will continue to meet biweekly with this provider  Behavioral Health Treatment Plan [facility]: Diagnosis and Treatment Plan explained to Josefina Dominguez relates understanding diagnosis and is agreeable to Treatment Plan   Yes

## 2022-08-16 ENCOUNTER — TELEMEDICINE (OUTPATIENT)
Dept: PERINATAL CARE | Facility: CLINIC | Age: 37
End: 2022-08-16

## 2022-08-16 DIAGNOSIS — O09.529 ANTEPARTUM MULTIGRAVIDA OF ADVANCED MATERNAL AGE: Primary | ICD-10-CM

## 2022-08-16 DIAGNOSIS — Z81.8 FAMILY HISTORY OF AUTISM: ICD-10-CM

## 2022-08-16 DIAGNOSIS — O09.299 CURRENT SINGLETON PREGNANCY WITH HISTORY OF CONGENITAL ANOMALY IN PRIOR CHILD, ANTEPARTUM: ICD-10-CM

## 2022-08-16 NOTE — PROGRESS NOTES
Genetic Counseling   High-Risk Gestation Note    Appointment Date:  2022  Referred By: Abraham Ashley MD  YOB: 1985  Partner:  Missy Matthews  Indication for Visit:  advanced maternal age and personal and/or family history of genetic disorder:  CHARGE syndrome  Pregnancy History:   Estimated Date of Delivery: 3/9/23  Estimated Gestational Age: 7w10d      Virtual Regular Visit    Verification of patient location:    Patient is located in the following state in which I hold an active license [place]      Assessment/Plan:    Problem List Items Addressed This Visit    None     Visit Diagnoses     Antepartum multigravida of advanced maternal age    -  Primary    Current duarte pregnancy with history of congenital anomaly in prior child, antepartum        Family history of autism                   Reason for visit is   Chief Complaint   Patient presents with   • Virtual Regular Visit        Encounter provider Sosa Khan    Provider located at [address] [place] [id number] [phone]      Recent Visits  No visits were found meeting these conditions  Showing recent visits within past 7 days and meeting all other requirements  Future Appointments  No visits were found meeting these conditions  Showing future appointments within next 150 days and meeting all other requirements       The patient was identified by name and date of birth  Lala Spatz was informed that this is a telemedicine visit and that the visit is being conducted through [facility] and patient was informed this is a secure, HIPAA-complaint platform  She agrees to proceed     My office door was closed  No one else was in the room  She acknowledged consent and understanding of privacy and security of the video platform  The patient has agreed to participate and understands they can discontinue the visit at any time  Cecilia Bae is a 29 y o  female who presented with her partner for genetic counseling to discuss a previous child with CHARGE syndrome  CHARGE syndrome was initially described as a set of features that went together before the genetic diagnosis was identified  It stands for Coloboma, Heart defects, choanal Atresia, Restriction of growth and development, Genitourinary malformations and Ear anomalies, though all of these are not seen in every patient  Other features have been reported to include anosmia, distinctive facial features, low tone, vestibular abnormalities affecting balance, renal differences, endocrine differences such as hypogonadotropic hypogonadism/delayed puberty and growth hormone deficiency, feeding differences, hearing loss, orofacial clefts, transesophageal fistula, brain differences, small head size/microcephaly, and limb/bone differences  It is typically caused by pathogenic mutations in the CHD7 gene and follows and autosomal dominant pattern though most cases are de mary grace  The couple has a 21 month old daughter who was diagnosed with CHARGE syndrome postnatally  Michelle's pregnancy was unremarkable with the exception of polyhydramnios developing in the third trimester and fetal unilateral hydronephrosis  After delivery their daughter was noted to have facial asymmetry, abnormal ear cartilage, choanal atresia, respiratory and feeding issues, and patent ductus arterious with small atrial septal defect  She was transferred to [address] [place] [address]) at two weeks old where she had an extensive work-up  Genetic testing was performed and no CHD7 gene mutations were identified  Whole exome sequencing identified a 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 which was not determined to be diagnostic as no other mutations in the gene were found  Epigenetic testing was performed (EpiSign test) which investigates the methylation pattern that can be seen in CHARGE syndrome  As this was positive (and along with the clinical findings) this confirmed a diagnosis of CHARGE syndrome  The couple had extensive genetic counseling after their daughter's diagnosis thus we briefly reviewed that recurrence risk is low, likely less than 1%  We discussed that epigenetic methylation testing may not be available prenatally, however ultrasounds can detect some findings associated with CHARGE syndrome  Histories for the patient and her partner's family were taken during our session  Michelle reports two paternal cousins (of the same sibship) with autism, etiology unknown  We reviewed the general population risk for a diagnosis of autism, which is estimated at approximately 1/  We also reviewed the possible etiologies of autism, including multifactorial and genetic  Autism may be secondary to a chromosomal abnormality or single gene disorder; a genetic cause can now be identified in up to approximately 35-40% of cases  However there are over 800 genes associated with autism that have been identified to date, making a prenatal diagnosis and risk assessment difficult without records on the affected individual   Further review of family history for the patient and her partner was noncontributory  The family history was not significant for other genetic diseases or disorders, intellectual disability, birth defects, fetal loss, or consanguinity  Patient reports both her and her  being of  descent  She denies either of them having known Ashkenazi Manda[place] ancestry  The benefits and limitations of Cystic fibrosis (CF), Spinal muscular atrophy (SMA), hemoglobinopathy, Gene Sequencing for CABP2 (autosomal recessive cause for bilateral sensorineural hearing loss) and expanded carrier screening was discussed  The patient was unsure of pursuing carrier screening at this time and elected to further consider the options    Should she decide to have any of the blood work performed she will contact our office or her OB office  We discussed the patient's age related risk for chromosome abnormalities  The risk of Down syndrome at age 40 at delivery is 1/281, and the risk for any chromosomal abnormality at this age is 4/80  The differences between full chromosome aneuploidies and copy number variants (microdeletions and microduplications) was also discussed  We reviewed that copy number variants occur in about 0 4% of all pregnancies  The risks, benefits, and limitations of amniocentesis were discussed with the patient  Amniocentesis is performed under direct real time ultrasound visualization to avoid both the fetus and the placenta  Once amniotic fluid is withdrawn, laboratory analysis is performed and amniotic fluid alpha-fetoprotein, as well as chromosome and/or microarray analysis is undertaken  The risk of genetic amniocentesis includes, but is not limited to less than 1 in 300 pregnancy loss rate or  delivery rate if 23 weeks or greater, infection, bleeding, rupture of membranes, failure of cells to grow, karyotype error, laboratory error, etc   Occasionally a repeat amniocentesis is necessary due to cell culture failure  Chromosome/microarray analysis from amniocentesis is 99 9% accurate and alpha-fetoprotein analysis can detect approximately 95% of open neural tube defects  Chorionic villus sampling (CVS) is another diagnostic testing option that is available earlier than amniocentesis, between 10-14 weeks gestation  Like amniocentesis, CVS is 99% accurate for detecting chromosomal problems  Unlike amniocentesis, CVS cannot detect alpha-fetoprotein levels in order to determine the risk for open neural tube defects  MSAFP testing would need to be performed at 15-20 weeks gestation for this purpose  The risk of CVS includes, but is not limited to, less than a 1 in 300 risk for pregnancy loss    There is also a 1% risk for maternal cell contamination and cell culture failure, in which case the CVS would need to be followed-up with amniocentesis  We reviewed the testing option of cell free fetal DNA screening (also known as noninvasive prenatal testing or NIPT)  We discussed that it is a serum test to identify fragments of placental DNA in maternal blood  We reviewed the benefits and limitations of cell free fetal DNA screening in detecting Down syndrome, Trisomy 13, Trisomy 25 and sex chromosome aneuploidies  We also discussed that cell free fetal DNA screening does not detect additional chromosomal abnormalities and the possibility of a failed test result  As cell free fetal DNA screening does not detect open neural tube defects, MSAFP screening is available at 15-20 weeks gestation  Sequential screening consists of first trimester measurement of nuchal translucency combined with first trimester biochemical analysis, as well as second trimester biochemical analysis  It is able to detect approximately 95% of pregnancies in which the fetus has Down syndrome, 90% of pregnancies in which the fetus has trisomy 25 and 80% of pregnancies which the fetus has an open neural tube defect  It can also indirectly identify other chromosomal abnormalities, copy number variants, genetic syndromes, or adverse pregnancy outcomes if serum analyte levels are abnormal     We discussed the availability of an ultrasound between 11-14 weeks gestation to measure the nuchal translucency (NT), which can assess for chromosome abnormalities, cardiac defects, and other adverse pregnancy outcomes  We reviewed that level II anatomy ultrasound is typically performed at approximately 20 weeks gestation, but can be done earlier at about 16 weeks gestation  Typically a follow up ultrasound at 20 weeks is then recommended for completion of anatomy    Level II ultrasound evaluation is between 60-80% accurate in detecting major physical birth defects and variations in fetal development that may be associated with chromosome/genetic abnormalities  Level II ultrasound evaluation is not able to detect all birth defects or health problems  After discussing the available prenatal screening and testing options Michelle elected to pursue cell free fetal DNA screening  A nurse visit for [facility] blood draw was scheduled in our [facility] office  She was informed that the results will disclose the fetal sex and will be available in her MyChart to review  Results take approximately 7-10 days  The patient declined CVS and amniocentesis secondary to procedural related complications  She may reconsider diagnostic testing should the cell free fetal DNA screening come back abnormal   Efrain Cooper is also planning on pursuing NT ultrasound, MSAFP screening and Level II ultrasound at the appropriate times  Lastly, we discussed the fact that everyone in the general population regardless of age, family history, or medical background has approximately a 3-5% risk of having a child with some type of congenital anomaly, genetic disease or intellectual disability  Currently there are no tests available to rule out all birth defects or health problems  Efrain Brnancy was provided with our contact information  I encouraged her to call with any questions or concerns  Plan/Tests Ordered:  1) Patient declined CVS, amniocentesis, and Sequential screen  2) Patient elected NIPT - nurse visit for [facility] blood draw scheduled for 8/17/22  3) NT ultrasound scheduled for 9/2/22  4) MSAFP screening at 15-20 weeks gestation - to be ordered by patient OB office  5) Early anatomy ultrasound at approximately 16 weeks gestation  6) Level II anatomy ultrasound at approximately 20 weeks gestation        HPI     Past Medical History:   Diagnosis Date   • Acute renal failure ([address] )     [id number] RESOLVED   • Acute tubular necrosis ([facility])    • Anemia     during the pregnancy    • Bowel obstruction ([address] ) 01/31/2017   • Chronic kidney disease    • Fibroadenoma of breast, left    • Fibroadenoma of breast, right    •  1 para 1    • History of transfusion 2015    After delivery    • Hypertension     no medication    • Kidney stone    • Postpartum hemorrhage     UNSPECIFIED TYPE  RESOLVED    • Urinary tract infection     yes in the last ten years   • Vacuum extractor delivery, delivered      daughter with postpartum hemorrhage and DIC   • Varicella     had childhood chickenpox       Past Surgical History:   Procedure Laterality Date   • BREAST FIBROADENOMA SURGERY      Cryosurgical Ablation of Fibroadenoma   • BREAST LUMPECTOMY Left    • BREAST LUMPECTOMY Right    •  SECTION         • EXPLORATORY LAPAROTOMY W/ BOWEL RESECTION N/A 2017    Procedure: LAPAROTOMY EXPLORATORY W/ lysis of adhesions;  Surgeon: Eliane Montoya DO;  Location: [facility];  Service:    • INTRAUTERINE DEVICE INSERTION     • LAPAROTOMY      EXPLORATORY for DIC and hemorrhage postpartum retroperitoneal hematoma   • MS  DELIVERY ONLY N/A 2020    Procedure:  SECTION () REPEAT;  Surgeon: Russell Whitt MD;  Location: [facility];  Service: Obstetrics   • TOOTH EXTRACTION      Impression: [id number] Susi Cordova         Current Outpatient Medications   Medication Sig Dispense Refill   • busPIRone (BUSPAR) 15 mg tablet Take 1 tablet (15 mg total) by mouth 2 (two) times a day 60 tablet 2   • clindamycin (CLEOCIN T) 1 % lotion  (Patient not taking: Reported on 2022)     • Docusate Sodium (COLACE PO) Take by mouth as needed   (Patient not taking: Reported on 2022)     • NIFEdipine (PROCARDIA XL) 30 mg 24 hr tablet Take 1 tablet (30 mg total) by mouth 2 (two) times a day (Patient not taking: Reported on 2022) 180 tablet 3   • Prenatal MV-Min-Fe Fum-FA-DHA (PRENATAL 1 PO) Take by mouth     • pyridoxine (VITAMIN B6) 50 mg tablet Take 50 mg by mouth daily       No current facility-administered medications for this visit  Allergies   Allergen Reactions   • Nickel        Review of Systems    Video Exam    There were no vitals filed for this visit      Physical Exam     I spent 60 minutes directly with the patient during this visit

## 2022-08-17 ENCOUNTER — CLINICAL SUPPORT (OUTPATIENT)
Dept: PERINATAL CARE | Facility: CLINIC | Age: 37
End: 2022-08-17
Payer: COMMERCIAL

## 2022-08-17 DIAGNOSIS — O09.521 SUPERVISION OF ELDERLY MULTIGRAVIDA IN FIRST TRIMESTER: ICD-10-CM

## 2022-08-17 PROCEDURE — 36415 COLL VENOUS BLD VENIPUNCTURE: CPT | Performed by: OBSTETRICS & GYNECOLOGY

## 2022-08-17 NOTE — PROGRESS NOTES
Patient chose to have [facility] Non-invasive Prenatal Screen  Patient given brochure and is aware [facility] will contact patients insurance and coordinate coverage  Patient made aware she will need to respond to text message or e-mail from [facility] within 2 business days or testing will be run through insurance  Patient informed text message will come from area code  "415"  Provided [employer] # [phone] and web site : [email or web address]     2 vials of blood drawn from Right arm, patient tolerated blood draw without difficulty  Specimens labeled with patient identifiers (name, date of birth, specimen collection date), order and specimen was verified with patient, packed and sent via [address]  Copy of lab order scanned to Epic media  Maternal Fetal Medicine will have results in approximately 7-10 business days and will call patient or notify via [address]  Patient aware viewing lab result online will reveal fetal sex If ordered  Patient verbalized understanding of all instructions and no questions at this time

## 2022-08-18 ENCOUNTER — TELEPHONE (OUTPATIENT)
Dept: OBGYN CLINIC | Facility: CLINIC | Age: 37
End: 2022-08-18

## 2022-08-18 ENCOUNTER — DOCUMENTATION (OUTPATIENT)
Dept: LABOR AND DELIVERY | Facility: HOSPITAL | Age: 37
End: 2022-08-18

## 2022-08-18 DIAGNOSIS — G43.909 MIGRAINE WITHOUT STATUS MIGRAINOSUS, NOT INTRACTABLE, UNSPECIFIED MIGRAINE TYPE: Primary | ICD-10-CM

## 2022-08-18 RX ORDER — METOCLOPRAMIDE 10 MG/1
10 TABLET ORAL 4 TIMES DAILY
Qty: 30 TABLET | Refills: 0 | Status: SHIPPED | OUTPATIENT
Start: 2022-08-18 | End: 2022-09-22

## 2022-08-18 NOTE — TELEPHONE ENCOUNTER
EC response via TT    Magnesium, Tylenol, hydration  If intolerable we can try reglan Rx     She said the magnesium gives her horrible diarrhea  She wants something she can take when one comes on  Will Reglan help do that? Can she do Tylenol every 4 hours or 6 hours? Reglan can also cause diarrhea  She can take 1000mg Tylenol q8     Ok so she wants to try the Reglan at least  Magnesium she prefers not to do unless she is desperate  Will do Tylenol q8  She will try to hydrate as much as possible with what she can tolerate but she has nausea as well which doesn't help  Can you send it over I confirmed her pharmacy

## 2022-08-18 NOTE — TELEPHONE ENCOUNTER
Patient LM  Having throbbing headaches about 1-2 days a week that start in the morning  She's been taking 2 extra strength Tylenol but not helping  She has a really bad one today where she needed someone to come help with her daughter  Sx include dizziness, nausea, and throbbing  She tried having some coffee and ate something with no relief  No visual changes  Not sure what to do at this point   TT send to Dr Kezia Granado

## 2022-08-22 ENCOUNTER — OFFICE VISIT (OUTPATIENT)
Dept: PSYCHIATRY | Facility: CLINIC | Age: 37
End: 2022-08-22
Payer: COMMERCIAL

## 2022-08-22 DIAGNOSIS — F41.1 GAD (GENERALIZED ANXIETY DISORDER): Primary | ICD-10-CM

## 2022-08-22 PROCEDURE — 99214 OFFICE O/P EST MOD 30 MIN: CPT | Performed by: NURSE PRACTITIONER

## 2022-08-22 NOTE — PSYCH
PROGRESS NOTE        Alexis Ko      Name and Date of Birth:  Kodi Butler 29 y o  1985    Date of Visit: 08/22/22    SUBJECTIVE:  Patient is a 20-year-old female who has a history of anxiety disorder  She is also pregnant  When last seen, I was in touch with her Ob and BuSpar has been okayed for her to take  She is doing well with that  At times, she takes more than half a tablet in the morning, she becomes a little dizzy and tired  She has cut back to half a tab in the morning and this seems to be working well  She still takes the whole tab at night without issue  So she needs, she could take BuSpar 15 mg, half tab at lunch time and half tab at suppertime if she needs and the 15 mg at HS  She is feeling well with no nausea at this point  She is having some disciplinary issues with her 9year-old daughter and daughter is becoming more emotional   She is hoping that when she gets back into the school year, that this resolves  The patient is seeing a therapist, and this is going well  If need be, she will talk to her therapist regarding options for therapy for her daughter  As far as past marital issues, the patient and her  are getting along much better than previous  So we will continue current medication and follow-up with me will be in 6 weeks  She denies suicidal ideation, intent or plan at present, has no suicidal ideation, intent or plan at present  She denies any auditory hallucinations and visual hallucinations, denies any other delusional thinking, denies any delusional thinking  She denies any side effects from medications      HPI ROS Appetite Changes and Sleep: normal appetite, normal sleep    Review Of Systems:      Constitutional Negative   ENT Negative   Cardiovascular Negative   Respiratory As Noted in HPI   Gastrointestinal Negative   Genitourinary Negative   Musculoskeletal Negative   Integumentary Negative Neurological Negative   Endocrine Negative   Other Symptoms Negative and None       Laboratory Results: No results found for this or any previous visit  Substance Abuse History:    Social History     Substance and Sexual Activity   Drug Use No    Comment: denies self or FOB  denies family use       Family Psychiatric History:     Family History   Problem Relation Age of Onset    Hypertension Mother         URINARY INCONTINENCE    Hyperlipidemia Mother     Diverticulitis Mother     Hypothyroidism Mother     Hypertension Father     Hepatitis Father         A    Prostate cancer Father     No Known Problems Sister     No Known Problems Brother     No Known Problems Daughter     Other Daughter         CHARGE syndrome    Diverticulitis Maternal Grandmother     Colon cancer Maternal Grandmother     Stroke Maternal Grandfather         CVA    Heart disease Maternal Grandfather     Diverticulitis Maternal Grandfather     Hypertension Maternal Grandfather     Alzheimer's disease Paternal Grandmother     Diabetes Paternal Grandfather         INSULIN DEPENDENT       The following portions of the patient's history were reviewed and updated as appropriate: past family history, past medical history, past social history, past surgical history and problem list     Social History     Socioeconomic History    Marital status: /Civil Union     Spouse name: Not on file    Number of children: Not on file    Years of education: Not on file    Highest education level: Not on file   Occupational History    Not on file   Tobacco Use    Smoking status: Never Smoker    Smokeless tobacco: Never Used   Vaping Use    Vaping Use: Never used   Substance and Sexual Activity    Alcohol use:  Yes     Alcohol/week: 5 0 standard drinks     Types: 5 Glasses of wine per week     Comment: none with pregnancy    Drug use: No     Comment: denies self or FOB  denies family use    Sexual activity: Yes     Partners: Male Birth control/protection: I U D  Comment: Mirena   Other Topics Concern    Not on file   Social History Narrative    Always uses a seatbelt    Drinks Coffee - 2 cups a day    IUD Contraception - mirena    Lack of exercise     (per [employer])     Social Determinants of Health     Financial Resource Strain: Not on file   Food Insecurity: Not on file   Transportation Needs: Not on file   Physical Activity: Not on file   Stress: Not on file   Social Connections: Not on file   Intimate Partner Violence: Not on file   Housing Stability: Not on file     Social History     Social History Narrative    Always uses a seatbelt    Drinks Coffee - 2 cups a day    IUD Contraception - mirena    Lack of exercise     (per [employer])        Social History     Tobacco History     Smoking Status  Never Smoker    Smokeless Tobacco Use  Never Used          Alcohol History     Alcohol Use Status  Yes Drinks/Week  5 Glasses of wine, 0 Cans of beer, 0 Shots of liquor, 0 Standard drinks or equivalent per week Amount  5 0 standard drinks of alcohol/wk Comment  none with pregnancy          Drug Use     Drug Use Status  No Comment  denies self or FOB  denies family use          Sexual Activity     Sexually Active  Yes Partners  Male Birth Control/Protection  I U D   Comment  Mirena          Activities of Daily Living    Not Asked                     OBJECTIVE:     Mental Status Evaluation:    Appearance age appropriate, casually dressed   Behavior pleasant, cooperative   Speech normal volume, normal pitch   Mood Improved   Affect brighter   Thought Processes logical   Associations intact associations   Thought Content Normal   Perceptual Disturbances: None   Abnormal Thoughts  Risk Potential Suicidal ideation - None  Homicidal ideation - None  Potential for aggression - No   Orientation oriented to person, place, time/date and situation   Memory recent and remote memory grossly intact   Cosciousness alert and awake Attention Span attention span and concentration are age appropriate   Intellect Appears to be of Average Intelligence   Insight Good   Judgement Good   Muscle Strength and  Gait muscle strength and tone were normal   Language no difficulty naming common objects   Fund of Knowledge displays adequate knowledge of current events   Pain None   Pain Scale 0       Assessment/Plan:       There are no diagnoses linked to this encounter  Treatment Recommendations/Precautions:    Continue current medications: BuSpar 15 mg, half tablet at noon, may take half tablet supper if needed and 1 tab HS  Follow-up with me in 8 weeks  Risks/Benefits      Risks, Benefits And Possible Side Effects Of Medications:    Risks, benefits, and possible side effects of medications explained to patient and patient verbalizes understanding and agreement for treatment  Controlled Medication Discussion:     Not applicable    Psychotherapy Provided:     Individual psychotherapy provided: Yes  Discussed marital issues that were prevalent in the past that are now resolving  Also discussed treatment options if needed for her 9year-old daughter

## 2022-08-23 ENCOUNTER — TELEMEDICINE (OUTPATIENT)
Dept: BEHAVIORAL/MENTAL HEALTH CLINIC | Facility: CLINIC | Age: 37
End: 2022-08-23
Payer: COMMERCIAL

## 2022-08-23 DIAGNOSIS — F41.1 GAD (GENERALIZED ANXIETY DISORDER): Primary | ICD-10-CM

## 2022-08-23 PROCEDURE — 90834 PSYTX W PT 45 MINUTES: CPT | Performed by: COUNSELOR

## 2022-08-23 NOTE — PSYCH
Virtual AWV Consent    Verification of patient location:    Patient is located in the following state in which I hold an active license [place]    Reason for visit is therapy follow up    [facility]    Provider located at [address] [place] [id number] [phone]      Recent Visits  No visits were found meeting these conditions  Showing recent visits within past 7 days and meeting all other requirements  Future Appointments  No visits were found meeting these conditions  Showing future appointments within next 150 days and meeting all other requirements       After connecting through [employer], the patient was identified by name and date of birth  Rose Rahman was informed that this is a telemedicine visit and that the visit is being conducted through [address] [place] [address] Now and patient was informed that this is a secure, HIPAA-compliant platform  She agrees to proceed  My office door was closed  No one else was in the room  She acknowledged consent and understanding of privacy and security of the video platform  Rose Rahman verbally agrees to participate in [employer]  Pt is aware that [employer] could be limited without vital signs or the ability to perform a full hands-on physical exam  Michelle Valadez understands she or the provider may request at any time to terminate the video visit and request the patient to seek care or treatment in person  Patient is aware this is a billable service  Psychotherapy Provided: Individual Psychotherapy 45 minutes     Length of time in session: 45 minutes, follow up in 3 week (due to this provider being out of the office)    No diagnosis found  Goals addressed in session: Goal 1     Pain:      moderate to severe    5    Current suicide risk : Low     D: Flip Deandra presented virtually in session  She confirmed that she is at home   The focus of the session was helping Alysha Terrazas to brainstorm ways to connect with her 9year old daughter to help her to process her emotions about how she is feeling about the prison arrangement  Alysha Terrazas also reports that she feels very anxious at times that she is not able to connect with her daughter and over thinks the situation  The session was focused on problem solving strategies and communication  Discussed meeting in three weeks due to this provider being out of the office  For homework, Alysha Terrazas will focus on addressing her concerns with her  and daughter  A: Alysha Terrazas presented as oriented x3  She denies any SI/Hi/SIB  Overall, her mood seems to be intact, but she does present with some irritability  She is 12 weeks gestation at this time  She is responding well to setting aside time to focus on herself and getting a moment to practice behavioral activation strategies  P: Alysha Terrazas will continue to meet biweekly with this provider  Behavioral Health Treatment Plan [facility]: Diagnosis and Treatment Plan explained to Lula Oliva relates understanding diagnosis and is agreeable to Treatment Plan   Yes

## 2022-08-26 ENCOUNTER — APPOINTMENT (OUTPATIENT)
Dept: LAB | Age: 37
End: 2022-08-26
Payer: COMMERCIAL

## 2022-08-26 DIAGNOSIS — Z34.81 PRENATAL CARE, SUBSEQUENT PREGNANCY, FIRST TRIMESTER: ICD-10-CM

## 2022-08-26 LAB
ABO GROUP BLD: NORMAL
ALBUMIN SERPL BCP-MCNC: 3.8 G/DL (ref 3.5–5)
ALP SERPL-CCNC: 39 U/L (ref 46–116)
ALT SERPL W P-5'-P-CCNC: 18 U/L (ref 12–78)
ANION GAP SERPL CALCULATED.3IONS-SCNC: 5 MMOL/L (ref 4–13)
AST SERPL W P-5'-P-CCNC: 8 U/L (ref 5–45)
BACTERIA UR QL AUTO: ABNORMAL /HPF
BASOPHILS # BLD AUTO: 0.05 THOUSANDS/ΜL (ref 0–0.1)
BASOPHILS NFR BLD AUTO: 1 % (ref 0–1)
BILIRUB SERPL-MCNC: 0.27 MG/DL (ref 0.2–1)
BILIRUB UR QL STRIP: NEGATIVE
BLD GP AB SCN SERPL QL: NEGATIVE
BUN SERPL-MCNC: 7 MG/DL (ref 5–25)
CALCIUM SERPL-MCNC: 9 MG/DL (ref 8.3–10.1)
CHLORIDE SERPL-SCNC: 105 MMOL/L (ref 96–108)
CLARITY UR: CLEAR
CO2 SERPL-SCNC: 24 MMOL/L (ref 21–32)
COLOR UR: YELLOW
CREAT SERPL-MCNC: 0.76 MG/DL (ref 0.6–1.3)
CREAT UR-MCNC: 189 MG/DL
EOSINOPHIL # BLD AUTO: 0.19 THOUSAND/ΜL (ref 0–0.61)
EOSINOPHIL NFR BLD AUTO: 2 % (ref 0–6)
ERYTHROCYTE [DISTWIDTH] IN BLOOD BY AUTOMATED COUNT: 12.2 % (ref 11.6–15.1)
GFR SERPL CREATININE-BSD FRML MDRD: 101 ML/MIN/1.73SQ M
GLUCOSE 1H P 50 G GLC PO SERPL-MCNC: 146 MG/DL (ref 40–134)
GLUCOSE SERPL-MCNC: 141 MG/DL (ref 65–140)
GLUCOSE UR STRIP-MCNC: NEGATIVE MG/DL
HBV SURFACE AG SER QL: NORMAL
HCT VFR BLD AUTO: 40 % (ref 34.8–46.1)
HCV AB SER QL: NORMAL
HGB BLD-MCNC: 13 G/DL (ref 11.5–15.4)
HGB UR QL STRIP.AUTO: NEGATIVE
IMM GRANULOCYTES # BLD AUTO: 0.03 THOUSAND/UL (ref 0–0.2)
IMM GRANULOCYTES NFR BLD AUTO: 0 % (ref 0–2)
KETONES UR STRIP-MCNC: NEGATIVE MG/DL
LEUKOCYTE ESTERASE UR QL STRIP: NEGATIVE
LYMPHOCYTES # BLD AUTO: 1.88 THOUSANDS/ΜL (ref 0.6–4.47)
LYMPHOCYTES NFR BLD AUTO: 23 % (ref 14–44)
MCH RBC QN AUTO: 29.8 PG (ref 26.8–34.3)
MCHC RBC AUTO-ENTMCNC: 32.5 G/DL (ref 31.4–37.4)
MCV RBC AUTO: 92 FL (ref 82–98)
MONOCYTES # BLD AUTO: 0.46 THOUSAND/ΜL (ref 0.17–1.22)
MONOCYTES NFR BLD AUTO: 6 % (ref 4–12)
MUCOUS THREADS UR QL AUTO: ABNORMAL
NEUTROPHILS # BLD AUTO: 5.44 THOUSANDS/ΜL (ref 1.85–7.62)
NEUTS SEG NFR BLD AUTO: 68 % (ref 43–75)
NITRITE UR QL STRIP: NEGATIVE
NON-SQ EPI CELLS URNS QL MICRO: ABNORMAL /HPF
NRBC BLD AUTO-RTO: 0 /100 WBCS
PH UR STRIP.AUTO: 6.5 [PH]
PLATELET # BLD AUTO: 224 THOUSANDS/UL (ref 149–390)
PMV BLD AUTO: 11.6 FL (ref 8.9–12.7)
POTASSIUM SERPL-SCNC: 3.4 MMOL/L (ref 3.5–5.3)
PROT SERPL-MCNC: 7.6 G/DL (ref 6.4–8.4)
PROT UR STRIP-MCNC: ABNORMAL MG/DL
PROT UR-MCNC: 18 MG/DL
PROT/CREAT UR: 0.1 MG/G{CREAT} (ref 0–0.1)
RBC # BLD AUTO: 4.36 MILLION/UL (ref 3.81–5.12)
RBC #/AREA URNS AUTO: ABNORMAL /HPF
RH BLD: POSITIVE
RPR SER QL: NORMAL
RUBV IGG SERPL IA-ACNC: >175 IU/ML
SODIUM SERPL-SCNC: 134 MMOL/L (ref 135–147)
SP GR UR STRIP.AUTO: 1.02 (ref 1–1.03)
URATE SERPL-MCNC: 3.4 MG/DL (ref 2–7.5)
UROBILINOGEN UR STRIP-ACNC: <2 MG/DL
WBC # BLD AUTO: 8.05 THOUSAND/UL (ref 4.31–10.16)
WBC #/AREA URNS AUTO: ABNORMAL /HPF

## 2022-08-26 PROCEDURE — 80053 COMPREHEN METABOLIC PANEL: CPT

## 2022-08-26 PROCEDURE — 81001 URINALYSIS AUTO W/SCOPE: CPT

## 2022-08-26 PROCEDURE — 80081 OBSTETRIC PANEL INC HIV TSTG: CPT

## 2022-08-26 PROCEDURE — 84550 ASSAY OF BLOOD/URIC ACID: CPT

## 2022-08-26 PROCEDURE — 87086 URINE CULTURE/COLONY COUNT: CPT

## 2022-08-26 PROCEDURE — 84156 ASSAY OF PROTEIN URINE: CPT

## 2022-08-26 PROCEDURE — 82950 GLUCOSE TEST: CPT

## 2022-08-26 PROCEDURE — 82570 ASSAY OF URINE CREATININE: CPT

## 2022-08-26 PROCEDURE — 36415 COLL VENOUS BLD VENIPUNCTURE: CPT

## 2022-08-26 PROCEDURE — 86803 HEPATITIS C AB TEST: CPT

## 2022-08-27 LAB
BACTERIA UR CULT: NORMAL
HIV 1+2 AB+HIV1 P24 AG SERPL QL IA: NORMAL

## 2022-08-29 ENCOUNTER — TELEPHONE (OUTPATIENT)
Dept: OBGYN CLINIC | Facility: CLINIC | Age: 37
End: 2022-08-29

## 2022-08-29 DIAGNOSIS — O99.810 ABNORMAL GLUCOSE AFFECTING PREGNANCY: Primary | ICD-10-CM

## 2022-08-29 NOTE — TELEPHONE ENCOUNTER
Spoke to pt and reviewed results and recommendations from their PN labs per  CS  Needs 3 hr- explained process

## 2022-08-29 NOTE — TELEPHONE ENCOUNTER
----- Message from Patrizia Lazcano MD sent at 8/27/2022  3:07 PM EDT -----  Please notify elevated 1 hr - needs 3 hr   Other labs OK

## 2022-09-02 ENCOUNTER — INITIAL PRENATAL (OUTPATIENT)
Dept: OBGYN CLINIC | Facility: CLINIC | Age: 37
End: 2022-09-02

## 2022-09-02 ENCOUNTER — ROUTINE PRENATAL (OUTPATIENT)
Dept: PERINATAL CARE | Facility: OTHER | Age: 37
End: 2022-09-02
Payer: COMMERCIAL

## 2022-09-02 VITALS
SYSTOLIC BLOOD PRESSURE: 115 MMHG | BODY MASS INDEX: 24.66 KG/M2 | WEIGHT: 139.2 LBS | DIASTOLIC BLOOD PRESSURE: 72 MMHG | HEIGHT: 63 IN | HEART RATE: 83 BPM

## 2022-09-02 VITALS
BODY MASS INDEX: 24.45 KG/M2 | HEIGHT: 63 IN | WEIGHT: 138 LBS | DIASTOLIC BLOOD PRESSURE: 68 MMHG | SYSTOLIC BLOOD PRESSURE: 112 MMHG

## 2022-09-02 DIAGNOSIS — O09.521 MULTIGRAVIDA OF ADVANCED MATERNAL AGE IN FIRST TRIMESTER: ICD-10-CM

## 2022-09-02 DIAGNOSIS — O10.011 PRE-EXISTING ESSENTIAL HYPERTENSION DURING PREGNANCY IN FIRST TRIMESTER: Primary | ICD-10-CM

## 2022-09-02 DIAGNOSIS — Z11.3 SCREENING FOR STD (SEXUALLY TRANSMITTED DISEASE): ICD-10-CM

## 2022-09-02 DIAGNOSIS — Z3A.13 13 WEEKS GESTATION OF PREGNANCY: ICD-10-CM

## 2022-09-02 DIAGNOSIS — Z3A.13 13 WEEKS GESTATION OF PREGNANCY: Primary | ICD-10-CM

## 2022-09-02 DIAGNOSIS — O35.2XX0 HEREDITARY DISEASE IN FAMILY POSSIBLY AFFECTING FETUS, AFFECTING MANAGEMENT OF MOTHER IN PREGNANCY, SINGLE OR UNSPECIFIED FETUS: ICD-10-CM

## 2022-09-02 DIAGNOSIS — O34.211 MATERNAL CARE DUE TO LOW TRANSVERSE UTERINE SCAR FROM PREVIOUS CESAREAN DELIVERY: ICD-10-CM

## 2022-09-02 DIAGNOSIS — Z34.81 PRENATAL CARE, SUBSEQUENT PREGNANCY, FIRST TRIMESTER: ICD-10-CM

## 2022-09-02 PROBLEM — O10.911 PRE-EXISTING HYPERTENSION DURING PREGNANCY IN FIRST TRIMESTER: Status: ACTIVE | Noted: 2022-09-02

## 2022-09-02 PROBLEM — Z87.59 HISTORY OF POLYHYDRAMNIOS: Status: ACTIVE | Noted: 2022-09-02

## 2022-09-02 LAB
SL AMB  POCT GLUCOSE, UA: NEGATIVE
SL AMB POCT URINE PROTEIN: NEGATIVE

## 2022-09-02 PROCEDURE — 87591 N.GONORRHOEAE DNA AMP PROB: CPT | Performed by: STUDENT IN AN ORGANIZED HEALTH CARE EDUCATION/TRAINING PROGRAM

## 2022-09-02 PROCEDURE — 76813 OB US NUCHAL MEAS 1 GEST: CPT | Performed by: OBSTETRICS & GYNECOLOGY

## 2022-09-02 PROCEDURE — 87491 CHLMYD TRACH DNA AMP PROBE: CPT | Performed by: STUDENT IN AN ORGANIZED HEALTH CARE EDUCATION/TRAINING PROGRAM

## 2022-09-02 PROCEDURE — 76801 OB US < 14 WKS SINGLE FETUS: CPT | Performed by: OBSTETRICS & GYNECOLOGY

## 2022-09-02 PROCEDURE — 99241 PR OFFICE CONSULTATION NEW/ESTAB PATIENT 15 MIN: CPT | Performed by: OBSTETRICS & GYNECOLOGY

## 2022-09-02 PROCEDURE — PNV: Performed by: STUDENT IN AN ORGANIZED HEALTH CARE EDUCATION/TRAINING PROGRAM

## 2022-09-02 RX ORDER — ASPIRIN 81 MG/1
162 TABLET, CHEWABLE ORAL DAILY
Qty: 60 TABLET | Refills: 6
Start: 2022-09-02

## 2022-09-02 NOTE — PROGRESS NOTES
Pt is here for pn1 visit   No concerns at this time  Urine neg/neg   No VB,Cramping  Pn1 Labs Completed/needs 3hr glucose   Last Pap 6/2020 NILM/HPV -  GC/CHL Collected today   Blue Folder given/reviewed with pt   UTD Covid vaccines

## 2022-09-02 NOTE — LETTER
2022     Raad Murray MD  [facility]  [address]    Patient: Domi Moncada   YOB: 1985   Date of Visit: 2022       Dear Dr Lucia Marroquin: Thank you for referring Domi Moncada to me for evaluation  Below are my notes for this consultation  If you have questions, please do not hesitate to call me  I look forward to following your patient along with you  Sincerely,        Ulysses Thomas MD        CC: No Recipients  Ulysses Thomas MD  2022 12:34 PM  Sign when Signing Visit  OFFICE CONSULT  Referring physician:   Raad Murray Md  [address] [facility],  [address] [place] [address]      Dear Dr Lucia Marroquin      Thank you for requesting a  consultation on your patient Ms Domi Moncada for the following indications: Advanced maternal age,  Nuchal translucency screening, hypertension and history of postpartum hemorrhage  She completed cell free DNA screening in this pregnancy already which was normal    History  Medications:Nifedipine 30 milligrams q h s  (this is a decrease in her dose of b i d  due to her development of lower blood pressures in pregnancy)  Allergies to medications:  None  Past medical history:  Advanced maternal age, maternal hypertension since 2019, acute renal injury with her severe hypotension due to the hemorrhage after her 1st pregnancy  Baseline preeclamptic labs are normal    Past surgical history:   section and exploratory laparotomy  Past obstetrical history:   7/5/15 at 41 weeks she had a complicated delivery of an 8 pound 1 ounce baby girl  She had a vacuum assisted vaginal delivery, manual extraction of the placenta, PPH, retroperitoneal bleeding, exploratory laparotomy and IR embolization of the anterior iliac and massive transfusion  During this admission she also developed an acute kidney injury and hypertension     20 at 38 weeks she had a 6 pound 3 ounce baby girl by low-transverse   Her 2nd daughter after delivery was diagnosed with Charge syndrome  During that pregnancy she developed polyhydramnios and hydronephrosis  After delivery she was diagnosed with unilateral choanal atresia, feeding issues, facial droop, swelling, patent ductus arteriosus and patent foramen ovale  As of today she still has a G-tube and some balance issues  Her daughter does not have the normal CHD 7 gene diagnostic of Charge syndrome but they did find a new mutation that [facility]  This gives a 1 percent recurrence risk in this pregnancy  Social history:  Denies substance use  First generation family history:  Her parents have hypertension  Ultrasound findings: The ultrasound shows a fetus concordant with dates  The nasal bone and nuchal translucency appears normal  No malformations are seen on today's early ultrasound  The patient was informed of the findings and counseled about the limitations of the exam in detecting all forms of fetal congenital abnormalities  She does not report any vaginal bleeding or uterine cramping or contractions  Specific counseling was provided on the following problems:  1  Hypertension-recommend serial growth scans in the 3rd trimester starting at 28 weeks  Recommend weekly NST/fluid scans from 32 weeks on  Recommend planning delivery between 38 weeks and 0 days to 39 weeks and 6 days  Those deliveries that are planned for less than 39 weeks are reserved for patients who are having difficulties with blood pressure or other complications in their pregnancy  It is reassuring that in her last pregnancy she was able to come off of her hypertension medication and did not developed preeclampsia  2  Advanced maternal age with nml NIPT  3  Elevated Glucola but she reports she had eaten before her test which may have caused a false positive    She is aware to complete her 3 hour glucose tolerance test   4  Recommend she start baby aspirin 162 milligrams daily till 36 weeks to decrease her risk of developing preeclampsia  5  History of polyhydramnios and hydronephrosis in her 2nd pregnancy likely secondary to Charge syndrome  6  History of prior  and she is planning on a future  and tubal   Placenta is not near her prior  scar which is reassuring  7  History of prior baby with a PFO and patent ductus arteriosus  This does not require fetal echo  BuSpar use in pregnancy  Reviewed micromedix after she left due to limited time during her appt  There are no adequate and well- controlled studies of buspirone use in pregnant women, and the effect on buspirone on labor and delivery is unknown  It is reassuring that there was no evidence of fetal harm or impaired fertility when rates and rabbits were administered  buspirone at doses approximately 30 times the maximum recommended human dose  Similarly no information is known about safety in breast feeding infants  8  History of acute renal injury after her 1st delivery that developed a severe hemorrhage  This resolved and she currently has normal renal function but she continues to follow with nephrology  Future tests recommended:  1  Recommend her OB office offer a MSAFP between 16-18 weeks to screen for spina bifida  Future ultrasounds ordered today:   1  Fetal Level II ultrasound imaging is recommended at 16 weeks for early anatomy and at 19-20 weeks' gestation for full anatomy given her history of a prior baby with Charge syndrome  At this time both Emani Katz and Tena Barrios would not plan termination of the pregnancy unless something is found that is life-threatening  The face to face time, in addition to time spent discussing ultrasound results, was approximately 15 minutes, greater than 50% of which was spent during counseling and coordination of care      Santos Nichole MD

## 2022-09-02 NOTE — PROGRESS NOTES
Charmayne Poli is here for her first prenatal visit  Age: 29 y o  LMP: Patient's last menstrual period was 2022  Gestational age 13w1d based on LMP Yes , consistent with early US Yes    3  Para            Previous C Section: Yes     She has nausea and vomiting, but this is sufficiently treated with B6/unisom  She has had no vaginal bleeding  Patients complains of headache as well, which she is only able to control with excellent hydration  Reglan was unhelpful  She had consultation with maternal fetal medicine earlier today  Allergies: Nickel  Medication use :   Current Outpatient Medications   Medication Sig Dispense Refill    aspirin 81 mg chewable tablet Chew 2 tablets (162 mg total) daily 60 tablet 6    busPIRone (BUSPAR) 15 mg tablet Take 1 tablet (15 mg total) by mouth 2 (two) times a day 60 tablet 2    clindamycin (CLEOCIN T) 1 % lotion  (Patient not taking: No sig reported)      Docusate Sodium (COLACE PO) Take by mouth as needed      metoclopramide (Reglan) 10 mg tablet Take 1 tablet (10 mg total) by mouth 4 (four) times a day 30 tablet 0    NIFEdipine (PROCARDIA XL) 30 mg 24 hr tablet Take 1 tablet (30 mg total) by mouth 2 (two) times a day 180 tablet 3    Prenatal MV-Min-Fe Fum-FA-DHA (PRENATAL 1 PO) Take by mouth      pyridoxine (VITAMIN B6) 50 mg tablet Take 50 mg by mouth daily       No current facility-administered medications for this visit       She is a non-smoker  In this pregnancy her  medical history is significant for history bowel obstruction with ex lap    Prenatal Labs   A positive  Antibody negative  CBC <224  Hep B/C nonreactive  HIV nonreactive  RPR NR   Rubella immune  GCCT collected by urine today  1hr elevated, 3hr pending  Baseline P:Cr ratio 0 10  Pap 10/4/18 negative      Her obstetrical, medical, surgical and family history was reviewed  Her physical exam was normal  A Pap smear was not obtained, Gonorrhea and Chlamydia testing was obtained    Discussed as well during this visit was diet, prenatal vitamins, prenatal visits, lab testing, breast feeding, vaccinations, maternal fetal medicine consultations, prevention of exposure to infectious diseases and toxic chemicals, lifestyle

## 2022-09-02 NOTE — PROGRESS NOTES
OFFICE CONSULT  Referring physician:   [facility]  D[facility],  [address]      Dear Dr Janine Jean      Thank you for requesting a  consultation on your patient Ms Kodi Pfeiffer for the following indications: Advanced maternal age,  Nuchal translucency screening, hypertension and history of postpartum hemorrhage  She completed cell free DNA screening in this pregnancy already which was normal    History  Medications:Nifedipine 30 milligrams q h s  (this is a decrease in her dose of b i d  due to her development of lower blood pressures in pregnancy)  Allergies to medications:  None  Past medical history:  Advanced maternal age, maternal hypertension since 2019, acute renal injury with her severe hypotension due to the hemorrhage after her 1st pregnancy  Baseline preeclamptic labs are normal    Past surgical history:   section and exploratory laparotomy  Past obstetrical history:   7/5/15 at 41 weeks she had a complicated delivery of an 8 pound 1 ounce baby girl  She had a vacuum assisted vaginal delivery, manual extraction of the placenta, PPH, retroperitoneal bleeding, exploratory laparotomy and IR embolization of the anterior iliac and massive transfusion  During this admission she also developed an acute kidney injury and hypertension  20 at 38 weeks she had a 6 pound 3 ounce baby girl by low-transverse   Her 2nd daughter after delivery was diagnosed with Charge syndrome  During that pregnancy she developed polyhydramnios and hydronephrosis  After delivery she was diagnosed with unilateral choanal atresia, feeding issues, facial droop, swelling, patent ductus arteriosus and patent foramen ovale  As of today she still has a G-tube and some balance issues  Her daughter does not have the normal CHD 7 gene diagnostic of Charge syndrome but they did find a new mutation that was not found in either [facility] or her partner Nia Chun    This gives a 1 percent recurrence risk in this pregnancy  Social history:  Denies substance use  First generation family history:  Her parents have hypertension  Ultrasound findings: The ultrasound shows a fetus concordant with dates  The nasal bone and nuchal translucency appears normal  No malformations are seen on today's early ultrasound  The patient was informed of the findings and counseled about the limitations of the exam in detecting all forms of fetal congenital abnormalities  She does not report any vaginal bleeding or uterine cramping or contractions  Specific counseling was provided on the following problems:  1  Hypertension-recommend serial growth scans in the 3rd trimester starting at 28 weeks  Recommend weekly NST/fluid scans from 32 weeks on  Recommend planning delivery between 38 weeks and 0 days to 39 weeks and 6 days  Those deliveries that are planned for less than 39 weeks are reserved for patients who are having difficulties with blood pressure or other complications in their pregnancy  It is reassuring that in her last pregnancy she was able to come off of her hypertension medication and did not developed preeclampsia  2  Advanced maternal age with nml NIPT  3  Elevated Glucola but she reports she had eaten before her test which may have caused a false positive  She is aware to complete her 3 hour glucose tolerance test   4  Recommend she start baby aspirin 162 milligrams daily till 36 weeks to decrease her risk of developing preeclampsia  5  History of polyhydramnios and hydronephrosis in her 2nd pregnancy likely secondary to Charge syndrome  6  History of prior  and she is planning on a future  and tubal   Placenta is not near her prior  scar which is reassuring  7  History of prior baby with a PFO and patent ductus arteriosus  This does not require fetal echo  BuSpar use in pregnancy   Reviewed micromedix after she left due to limited time during her appt   There are no adequate and well controlled studies of buspirone use in pregnant women, and the effect of buspirone on Labor and delivery is unknown  It is reassuring that there is no evidence of fetal harm or impaired fertility when rabbits and rats were administered buspirone at doses approximately 30 times the maximum recommended Human dose  Similarly no information is known about safety of buspirone in breastfeeding infants  8  History of acute renal injury after her 1st delivery that developed a severe hemorrhage  This resolved and she currently has normal renal function but she continues to follow with nephrology  Future tests recommended:  1  Recommend her OB office offer a MSAFP between 16-18 weeks to screen for spina bifida  Future ultrasounds ordered today:   1  Fetal Level II ultrasound imaging is recommended at 16 weeks for early anatomy and at 19-20 weeks' gestation for full anatomy given her history of a prior baby with Charge syndrome  At this time both Alissa Knott and Lisa King would not plan termination of the pregnancy unless something is found that is life-threatening           The face to face time, in addition to time spent discussing ultrasound results, was approximately 15 minutes, greater than 50% of which was spent during counseling and coordination of care      Ferny Contreras MD

## 2022-09-02 NOTE — LETTER
2022     MD Hilary Church  [address]    Patient: Patel Rudd   YOB: 1985   Date of Visit: 2022       Dear Dr Dona Cardona: Thank you for referring Patel Rudd to me for evaluation  Below are my notes for this consultation  If you have questions, please do not hesitate to call me  I look forward to following your patient along with you  Sincerely,        Trey Gonzalez MD        CC: No Recipients  Trey Gonzalez MD  2022 12:38 PM  Incomplete  OFFICE CONSULT  Referring physician:   Md Hilary Church  [facility]  [address] [facility]      Dear Dr Dona Cardona      Thank you for requesting a  consultation on your patient Ms Patel Rudd for the following indications: Advanced maternal age,  Nuchal translucency screening, hypertension and history of postpartum hemorrhage  She completed cell free DNA screening in this pregnancy already which was normal    History  Medications:Nifedipine 30 milligrams q h s  (this is a decrease in her dose of b i d  due to her development of lower blood pressures in pregnancy)  Allergies to medications:  None  Past medical history:  Advanced maternal age, maternal hypertension since , acute renal injury with her severe hypotension due to the hemorrhage after her 1st pregnancy  Baseline preeclamptic labs are normal    Past surgical history:   section and exploratory laparotomy  Past obstetrical history:   7/5/15 at 41 weeks she had a complicated delivery of an 8 pound 1 ounce baby girl  She had a vacuum assisted vaginal delivery, manual extraction of the placenta, PPH, retroperitoneal bleeding, exploratory laparotomy and IR embolization of the anterior iliac and massive transfusion  During this admission she also developed an acute kidney injury and hypertension  20 at 38 weeks she had a 6 pound 3 ounce baby girl by low-transverse   Her 2nd daughter after delivery was diagnosed with Charge syndrome  During that pregnancy she developed polyhydramnios and hydronephrosis  After delivery she was diagnosed with unilateral choanal atresia, feeding issues, facial droop, swelling, patent ductus arteriosus and patent foramen ovale  As of today she still has a G-tube and some balance issues  Her daughter does not have the normal CHD 7 gene diagnostic of Charge syndrome but they did find a new mutation that was not found in either Aniceto Rios or her partner Lila Schmid  This gives a 1 percent recurrence risk in this pregnancy  Social history:  Denies substance use  First generation family history:  Her parents have hypertension  Ultrasound findings: The ultrasound shows a fetus concordant with dates  The nasal bone and nuchal translucency appears normal  No malformations are seen on today's early ultrasound  The patient was informed of the findings and counseled about the limitations of the exam in detecting all forms of fetal congenital abnormalities  She does not report any vaginal bleeding or uterine cramping or contractions  Specific counseling was provided on the following problems:  1  Hypertension-recommend serial growth scans in the 3rd trimester starting at 28 weeks  Recommend weekly NST/fluid scans from 32 weeks on  Recommend planning delivery between 38 weeks and 0 days to 39 weeks and 6 days  Those deliveries that are planned for less than 39 weeks are reserved for patients who are having difficulties with blood pressure or other complications in their pregnancy  It is reassuring that in her last pregnancy she was able to come off of her hypertension medication and did not developed preeclampsia  2  Advanced maternal age with nml NIPT  3  Elevated Glucola but she reports she had eaten before her test which may have caused a false positive    She is aware to complete her 3 hour glucose tolerance test   4  Recommend she start baby aspirin 162 milligrams daily till 36 weeks to decrease her risk of developing preeclampsia  5  History of polyhydramnios and hydronephrosis in her 2nd pregnancy likely secondary to Charge syndrome  6  History of prior  and she is planning on a future  and tubal   Placenta is not near her prior  scar which is reassuring  7  History of prior baby with a PFO and patent ductus arteriosus  This does not require fetal echo  BuSpar use in pregnancy  Reviewed micromedix after she left due to limited time during her appt  There are no adequate and well controlled studies of buspirone use in pregnant women, and the effect of buspirone on Labor and delivery is unknown  It is reassuring that there is no evidence of fetal harm or impaired fertility when rabbits and rats were administered buspirone at doses approximately 30 times the maximum recommended Human dose  Similarly no information is known about safety of buspirone in breastfeeding infants  8  History of acute renal injury after her 1st delivery that developed a severe hemorrhage  This resolved and she currently has normal renal function but she continues to follow with nephrology  Future tests recommended:  1  Recommend her OB office offer a MSAFP between 16-18 weeks to screen for spina bifida  Future ultrasounds ordered today:   1  Fetal Level II ultrasound imaging is recommended at 16 weeks for early anatomy and at 19-20 weeks' gestation for full anatomy given her history of a prior baby with Charge syndrome  At this time both Dianna García and Kristin Carver would not plan termination of the pregnancy unless something is found that is life-threatening           The face to face time, in addition to time spent discussing ultrasound results, was approximately 15 minutes, greater than 50% of which was spent during counseling and coordination of care      MD Edwar Bone MD  2022 12:34 PM  Sign when Signing Visit  OFFICE CONSULT  Referring physician:   Md Hilary Mckeon 621  Dm Pearl [facility] [address],  [address]      Dear Dr Yanelis Rainey      Thank you for requesting a  consultation on your patient Ms Jannis Soulier for the following indications: Advanced maternal age,  Nuchal translucency screening, hypertension and history of postpartum hemorrhage  She completed cell free DNA screening in this pregnancy already which was normal    History  Medications:Nifedipine 30 milligrams q h s  (this is a decrease in her dose of b i d  due to her development of lower blood pressures in pregnancy)  Allergies to medications:  None  Past medical history:  Advanced maternal age, maternal hypertension since 2019, acute renal injury with her severe hypotension due to the hemorrhage after her 1st pregnancy  Baseline preeclamptic labs are normal    Past surgical history:   section and exploratory laparotomy  Past obstetrical history:   7/5/15 at 41 weeks she had a complicated delivery of an 8 pound 1 ounce baby girl  She had a vacuum assisted vaginal delivery, manual extraction of the placenta, PPH, retroperitoneal bleeding, exploratory laparotomy and IR embolization of the anterior iliac and massive transfusion  During this admission she also developed an acute kidney injury and hypertension  20 at 38 weeks she had a 6 pound 3 ounce baby girl by low-transverse   Her 2nd daughter after delivery was diagnosed with Charge syndrome  During that pregnancy she developed polyhydramnios and hydronephrosis  After delivery she was diagnosed with unilateral choanal atresia, feeding issues, facial droop, swelling, patent ductus arteriosus and patent foramen ovale  As of today she still has a G-tube and some balance issues  Her daughter does not have the normal CHD 7 gene diagnostic of Charge syndrome but they did find a new mutation that was not found in either Pratima Espinal or her partner Ramone Box    This gives a 1 percent recurrence risk in this pregnancy  Social history:  Denies substance use  First generation family history:  Her parents have hypertension  Ultrasound findings: The ultrasound shows a fetus concordant with dates  The nasal bone and nuchal translucency appears normal  No malformations are seen on today's early ultrasound  The patient was informed of the findings and counseled about the limitations of the exam in detecting all forms of fetal congenital abnormalities  She does not report any vaginal bleeding or uterine cramping or contractions  Specific counseling was provided on the following problems:  1  Hypertension-recommend serial growth scans in the 3rd trimester starting at 28 weeks  Recommend weekly NST/fluid scans from 32 weeks on  Recommend planning delivery between 38 weeks and 0 days to 39 weeks and 6 days  Those deliveries that are planned for less than 39 weeks are reserved for patients who are having difficulties with blood pressure or other complications in their pregnancy  It is reassuring that in her last pregnancy she was able to come off of her hypertension medication and did not developed preeclampsia  2  Advanced maternal age with nml NIPT  3  Elevated Glucola but she reports she had eaten before her test which may have caused a false positive  She is aware to complete her 3 hour glucose tolerance test   4  Recommend she start baby aspirin 162 milligrams daily till 36 weeks to decrease her risk of developing preeclampsia  5  History of polyhydramnios and hydronephrosis in her 2nd pregnancy likely secondary to Charge syndrome  6  History of prior  and she is planning on a future  and tubal   Placenta is not near her prior  scar which is reassuring  7  History of prior baby with a PFO and patent ductus arteriosus  This does not require fetal echo  BuSpar use in pregnancy   Reviewed micromedix after she left due to limited time during her appt  There are no adequate and well- controlled studies of buspirone use in pregnant women, and the effect on buspirone on labor and delivery is unknown  It is reassuring that there was no evidence of fetal harm or impaired fertility when rates and rabbits were administered  buspirone at doses approximately 30 times the maximum recommended human dose  Similarly no information is known about safety in breast feeding infants  8  History of acute renal injury after her 1st delivery that developed a severe hemorrhage  This resolved and she currently has normal renal function but she continues to follow with nephrology  Future tests recommended:  1  Recommend her OB office offer a MSAFP between 16-18 weeks to screen for spina bifida  Future ultrasounds ordered today:   1  Fetal Level II ultrasound imaging is recommended at 16 weeks for early anatomy and at 19-20 weeks' gestation for full anatomy given her history of a prior baby with Charge syndrome  At this time both Cj Mccurdy and Carson Bennett would not plan termination of the pregnancy unless something is found that is life-threatening  The face to face time, in addition to time spent discussing ultrasound results, was approximately 15 minutes, greater than 50% of which was spent during counseling and coordination of care      Hiram Joiner MD

## 2022-09-03 LAB
C TRACH DNA SPEC QL NAA+PROBE: NEGATIVE
N GONORRHOEA DNA SPEC QL NAA+PROBE: NEGATIVE

## 2022-09-11 ENCOUNTER — LAB (OUTPATIENT)
Dept: LAB | Facility: CLINIC | Age: 37
End: 2022-09-11
Payer: COMMERCIAL

## 2022-09-11 DIAGNOSIS — O99.810 ABNORMAL GLUCOSE AFFECTING PREGNANCY: ICD-10-CM

## 2022-09-11 LAB
GLUCOSE 1H P 100 G GLC PO SERPL-MCNC: 126 MG/DL (ref 65–179)
GLUCOSE 2H P 100 G GLC PO SERPL-MCNC: 82 MG/DL (ref 65–154)
GLUCOSE 3H P 100 G GLC PO SERPL-MCNC: 72 MG/DL (ref 65–139)
GLUCOSE P FAST SERPL-MCNC: 87 MG/DL (ref 65–94)
GLUCOSE SERPL-MCNC: 85 MG/DL (ref 65–140)

## 2022-09-11 PROCEDURE — 36415 COLL VENOUS BLD VENIPUNCTURE: CPT

## 2022-09-11 PROCEDURE — 82952 GTT-ADDED SAMPLES: CPT

## 2022-09-11 PROCEDURE — 82948 REAGENT STRIP/BLOOD GLUCOSE: CPT

## 2022-09-11 PROCEDURE — 82951 GLUCOSE TOLERANCE TEST (GTT): CPT

## 2022-09-19 ENCOUNTER — TELEMEDICINE (OUTPATIENT)
Dept: BEHAVIORAL/MENTAL HEALTH CLINIC | Facility: CLINIC | Age: 37
End: 2022-09-19
Payer: COMMERCIAL

## 2022-09-19 DIAGNOSIS — F41.1 GAD (GENERALIZED ANXIETY DISORDER): Primary | ICD-10-CM

## 2022-09-19 PROCEDURE — 90834 PSYTX W PT 45 MINUTES: CPT | Performed by: COUNSELOR

## 2022-09-19 NOTE — PSYCH
Virtual AWV Consent    Verification of patient location:    Patient is located in the following state in which I hold an active license [place]    Reason for visit is therapy follow up     Encounter provider Jose E Hui    Provider located at [address] [place] [id number] [phone]      Recent Visits  No visits were found meeting these conditions  Showing recent visits within past 7 days and meeting all other requirements  Future Appointments  No visits were found meeting these conditions  Showing future appointments within next 150 days and meeting all other requirements       After connecting through [employer], the patient was identified by name and date of birth  Claudean Bares was informed that this is a telemedicine visit and that the visit is being conducted through [address] [place] [address] Now and patient was informed that this is a secure, HIPAA-compliant platform  She agrees to proceed  My office door was closed  No one else was in the room  She acknowledged consent and understanding of privacy and security of the video platform  Claudean Bares verbally agrees to participate in [employer]  Pt is aware that [employer] could be limited without vital signs or the ability to perform a full hands-on physical exam  Michelle Knight understands she or the provider may request at any time to terminate the video visit and request the patient to seek care or treatment in person  Patient is aware this is a billable service  Psychotherapy Provided: Individual Psychotherapy 45 minutes     Length of time in session: 45 minutes, follow up in 2 week    No diagnosis found  Goals addressed in session: Goal 2     Pain:      moderate to severe    5    Current suicide risk : Low     D: Ashley Lucretia presented virtually in session and confirmed that she is at home   Ashley Boykin reports her stress and frustration continues to be situated around her 9year old daughter  She reports that her daughter struggles with temper tantrums and thoughts about her parents' being   Discussed strategies to help address her concerns with her daughter and talk with her  She is officially in the second trimester and is feeling better with her pregnancy  Will continue to meet biweekly  A: Kelsi Ocampo presented as oriented x3  She denies any SI/HI/SIB  Her stress seems to related to her daughter and not understanding the function of her behaviors  She is opening to counseling which may be of benefit in order to guide her with her daughter  P: Kelsi Ocampo will continue to meet biweekly with this therapist      [address]: Diagnosis and Treatment Plan explained to Elnita Nissen relates understanding diagnosis and is agreeable to Treatment Plan   Yes

## 2022-09-20 PROBLEM — F41.1 GAD (GENERALIZED ANXIETY DISORDER): Status: ACTIVE | Noted: 2022-09-20

## 2022-09-22 ENCOUNTER — ROUTINE PRENATAL (OUTPATIENT)
Dept: PERINATAL CARE | Facility: OTHER | Age: 37
End: 2022-09-22
Payer: COMMERCIAL

## 2022-09-22 ENCOUNTER — ROUTINE PRENATAL (OUTPATIENT)
Dept: OBGYN CLINIC | Facility: CLINIC | Age: 37
End: 2022-09-22

## 2022-09-22 VITALS
HEIGHT: 64 IN | SYSTOLIC BLOOD PRESSURE: 112 MMHG | HEART RATE: 72 BPM | DIASTOLIC BLOOD PRESSURE: 74 MMHG | WEIGHT: 140 LBS | BODY MASS INDEX: 23.9 KG/M2

## 2022-09-22 VITALS — SYSTOLIC BLOOD PRESSURE: 120 MMHG | DIASTOLIC BLOOD PRESSURE: 68 MMHG | WEIGHT: 141 LBS | BODY MASS INDEX: 24.2 KG/M2

## 2022-09-22 DIAGNOSIS — O09.521 MULTIGRAVIDA OF ADVANCED MATERNAL AGE IN FIRST TRIMESTER: Primary | ICD-10-CM

## 2022-09-22 DIAGNOSIS — O10.011 PRE-EXISTING ESSENTIAL HYPERTENSION DURING PREGNANCY IN FIRST TRIMESTER: ICD-10-CM

## 2022-09-22 DIAGNOSIS — Z3A.16 16 WEEKS GESTATION OF PREGNANCY: ICD-10-CM

## 2022-09-22 DIAGNOSIS — O35.2XX0 HEREDITARY DISEASE IN FAMILY POSSIBLY AFFECTING FETUS, AFFECTING MANAGEMENT OF MOTHER IN PREGNANCY, SINGLE OR UNSPECIFIED FETUS: ICD-10-CM

## 2022-09-22 DIAGNOSIS — O34.211 MATERNAL CARE DUE TO LOW TRANSVERSE UTERINE SCAR FROM PREVIOUS CESAREAN DELIVERY: ICD-10-CM

## 2022-09-22 DIAGNOSIS — O35.2XX0 HEREDITARY DISEASE IN FAMILY POSSIBLY AFFECTING FETUS, AFFECTING MANAGEMENT OF MOTHER IN PREGNANCY, SINGLE OR UNSPECIFIED FETUS: Primary | ICD-10-CM

## 2022-09-22 DIAGNOSIS — F41.1 GAD (GENERALIZED ANXIETY DISORDER): ICD-10-CM

## 2022-09-22 LAB
SL AMB  POCT GLUCOSE, UA: NEGATIVE
SL AMB POCT URINE PROTEIN: NEGATIVE

## 2022-09-22 PROCEDURE — 76805 OB US >/= 14 WKS SNGL FETUS: CPT | Performed by: OBSTETRICS & GYNECOLOGY

## 2022-09-22 PROCEDURE — PNV: Performed by: OBSTETRICS & GYNECOLOGY

## 2022-09-22 PROCEDURE — 99213 OFFICE O/P EST LOW 20 MIN: CPT | Performed by: OBSTETRICS & GYNECOLOGY

## 2022-09-22 NOTE — PROGRESS NOTES
The patient was seen today for an ultrasound  Please see ultrasound report (located under Ob Procedures) for additional details  Thank you very much for allowing us to participate in the care of this very nice patient  Should you have any questions, please do not hesitate to contact me  Aguilar Lozano MD [address] [facility]  Attending Physician, Son

## 2022-09-22 NOTE — ASSESSMENT & PLAN NOTE
EDC confirmed in [facility]  Daily headaches which is new this pregnancy - advised humidifier at night and magnesium supplement daily  If no benefit after two weeks, patient to call for neurology consult  Will plan to schedule  once January-March physician schedule finalized

## 2022-09-22 NOTE — ASSESSMENT & PLAN NOTE
Her second child has CHARGE syndrome  Saw MFM and genetics (previously seen by genetics at [facility]) - less than 1% chance of recurrence as neither parent irma mutation found in [place]  16 week anatomy scan today normal per patient report - no report in chart yet  Has 20, 28 and 34 week ultrasounds scheduled/planned

## 2022-09-22 NOTE — PROGRESS NOTES
Prenatal visit at 12 1/7wks  Denies ctxs, LOF, VB  +FM - felt her first big kick today  Daily headaches in the morning  She was unable to tolerate coffee in the first trimester, but can now  States caffeine seems to help, but she still gets them daily  No obvious allergy component  Discussed attempting magnesium supplement daily for prevention as well as humidifier in case caused by sinuses  If no relief, would refer to neurology as this is new complaint and she is not a chronic headache sufferer  Discussed new resource of trauma at [facility] since her last delivery  Problem List Items Addressed This Visit        Cardiovascular and Mediastinum    Pre-existing hypertension during pregnancy in first trimester     BP well-controlled on procardia XL  Continue ASA 162mg daily  Genitourinary    Maternal care due to low transverse uterine scar from previous  delivery     Plan for repeat  with BTL  Placenta not near previous uterine scar  Other    Multigravida of advanced maternal age in first trimester     Had NIPT with  at 9 weeks - normal   MSAFP given today  Hereditary disease in family possibly affecting fetus     Her second child has CHARGE syndrome  Saw [facility] and genetics (previously seen by genetics at [facility]) - less than 1% chance of recurrence as neither parent irma mutation found in [place]  16 week anatomy scan today normal per patient report - no report in chart yet  Has 20, 28 and 34 week ultrasounds scheduled/planned  SARA (generalized anxiety disorder)     Currently maintained on Buspar  Will continue to monitor  16 weeks gestation of pregnancy - Primary     EDC confirmed in Epic  Daily headaches which is new this pregnancy - advised humidifier at night and magnesium supplement daily  If no benefit after two weeks, patient to call for neurology consult    Will plan to schedule  once January-March physician schedule finalized            Relevant Orders    Alpha fetoprotein, maternal    POCT urine dip (Completed)

## 2022-09-29 ENCOUNTER — APPOINTMENT (OUTPATIENT)
Dept: LAB | Facility: MEDICAL CENTER | Age: 37
End: 2022-09-29
Payer: COMMERCIAL

## 2022-09-29 DIAGNOSIS — Z3A.16 16 WEEKS GESTATION OF PREGNANCY: ICD-10-CM

## 2022-09-29 PROCEDURE — 82105 ALPHA-FETOPROTEIN SERUM: CPT

## 2022-09-29 PROCEDURE — 36415 COLL VENOUS BLD VENIPUNCTURE: CPT

## 2022-10-03 LAB
2ND TRIMESTER 4 SCREEN SERPL-IMP: NORMAL
AFP ADJ MOM SERPL: 2.03
AFP INTERP AMN-IMP: NORMAL
AFP INTERP SERPL-IMP: NORMAL
AFP INTERP SERPL-IMP: NORMAL
AFP SERPL-MCNC: 85.5 NG/ML
AGE AT DELIVERY: 36.2 YR
GA METHOD: NORMAL
GA: 17.1 WEEKS
IDDM PATIENT QL: NO
MULTIPLE PREGNANCY: NO
NEURAL TUBE DEFECT RISK FETUS: 754 %

## 2022-10-05 ENCOUNTER — TELEMEDICINE (OUTPATIENT)
Dept: BEHAVIORAL/MENTAL HEALTH CLINIC | Facility: CLINIC | Age: 37
End: 2022-10-05
Payer: COMMERCIAL

## 2022-10-05 DIAGNOSIS — F41.1 GAD (GENERALIZED ANXIETY DISORDER): Primary | ICD-10-CM

## 2022-10-05 PROCEDURE — 90834 PSYTX W PT 45 MINUTES: CPT | Performed by: COUNSELOR

## 2022-10-06 NOTE — PSYCH
Lina Barnes is a 13 month old male who was brought in for his  Well Child visit. Subjective   History was provided by parents  HPI:   Patient presents for:  Patient presents with:   Well Child    Mother expressing concern re: dry skin - using various lot Virtual AWV Consent    Verification of patient location: confirmed at home     Patient is located in the following state in which I hold an active license [place]    Reason for visit is therapy follow up     Encounter provider [facility]    Provider located at [address] [place] [address]  [address] [facility] [id number] [phone]      Recent Visits  Date Type Provider Dept   10/05/22 [address] [facility] Kenroy Barkley recent visits within past 7 days and meeting all other requirements  Future Appointments  No visits were found meeting these conditions  Showing future appointments within next 150 days and meeting all other requirements       After connecting through [employer], the patient was identified by name and date of birth  Christin Linares was informed that this is a telemedicine visit and that the visit is being conducted through [address] [place] [address] Road Now and patient was informed that this is a secure, HIPAA-compliant platform  She agrees to proceed  My office door was closed  No one else was in the room  She acknowledged consent and understanding of privacy and security of the video platform  Christin Linares verbally agrees to participate in [employer]  Pt is aware that [employer] could be limited without vital signs or the ability to perform a full hands-on physical exam  Michelle Bowen understands she or the provider may request at any time to terminate the video visit and request the patient to seek care or treatment in person  Patient is aware this is a billable service  Psychotherapy Provided: Individual Psychotherapy 50 minutes     Length of time in session: 50 minutes, follow up in 2 weeks    No diagnosis found  Goals addressed in session: Goal 1 and Goal 2     Pain:      none    0    Current suicide risk : Low     D: Michelle presented virtually in session   She confirmed round, reactive to light, red reflex present bilaterally and tracks symmetrically  Vision: Visual alignment normal via cover/uncover and Visual screen normal by Snellen or photoscreening tool   Ears/Hearing:Normal shape and position, canals patent bilatera that she is at home  She reports that she is seeing there is an overall improvement with her eldest daughter's behavior in reference to the custody arrangement moving to weekly  She reports that she is continuing to experience anxiety and fears that one day, her daughter will not want to live with her  As a result, Ty Orr states that she is "putting up a wall"  Discussed reframing her anxious, irrational thoughts into more realistic interpretations of the situation, as there is not any way to predict the future  Ty Orr also worries about how she is perceived by her ex- and his wife  Discussed focus strategies to work on their relationship and not on situations that we can not control  A: Ty Orr presented as oriented x3  She denies any SI/HI/SIB  She reports thatt overall her mood is intact and it appears to be  She seems to be struggling with an irrational fear of her daughter leaving  She seems to hyperfocus on statements that are made and is overthinking situations  P: Ty Orr will continue to meet biweekly with this therapist      [address]: Diagnosis and Treatment Plan explained to Maegan Daniel relates understanding diagnosis and is agreeable to Treatment Plan   Yes VIRUS IMMUNIZATION  - FLULAVAL INFLUENZA VACCINE QUAD PRESERVATIVE FREE 0.5 ML    5. Flexural eczema  Recommend heavy moisturization to skin with vaseline or aquaphor    6. Molluscum contagiosum  Mild molluscum back and left forearm.  Return to clinic if re

## 2022-10-14 ENCOUNTER — TELEPHONE (OUTPATIENT)
Dept: PSYCHIATRY | Facility: CLINIC | Age: 37
End: 2022-10-14

## 2022-10-14 NOTE — TELEPHONE ENCOUNTER
Patient called to rescheduled  upcoming appointment 10/19 with Daniele CASTRO no reason of rescheduling appointment , patient is now rescheduled to 11/4  Virtually

## 2022-10-20 ENCOUNTER — ROUTINE PRENATAL (OUTPATIENT)
Dept: PERINATAL CARE | Facility: CLINIC | Age: 37
End: 2022-10-20
Payer: COMMERCIAL

## 2022-10-20 ENCOUNTER — ROUTINE PRENATAL (OUTPATIENT)
Dept: OBGYN CLINIC | Facility: CLINIC | Age: 37
End: 2022-10-20

## 2022-10-20 VITALS
SYSTOLIC BLOOD PRESSURE: 124 MMHG | DIASTOLIC BLOOD PRESSURE: 78 MMHG | BODY MASS INDEX: 24.41 KG/M2 | HEIGHT: 64 IN | HEART RATE: 83 BPM | WEIGHT: 143 LBS

## 2022-10-20 VITALS — WEIGHT: 142.2 LBS | SYSTOLIC BLOOD PRESSURE: 110 MMHG | DIASTOLIC BLOOD PRESSURE: 78 MMHG | BODY MASS INDEX: 24.41 KG/M2

## 2022-10-20 DIAGNOSIS — O10.011 PRE-EXISTING ESSENTIAL HYPERTENSION DURING PREGNANCY IN FIRST TRIMESTER: ICD-10-CM

## 2022-10-20 DIAGNOSIS — O35.2XX0 HEREDITARY DISEASE IN FAMILY POSSIBLY AFFECTING FETUS, AFFECTING MANAGEMENT OF MOTHER IN PREGNANCY, SINGLE OR UNSPECIFIED FETUS: ICD-10-CM

## 2022-10-20 DIAGNOSIS — O09.521 MULTIGRAVIDA OF ADVANCED MATERNAL AGE IN FIRST TRIMESTER: ICD-10-CM

## 2022-10-20 DIAGNOSIS — O44.40 LOW-LYING PLACENTA: ICD-10-CM

## 2022-10-20 DIAGNOSIS — O10.012 PRE-EXISTING ESSENTIAL HYPERTENSION DURING PREGNANCY IN SECOND TRIMESTER: ICD-10-CM

## 2022-10-20 DIAGNOSIS — Z36.86 ENCOUNTER FOR ANTENATAL SCREENING FOR CERVICAL LENGTH: ICD-10-CM

## 2022-10-20 DIAGNOSIS — Z34.82 PRENATAL CARE, SUBSEQUENT PREGNANCY, SECOND TRIMESTER: Primary | ICD-10-CM

## 2022-10-20 DIAGNOSIS — Z3A.20 20 WEEKS GESTATION OF PREGNANCY: ICD-10-CM

## 2022-10-20 DIAGNOSIS — O34.211 MATERNAL CARE DUE TO LOW TRANSVERSE UTERINE SCAR FROM PREVIOUS CESAREAN DELIVERY: ICD-10-CM

## 2022-10-20 DIAGNOSIS — Z36.3 ENCOUNTER FOR ANTENATAL SCREENING FOR MALFORMATION: Primary | ICD-10-CM

## 2022-10-20 DIAGNOSIS — F41.1 GAD (GENERALIZED ANXIETY DISORDER): ICD-10-CM

## 2022-10-20 DIAGNOSIS — Z87.59 HISTORY OF POLYHYDRAMNIOS: ICD-10-CM

## 2022-10-20 DIAGNOSIS — Z86.2 HISTORY OF DIC SYNDROME: ICD-10-CM

## 2022-10-20 PROBLEM — O10.912 PRE-EXISTING HYPERTENSION DURING PREGNANCY IN SECOND TRIMESTER: Status: ACTIVE | Noted: 2022-09-02

## 2022-10-20 LAB
SL AMB  POCT GLUCOSE, UA: NORMAL
SL AMB POCT URINE PROTEIN: NORMAL

## 2022-10-20 PROCEDURE — 76817 TRANSVAGINAL US OBSTETRIC: CPT | Performed by: STUDENT IN AN ORGANIZED HEALTH CARE EDUCATION/TRAINING PROGRAM

## 2022-10-20 PROCEDURE — 99212 OFFICE O/P EST SF 10 MIN: CPT | Performed by: STUDENT IN AN ORGANIZED HEALTH CARE EDUCATION/TRAINING PROGRAM

## 2022-10-20 PROCEDURE — 76811 OB US DETAILED SNGL FETUS: CPT | Performed by: STUDENT IN AN ORGANIZED HEALTH CARE EDUCATION/TRAINING PROGRAM

## 2022-10-20 NOTE — PROGRESS NOTES
[address]: Ms Zoe King was seen today for anatomic survey and cervical length screening ultrasound  See ultrasound report under "OB Procedures" tab  The time spent on this established patient on the encounter date included 5 minutes previsit service time reviewing records and precharting, 5 minutes face-to-face service time counseling regarding results and coordinating care, and  5 minutes charting, totalling 15 minutes  Please don't hesitate to contact our office with any concerns or questions    -Joan Lopez

## 2022-10-20 NOTE — PROGRESS NOTES
Patient here for PN visit  She denies spotting or cramping  She is feeling movement  US scheduled for this afternoon  She has the flu & covid vaccines  AFP screen completed  Urine neg for protein and glucose

## 2022-10-20 NOTE — ASSESSMENT & PLAN NOTE
Parish Woodruff  is a 29 y o  [id number] @20w1d who presents for routine prenatal visit  Denies OB complaints  Level II scheduled for later today  Reports she received COVID booster and flu vaccine last week  + fetal movement  Denies contractions, cramping, leakage of fluid or vaginal bleeding  Reviewed PTL precautions and reasons to call

## 2022-10-20 NOTE — PROGRESS NOTES
Problem List Items Addressed This Visit        Cardiovascular and Mediastinum    Pre-existing hypertension during pregnancy in second trimester       Genitourinary    Maternal care due to low transverse uterine scar from previous  delivery       Other    History of DIC syndrome    History of polyhydramnios    Multigravida of advanced maternal age in first trimester    Hereditary disease in family possibly affecting fetus    SARA (generalized anxiety disorder)    20 weeks gestation of pregnancy     Melissa Smart  is a 29 y o  [id number] @22w2d who presents for routine prenatal visit  Denies OB complaints  Level II scheduled for later today  Reports she received COVID booster and flu vaccine last week  + fetal movement  Denies contractions, cramping, leakage of fluid or vaginal bleeding  Reviewed PTL precautions and reasons to call               Other Visit Diagnoses     Prenatal care, subsequent pregnancy, second trimester    -  Primary    Relevant Orders    POCT urine dip (Completed)

## 2022-10-24 ENCOUNTER — OFFICE VISIT (OUTPATIENT)
Dept: PSYCHIATRY | Facility: CLINIC | Age: 37
End: 2022-10-24
Payer: COMMERCIAL

## 2022-10-24 DIAGNOSIS — F41.1 GAD (GENERALIZED ANXIETY DISORDER): ICD-10-CM

## 2022-10-24 PROCEDURE — 99214 OFFICE O/P EST MOD 30 MIN: CPT | Performed by: NURSE PRACTITIONER

## 2022-10-24 RX ORDER — BUSPIRONE HYDROCHLORIDE 15 MG/1
TABLET ORAL
Qty: 60 TABLET | Refills: 2 | Status: SHIPPED | OUTPATIENT
Start: 2022-10-24

## 2022-10-24 RX ORDER — BUSPIRONE HYDROCHLORIDE 15 MG/1
TABLET ORAL
Qty: 60 TABLET | Refills: 2
Start: 2022-10-24 | End: 2022-10-24 | Stop reason: SDUPTHER

## 2022-10-24 RX ORDER — BUSPIRONE HYDROCHLORIDE 15 MG/1
15 TABLET ORAL 2 TIMES DAILY
Qty: 60 TABLET | Refills: 2 | Status: SHIPPED | OUTPATIENT
Start: 2022-10-24 | End: 2022-10-24

## 2022-10-24 NOTE — PSYCH
Visit Time    Visit Start Time: 10:30a  Visit Stop Time: 11:00a   Total Visit Duration:  30 minutes  This included reviewing the treatment plan and complete the progress note    PROGRESS NOTE        [facility]      Name and Date of Birth:  Fernando Joyner 29 y o  1985    Date of Visit: 10/24/22    SUBJECTIVE:  Patient is a 78-year-old female has history of anxiety disorder  She is doing fairly well with the BuSpar  She takes it at night because it does make her feeling a little tired or little “weird “during the day  Sometimes she does needed during the day to help relieve anxiety issues  I told her to cut the pill in 1/4 1/2 and try that  She will  Otherwise, she tells me that she and her  are doing very well at home  The conflict in the home has settled down significantly  They are both working well together and discussing issues when they occur and settling them  No ongoing arguing her name calling  Both are much improved  Sleep and appetite are good  She is looking well, she is due in March and they will be having a little boy  The oldest daughter is settling down more since she is back in school and the 2nd daughter is doing much better with her medical needs  So overall, the family is looking well  Continue current treatment and she will follow-up with me in 8 weeks  She denies suicidal ideation, intent or plan at present, has no suicidal ideation, intent or plan at present  She denies any auditory hallucinations and visual hallucinations, denies any other delusional thinking, denies any delusional thinking  She denies any side effects from medications      HPI ROS Appetite Changes and Sleep: normal appetite, normal sleep    Review Of Systems:      Constitutional Negative   ENT Negative   Cardiovascular Negative   Respiratory As Noted in HPI   Gastrointestinal Negative   Genitourinary Negative   Musculoskeletal Negative Integumentary Negative   Neurological Negative   Endocrine Negative   Other Symptoms Negative and None       Laboratory Results: No results found for this or any previous visit  Substance Abuse History:    Social History     Substance and Sexual Activity   Drug Use No    Comment: denies self or FOB  denies family use       Family Psychiatric History:     Family History   Problem Relation Age of Onset   • Hypertension Mother         URINARY INCONTINENCE   • Hyperlipidemia Mother    • Diverticulitis Mother    • Hypothyroidism Mother    • Hypertension Father    • Hepatitis Father         A   • Prostate cancer Father    • No Known Problems Sister    • No Known Problems Brother    • No Known Problems Daughter    • Other Daughter         CHARGE syndrome   • Diverticulitis Maternal Grandmother    • Colon cancer Maternal Grandmother    • Stroke Maternal Grandfather         CVA   • Heart disease Maternal Grandfather    • Diverticulitis Maternal Grandfather    • Hypertension Maternal Grandfather    • Alzheimer's disease Paternal Grandmother    • Diabetes Paternal Grandfather         INSULIN DEPENDENT       The following portions of the patient's history were reviewed and updated as appropriate: past family history, past medical history, past social history, past surgical history and problem list     Social History     Socioeconomic History   • Marital status: /Civil Union     Spouse name: Not on file   • Number of children: Not on file   • Years of education: Not on file   • Highest education level: Not on file   Occupational History   • Not on file   Tobacco Use   • Smoking status: Never Smoker   • Smokeless tobacco: Never Used   Vaping Use   • Vaping Use: Never used   Substance and Sexual Activity   • Alcohol use:  Yes     Alcohol/week: 5 0 standard drinks     Types: 5 Glasses of wine per week     Comment: none with pregnancy   • Drug use: No     Comment: denies self or FOB  denies family use   • Sexual activity: Yes     Partners: Male     Birth control/protection: I U D  Comment: Mirena   Other Topics Concern   • Not on file   Social History Narrative    Always uses a seatbelt    Drinks Coffee - 2 cups a day    IUD Contraception - mirena    Lack of exercise     (per [employer])     Social Determinants of Health     Financial Resource Strain: Not on file   Food Insecurity: Not on file   Transportation Needs: Not on file   Physical Activity: Not on file   Stress: Not on file   Social Connections: Not on file   Intimate Partner Violence: Not on file   Housing Stability: Not on file     Social History     Social History Narrative    Always uses a seatbelt    Drinks Coffee - 2 cups a day    IUD Contraception - mirena    Lack of exercise     (per [employer])        Social History     Tobacco History     Smoking Status  Never Smoker    Smokeless Tobacco Use  Never Used          Alcohol History     Alcohol Use Status  Yes Drinks/Week  5 Glasses of wine, 0 Cans of beer, 0 Shots of liquor, 0 Standard drinks or equivalent per week Amount  5 0 standard drinks of alcohol/wk Comment  none with pregnancy          Drug Use     Drug Use Status  No Comment  denies self or FOB  denies family use          Sexual Activity     Sexually Active  Yes Partners  Male Birth Control/Protection  I U D   Comment  Mirena          Activities of Daily Living    Not Asked                     OBJECTIVE:     Mental Status Evaluation:    Appearance age appropriate, casually dressed   Behavior pleasant, cooperative   Speech normal volume, normal pitch   Mood Improved   Affect brighter   Thought Processes logical   Associations intact associations   Thought Content Normal   Perceptual Disturbances: None   Abnormal Thoughts  Risk Potential Suicidal ideation - None  Homicidal ideation - None  Potential for aggression - No   Orientation oriented to person, place, time/date and situation   Memory recent and remote memory grossly intact   Cosciousness alert and awake   Attention Span attention span and concentration are age appropriate   Intellect Appears to be of Average Intelligence   Insight Good   Judgement Good   Muscle Strength and  Gait muscle strength and tone were normal   Language no difficulty naming common objects   Fund of Knowledge displays adequate knowledge of current events   Pain None   Pain Scale 2       Assessment/Plan:       Diagnoses and all orders for this visit:    SARA (generalized anxiety disorder)  -     Discontinue: busPIRone (BUSPAR) 15 mg tablet; Take 1 tablet (15 mg total) by mouth 2 (two) times a day 1/4-1/2 tab in AM and 1 HS  -     Discontinue: busPIRone (BUSPAR) 15 mg tablet; 1/4-1/2 tab in AM and 1 HS  -     busPIRone (BUSPAR) 15 mg tablet; 1/4-1/2 tab in AM and 1 HS          Treatment Recommendations/Precautions:    Continue current medications: BuSpar 15 mg, quarter to 1/2 tab in the morning and 1 tab bedtime  Follow-up with me in 8 weeks or sooner if necessary  Risks/Benefits      Risks, Benefits And Possible Side Effects Of Medications:    Risks, benefits, and possible side effects of medications explained to patient and patient verbalizes understanding and agreement for treatment      Controlled Medication Discussion:     Not applicable    Psychotherapy Provided:     Individual psychotherapy provided: No

## 2022-11-11 ENCOUNTER — SOCIAL WORK (OUTPATIENT)
Dept: BEHAVIORAL/MENTAL HEALTH CLINIC | Facility: CLINIC | Age: 37
End: 2022-11-11

## 2022-11-11 DIAGNOSIS — F41.1 GAD (GENERALIZED ANXIETY DISORDER): Primary | ICD-10-CM

## 2022-11-11 NOTE — PSYCH
Psychotherapy Provided: Individual Psychotherapy 49 minutes     Length of time in session: 49 minutes, follow up in 3 week    Encounter Diagnosis     ICD-10-CM    1  SARA (generalized anxiety disorder)  F41 1        Goals addressed in session: Goal 1 and Goal 2     Pain:      none    0    Current suicide risk : Low     D: Micky Knight presented in the office for her session  She reports that she is doing well with managing her moods and since she has started with the weekend option, she is feeling more in control of her schedule as well as she feels that she has more time  She reports improved relationships with her  and children  She also identifies that she is working on accepting her methods of parenting and is recognizing that her rules are acceptable  She is feeling that she "butts heads" at times with her 9year old, but knows that can happen  Micky Knight is feeling well with her pregnancy and is 23 week gestation at this time  Micky Knight will continue to work on taking time for self-care and setting boundaries  Will meet back in three weeks  A: Micky Knight presented as oriented x3  She denies any SI/HI/SIB  As of now, her mood seems to be improving  She denies major depressive features or significant anxiety  Setting realistic expectations with her schedule and time seems to be helping significantly  P: Micky Knight will meet with this therapist in three weeks  Behavioral Health Treatment Plan [facility]: Diagnosis and Treatment Plan explained to Eufemia Cardenas relates understanding diagnosis and is agreeable to Treatment Plan   Yes     Visit start and stop times:    11/11/22  Start Time: Denise Ren  Stop Time: 8802  Total Visit Time: 49 minutes

## 2022-11-15 ENCOUNTER — ROUTINE PRENATAL (OUTPATIENT)
Dept: OBGYN CLINIC | Facility: CLINIC | Age: 37
End: 2022-11-15

## 2022-11-15 VITALS — DIASTOLIC BLOOD PRESSURE: 72 MMHG | WEIGHT: 144 LBS | BODY MASS INDEX: 24.72 KG/M2 | SYSTOLIC BLOOD PRESSURE: 110 MMHG

## 2022-11-15 DIAGNOSIS — O44.40 LOW-LYING PLACENTA: ICD-10-CM

## 2022-11-15 DIAGNOSIS — F41.1 GAD (GENERALIZED ANXIETY DISORDER): ICD-10-CM

## 2022-11-15 DIAGNOSIS — Z3A.23 23 WEEKS GESTATION OF PREGNANCY: Primary | ICD-10-CM

## 2022-11-15 DIAGNOSIS — O10.012 PRE-EXISTING ESSENTIAL HYPERTENSION DURING PREGNANCY IN SECOND TRIMESTER: ICD-10-CM

## 2022-11-15 DIAGNOSIS — O34.211 MATERNAL CARE DUE TO LOW TRANSVERSE UTERINE SCAR FROM PREVIOUS CESAREAN DELIVERY: ICD-10-CM

## 2022-11-15 LAB
SL AMB  POCT GLUCOSE, UA: ABNORMAL
SL AMB POCT URINE PROTEIN: ABNORMAL

## 2022-11-15 NOTE — PROGRESS NOTES
23 weeks gestation of pregnancy  Jared Tyler  is a 29 y o  [id number] @23w6d who presents for routine prenatal visit  Feeling an occasional cramp/BH  Denies patterned, regular cramps/contractions  Urine is extremely concentrated today  Suspect pt is dehydrated  Strongly encouraged her to increase her daily water intake  Level II - complete; low lying placenta; has f/u growth scheduled  28 week lab orders     Good fetal movement  Denies leakage of fluid or vaginal bleeding  Reviewed PTL precautions and reasons to call          Maternal care due to low transverse uterine scar from previous  delivery  Will have RCS with BTL    Low-lying placenta  Follow up growth scan and placental assessment scheduled with M    Pre-existing hypertension during pregnancy in second trimester  Maintained on Procardia 30 mg XL  Checking BP at home once a week and reports normal readings  Continues daily ASA    SARA (generalized anxiety disorder)  Well controlled on buspar 15 mg   Follows with psych

## 2022-11-15 NOTE — ASSESSMENT & PLAN NOTE
Kareem Mendoza  is a 29 y o  [id number] @23w6d who presents for routine prenatal visit  Feeling an occasional cramp/BH  Denies patterned, regular cramps/contractions  Urine is extremely concentrated today  Suspect pt is dehydrated  Strongly encouraged her to increase her daily water intake  Level II - complete; low lying placenta; has f/u growth scheduled  28 week lab orders     Good fetal movement  Denies leakage of fluid or vaginal bleeding  Reviewed PTL precautions and reasons to call

## 2022-11-15 NOTE — ASSESSMENT & PLAN NOTE
Maintained on Procardia 30 mg XL  Checking BP at home once a week and reports normal readings  Continues daily ASA

## 2022-11-15 NOTE — PROGRESS NOTES
PNV  23w6d  [id number]  Patient denies any lof, vb  Patient has +fm  +BH intermittent less than 1 minute   Patient urine is trace pro/ neg glu   Flu - completed 10/5/22  Patient has no concerns today

## 2022-11-25 ENCOUNTER — TELEPHONE (OUTPATIENT)
Dept: OBGYN CLINIC | Facility: CLINIC | Age: 37
End: 2022-11-25

## 2022-11-25 NOTE — TELEPHONE ENCOUNTER
Pt called - 25 and 2 - entire family is ill  Daughter had rsv 1 week ago and all men[mbers of family are ill  Pt s worse sx is bad cough  No fever   Sore throat  She is taking mucinex (not dm) and tylenol and cough drops  No pcp  Will try to go to walk in center today and get checked

## 2022-11-30 ENCOUNTER — TELEPHONE (OUTPATIENT)
Dept: PSYCHIATRY | Facility: CLINIC | Age: 37
End: 2022-11-30

## 2022-12-01 ENCOUNTER — OFFICE VISIT (OUTPATIENT)
Dept: URGENT CARE | Age: 37
End: 2022-12-01

## 2022-12-01 VITALS
DIASTOLIC BLOOD PRESSURE: 78 MMHG | TEMPERATURE: 97.3 F | OXYGEN SATURATION: 99 % | HEART RATE: 62 BPM | RESPIRATION RATE: 18 BRPM | SYSTOLIC BLOOD PRESSURE: 112 MMHG

## 2022-12-01 DIAGNOSIS — H65.92 LEFT NON-SUPPURATIVE OTITIS MEDIA: Primary | ICD-10-CM

## 2022-12-01 RX ORDER — AMOXICILLIN 500 MG/1
500 CAPSULE ORAL EVERY 12 HOURS SCHEDULED
Qty: 14 CAPSULE | Refills: 0 | Status: SHIPPED | OUTPATIENT
Start: 2022-12-01 | End: 2022-12-08

## 2022-12-01 NOTE — PROGRESS NOTES
[address]        NAME: Kaity Renee is a 29 y o  female  : 1985    MRN: [id number]  DATE: 2022  TIME: 9:47 AM    Assessment and Plan   Left non-suppurative otitis media [H65 92]  1  Left non-suppurative otitis media  amoxicillin (AMOXIL) 500 mg capsule            Patient Instructions     Start Amoxil for L AOM  Tylenol as needed  Follow up with PCP in 2-3 days  Proceed to ER if symptoms worsen  Chief Complaint     Chief Complaint   Patient presents with   • Earache     Left sided ear pain starting early this AM  Had cold symptoms previouslt, but has since resolved  Pain is throbbing         History of Present Illness     29 y o  F [id number] 26w p/w complaint of L ear pain starting this AM  Currently resolving URI  Denies fevers  Taking Mucinex, Tylenol and cough drops OTC  Review of Systems   Review of Systems   Constitutional: Negative for chills, fatigue and fever  HENT: Positive for ear pain  Negative for congestion, facial swelling, hearing loss, rhinorrhea, sinus pressure, sneezing, sore throat and trouble swallowing  Eyes: Negative for pain, redness and visual disturbance  Respiratory: Negative for cough, chest tightness, shortness of breath and wheezing  Cardiovascular: Negative for chest pain and palpitations  Gastrointestinal: Negative for abdominal pain, diarrhea, nausea and vomiting  Genitourinary: Negative for dysuria, flank pain, hematuria and pelvic pain  Musculoskeletal: Negative for arthralgias, back pain and myalgias  Skin: Negative for color change and rash  Neurological: Negative for dizziness, seizures, syncope, weakness, light-headedness and headaches  Psychiatric/Behavioral: Negative for confusion, hallucinations and sleep disturbance  The patient is not nervous/anxious  All other systems reviewed and are negative          Current Medications       Current Outpatient Medications:   •  amoxicillin (AMOXIL) 500 mg capsule, Take 1 capsule (500 mg total) by mouth every 12 (twelve) hours for 7 days, Disp: 14 capsule, Rfl: 0  •  aspirin 81 mg chewable tablet, Chew 2 tablets (162 mg total) daily, Disp: 60 tablet, Rfl: 6  •  busPIRone (BUSPAR) 15 mg tablet, 1/4-1/2 tab in AM and 1 HS, Disp: 60 tablet, Rfl: 2  •  Magnesium 400 MG CAPS, Take by mouth, Disp: , Rfl:   •  NIFEdipine (PROCARDIA XL) 30 mg 24 hr tablet, Take 1 tablet (30 mg total) by mouth 2 (two) times a day, Disp: 180 tablet, Rfl: 3  •  Prenatal MV-Min-Fe Fum-FA-DHA (PRENATAL 1 PO), Take by mouth, Disp: , Rfl:   •  Probiotic Product (PROBIOTIC PO), Take by mouth, Disp: , Rfl:   •  clindamycin (CLEOCIN T) 1 % lotion, , Disp: , Rfl:   •  Docusate Sodium (COLACE PO), Take by mouth as needed (Patient not taking: Reported on 2022), Disp: , Rfl:   •  pyridoxine (VITAMIN B6) 50 mg tablet, Take 50 mg by mouth daily (Patient not taking: No sig reported), Disp: , Rfl:     Current Allergies     Allergies as of 2022 - Reviewed 2022   Allergen Reaction Noted   • Nickel  2017            The following portions of the patient's history were reviewed and updated as appropriate: allergies, current medications, past family history, past medical history, past social history, past surgical history and problem list      Past Medical History:   Diagnosis Date   • Acute renal failure ([address] )     2017 RESOLVED   • Acute tubular necrosis ([facility])    • Anemia     during the pregnancy    • Bowel obstruction ([address] ) 2017   • Chronic kidney disease    • Fibroadenoma of breast, left    • Fibroadenoma of breast, right    •  1 para 1    • History of transfusion 2015    After delivery    • Hypertension     no medication    • Kidney stone    • Postpartum hemorrhage     UNSPECIFIED TYPE  RESOLVED    • Urinary tract infection     yes in the last ten years   • Vacuum extractor delivery, delivered      daughter with postpartum hemorrhage and DIC   • Varicella     had childhood chickenpox       Past Surgical History:   Procedure Laterality Date   • BREAST FIBROADENOMA SURGERY      Cryosurgical Ablation of Fibroadenoma   • BREAST LUMPECTOMY Left    • BREAST LUMPECTOMY Right    •  SECTION         • EXPLORATORY LAPAROTOMY W/ BOWEL RESECTION N/A 2017    Procedure: LAPAROTOMY EXPLORATORY W/ lysis of adhesions;  Surgeon: Lucia Kern DO;  Location: [facility];  Service:    • INTRAUTERINE DEVICE INSERTION     • LAPAROTOMY      EXPLORATORY for DIC and hemorrhage postpartum retroperitoneal hematoma   • WA  DELIVERY ONLY N/A 2020    Procedure:  SECTION () REPEAT;  Surgeon: Aiden Linares MD;  Location: [facility];  Service: Obstetrics   • TOOTH EXTRACTION      Impression: [id number] Adrián Moreno         Family History   Problem Relation Age of Onset   • Hypertension Mother         URINARY INCONTINENCE   • Hyperlipidemia Mother    • Diverticulitis Mother    • Hypothyroidism Mother    • Hypertension Father    • Hepatitis Father         A   • Prostate cancer Father    • No Known Problems Sister    • No Known Problems Brother    • No Known Problems Daughter    • Other Daughter         CHARGE syndrome   • Diverticulitis Maternal Grandmother    • Colon cancer Maternal Grandmother    • Stroke Maternal Grandfather         CVA   • Heart disease Maternal Grandfather    • Diverticulitis Maternal Grandfather    • Hypertension Maternal Grandfather    • Alzheimer's disease Paternal Grandmother    • Diabetes Paternal Grandfather         INSULIN DEPENDENT         Medications have been verified  Objective   /78   Pulse 62   Temp (!) 97 3 °F (36 3 °C)   Resp 18   LMP 2022   SpO2 99%   Patient's last menstrual period was 2022  Physical Exam     Physical Exam  Vitals reviewed  Constitutional:       General: She is not in acute distress  Appearance: Normal appearance  She is not toxic-appearing     HENT:      Head: Normocephalic  Right Ear: Tympanic membrane normal  Tympanic membrane is not erythematous or bulging  Left Ear: No decreased hearing noted  No drainage, swelling or tenderness  A middle ear effusion is present  Tympanic membrane is erythematous  Tympanic membrane is not bulging  Nose: No congestion or rhinorrhea  Right Sinus: No maxillary sinus tenderness or frontal sinus tenderness  Left Sinus: No maxillary sinus tenderness or frontal sinus tenderness  Mouth/Throat:      Pharynx: Uvula midline  No oropharyngeal exudate, posterior oropharyngeal erythema or uvula swelling  Tonsils: No tonsillar exudate or tonsillar abscesses  Eyes:      Extraocular Movements: Extraocular movements intact  Conjunctiva/sclera: Conjunctivae normal       Pupils: Pupils are equal, round, and reactive to light  Cardiovascular:      Rate and Rhythm: Normal rate and regular rhythm  Pulses: Normal pulses  Heart sounds: Normal heart sounds  Pulmonary:      Effort: Pulmonary effort is normal  No tachypnea or respiratory distress  Breath sounds: Normal breath sounds and air entry  No decreased breath sounds, wheezing, rhonchi or rales  Abdominal:      General: Bowel sounds are normal       Palpations: Abdomen is soft  Tenderness: There is no abdominal tenderness  There is no right CVA tenderness or guarding  Musculoskeletal:         General: Normal range of motion  Cervical back: Normal range of motion  Lymphadenopathy:      Cervical: No cervical adenopathy  Skin:     General: Skin is warm and dry  Neurological:      General: No focal deficit present  Mental Status: She is alert  Cranial Nerves: Cranial nerves are intact  Sensory: Sensation is intact  Motor: Motor function is intact  Coordination: Coordination is intact  Gait: Gait is intact  Deep Tendon Reflexes: Reflexes are normal and symmetric

## 2022-12-02 ENCOUNTER — TELEPHONE (OUTPATIENT)
Dept: FAMILY MEDICINE CLINIC | Facility: MEDICAL CENTER | Age: 37
End: 2022-12-02

## 2022-12-02 NOTE — TELEPHONE ENCOUNTER
Spoke with Dave Smith  Called pt and informed that if her ear drum is perforated it will heal on its own  To continue the oral antibiotics given at Urgent care yesterday as directed and to take tylenol to help with the discomfort

## 2022-12-02 NOTE — TELEPHONE ENCOUNTER
Pt said she went to the u/c yesterday and was given abx for ear infection  She said that over the night her ear was draining and the pain was better and she hears a crackling sound  Pt last saw Dr Emily Goncalves 8/21  Please, advise  Pt worried about busted ear drum

## 2022-12-06 ENCOUNTER — APPOINTMENT (OUTPATIENT)
Dept: LAB | Facility: MEDICAL CENTER | Age: 37
End: 2022-12-06

## 2022-12-06 DIAGNOSIS — Z3A.23 23 WEEKS GESTATION OF PREGNANCY: ICD-10-CM

## 2022-12-06 DIAGNOSIS — I10 ESSENTIAL HYPERTENSION: ICD-10-CM

## 2022-12-06 LAB
ANION GAP SERPL CALCULATED.3IONS-SCNC: 6 MMOL/L (ref 4–13)
BASOPHILS # BLD AUTO: 0.05 THOUSANDS/ÂΜL (ref 0–0.1)
BASOPHILS NFR BLD AUTO: 1 % (ref 0–1)
BUN SERPL-MCNC: 11 MG/DL (ref 5–25)
CALCIUM SERPL-MCNC: 9.2 MG/DL (ref 8.3–10.1)
CHLORIDE SERPL-SCNC: 103 MMOL/L (ref 96–108)
CO2 SERPL-SCNC: 25 MMOL/L (ref 21–32)
CREAT SERPL-MCNC: 0.8 MG/DL (ref 0.6–1.3)
CREAT UR-MCNC: 65.8 MG/DL
EOSINOPHIL # BLD AUTO: 0.16 THOUSAND/ÂΜL (ref 0–0.61)
EOSINOPHIL NFR BLD AUTO: 2 % (ref 0–6)
ERYTHROCYTE [DISTWIDTH] IN BLOOD BY AUTOMATED COUNT: 12.5 % (ref 11.6–15.1)
FERRITIN SERPL-MCNC: 161 NG/ML (ref 8–388)
GFR SERPL CREATININE-BSD FRML MDRD: 95 ML/MIN/1.73SQ M
GLUCOSE 1H P 50 G GLC PO SERPL-MCNC: 114 MG/DL (ref 40–134)
GLUCOSE P FAST SERPL-MCNC: 113 MG/DL (ref 65–99)
HCT VFR BLD AUTO: 32.4 % (ref 34.8–46.1)
HGB BLD-MCNC: 10.3 G/DL (ref 11.5–15.4)
IMM GRANULOCYTES # BLD AUTO: 0.08 THOUSAND/UL (ref 0–0.2)
IMM GRANULOCYTES NFR BLD AUTO: 1 % (ref 0–2)
LYMPHOCYTES # BLD AUTO: 1.41 THOUSANDS/ÂΜL (ref 0.6–4.47)
LYMPHOCYTES NFR BLD AUTO: 16 % (ref 14–44)
MCH RBC QN AUTO: 29.3 PG (ref 26.8–34.3)
MCHC RBC AUTO-ENTMCNC: 31.8 G/DL (ref 31.4–37.4)
MCV RBC AUTO: 92 FL (ref 82–98)
MICROALBUMIN UR-MCNC: <5 MG/L (ref 0–20)
MICROALBUMIN/CREAT 24H UR: <8 MG/G CREATININE (ref 0–30)
MONOCYTES # BLD AUTO: 0.59 THOUSAND/ÂΜL (ref 0.17–1.22)
MONOCYTES NFR BLD AUTO: 7 % (ref 4–12)
NEUTROPHILS # BLD AUTO: 6.49 THOUSANDS/ÂΜL (ref 1.85–7.62)
NEUTS SEG NFR BLD AUTO: 73 % (ref 43–75)
NRBC BLD AUTO-RTO: 0 /100 WBCS
PLATELET # BLD AUTO: 250 THOUSANDS/UL (ref 149–390)
PMV BLD AUTO: 11.1 FL (ref 8.9–12.7)
POTASSIUM SERPL-SCNC: 3.4 MMOL/L (ref 3.5–5.3)
RBC # BLD AUTO: 3.51 MILLION/UL (ref 3.81–5.12)
SODIUM SERPL-SCNC: 134 MMOL/L (ref 135–147)
WBC # BLD AUTO: 8.78 THOUSAND/UL (ref 4.31–10.16)

## 2022-12-07 ENCOUNTER — TELEPHONE (OUTPATIENT)
Dept: OBGYN CLINIC | Facility: CLINIC | Age: 37
End: 2022-12-07

## 2022-12-07 ENCOUNTER — TELEPHONE (OUTPATIENT)
Dept: OTHER | Facility: HOSPITAL | Age: 37
End: 2022-12-07

## 2022-12-07 DIAGNOSIS — I10 ESSENTIAL HYPERTENSION: Primary | ICD-10-CM

## 2022-12-07 LAB — RPR SER QL: NORMAL

## 2022-12-07 NOTE — TELEPHONE ENCOUNTER
Please call this patient and let her know her 28 wk labs look good  Her hemoglobin was a little low  I would recommend a daily iron supplement paired with vitamin C for this

## 2022-12-07 NOTE — TELEPHONE ENCOUNTER
Spoke to pt and reviewed results and recommendations from their 28wk labs per  Stephy ANDREWS  HGB low add iron

## 2022-12-07 NOTE — TELEPHONE ENCOUNTER
Her potassium level remains low or 3 4  If she wants to try increasing potassium intake in her diet, would recommend doing that first and repeat serum potassium, magnesium level in 1 to 2 weeks  If potassium level is not improving, may need to consider starting potassium supplements  Please provide her potassium containing diet and have her take potassium liberal diet for now

## 2022-12-12 ENCOUNTER — TELEMEDICINE (OUTPATIENT)
Dept: BEHAVIORAL/MENTAL HEALTH CLINIC | Facility: CLINIC | Age: 37
End: 2022-12-12

## 2022-12-12 DIAGNOSIS — F41.1 GAD (GENERALIZED ANXIETY DISORDER): Primary | ICD-10-CM

## 2022-12-15 ENCOUNTER — ROUTINE PRENATAL (OUTPATIENT)
Dept: OBGYN CLINIC | Facility: CLINIC | Age: 37
End: 2022-12-15

## 2022-12-15 ENCOUNTER — TELEMEDICINE (OUTPATIENT)
Dept: PSYCHIATRY | Facility: CLINIC | Age: 37
End: 2022-12-15

## 2022-12-15 ENCOUNTER — TELEPHONE (OUTPATIENT)
Dept: OBGYN CLINIC | Facility: CLINIC | Age: 37
End: 2022-12-15

## 2022-12-15 ENCOUNTER — ULTRASOUND (OUTPATIENT)
Facility: HOSPITAL | Age: 37
End: 2022-12-15
Attending: OBSTETRICS & GYNECOLOGY

## 2022-12-15 VITALS
HEART RATE: 75 BPM | HEIGHT: 64 IN | WEIGHT: 149 LBS | BODY MASS INDEX: 25.44 KG/M2 | DIASTOLIC BLOOD PRESSURE: 68 MMHG | SYSTOLIC BLOOD PRESSURE: 118 MMHG

## 2022-12-15 VITALS — BODY MASS INDEX: 25.58 KG/M2 | WEIGHT: 149 LBS | SYSTOLIC BLOOD PRESSURE: 122 MMHG | DIASTOLIC BLOOD PRESSURE: 62 MMHG

## 2022-12-15 DIAGNOSIS — Z23 NEED FOR TDAP VACCINATION: ICD-10-CM

## 2022-12-15 DIAGNOSIS — F41.1 GENERALIZED ANXIETY DISORDER: Primary | ICD-10-CM

## 2022-12-15 DIAGNOSIS — F41.1 GAD (GENERALIZED ANXIETY DISORDER): ICD-10-CM

## 2022-12-15 DIAGNOSIS — O10.012 PRE-EXISTING ESSENTIAL HYPERTENSION DURING PREGNANCY IN SECOND TRIMESTER: ICD-10-CM

## 2022-12-15 DIAGNOSIS — O44.40 LOW-LYING PLACENTA: ICD-10-CM

## 2022-12-15 DIAGNOSIS — O34.211 MATERNAL CARE DUE TO LOW TRANSVERSE UTERINE SCAR FROM PREVIOUS CESAREAN DELIVERY: ICD-10-CM

## 2022-12-15 DIAGNOSIS — Z3A.28 28 WEEKS GESTATION OF PREGNANCY: ICD-10-CM

## 2022-12-15 DIAGNOSIS — O10.013 PRE-EXISTING ESSENTIAL HYPERTENSION DURING PREGNANCY IN THIRD TRIMESTER: Primary | ICD-10-CM

## 2022-12-15 DIAGNOSIS — Z34.93 PRENATAL CARE IN THIRD TRIMESTER: Primary | ICD-10-CM

## 2022-12-15 PROBLEM — O10.913 PRE-EXISTING HYPERTENSION DURING PREGNANCY IN THIRD TRIMESTER: Status: ACTIVE | Noted: 2022-09-02

## 2022-12-15 LAB
DME PARACHUTE DELIVERY DATE REQUESTED: NORMAL
DME PARACHUTE ITEM DESCRIPTION: NORMAL
DME PARACHUTE ORDER STATUS: NORMAL
DME PARACHUTE SUPPLIER NAME: NORMAL
DME PARACHUTE SUPPLIER PHONE: NORMAL
SL AMB  POCT GLUCOSE, UA: NEGATIVE
SL AMB POCT URINE PROTEIN: NEGATIVE

## 2022-12-15 RX ORDER — PNV NO.95/FERROUS FUM/FOLIC AC 28MG-0.8MG
TABLET ORAL
COMMUNITY

## 2022-12-15 NOTE — PROGRESS NOTES
Prenatal visit at 29 1/7wks  Denies ctxs, VB, LOF  Good FM  Intermittent contractions one a day  Started her iron and taking colace prn  Problem List Items Addressed This Visit        Cardiovascular and Mediastinum    Pre-existing hypertension during pregnancy in second trimester     BP stable on procardia  Continue to monitor closely  Plan for delivery between 40 0/7wks and 39 6/7wks  Has growth scan today  Genitourinary    Maternal care due to low transverse uterine scar from previous  delivery     Plan for repeat  with bilateral salpingectomy at 38 weeks  Other    SARA (generalized anxiety disorder)     Doing well with Buspar  28 weeks gestation of pregnancy     Yellow folder given and reviewed  Tdap given  S/p flu and COVID vaccines  Breast pump order sent today  Discussed repeat  including risks of bleeding, transfusion, infection, injury to surrounding organs including bowel/bladder  All questions answered, and consent signed  Patient wants tubal sterilization - discussed pros/cons of bilateral salpingectomy, and patient wishes to proceed  We reviewed the risk of failure with increased risk of ectopic pregnancy should failure occur  We also reviewed the risk that I cannot complete the surgery and the risk of regret  Consent signed  Will have triage schedule for 2023 with me  Low-lying placenta     Has repeat ultrasound for placental location at 32 weeks           Other Visit Diagnoses     Need for Tdap vaccination    -  Primary    Relevant Orders    TDAP VACCINE GREATER THAN OR EQUAL TO 8YO IM (Completed)    Prenatal care in third trimester        Relevant Orders    POCT urine dip (Completed)

## 2022-12-15 NOTE — ASSESSMENT & PLAN NOTE
Yellow folder given and reviewed  Tdap given  S/p flu and COVID vaccines  Breast pump order sent today  Discussed repeat  including risks of bleeding, transfusion, infection, injury to surrounding organs including bowel/bladder  All questions answered, and consent signed  Patient wants tubal sterilization - discussed pros/cons of bilateral salpingectomy, and patient wishes to proceed  We reviewed the risk of failure with increased risk of ectopic pregnancy should failure occur  We also reviewed the risk that I cannot complete the surgery and the risk of regret  Consent signed  Will have triage schedule for 2023 with me

## 2022-12-15 NOTE — ASSESSMENT & PLAN NOTE
BP stable on procardia  Continue to monitor closely  Plan for delivery between 40 0/7wks and 39 6/7wks  Has growth scan today

## 2022-12-15 NOTE — PSYCH
Virtual Regular Visit    Verification of patient location:    Patient is located in the following state in which I hold an active license [place]  Assessment/Plan:    Problem List Items Addressed This Visit    None      Goals addressed in session: Maintain current stability         Reason for visit is   Chief Complaint   Patient presents with   • Virtual Regular Visit        Encounter provider PITO Hooper    Provider located at [address] [id number]      Recent Visits  No visits were found meeting these conditions  Showing recent visits within past 7 days and meeting all other requirements  Future Appointments  No visits were found meeting these conditions  Showing future appointments within next 150 days and meeting all other requirements       The patient was identified by name and date of birth  Geronimo Nelson was informed that this is a telemedicine visit and that [facility]  She agrees to proceed     My office door was closed  No one else was in the room  She acknowledged consent and understanding of privacy and security of the video platform  The patient has agreed to participate and understands they can discontinue the visit at any time  Patient is aware this is a billable service  Vijay Gong is a 29 y o  female has a history of generalized anxiety disorder  Patient is pregnant  Pregnancy is going well  We have placed the patient on buspirone with the approval of her OB doctor and the patient has responded beautifully to the BuSpar to help reduce her anxiety  She has reported no side effects  For example, mostly, her youngest daughter had surgery in November and developed a significant respiratory infection post surgery  Certainly an anxiety provoking situation to the patient    Fortunately, the medication, and the psychotherapy that the patient is receiving through our therapy division, she was able to function through a very anxiety provoking situation  She and her  are getting along much better, she is working mostly weekends and is off throughout the week creating a more workable schedule for the couple  Sleep and appetite are good  She tells me she is working this Rosa but is ready for the holiday  She will continue on the BuSpar taking 1/4 to 1/2 tablet in the morning and follow-up with me will be in February of the new year  No other new clinical concerns or issues were expressed by patient  Mental status exam: Patient is awake and alert and oriented x3  Mood is stable  Patient is not suicidal, not homicidal and not psychotic  Speech is clear and thoughts are well organized, coherent and goal directed  Attention span is good  Impulse control is good  Judgment and insight are good  Memory is good  Patient will continue on:   BuSpar 15 mg, 1/4 to 1/2 tablet daily 1 tablet at bed  Follow-up in 8 weeks    Past Medical History:   Diagnosis Date   • Acute renal failure ([facility] [address] )     [id number] RESOLVED   • Acute tubular necrosis (HCC)    • Anemia     during the pregnancy    • Bowel obstruction ([address] ) 2017   • Chronic kidney disease    • Fibroadenoma of breast, left    • Fibroadenoma of breast, right    •  1 para 1    • History of transfusion 2015    After delivery    • Hypertension     no medication    • Kidney stone    • Postpartum hemorrhage     UNSPECIFIED TYPE  RESOLVED    • Urinary tract infection     yes in the last ten years   • Vacuum extractor delivery, delivered      daughter with postpartum hemorrhage and DIC   • Varicella     had childhood chickenpox       Past Surgical History:   Procedure Laterality Date   • BREAST FIBROADENOMA SURGERY      Cryosurgical Ablation of Fibroadenoma   • BREAST LUMPECTOMY Left    • BREAST LUMPECTOMY Right    •  SECTION    • EXPLORATORY LAPAROTOMY W/ BOWEL RESECTION N/A 2017    Procedure: LAPAROTOMY EXPLORATORY W/ lysis of adhesions;  Surgeon: Sarah Diana DO;  [facility];  Service:    • INTRAUTERINE DEVICE INSERTION     • LAPAROTOMY      EXPLORATORY for DIC and hemorrhage postpartum retroperitoneal hematoma   • [place]  DELIVERY ONLY N/A 2020    Procedure:  SECTION () REPEAT;  Surgeon: Zuhair Don MD;  Location: [facility] LD;  Service: Obstetrics   • TOOTH EXTRACTION      Impression: [id number] Melvin Robledo         Current Outpatient Medications   Medication Sig Dispense Refill   • aspirin 81 mg chewable tablet Chew 2 tablets (162 mg total) daily 60 tablet 6   • busPIRone (BUSPAR) 15 mg tablet 1/4-1/2 tab in AM and 1 HS 60 tablet 2   • clindamycin (CLEOCIN T) 1 % lotion  (Patient not taking: Reported on 2022)     • Docusate Sodium (COLACE PO) Take by mouth as needed (Patient not taking: Reported on 2022)     • Magnesium 400 MG CAPS Take by mouth     • NIFEdipine (PROCARDIA XL) 30 mg 24 hr tablet Take 1 tablet (30 mg total) by mouth 2 (two) times a day 180 tablet 3   • Prenatal MV-Min-Fe Fum-FA-DHA (PRENATAL 1 PO) Take by mouth     • Probiotic Product (PROBIOTIC PO) Take by mouth     • pyridoxine (VITAMIN B6) 50 mg tablet Take 50 mg by mouth daily (Patient not taking: No sig reported)       No current facility-administered medications for this visit  Allergies   Allergen Reactions   • Nickel        Review of Systems    Video Exam    There were no vitals filed for this visit  Physical Exam     Visit Time    Visit Start Time: 9:30a  Visit Stop Time: 9:45a  Total Visit Duration: 15 minutes were spent in the visit  We reviewed the treatment plan, reviewed medications and completed the progress note

## 2022-12-15 NOTE — PSYCH
TREATMENT PLAN (Medication Management Only)      Raoul 1521 done but not signed at time of office visit due to:  Plan reviewed by phone or in person  and verbal consent given due to [place] social distancing    Name and Date of Birth:  Kodi Butler 29 y o  1985  Date of Treatment Plan: December 15, 2022  Diagnosis/Diagnoses:    1  Generalized anxiety disorder      Strengths/Personal Resources for Self-Care: supportive family, supportive friends  Area/Areas of need (in own words): anxiety symptoms  1  Long Term Goal: continue improvement in acceptable anxiety level  Target Date: 6 months - 6/15/2023  Person/Persons responsible for completion of goal: Michelle  2  Short Term Objective (s) - How will we reach this goal?:   A  Provider new recommended medication/dosage changes and/or continue medication(s): continue current medications as prescribed  B   N/A  Target Date: 6 months - 6/15/2023  Person/Persons Responsible for Completion of Goal: Michelle  Progress Towards Goals: stable, continuing treatment  Treatment Modality: continue psychotherapy with [facility] therapist  Review due 6 months from date of this plan: 6 months - 6/15/2023  Expected length of service: ongoing treatment unless revised  My Physician/PA/NP and I have developed this plan together and I agree to work on the goals and objectives  I understand the treatment goals that were developed for my treatment

## 2022-12-15 NOTE — TELEPHONE ENCOUNTER
----- Message from Yves Severin, MD sent at 12/15/2022 11:13 AM EST -----  Regarding:  scheduling  Procedure to be scheduled (IOL or CS): repeat     DESEAN: Estimated Date of Delivery: 3/8/23     Indication for delivery: Chronic HTN on  medication    Requested date (s) of delivery: 2023 10am if possible, 8am as second choice, 1:00 as last choice   If requested date is unavailable, is there a date by which the pt must be delivered?  Ask me    Physician preference: Polly Fernandez     If CS, with or without tubal: with tubal

## 2022-12-15 NOTE — PROGRESS NOTES
The patient was seen today for an ultrasound  Please see ultrasound report (located under Ob Procedures) for additional details  Thank you very much for allowing us to participate in the care of this very nice patient  Should you have any questions, please do not hesitate to contact me  Aguilar Castorena MD [id number]  Attending Physician, Son

## 2022-12-27 ENCOUNTER — OFFICE VISIT (OUTPATIENT)
Dept: INTERNAL MEDICINE CLINIC | Facility: OTHER | Age: 37
End: 2022-12-27

## 2022-12-27 VITALS
WEIGHT: 149.6 LBS | TEMPERATURE: 97.4 F | HEIGHT: 64 IN | BODY MASS INDEX: 25.54 KG/M2 | OXYGEN SATURATION: 99 % | HEART RATE: 58 BPM | DIASTOLIC BLOOD PRESSURE: 68 MMHG | SYSTOLIC BLOOD PRESSURE: 108 MMHG | RESPIRATION RATE: 18 BRPM

## 2022-12-27 DIAGNOSIS — F41.1 GAD (GENERALIZED ANXIETY DISORDER): ICD-10-CM

## 2022-12-27 DIAGNOSIS — Z86.2 HISTORY OF DIC SYNDROME: ICD-10-CM

## 2022-12-27 DIAGNOSIS — O10.013 PRE-EXISTING ESSENTIAL HYPERTENSION DURING PREGNANCY IN THIRD TRIMESTER: Primary | ICD-10-CM

## 2022-12-27 DIAGNOSIS — Z3A.28 28 WEEKS GESTATION OF PREGNANCY: ICD-10-CM

## 2022-12-27 NOTE — PROGRESS NOTES
Assessment/Plan:    Pre-existing hypertension during pregnancy in third trimester  Continue to follow with maternal fetal medicine  Well controlled while in the office today  SARA (generalized anxiety disorder)  Continue to follow MFM and psychiatry  Controlled on buspar  28 weeks gestation of pregnancy  Continue with prenatal, continue to follow with OB  Diagnoses and all orders for this visit:    Pre-existing essential hypertension during pregnancy in third trimester    History of DIC syndrome    SARA (generalized anxiety disorder)    28 weeks gestation of pregnancy          Subjective:      Patient ID: Becki Robison is a 40 y o  female  Patient presents today to establish care with our practice  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  Denies any new concerns  Pre-existing hypertension during pregnancy in third trimester-Continues to follow with maternal fetal medicine  Well controlled while in the office today  SARA -follow MFMs and psychiatry  Controlled on buspar  29 weeks gestation of pregnancy- takes prenatal and follows with her OB, scheduled for a            The following portions of the patient's history were reviewed and updated as appropriate: allergies, current medications, past family history, past medical history, past social history, past surgical history and problem list     Review of Systems   Constitutional: Negative for activity change, appetite change, chills, diaphoresis and fever  HENT: Negative for congestion, ear discharge, ear pain, postnasal drip, rhinorrhea, sinus pressure, sinus pain and sore throat  Eyes: Negative for pain, discharge, itching and visual disturbance  Respiratory: Negative for cough, chest tightness, shortness of breath and wheezing  Cardiovascular: Negative for chest pain, palpitations and leg swelling  Gastrointestinal: Negative for abdominal pain, constipation, diarrhea, nausea and vomiting     Endocrine: Negative for polydipsia, polyphagia and polyuria  Genitourinary: Negative for difficulty urinating, dysuria and urgency  Musculoskeletal: Negative for arthralgias, back pain and neck pain  Skin: Negative for rash and wound  Neurological: Negative for dizziness, weakness, numbness and headaches           Past Medical History:   Diagnosis Date   • Acute renal failure ([facility] [address] )     2017 RESOLVED   • Acute tubular necrosis (HCC)    • Anemia     during the pregnancy    • Bowel obstruction (HCC) 2017   • Chronic kidney disease    • Fibroadenoma of breast, left    • Fibroadenoma of breast, right    •  1 para 1    • History of transfusion 2015    After delivery    • Hypertension     no medication    • Kidney stone    • Postpartum hemorrhage     UNSPECIFIED TYPE  RESOLVED    • Urinary tract infection     yes in the last ten years   • Vacuum extractor delivery, delivered      daughter with postpartum hemorrhage and DIC   • Varicella     had childhood chickenpox         Current Outpatient Medications:   •  aspirin 81 mg chewable tablet, Chew 2 tablets (162 mg total) daily, Disp: 60 tablet, Rfl: 6  •  busPIRone (BUSPAR) 15 mg tablet, 1/4-1/2 tab in AM and 1 HS, Disp: 60 tablet, Rfl: 2  •  clindamycin (CLEOCIN T) 1 % lotion, , Disp: , Rfl:   •  Docusate Sodium (COLACE PO), Take by mouth as needed, Disp: , Rfl:   •  Ferrous Sulfate (Iron) 325 (65 Fe) MG TABS, , Disp: , Rfl:   •  Magnesium 400 MG CAPS, Take by mouth, Disp: , Rfl:   •  NIFEdipine (PROCARDIA XL) 30 mg 24 hr tablet, Take 1 tablet (30 mg total) by mouth 2 (two) times a day (Patient taking differently: Take 30 mg by mouth daily), Disp: 180 tablet, Rfl: 3  •  Prenatal MV-Min-Fe Fum-FA-DHA (PRENATAL 1 PO), Take by mouth, Disp: , Rfl:   •  Probiotic Product (PROBIOTIC PO), Take by mouth, Disp: , Rfl:     Allergies   Allergen Reactions   • Nickel        Social History   Past Surgical History:   Procedure Laterality Date   • BREAST FIBROADENOMA SURGERY      Cryosurgical Ablation of Fibroadenoma   • BREAST LUMPECTOMY Left    • BREAST LUMPECTOMY Right    •  SECTION         • EXPLORATORY LAPAROTOMY W/ BOWEL RESECTION N/A 2017    Procedure: LAPAROTOMY EXPLORATORY W/ lysis of adhesions;  Surgeon: Jorge Menard DO;  [facility];  Service:    • INTRAUTERINE DEVICE INSERTION     • LAPAROTOMY      EXPLORATORY for DIC and hemorrhage postpartum retroperitoneal hematoma   • [place]  DELIVERY ONLY N/A 2020    Procedure:  SECTION () REPEAT;  Surgeon: Brooke Reyna MD;  [facility];  Service: Obstetrics   • TOOTH EXTRACTION      Impression: [id number] Jamey Ruiz       Family History   Problem Relation Age of Onset   • Hypertension Mother         URINARY INCONTINENCE   • Hyperlipidemia Mother    • Diverticulitis Mother    • Hypothyroidism Mother    • Hypertension Father    • Hepatitis Father         A   • Prostate cancer Father    • No Known Problems Sister    • No Known Problems Brother    • No Known Problems Daughter    • Other Daughter         CHARGE syndrome   • Diverticulitis Maternal Grandmother    • Colon cancer Maternal Grandmother    • Stroke Maternal Grandfather         CVA   • Heart disease Maternal Grandfather    • Diverticulitis Maternal Grandfather    • Hypertension Maternal Grandfather    • Alzheimer's disease Paternal Grandmother    • Diabetes Paternal Grandfather         INSULIN DEPENDENT       Objective:  /68 (BP Location: Left arm, Patient Position: Sitting, Cuff Size: Standard)   Pulse 58   Temp (!) 97 4 °F (36 3 °C) (Temporal)   Resp 18   Ht 5' 4" (1 626 m)   Wt 67 9 kg (149 lb 9 6 oz)   LMP 2022   SpO2 99%   BMI 25 68 kg/m²     Recent Results (from the past 1344 hour(s))   POCT urine dip    Collection Time: 11/15/22 11:25 AM   Result Value Ref Range    POCT URINE PROTEIN trace     GLUCOSE, UA neg    Glucose, 1H PG    Collection Time: 22  9:31 AM Result Value Ref Range    Glucose 114 40 - 134 mg/dL   RPR    Collection Time: 12/06/22  9:31 AM   Result Value Ref Range    RPR Non-Reactive Non-Reactive   Basic metabolic panel    Collection Time: 12/06/22  9:31 AM   Result Value Ref Range    Sodium 134 (L) 135 - 147 mmol/L    Potassium 3 4 (L) 3 5 - 5 3 mmol/L    Chloride 103 96 - 108 mmol/L    CO2 25 21 - 32 mmol/L    ANION GAP 6 4 - 13 mmol/L    BUN 11 5 - 25 mg/dL    Creatinine 0 80 0 60 - 1 30 mg/dL    Glucose, Fasting 113 (H) 65 - 99 mg/dL    Calcium 9 2 8 3 - 10 1 mg/dL    eGFR 95 ml/min/1 73sq m   Microalbumin / creatinine urine ratio    Collection Time: 12/06/22  9:31 AM   Result Value Ref Range    Creatinine, Ur 65 8 mg/dL    Microalbum  ,U,Random <5 0 0 0 - 20 0 mg/L    Microalb Creat Ratio <8 0 - 30 mg/g creatinine   CBC and differential    Collection Time: 12/06/22  9:31 AM   Result Value Ref Range    WBC 8 78 4 31 - 10 16 Thousand/uL    RBC 3 51 (L) 3 81 - 5 12 Million/uL    Hemoglobin 10 3 (L) 11 5 - 15 4 g/dL    Hematocrit 32 4 (L) 34 8 - 46 1 %    MCV 92 82 - 98 fL    MCH 29 3 26 8 - 34 3 pg    MCHC 31 8 31 4 - 37 4 g/dL    RDW 12 5 11 6 - 15 1 %    MPV 11 1 8 9 - 12 7 fL    Platelets 773 137 - 667 Thousands/uL    nRBC 0 /100 WBCs    Neutrophils Relative 73 43 - 75 %    Immat GRANS % 1 0 - 2 %    Lymphocytes Relative 16 14 - 44 %    Monocytes Relative 7 4 - 12 %    Eosinophils Relative 2 0 - 6 %    Basophils Relative 1 0 - 1 %    Neutrophils Absolute 6 49 1 85 - 7 62 Thousands/µL    Immature Grans Absolute 0 08 0 00 - 0 20 Thousand/uL    Lymphocytes Absolute 1 41 0 60 - 4 47 Thousands/µL    Monocytes Absolute 0 59 0 17 - 1 22 Thousand/µL    Eosinophils Absolute 0 16 0 00 - 0 61 Thousand/µL    Basophils Absolute 0 05 0 00 - 0 10 Thousands/µL   Ferritin    Collection Time: 12/06/22  2:28 PM   Result Value Ref Range    Ferritin 161 8 - 388 ng/mL   Home Grade Breast Pump    Collection Time: 12/15/22 10:10 AM   Result Value Ref Range    Supplier Name [employer]     Supplier Phone Number [phone]     Order Status Supplier Submitted     Delivery Note      Delivery Request Date 12/15/2022     Item Description Home grade breast pump  POCT urine dip    Collection Time: 12/15/22 10:22 AM   Result Value Ref Range    POCT URINE PROTEIN negative     GLUCOSE, UA negative             Physical Exam  Constitutional:       General: She is not in acute distress  Appearance: She is well-developed  She is not diaphoretic  HENT:      Head: Normocephalic and atraumatic  Right Ear: External ear normal       Left Ear: External ear normal       Nose: Nose normal       Mouth/Throat:      Pharynx: No oropharyngeal exudate  Eyes:      General:         Right eye: No discharge  Left eye: No discharge  Conjunctiva/sclera: Conjunctivae normal       Pupils: Pupils are equal, round, and reactive to light  Neck:      Thyroid: No thyromegaly  Cardiovascular:      Rate and Rhythm: Normal rate and regular rhythm  Heart sounds: Normal heart sounds  No murmur heard  No friction rub  No gallop  Pulmonary:      Effort: Pulmonary effort is normal  No respiratory distress  Breath sounds: Normal breath sounds  No stridor  No wheezing or rales  Skin:     General: Skin is warm and dry  Findings: No erythema or rash  Neurological:      Mental Status: She is alert and oriented to person, place, and time  Psychiatric:         Behavior: Behavior normal          Thought Content:  Thought content normal          Judgment: Judgment normal

## 2022-12-29 ENCOUNTER — ROUTINE PRENATAL (OUTPATIENT)
Dept: OBGYN CLINIC | Facility: CLINIC | Age: 37
End: 2022-12-29

## 2022-12-29 VITALS — WEIGHT: 149 LBS | BODY MASS INDEX: 25.58 KG/M2 | DIASTOLIC BLOOD PRESSURE: 70 MMHG | SYSTOLIC BLOOD PRESSURE: 100 MMHG

## 2022-12-29 DIAGNOSIS — Z34.93 PRENATAL CARE IN THIRD TRIMESTER: Primary | ICD-10-CM

## 2022-12-29 DIAGNOSIS — O10.013 PRE-EXISTING ESSENTIAL HYPERTENSION DURING PREGNANCY IN THIRD TRIMESTER: ICD-10-CM

## 2022-12-29 DIAGNOSIS — O34.211 MATERNAL CARE DUE TO LOW TRANSVERSE UTERINE SCAR FROM PREVIOUS CESAREAN DELIVERY: ICD-10-CM

## 2022-12-29 DIAGNOSIS — Z3A.28 28 WEEKS GESTATION OF PREGNANCY: ICD-10-CM

## 2022-12-29 LAB
SL AMB  POCT GLUCOSE, UA: NEGATIVE
SL AMB POCT URINE PROTEIN: NEGATIVE

## 2022-12-29 NOTE — PROGRESS NOTES
Prenatal visit at 27 1/7wks  Denies ctxs, VB, LOF  Good FM  Some hip pain when she sits for longer than 20 minutes but overall well  Problem List Items Addressed This Visit        Cardiovascular and Mediastinum    Pre-existing hypertension during pregnancy in third trimester     Continue Procardia XL 30mg daily  Strict precautions given  Plan for 38 week delivery  Genitourinary    Maternal care due to low transverse uterine scar from previous  delivery      and bilateral salpingectomy scheduled for 2023 at 10am with me  Other    28 weeks gestation of pregnancy     Up to date on vaccines  Breast pump ordered  Discussed normal aches/pains of third trimester   labor precautions given           Other Visit Diagnoses     Prenatal care in third trimester    -  Primary    Relevant Orders    POCT urine dip (Completed)

## 2023-01-03 ENCOUNTER — TELEPHONE (OUTPATIENT)
Dept: OBGYN CLINIC | Facility: CLINIC | Age: 38
End: 2023-01-03

## 2023-01-03 NOTE — TELEPHONE ENCOUNTER
----- Message from Gee Mercado MD sent at 1/3/2023  1:04 PM EST -----  Regarding: Reschedule   Melani Adjutant was scheduled for  on 3/24  She was supposed to be scheduled on   Can you please correct and confirm with me?     Dr Ailyn Prado

## 2023-01-05 NOTE — TELEPHONE ENCOUNTER
I spoke with Jakub Moreno and in my note I had the date as 2/24  Griselle admitted to the error  Sorry  The date is 2/24 but at 1 pm  Nothing else available   Thank you

## 2023-01-10 ENCOUNTER — TELEPHONE (OUTPATIENT)
Dept: PSYCHIATRY | Facility: CLINIC | Age: 38
End: 2023-01-10

## 2023-01-10 NOTE — TELEPHONE ENCOUNTER
Patient called the office and left voice mail requesting to reschedule appt on 1/11/23 for therapy  Called patient back and left voice mail to call the office to reschedule appt  Appt cancelled

## 2023-01-11 NOTE — PROGRESS NOTES
Please refer to the [facility] ultrasound report in Ob Procedures for additional information regarding today's visit

## 2023-01-12 ENCOUNTER — ULTRASOUND (OUTPATIENT)
Facility: HOSPITAL | Age: 38
End: 2023-01-12

## 2023-01-12 ENCOUNTER — APPOINTMENT (OUTPATIENT)
Dept: LAB | Facility: CLINIC | Age: 38
End: 2023-01-12

## 2023-01-12 ENCOUNTER — ROUTINE PRENATAL (OUTPATIENT)
Dept: OBGYN CLINIC | Facility: CLINIC | Age: 38
End: 2023-01-12

## 2023-01-12 VITALS
SYSTOLIC BLOOD PRESSURE: 112 MMHG | HEIGHT: 64 IN | HEART RATE: 80 BPM | WEIGHT: 149.4 LBS | DIASTOLIC BLOOD PRESSURE: 72 MMHG | BODY MASS INDEX: 25.51 KG/M2

## 2023-01-12 VITALS — WEIGHT: 150.4 LBS | SYSTOLIC BLOOD PRESSURE: 106 MMHG | BODY MASS INDEX: 25.82 KG/M2 | DIASTOLIC BLOOD PRESSURE: 76 MMHG

## 2023-01-12 DIAGNOSIS — O09.523 ELDERLY MULTIGRAVIDA, THIRD TRIMESTER: ICD-10-CM

## 2023-01-12 DIAGNOSIS — Z3A.32 32 WEEKS GESTATION OF PREGNANCY: ICD-10-CM

## 2023-01-12 DIAGNOSIS — O44.40 LOW-LYING PLACENTA: ICD-10-CM

## 2023-01-12 DIAGNOSIS — O10.013 PRE-EXISTING ESSENTIAL HYPERTENSION DURING PREGNANCY IN THIRD TRIMESTER: Primary | ICD-10-CM

## 2023-01-12 DIAGNOSIS — O10.013 PRE-EXISTING ESSENTIAL HYPERTENSION DURING PREGNANCY IN THIRD TRIMESTER: ICD-10-CM

## 2023-01-12 DIAGNOSIS — O34.211 MATERNAL CARE DUE TO LOW TRANSVERSE UTERINE SCAR FROM PREVIOUS CESAREAN DELIVERY: ICD-10-CM

## 2023-01-12 DIAGNOSIS — O35.2XX0 HEREDITARY DISEASE IN FAMILY POSSIBLY AFFECTING FETUS, AFFECTING MANAGEMENT OF MOTHER IN PREGNANCY, SINGLE OR UNSPECIFIED FETUS: ICD-10-CM

## 2023-01-12 DIAGNOSIS — Z86.2 HISTORY OF DIC SYNDROME: ICD-10-CM

## 2023-01-12 DIAGNOSIS — Z87.59 HISTORY OF POLYHYDRAMNIOS: ICD-10-CM

## 2023-01-12 DIAGNOSIS — O09.523 MULTIGRAVIDA OF ADVANCED MATERNAL AGE IN THIRD TRIMESTER: ICD-10-CM

## 2023-01-12 DIAGNOSIS — Z34.93 PRENATAL CARE IN THIRD TRIMESTER: Primary | ICD-10-CM

## 2023-01-12 DIAGNOSIS — O44.42 LOW-LYING PLACENTA WITHOUT HEMORRHAGE, SECOND TRIMESTER: ICD-10-CM

## 2023-01-12 DIAGNOSIS — I10 ESSENTIAL HYPERTENSION: ICD-10-CM

## 2023-01-12 LAB
MAGNESIUM SERPL-MCNC: 1.9 MG/DL (ref 1.9–2.7)
POTASSIUM SERPL-SCNC: 3.8 MMOL/L (ref 3.5–5.3)
SL AMB  POCT GLUCOSE, UA: NORMAL
SL AMB POCT URINE PROTEIN: NORMAL

## 2023-01-12 NOTE — PROGRESS NOTES
Ultrasound Probe Disinfection    A transvaginal ultrasound was performed  Prior to use, disinfection was performed with High Level Disinfection Process (Trophon)  Probe serial number A4: [id number] was used        Cheo Sandra  01/12/23  11:31 AM

## 2023-01-12 NOTE — PROGRESS NOTES
Non-Stress Testing:    Non-Stress test, equipment, procedure, and expected outcomes reviewed  Reviewed fetal kick counts and when to call OB  Dagoberto Manifold Verified patient understanding of fetal kick counts with teach back method  Patient reports feeling daily fetal movements  Patient has no questions or concerns

## 2023-01-12 NOTE — LETTER
NST sleeve cover sheet    Patient name: Delores Marquez  : 1985  MRN: [id number]    DESEAN: Estimated Date of Delivery: 3/8/23    Obstetrician: _____________SLOGA____________    Reason(s) for testing:  ___________________IUGR________________      Testing frequency:    ___ 2x/wk  ___ 1x/wk  ___ Dopplers  ___ BPP?       Last growth scan: __________________________________________

## 2023-01-12 NOTE — ASSESSMENT & PLAN NOTE
Edmar Scott  is a 40 y o  [id number] @32w1d who presents for routine prenatal visit  28 wk labs - anemia treating with PO iron, neg RPR and normal 1 hr    Growth scan today   S/p TDAP and flu    Plans to breastfeed  Pump - has  Ped - has     Reports good fetal movement  Denies LOF, vaginal bleeding, regular uterine contractions, cramping, headaches or visual changes  Reviewed PTL precautions and FKC

## 2023-01-12 NOTE — LETTER
January 12, 2023     Joellen Khanna, [address] [address]    Patient: Vandana Rome   YOB: 1985   Date of Visit: 1/12/2023       Dear Dr Justen Wells: Thank you for referring Vandana Rome to me for evaluation  Below are my notes for this consultation  If you have questions, please do not hesitate to call me  I look forward to following your patient along with you           Sincerely,        Margaux Carlos MD        CC: No Recipients  Margaux Carlos MD  1/11/2023  6:14 PM  Sign when Signing Visit  Please refer to the [facility] ultrasound report in Ob Procedures for additional information regarding today's visit

## 2023-01-12 NOTE — ASSESSMENT & PLAN NOTE
Continues Procardia 30 mg daily  Continues LDASA to 36 weeks  Precautions given  Scheduled for rCS and bilateral salpingectomy 2/24/23  with ED

## 2023-01-12 NOTE — PROGRESS NOTES
Problem List Items Addressed This Visit        Cardiovascular and Mediastinum    Pre-existing hypertension during pregnancy in third trimester     Continues Procardia 30 mg daily  Continues LDASA to 36 weeks  Precautions given  Scheduled for rCS and bilateral salpingectomy 23  with ED  Genitourinary    Maternal care due to low transverse uterine scar from previous  delivery       Other    History of DIC syndrome    History of polyhydramnios    Multigravida of advanced maternal age in third trimester    Hereditary disease in family possibly affecting fetus    26 weeks gestation of pregnancy     Dagoberto Cantrell  is a 40 y o  [id number] @31w2d who presents for routine prenatal visit  28 wk labs - anemia treating with PO iron, neg RPR and normal 1 hr    Growth scan today   S/p TDAP and flu    Plans to breastfeed  Pump - has  Ped - has     Reports good fetal movement  Denies LOF, vaginal bleeding, regular uterine contractions, cramping, headaches or visual changes  Reviewed PTL precautions and FKC  Low-lying placenta     Repeat US today           Other Visit Diagnoses     Prenatal care in third trimester    -  Primary    Relevant Orders    POCT urine dip (Completed)

## 2023-01-17 ENCOUNTER — OFFICE VISIT (OUTPATIENT)
Dept: NEPHROLOGY | Facility: CLINIC | Age: 38
End: 2023-01-17

## 2023-01-17 VITALS
SYSTOLIC BLOOD PRESSURE: 114 MMHG | WEIGHT: 153 LBS | BODY MASS INDEX: 26.12 KG/M2 | HEIGHT: 64 IN | DIASTOLIC BLOOD PRESSURE: 68 MMHG

## 2023-01-17 DIAGNOSIS — E87.6 HYPOKALEMIA: ICD-10-CM

## 2023-01-17 DIAGNOSIS — I10 ESSENTIAL HYPERTENSION: Primary | ICD-10-CM

## 2023-01-17 RX ORDER — NIFEDIPINE 30 MG/1
30 TABLET, EXTENDED RELEASE ORAL DAILY
Qty: 30 TABLET | Refills: 0 | Status: SHIPPED | OUTPATIENT
Start: 2023-01-17

## 2023-01-17 RX ORDER — OMEPRAZOLE 20 MG/1
20 CAPSULE, DELAYED RELEASE ORAL DAILY
COMMUNITY

## 2023-01-17 NOTE — PROGRESS NOTES
NEPHROLOGY OUTPATIENT PROGRESS NOTE   Sherryll Lanes 40 y o  female MRN: [id number]  DATE: 1/17/2023  Reason for visit:   Chief Complaint   Patient presents with   • Follow-up   • Hypertension     ASSESSMENT and PLAN:  baseline creatinine 0 6 to 0 8  -creatinine 0 8 in December 2022  stable at baseline  -UA bland without hematuria or significant proteinuria in August 2022  Most recent UACR nonsignificant in December 2022   -avoid nephrotoxins or NSAIDs  Continue to closely monitor renal function   -she has history of DEANA with peak creatinine 2 4 in 2015 when she had postpartum hemorrhage and required ex lap       Hypertension  -24 hour ABPM readings in March 2020:  24 hour BP average 139/92  Daytime BP average 143/95  Nighttime BP average 127/84  MAP night time dipping 9 65%  -Currently 33 weeks pregnant   -She was on Aldactone prior for acne by dermatology prior to her pregnancy, continue to remain off currently  -Given well-controlled blood pressure generally 110s/60s, remains on nifedipine 30 mg daily  -BP well controlled in the office today    -Prior workup include serum aldosterone 11 7 (prior 4 2, 43 9), PRA 3 71 (prior 2 03, 0 2)  -serum a m  cortisol 15 6  -prior CT scan shows unremarkable adrenal glands    -plasma metanephrine/catecholamine unremarkable   -Continue low-salt diet  Isolated episode of hypokalemia recently, now is improved with most recent serum potassium 3 8  Diagnoses and all orders for this visit:    Essential hypertension  -     NIFEdipine (PROCARDIA XL) 30 mg 24 hr tablet; Take 1 tablet (30 mg total) by mouth daily  -     Basic metabolic panel; Future  -     CBC; Future  -     Microalbumin / creatinine urine ratio; Future  -     Magnesium; Future    Other orders  -     omeprazole (PriLOSEC) 20 mg delayed release capsule;  Take 20 mg by mouth daily        SUBJECTIVE / HPI:  Michelle Gonzales is a 37 y o  female without significant medical issues except component of anxiety , prior history of ex lap due to small bowel obstruction, also history of postpartum hemorrhage requiring blood transfusion in 2015, hypertension diagnosed in 2019 who presents for regular follow-up of hypertension  Patient currently remains about 33 weeks pregnant  She continues to remain off Aldactone which was initially prescribed by dermatology for acne  Her blood pressure seems to be much better and well controlled during pregnancy  Denies any recent headache, leg edema issues  Most recent urine studies does not show any significant proteinuria  Home BP readings are better controlled as mentioned above  She does have some constipation issues  No recent NSAID exposure  Denies any urinary complaint      History of isolated rheumatoid factor positive, she was evaluated by Rheumatology and No further intervention was done in the past   She also has history of postpartum hemorrhage although denies having any preeclampsia or eclampsia issues during pregnancy in 2015       Family history includes both parents having hypertension controlled with single agent diagnosed in their 45s  Also her brother having hypertension in his early 35s  No obvious family history of CVA  REVIEW OF SYSTEMS:  More than 10 point review of systems were obtained and discussed in length with the patient  Complete review of systems were negative / unremarkable except mentioned above  PHYSICAL EXAM:  Vitals:    01/17/23 0916 01/17/23 0955   BP: 108/70 114/68   BP Location: Left arm    Patient Position: Sitting    Cuff Size: Standard    Weight: 69 4 kg (153 lb)    Height: 5' 4" (1 626 m)      Body mass index is 26 26 kg/m²  Physical Exam  Vitals reviewed  Constitutional:       Appearance: She is well-developed  HENT:      Head: Normocephalic and atraumatic        Right Ear: External ear normal       Left Ear: External ear normal    Eyes:      Conjunctiva/sclera: Conjunctivae normal    Cardiovascular:      Comments: S1, S2 present  Pulmonary:      Effort: Pulmonary effort is normal       Breath sounds: Normal breath sounds  No wheezing or rales  Abdominal:      General: Bowel sounds are normal  There is distension (Currently pregnant)  Palpations: Abdomen is soft  Tenderness: There is no abdominal tenderness  Musculoskeletal:         General: No deformity  Lymphadenopathy:      Cervical: No cervical adenopathy  Skin:     Findings: No rash  Neurological:      Mental Status: She is alert and oriented to person, place, and time     Psychiatric:         Behavior: Behavior normal          PAST MEDICAL HISTORY:  Past Medical History:   Diagnosis Date   • Acute renal failure ([facility] [address] )     [id number] RESOLVED   • Acute tubular necrosis (HCC)    • Anemia     during the pregnancy    • Bowel obstruction ([facility] [address] ) 2017   • Chronic kidney disease    • Fibroadenoma of breast, left    • Fibroadenoma of breast, right    •  1 para 1    • History of transfusion 2015    After delivery    • Hypertension     no medication    • Kidney stone    • Postpartum hemorrhage     UNSPECIFIED TYPE  RESOLVED    • Urinary tract infection     yes in the last ten years   • Vacuum extractor delivery, delivered      daughter with postpartum hemorrhage and DIC   • Varicella     had childhood chickenpox       PAST SURGICAL HISTORY:  Past Surgical History:   Procedure Laterality Date   • BREAST FIBROADENOMA SURGERY      Cryosurgical Ablation of Fibroadenoma   • BREAST LUMPECTOMY Left    • BREAST LUMPECTOMY Right    •  SECTION         • EXPLORATORY LAPAROTOMY W/ BOWEL RESECTION N/A 2017    Procedure: LAPAROTOMY EXPLORATORY W/ lysis of adhesions;  Surgeon: Ezekiel Garner DO;  Location: [facility];  Service:    • INTRAUTERINE DEVICE INSERTION     • LAPAROTOMY      EXPLORATORY for DIC and hemorrhage postpartum retroperitoneal hematoma   • VT  DELIVERY ONLY N/A 2020    Procedure:  SECTION () REPEAT;  Surgeon: Genesis Mayorga MD;  Location: [facility] ;  Service: Obstetrics   • TOOTH EXTRACTION      Impression: [id number] Vesta Gonzales         SOCIAL HISTORY:  Social History     Substance and Sexual Activity   Alcohol Use Yes   • Alcohol/week: 5 0 standard drinks   • Types: 5 Glasses of wine per week    Comment: none with pregnancy     Social History     Substance and Sexual Activity   Drug Use No    Comment: denies self or FOB  denies family use     Social History     Tobacco Use   Smoking Status Never   Smokeless Tobacco Never       FAMILY HISTORY:  Family History   Problem Relation Age of Onset   • Hypertension Mother         URINARY INCONTINENCE   • Hyperlipidemia Mother    • Diverticulitis Mother    • Hypothyroidism Mother    • Hypertension Father    • Hepatitis Father         A   • Prostate cancer Father    • No Known Problems Sister    • No Known Problems Brother    • No Known Problems Daughter    • Other Daughter         CHARGE syndrome   • Diverticulitis Maternal Grandmother    • Colon cancer Maternal Grandmother    • Stroke Maternal Grandfather         CVA   • Heart disease Maternal Grandfather    • Diverticulitis Maternal Grandfather    • Hypertension Maternal Grandfather    • Alzheimer's disease Paternal Grandmother    • Diabetes Paternal Grandfather         INSULIN DEPENDENT       MEDICATIONS:    Current Outpatient Medications:   •  aspirin 81 mg chewable tablet, Chew 2 tablets (162 mg total) daily, Disp: 60 tablet, Rfl: 6  •  busPIRone (BUSPAR) 15 mg tablet, 1/4-1/2 tab in AM and 1 HS, Disp: 60 tablet, Rfl: 2  •  Docusate Sodium (COLACE PO), Take by mouth as needed, Disp: , Rfl:   •  Ferrous Sulfate (Iron) 325 (65 Fe) MG TABS, , Disp: , Rfl:   •  Magnesium 400 MG CAPS, Take by mouth, Disp: , Rfl:   •  NIFEdipine (PROCARDIA XL) 30 mg 24 hr tablet, Take 1 tablet (30 mg total) by mouth daily, Disp: 30 tablet, Rfl: 0  •  omeprazole (PriLOSEC) 20 mg delayed release capsule, Take 20 mg by mouth daily, Disp: , Rfl:   •  Prenatal MV-Min-Fe Fum-FA-DHA (PRENATAL 1 PO), Take by mouth, Disp: , Rfl:   •  Probiotic Product (PROBIOTIC PO), Take by mouth, Disp: , Rfl:   •  clindamycin (CLEOCIN T) 1 % lotion, , Disp: , Rfl:     Lab Results:   Results for orders placed or performed in visit on 01/12/23   POCT urine dip   Result Value Ref Range    POCT URINE PROTEIN neg     GLUCOSE, UA neg

## 2023-01-18 ENCOUNTER — ULTRASOUND (OUTPATIENT)
Dept: PERINATAL CARE | Facility: CLINIC | Age: 38
End: 2023-01-18

## 2023-01-18 VITALS
BODY MASS INDEX: 26.09 KG/M2 | SYSTOLIC BLOOD PRESSURE: 110 MMHG | HEART RATE: 92 BPM | DIASTOLIC BLOOD PRESSURE: 62 MMHG | WEIGHT: 152.8 LBS | HEIGHT: 64 IN

## 2023-01-18 DIAGNOSIS — Z3A.33 33 WEEKS GESTATION OF PREGNANCY: ICD-10-CM

## 2023-01-18 DIAGNOSIS — O10.013 PRE-EXISTING ESSENTIAL HYPERTENSION DURING PREGNANCY IN THIRD TRIMESTER: Primary | ICD-10-CM

## 2023-01-18 NOTE — PROGRESS NOTES
[address]: Ms Mohini Pichardo was seen today at 33w0d for NST (found under the pregnancy episode) which I reviewed the RN assessment and agree, and SHARI (see ultrasound report under OB procedures tab)  See ultrasound report under "OB Procedures" tab  She reports good fetal movement  Denies headaches, visual changes or abdominal pain  She is scheduled for a repeat  delivery on 2023  Please don't hesitate to contact our office with any concerns or questions   -PITO Blanc      I spent 10 minutes devoted to patient care (3 min chart preparation, 4 minutes face to face and 3 minutes documenting)

## 2023-01-18 NOTE — PATIENT INSTRUCTIONS
Kick Counts in Pregnancy   WHAT YOU NEED TO KNOW:   What do I need to know about kick counts? Kick counts measure how much your baby is moving in your womb  A kick from your baby can be felt as a twist, turn, swish, roll, or jab  It is common to feel your baby kicking at 26 to 28 weeks of pregnancy  You may feel your baby kick as early as 20 weeks of pregnancy  You may want to start counting at 28 weeks  Why should I measure kick counts? Your baby's movement may provide information about your baby's health  He or she may move less, or not at all, if there are problems  Your baby may move less if he or she is not getting enough oxygen or nutrition from the placenta  Do not smoke while you are pregnant  Smoking decreases the amount of oxygen that gets to your baby  Talk to your healthcare provider if you need help to quit smoking  Tell your healthcare provider as soon as you feel a change in your baby's movements  When do I measure kick counts? Measure kick counts at the same time every day  Measure kick counts when your baby is awake and most active  Your baby may be most active in the evening  How do I measure kick counts? Check that your baby is awake before you measure kick counts  You can wake up your baby by lightly pushing on your belly, walking, or drinking something cold  Your healthcare provider may tell you different ways to measure kick counts  You may be told to do the following:  Use a chart or clock to keep track of the time you start and finish counting  Sit in a chair or lie on your left side  Place your hands on the largest part of your belly  Count until you reach 10 kicks  Write down how much time it takes to count 10 kicks  It may take 30 minutes to 2 hours to count 10 kicks  It should not take more than 2 hours to count 10 kicks  When should I contact my doctor? You feel a change in the number of kicks or movements of your baby       You feel fewer than 10 kicks within 2 hours  You have questions or concerns about your baby's movements  CARE AGREEMENT:   You have the right to help plan your care  Learn about your health condition and how it may be treated  Discuss treatment options with your healthcare providers to decide what care you want to receive  You always have the right to refuse treatment  The above information is an  only  It is not intended as medical advice for individual conditions or treatments  Talk to your doctor, nurse or pharmacist before following any medical regimen to see if it is safe and effective for you  © Copyright [employer] 2022 Information is for End User's use only and may not be sold, redistributed or otherwise used for commercial purposes   All illustrations and images included in CareNotes® are the copyrighted property of A D A M , Inc  or [address] [place], [place] [address]

## 2023-01-24 NOTE — PATIENT INSTRUCTIONS
Thank you for choosing us for your  care today  If you have any questions about your ultrasound or care, please do not hesitate to contact us or your primary obstetrician  Some general instructions for your pregnancy are:    Exercise: Aim for 22 minutes per day (150 minutes per week) of regular exercise  Walking is great! Nutrition: Choose healthy sources of calcium, iron, and protein  Learn about Preeclampsia: preeclampsia is a common, serious high blood pressure complication in pregnancy  A blood pressure of 373QFLX (systolic or top number) or 07ILUE (diastolic or bottom number) is not normal and needs evaluation by your doctor  Aspirin is sometimes prescribed in early pregnancy to prevent preeclampsia in women with risk factors - ask your obstetrician if you should be on this medication  For more resources, visit:  https://mothertobaby org/fact-sheets/low-dose-aspirin/  [employer] com cy  https://www highriskpregnancyinfo org/preeclampsia  If you smoke, try to reduce how many cigarettes you smoke or try to quit completely  Do not vape  Other warning signs to watch out for in pregnancy or postpartum: chest pain, obstructed breathing or shortness of breath, seizures, thoughts of hurting yourself or your baby, bleeding, a painful or swollen leg, fever, or headache (see [employer]WHON[employer] POST-BIRTH Warning Signs campaign)  If these happen call 911  Itching is also not normal in pregnancy and if you experience this, especially over your hands and feet, potentially worse at night, notify your doctors  I like to be swollen  Kick Counts in Pregnancy   WHAT YOU NEED TO KNOW:   What do I need to know about kick counts? Kick counts measure how much your baby is moving in your womb  A kick from your baby can be felt as a twist, turn, swish, roll, or jab  It is common to feel your baby kicking at 26 to 28 weeks of pregnancy   You may feel your baby kick as early as 20 weeks of pregnancy  You may want to start counting at 28 weeks  Why should I measure kick counts? Your baby's movement may provide information about your baby's health  He or she may move less, or not at all, if there are problems  Your baby may move less if he or she is not getting enough oxygen or nutrition from the placenta  Do not smoke while you are pregnant  Smoking decreases the amount of oxygen that gets to your baby  Talk to your healthcare provider if you need help to quit smoking  Tell your healthcare provider as soon as you feel a change in your baby's movements  When do I measure kick counts? Measure kick counts at the same time every day  Measure kick counts when your baby is awake and most active  Your baby may be most active in the evening  How do I measure kick counts? Check that your baby is awake before you measure kick counts  You can wake up your baby by lightly pushing on your belly, walking, or drinking something cold  Your healthcare provider may tell you different ways to measure kick counts  You may be told to do the following:  Use a chart or clock to keep track of the time you start and finish counting  Sit in a chair or lie on your left side  Place your hands on the largest part of your belly  Count until you reach 10 kicks  Write down how much time it takes to count 10 kicks  It may take 30 minutes to 2 hours to count 10 kicks  It should not take more than 2 hours to count 10 kicks  When should I contact my doctor? You feel a change in the number of kicks or movements of your baby  You feel fewer than 10 kicks within 2 hours  You have questions or concerns about your baby's movements  CARE AGREEMENT:   You have the right to help plan your care  Learn about your health condition and how it may be treated  Discuss treatment options with your healthcare providers to decide what care you want to receive  You always have the right to refuse treatment  The above information is an  only  It is not intended as medical advice for individual conditions or treatments  Talk to your doctor, nurse or pharmacist before following any medical regimen to see if it is safe and effective for you  © Copyright [employer] 2022 Information is for End User's use only and may not be sold, redistributed or otherwise used for commercial purposes   All illustrations and images included in CareNotes® are the copyrighted property of A D A M , Inc  or [address] [place] [address]

## 2023-01-25 ENCOUNTER — ROUTINE PRENATAL (OUTPATIENT)
Dept: OBGYN CLINIC | Facility: CLINIC | Age: 38
End: 2023-01-25

## 2023-01-25 ENCOUNTER — ULTRASOUND (OUTPATIENT)
Dept: PERINATAL CARE | Facility: CLINIC | Age: 38
End: 2023-01-25

## 2023-01-25 VITALS
DIASTOLIC BLOOD PRESSURE: 72 MMHG | HEIGHT: 64 IN | HEART RATE: 70 BPM | SYSTOLIC BLOOD PRESSURE: 120 MMHG | BODY MASS INDEX: 26.53 KG/M2 | WEIGHT: 155.4 LBS

## 2023-01-25 VITALS
HEIGHT: 64 IN | SYSTOLIC BLOOD PRESSURE: 120 MMHG | WEIGHT: 155 LBS | DIASTOLIC BLOOD PRESSURE: 78 MMHG | BODY MASS INDEX: 26.46 KG/M2

## 2023-01-25 DIAGNOSIS — I10 ESSENTIAL HYPERTENSION: ICD-10-CM

## 2023-01-25 DIAGNOSIS — Z34.83 ENCOUNTER FOR SUPERVISION OF OTHER NORMAL PREGNANCY, THIRD TRIMESTER: Primary | ICD-10-CM

## 2023-01-25 DIAGNOSIS — O10.013 PRE-EXISTING ESSENTIAL HYPERTENSION DURING PREGNANCY IN THIRD TRIMESTER: Primary | ICD-10-CM

## 2023-01-25 DIAGNOSIS — Z3A.34 34 WEEKS GESTATION OF PREGNANCY: ICD-10-CM

## 2023-01-25 DIAGNOSIS — O09.523 MULTIGRAVIDA OF ADVANCED MATERNAL AGE IN THIRD TRIMESTER: ICD-10-CM

## 2023-01-25 LAB
SL AMB  POCT GLUCOSE, UA: NEGATIVE
SL AMB POCT URINE PROTEIN: NEGATIVE

## 2023-01-25 NOTE — PROGRESS NOTES
[address]: Ms Kajal Ramsey was seen today at 34w0d for NST (found under the pregnancy episode) which I reviewed the RN assessment and agree, and SHARI (see ultrasound report under OB procedures tab)  See ultrasound report under "OB Procedures" tab  She reports good fetal movement  She stopped her aspirin  Her blood pressures are well controlled on Procardia 30 mg daily  She denies headaches, visual changes or abdominal pain  She is scheduled for a repeat  section on 2023  Don't hesitate to contact our office with any concerns or questions   -PITO Perez      I spent 11 minutes devoted to patient care (3 min chart preparation, 5 minutes face to face and 3 minutes documenting)

## 2023-01-25 NOTE — PROGRESS NOTES
Pt is here for routine ob visit   No concerns at this time  Urine neg/neg   No LOF,VB  [place] Contractions  +FM   UTD flu/tdap/covid vaccines  Repeat Csection scheduled 2/24  Consent signed

## 2023-01-25 NOTE — PROGRESS NOTES
40 y o   at 34w0d, here for routine OB visit  Feeling well overall and without concerns  Good FM  Denies LOF, VB, contractions  No questions/concerns       Problem   34 Weeks Gestation of Pregnancy    -precautions reviewed  -s/p Tdap/flu/COVID vaccines  -GBS next visit  -prepregnancy BMI 23 with goal weight gain 25-35#: TWG = 18#   -rLTCS scheduled, consent signed     Essential Hypertension    Scheduled for 38 week delivery         Problem List Items Addressed This Visit        Cardiovascular and Mediastinum    Essential hypertension       Other    34 weeks gestation of pregnancy   Other Visit Diagnoses     Encounter for supervision of other normal pregnancy, third trimester    -  Primary    Relevant Orders    POCT urine dip

## 2023-02-01 ENCOUNTER — ULTRASOUND (OUTPATIENT)
Facility: HOSPITAL | Age: 38
End: 2023-02-01

## 2023-02-01 VITALS
SYSTOLIC BLOOD PRESSURE: 114 MMHG | WEIGHT: 151.68 LBS | DIASTOLIC BLOOD PRESSURE: 66 MMHG | HEART RATE: 83 BPM | BODY MASS INDEX: 25.89 KG/M2 | HEIGHT: 64 IN

## 2023-02-01 DIAGNOSIS — Z3A.35 35 WEEKS GESTATION OF PREGNANCY: Primary | ICD-10-CM

## 2023-02-01 DIAGNOSIS — O10.013 PRE-EXISTING ESSENTIAL HYPERTENSION DURING PREGNANCY IN THIRD TRIMESTER: ICD-10-CM

## 2023-02-01 DIAGNOSIS — O09.523 MULTIGRAVIDA OF ADVANCED MATERNAL AGE IN THIRD TRIMESTER: ICD-10-CM

## 2023-02-01 NOTE — PROGRESS NOTES
Repeat Non-Stress Testing:    Patient verbalizes +FM  Pt denies ALL:               Leaking of fluid   Contractions   Vaginal bleeding   Decreased fetal movement    Patient is performing daily kick counts  Patient has no questions or concerns  NST strip reviewed by Dr Nirali Estrada

## 2023-02-01 NOTE — LETTER
February 1, 2023     Kenneth Jonse [address]    Patient: Helen Bland   YOB: 1985   Date of Visit: 2/1/2023       Dear Dr Jane Morales: Thank you for referring Helen Bland to me for evaluation  Below are my notes for this consultation  If you have questions, please do not hesitate to call me  I look forward to following your patient along with you  Sincerely,        Reyna Nguyen MD        CC: No Recipients  Reyna Nguyen MD  2/1/2023 10:34 AM  Sign when Signing Visit  A fetal ultrasound and NST were completed  See Ob procedures in Epic for an interpretation and recommendations  Do not hesitate to contact us in [facility] if you have questions  Beto Briggs MD, [address] [facility]  Maternal Fetal Medicine

## 2023-02-01 NOTE — PROGRESS NOTES
A fetal ultrasound and NST were completed  See Ob procedures in Epic for an interpretation and recommendations  Do not hesitate to contact us in [facility] if you have questions  Darshan Roca MD, [address] [facility]  Maternal Fetal Medicine

## 2023-02-07 ENCOUNTER — TELEMEDICINE (OUTPATIENT)
Dept: PSYCHIATRY | Facility: CLINIC | Age: 38
End: 2023-02-07

## 2023-02-07 DIAGNOSIS — F41.1 GAD (GENERALIZED ANXIETY DISORDER): Primary | ICD-10-CM

## 2023-02-07 LAB
DME PARACHUTE DELIVERY DATE REQUESTED: NORMAL
DME PARACHUTE ITEM DESCRIPTION: NORMAL
DME PARACHUTE ORDER STATUS: NORMAL
DME PARACHUTE SUPPLIER NAME: NORMAL
DME PARACHUTE SUPPLIER PHONE: NORMAL

## 2023-02-07 NOTE — PSYCH
Virtual Regular Visit    Verification of patient location:    Patient is located in the following state in which I hold an active license [place]      Assessment/Plan:    Problem List Items Addressed This Visit    None      Goals addressed in session: Maintain attain current level of stability  Appointment to be set up 6 weeks from now  Patient scheduled to deliver within the next 2 weeks  Reason for visit is   Chief Complaint   Patient presents with   • Virtual Regular Visit        Encounter provider PITO Iraheta    Provider located at [address] [id number]      Recent Visits  No visits were found meeting these conditions  Showing recent visits within past 7 days and meeting all other requirements  Today's Visits  Date Type Provider Dept   02/07/23 Telemedicine [facility] today's visits and meeting all other requirements  Future Appointments  No visits were found meeting these conditions  Showing future appointments within next 150 days and meeting all other requirements       The patient was identified by name and date of birth  Xenia Salomonpaobarrett was informed that this is a telemedicine visit and that the visit is being conducted throughthe 5gig platform  She agrees to proceed     My office door was closed  No one else was in the room  She acknowledged consent and understanding of privacy and security of the video platform  The patient has agreed to participate and understands they can discontinue the visit at any time  Patient is aware this is a billable service  Media Roll is a 40 y o  female who is 36 weeks pregnant and scheduled to deliver within the next 2 weeks  She is feeling good but understandably anxious about the new arrival   On a positive end, she and her  are getting along much better    Also, her oldest daughter is in school and her youngest is now in   So when the new baby comes, Anju Pereira will be able to focus and organize her day much better since the other 2 are monitored and involved in their own programs  Otherwise, she is smiling she is happy  Her last day of work is next weekend I will see her in 6 weeks to see how she is coming along and see how she is doing and to see how the baby is  So far, all is going well, she is healthy and happy  Mental status exam: Patient is awake and alert and oriented x3  Mood is understandably anxious with the upcoming delivery of her son within the next 2 weeks  Patient is not suicidal, not homicidal and not psychotic  Speech is clear and thoughts are well organized, coherent and goal directed  Attention span is good  Impulse control is good  Judgment and insight are good  Memory is good  The patient will continue on: BuSpar 15 mg, she could take a quarter or half in the morning and 1 at bedtime  We will discuss any changes in medications at her next appointment in 6 weeks which is after she delivers her son             Past Medical History:   Diagnosis Date   • Acute renal failure ([address] )     2017 RESOLVED   • Acute tubular necrosis (HCC)    • Anemia     during the pregnancy    • Bowel obstruction ([address] ) 2017   • Chronic kidney disease    • Fibroadenoma of breast, left    • Fibroadenoma of breast, right    •  1 para 1    • History of transfusion 2015    After delivery    • Hypertension     no medication    • Kidney stone    • Postpartum hemorrhage     UNSPECIFIED TYPE  RESOLVED    • Urinary tract infection     yes in the last ten years   • Vacuum extractor delivery, delivered      daughter with postpartum hemorrhage and DIC   • Varicella     had childhood chickenpox       Past Surgical History:   Procedure Laterality Date   • BREAST FIBROADENOMA SURGERY      Cryosurgical Ablation of Fibroadenoma   • BREAST LUMPECTOMY Left    • BREAST LUMPECTOMY Right    •  SECTION         • EXPLORATORY LAPAROTOMY W/ BOWEL RESECTION N/A 2017    Procedure: LAPAROTOMY EXPLORATORY W/ lysis of adhesions;  Surgeon: Abril Patel DO;  [facility];  Service:    • INTRAUTERINE DEVICE INSERTION     • LAPAROTOMY      EXPLORATORY for DIC and hemorrhage postpartum retroperitoneal hematoma   • ID  DELIVERY ONLY N/A 2020    Procedure:  SECTION () REPEAT;  Surgeon: Stefano Perrin MD;  [facility]: [facility] LD;  Service: Obstetrics   • TOOTH EXTRACTION      Impression: [id number] Kiran Head         Current Outpatient Medications   Medication Sig Dispense Refill   • busPIRone (BUSPAR) 15 mg tablet 1/4-1/2 tab in AM and 1 HS 60 tablet 2   • aspirin 81 mg chewable tablet Chew 2 tablets (162 mg total) daily 60 tablet 6   • clindamycin (CLEOCIN T) 1 % lotion      • Docusate Sodium (COLACE PO) Take by mouth as needed     • Ferrous Sulfate (Iron) 325 (65 Fe) MG TABS      • Magnesium 400 MG CAPS Take by mouth     • NIFEdipine (PROCARDIA XL) 30 mg 24 hr tablet Take 1 tablet (30 mg total) by mouth daily 30 tablet 0   • omeprazole (PriLOSEC) 20 mg delayed release capsule Take 20 mg by mouth daily     • Prenatal MV-Min-Fe Fum-FA-DHA (PRENATAL 1 PO) Take by mouth     • Probiotic Product (PROBIOTIC PO) Take by mouth       No current facility-administered medications for this visit  Allergies   Allergen Reactions   • Nickel        Review of Systems    Video Exam    There were no vitals filed for this visit  Physical Exam     Visit Time    Visit Start Time: 11:00a  Visit Stop Time: 11:20a  Total Visit Duration: 20 minutes were spent in the visit  We did review the treatment plan, we reviewed her medications and her follow-up will be postdelivery  Also time was spent completing the progress note

## 2023-02-08 ENCOUNTER — ULTRASOUND (OUTPATIENT)
Facility: HOSPITAL | Age: 38
End: 2023-02-08

## 2023-02-08 VITALS
WEIGHT: 154 LBS | BODY MASS INDEX: 26.29 KG/M2 | DIASTOLIC BLOOD PRESSURE: 70 MMHG | SYSTOLIC BLOOD PRESSURE: 112 MMHG | HEART RATE: 83 BPM | HEIGHT: 64 IN

## 2023-02-08 DIAGNOSIS — O10.013 PRE-EXISTING ESSENTIAL HYPERTENSION DURING PREGNANCY IN THIRD TRIMESTER: ICD-10-CM

## 2023-02-08 DIAGNOSIS — O09.523 MULTIGRAVIDA OF ADVANCED MATERNAL AGE IN THIRD TRIMESTER: ICD-10-CM

## 2023-02-08 DIAGNOSIS — Z3A.36 36 WEEKS GESTATION OF PREGNANCY: Primary | ICD-10-CM

## 2023-02-08 NOTE — PROGRESS NOTES
Repeat Non-Stress Testing:    Patient verbalizes +FM  Pt denies ALL:               Leaking of fluid   Contractions   Vaginal bleeding   Decreased fetal movement    Patient is performing daily kick counts  Patient has no questions or concerns     NST strip reviewed by Dr Kraus Samples

## 2023-02-08 NOTE — PROGRESS NOTES
[facility]: Ms Nia Brown was seen today for NST (found under the pregnancy episode) which I reviewed the RN assessment and agree, and fetal growth ultrasound (see ultrasound report under OB procedures tab)  Please don't hesitate to contact our office with any concerns or questions    -Coy Mendzoa MD

## 2023-02-09 ENCOUNTER — ROUTINE PRENATAL (OUTPATIENT)
Dept: OBGYN CLINIC | Facility: CLINIC | Age: 38
End: 2023-02-09

## 2023-02-09 ENCOUNTER — TELEPHONE (OUTPATIENT)
Dept: PSYCHIATRY | Facility: CLINIC | Age: 38
End: 2023-02-09

## 2023-02-09 VITALS — SYSTOLIC BLOOD PRESSURE: 120 MMHG | WEIGHT: 154 LBS | BODY MASS INDEX: 26.43 KG/M2 | DIASTOLIC BLOOD PRESSURE: 80 MMHG

## 2023-02-09 DIAGNOSIS — O35.2XX0 HEREDITARY DISEASE IN FAMILY POSSIBLY AFFECTING FETUS, AFFECTING MANAGEMENT OF MOTHER IN PREGNANCY, SINGLE OR UNSPECIFIED FETUS: ICD-10-CM

## 2023-02-09 DIAGNOSIS — O10.013 PRE-EXISTING ESSENTIAL HYPERTENSION DURING PREGNANCY IN THIRD TRIMESTER: ICD-10-CM

## 2023-02-09 DIAGNOSIS — Z3A.36 36 WEEKS GESTATION OF PREGNANCY: Primary | ICD-10-CM

## 2023-02-09 DIAGNOSIS — Z86.2 HISTORY OF DIC SYNDROME: ICD-10-CM

## 2023-02-09 DIAGNOSIS — O09.523 MULTIGRAVIDA OF ADVANCED MATERNAL AGE IN THIRD TRIMESTER: ICD-10-CM

## 2023-02-09 DIAGNOSIS — O34.211 MATERNAL CARE DUE TO LOW TRANSVERSE UTERINE SCAR FROM PREVIOUS CESAREAN DELIVERY: ICD-10-CM

## 2023-02-09 LAB
SL AMB  POCT GLUCOSE, UA: NORMAL
SL AMB POCT URINE PROTEIN: NORMAL

## 2023-02-09 NOTE — ASSESSMENT & PLAN NOTE
41 yo here for ob visit  at 36+1  Rare contractions, no leaking or bleeding  Good fetal movement  GBS collected  Return in 1 wk  WDL

## 2023-02-09 NOTE — PROGRESS NOTES
Pre-existing hypertension during pregnancy in third trimester  BP normal asymptomatic    Maternal care due to low transverse uterine scar from previous  delivery  Repeat CS and tubal  with ED  Midline vertical     History of DIC syndrome  Plan 2 lines, cross for 2 units    Hereditary disease in family possibly affecting fetus  Second child has CHARGE syndrome  MFM- less than 1% chance of recurrence as neither carry mutation  36 weeks gestation of pregnancy  39 yo here for ob visit  at 36+1  Rare contractions, no leaking or bleeding  Good fetal movement  GBS collected  Return in 1 wk

## 2023-02-12 LAB — GP B STREP DNA SPEC QL NAA+PROBE: POSITIVE

## 2023-02-14 DIAGNOSIS — I10 ESSENTIAL HYPERTENSION: ICD-10-CM

## 2023-02-15 ENCOUNTER — ROUTINE PRENATAL (OUTPATIENT)
Dept: OBGYN CLINIC | Facility: CLINIC | Age: 38
End: 2023-02-15

## 2023-02-15 ENCOUNTER — ULTRASOUND (OUTPATIENT)
Facility: HOSPITAL | Age: 38
End: 2023-02-15

## 2023-02-15 VITALS
HEIGHT: 64 IN | WEIGHT: 155.4 LBS | SYSTOLIC BLOOD PRESSURE: 120 MMHG | BODY MASS INDEX: 26.53 KG/M2 | DIASTOLIC BLOOD PRESSURE: 70 MMHG | HEART RATE: 93 BPM

## 2023-02-15 VITALS — BODY MASS INDEX: 26.74 KG/M2 | DIASTOLIC BLOOD PRESSURE: 82 MMHG | WEIGHT: 155.8 LBS | SYSTOLIC BLOOD PRESSURE: 120 MMHG

## 2023-02-15 DIAGNOSIS — O10.913 CHRONIC HYPERTENSION WITH EXACERBATION DURING PREGNANCY IN THIRD TRIMESTER: Primary | ICD-10-CM

## 2023-02-15 DIAGNOSIS — Z87.59 HISTORY OF POLYHYDRAMNIOS: ICD-10-CM

## 2023-02-15 DIAGNOSIS — O34.211 MATERNAL CARE DUE TO LOW TRANSVERSE UTERINE SCAR FROM PREVIOUS CESAREAN DELIVERY: ICD-10-CM

## 2023-02-15 DIAGNOSIS — B95.1 POSITIVE GBS TEST: ICD-10-CM

## 2023-02-15 DIAGNOSIS — O10.013 PRE-EXISTING ESSENTIAL HYPERTENSION DURING PREGNANCY IN THIRD TRIMESTER: ICD-10-CM

## 2023-02-15 DIAGNOSIS — Z86.2 HISTORY OF DIC SYNDROME: ICD-10-CM

## 2023-02-15 DIAGNOSIS — O35.2XX0 HEREDITARY DISEASE IN FAMILY POSSIBLY AFFECTING FETUS, AFFECTING MANAGEMENT OF MOTHER IN PREGNANCY, SINGLE OR UNSPECIFIED FETUS: ICD-10-CM

## 2023-02-15 DIAGNOSIS — O09.523 MULTIGRAVIDA OF ADVANCED MATERNAL AGE IN THIRD TRIMESTER: ICD-10-CM

## 2023-02-15 DIAGNOSIS — Z3A.37 37 WEEKS GESTATION OF PREGNANCY: ICD-10-CM

## 2023-02-15 DIAGNOSIS — Z3A.37 37 WEEKS GESTATION OF PREGNANCY: Primary | ICD-10-CM

## 2023-02-15 DIAGNOSIS — O99.013 ANEMIA AFFECTING PREGNANCY IN THIRD TRIMESTER: ICD-10-CM

## 2023-02-15 LAB
SL AMB  POCT GLUCOSE, UA: NORMAL
SL AMB POCT URINE PROTEIN: NORMAL

## 2023-02-15 RX ORDER — NIFEDIPINE 30 MG/1
30 TABLET, EXTENDED RELEASE ORAL DAILY
Qty: 90 TABLET | Refills: 3 | Status: ON HOLD | OUTPATIENT
Start: 2023-02-15

## 2023-02-15 NOTE — ASSESSMENT & PLAN NOTE
Merlyn Parra  is a 40 y o  [id number] @37w0d who presents for routine prenatal visit  Reports occasional BH  No persistent contractions or cramping  28 wk labs - mild anemia - repeat CBC ordered  Taking PO iron  Normal 1 hr, neg RPR  Growth scan 2/8/23 - EFW 55%  Going for weekly NST/SHARI  S/p tdap, flu, and COVID  GBS - positive   Plans to breastfeed  Pump - has  Ped - has     Reports good fetal movement  Denies LOF, vaginal bleeding, regular uterine contractions, cramping, headaches or visual changes  Reviewed labor precautions and FKC

## 2023-02-15 NOTE — ASSESSMENT & PLAN NOTE
Continues nifedipine 30 mg QD  Remains normotensive and asymptomatic  Scheduled for repeat CS with TL on 2/24/2023 with ED  Had CHG rinse and instructions  Advised against shaving  Has d/c LDASA

## 2023-02-15 NOTE — PROGRESS NOTES
Repeat Non-Stress Testing:    Patient verbalizes +FM  Pt denies ALL:               Leaking of fluid   Contractions   Vaginal bleeding   Decreased fetal movement    Patient is performing daily kick counts  Patient has no questions or concerns     NST strip reviewed by Dr Aura Swann

## 2023-02-15 NOTE — PROGRESS NOTES
Problem List Items Addressed This Visit        Cardiovascular and Mediastinum    Pre-existing hypertension during pregnancy in third trimester     Continues nifedipine 30 mg QD  Remains normotensive and asymptomatic  Scheduled for repeat CS with TL on 2023 with ED  Had CHG rinse and instructions  Advised against shaving  Has d/c LDASA  Genitourinary    Maternal care due to low transverse uterine scar from previous  delivery       Other    History of DIC syndrome    History of polyhydramnios    Multigravida of advanced maternal age in third trimester     NIPT low risk  AFP neg  Hereditary disease in family possibly affecting fetus    42 weeks gestation of pregnancy - Primary     Tao Sheikh  is a 40 y o  [id number] @37w0d who presents for routine prenatal visit  Reports occasional BH  No persistent contractions or cramping  28 wk labs - mild anemia - repeat CBC ordered  Taking PO iron  Normal 1 hr, neg RPR  Growth scan 23 - EFW 55%  Going for weekly NST/SHARI  S/p tdap, flu, and COVID  GBS - positive   Plans to breastfeed  Pump - has  Ped - has     Reports good fetal movement  Denies LOF, vaginal bleeding, regular uterine contractions, cramping, headaches or visual changes  Reviewed labor precautions and FKC             Positive GBS test   Other Visit Diagnoses     Anemia affecting pregnancy in third trimester        Relevant Orders    CBC and differential

## 2023-02-15 NOTE — PROGRESS NOTES
Patient here for PN visit  She denies any complaints  Good fetal movement  GBS positive  She has her breast pump  Up to date with vaccines   scheduled 23; has the hibiclens and instructions  Urine neg for protein and glucose

## 2023-02-15 NOTE — LETTER
February 15, 2023     [address] [place] [address]    Patient: Reynold Smith   YOB: 1985   Date of Visit: 2/15/2023       Dear Dr Patrick Dodge: Thank you for referring Reynold Smith to me for evaluation  Below are my notes for this consultation  If you have questions, please do not hesitate to call me  I look forward to following your patient along with you  Sincerely,        Ethan Chapman MD        CC: No Recipients  Ethan Chapman MD  2/15/2023  5:32 PM  Sign when Signing Visit  NST is reactive   Ethan Chapman MD

## 2023-02-16 ENCOUNTER — APPOINTMENT (OUTPATIENT)
Dept: LAB | Facility: IMAGING CENTER | Age: 38
End: 2023-02-16

## 2023-02-16 DIAGNOSIS — O99.013 ANEMIA AFFECTING PREGNANCY IN THIRD TRIMESTER: ICD-10-CM

## 2023-02-16 LAB
BASOPHILS # BLD AUTO: 0.07 THOUSANDS/ÂΜL (ref 0–0.1)
BASOPHILS NFR BLD AUTO: 1 % (ref 0–1)
EOSINOPHIL # BLD AUTO: 0.23 THOUSAND/ÂΜL (ref 0–0.61)
EOSINOPHIL NFR BLD AUTO: 2 % (ref 0–6)
ERYTHROCYTE [DISTWIDTH] IN BLOOD BY AUTOMATED COUNT: 13.1 % (ref 11.6–15.1)
HCT VFR BLD AUTO: 36.7 % (ref 34.8–46.1)
HGB BLD-MCNC: 11.4 G/DL (ref 11.5–15.4)
IMM GRANULOCYTES # BLD AUTO: 0.15 THOUSAND/UL (ref 0–0.2)
IMM GRANULOCYTES NFR BLD AUTO: 2 % (ref 0–2)
LYMPHOCYTES # BLD AUTO: 1.83 THOUSANDS/ÂΜL (ref 0.6–4.47)
LYMPHOCYTES NFR BLD AUTO: 18 % (ref 14–44)
MCH RBC QN AUTO: 28.9 PG (ref 26.8–34.3)
MCHC RBC AUTO-ENTMCNC: 31.1 G/DL (ref 31.4–37.4)
MCV RBC AUTO: 93 FL (ref 82–98)
MONOCYTES # BLD AUTO: 0.79 THOUSAND/ÂΜL (ref 0.17–1.22)
MONOCYTES NFR BLD AUTO: 8 % (ref 4–12)
NEUTROPHILS # BLD AUTO: 7.27 THOUSANDS/ÂΜL (ref 1.85–7.62)
NEUTS SEG NFR BLD AUTO: 69 % (ref 43–75)
NRBC BLD AUTO-RTO: 0 /100 WBCS
PLATELET # BLD AUTO: 192 THOUSANDS/UL (ref 149–390)
PMV BLD AUTO: 11.9 FL (ref 8.9–12.7)
RBC # BLD AUTO: 3.94 MILLION/UL (ref 3.81–5.12)
WBC # BLD AUTO: 10.34 THOUSAND/UL (ref 4.31–10.16)

## 2023-02-20 ENCOUNTER — ULTRASOUND (OUTPATIENT)
Dept: PERINATAL CARE | Facility: CLINIC | Age: 38
End: 2023-02-20

## 2023-02-20 VITALS
SYSTOLIC BLOOD PRESSURE: 126 MMHG | BODY MASS INDEX: 26.8 KG/M2 | DIASTOLIC BLOOD PRESSURE: 74 MMHG | HEART RATE: 78 BPM | WEIGHT: 157 LBS | HEIGHT: 64 IN

## 2023-02-20 DIAGNOSIS — Z3A.38 38 WEEKS GESTATION OF PREGNANCY: ICD-10-CM

## 2023-02-20 DIAGNOSIS — O10.013 PRE-EXISTING ESSENTIAL HYPERTENSION DURING PREGNANCY IN THIRD TRIMESTER: Primary | ICD-10-CM

## 2023-02-20 DIAGNOSIS — Z3A.37 37 WEEKS GESTATION OF PREGNANCY: ICD-10-CM

## 2023-02-20 DIAGNOSIS — O09.523 MULTIGRAVIDA OF ADVANCED MATERNAL AGE IN THIRD TRIMESTER: ICD-10-CM

## 2023-02-20 NOTE — ASSESSMENT & PLAN NOTE
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

## 2023-02-20 NOTE — H&P
[address] 40 y o  female MRN: [id number]  Unit/Bed#: LD TRIAGE 2- Encounter: [id number]    Assessment: 40 y o   at 38w1d admitted for scheduled RLTCS at 38w for [facility]  Plan:   * 38 weeks gestation of pregnancy  Assessment & Plan  · Admit for RLTCS as scheduled at 38w for C[facility]  · NPO  · Continuous fetal monitoring  · Routine admission labs (CBC, T&S, syphilis panel)  · Ancef for surgical ppx    Hereditary disease in family possibly affecting fetus  Assessment & Plan  Her 2nd baby has charge syndrome  Her daughter had polyhydramnios and hydronephrosis found antepartum and then postpartum had unilateral choanal atresia feeding issues, facial droop, swelling, patent ductus arteriosus and patent foramen ovale  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  Both parents have been screened and are not carriers  This is an autosomal dominant condition  This occurred as a new mutation but her recurrence risk may be as high as 1% recurrence risk  Maternal care due to low transverse uterine scar from previous  delivery  Assessment & Plan  Hx of CS x1   · Proceed with RLTCS as scheduled    Pre-existing hypertension during pregnancy in third trimester  Assessment & Plan  Baseline preE labs wnl 23  On Procardia XL 30 mg daily  · CBC, CMP, and UPC on admission  · Continue home Procardia  · Monitor BPs  · Monitor for signs/symptoms of preE/HTN    History of DIC syndrome  Assessment & Plan  In  she had a 8 pound 1 ounce baby girl with a complicated delivery  She had a vacuum assisted vaginal delivery for nonreassuring fetal testing after she developed hypotension after an epidural bolus she received at 9 cms with quick progression to complete and + 3 station   After delivery she had a manual extraction of the placenta, and then developed significant hypotension postpartum and needed an exploratory laparotomy and IR embolization of the anterior iliac and massive transfusion  The postpartum complications and excessive bleeding raised the concern that she may have had an amniotic fluid embolus just at 9 cms which can also be a cause of the severe hypotension and can lead to significant hemorrhage  During this admission she also developed an acute kidney injury and hypertension  Discussed case and plan w/ Dr Maria E Zayas    Chief Complaint: "I'm here for my "    HPI: Elizabet Cruz is a 40 y o  [id number] with an DESEAN of 3/8/2023, by Last Menstrual Period at 38w1d who is being admitted for scheduled RLTCS at 38w for [facility]  She complains of uterine contractions, occurring irregularly and not frequently, has no LOF, and reports no VB  She states she has felt good FM  Patient Active Problem List   Diagnosis   • Acne   • Elevated rheumatoid factor   • Hernia, hiatal   • History of DIC syndrome   • History of small bowel obstruction   • Essential hypertension   • History of polyhydramnios   • Pre-existing hypertension during pregnancy in third trimester   • Multigravida of advanced maternal age in third trimester   • Maternal care due to low transverse uterine scar from previous  delivery   • Hereditary disease in family possibly affecting fetus   • SARA (generalized anxiety disorder)   • 38 weeks gestation of pregnancy   • Positive GBS test       Baby complications/comments: none    Review of Systems   Constitutional: Negative for chills and fever  Eyes: Negative for visual disturbance  Respiratory: Negative for cough and shortness of breath  Cardiovascular: Negative for chest pain and leg swelling  Gastrointestinal: Negative for abdominal pain, diarrhea, nausea and vomiting  Genitourinary: Negative for dysuria, frequency, hematuria, pelvic pain, urgency, vaginal bleeding and vaginal discharge  Neurological: Negative for dizziness, light-headedness and headaches         OB Hx:  OB History    Para Term  AB Living   3 2 2 0 0 2   SAB IAB Ectopic Multiple Live Births   0 0 0 0 2      # Outcome Date GA Lbr Lan/2nd Weight Sex Delivery Anes PTL Lv   3 Current            2 Term 20 38w0d  2807 g (6 lb 3 oz) F CS-LTranv Spinal N TATIANA   1 Term 07/05/15 41w0d  3657 g (8 lb 1 oz) F Vag-Vacuum EPI N TATIANA      Birth Comments: Postpartum hemorrhage- see comments      Complications: Other Excessive Bleeding      Obstetric Comments   7/5/15 She had a vacuum assisted vaginal delivery, manual extraction of the placenta, PPH, retroperitoneal bleeding, exploratory laparotomy and IR embolization of the anterior iliac and massive transfusion  During this admission she also developed an acute kidney injury and hypertension         Past Medical Hx:  Past Medical History:   Diagnosis Date   • Acute renal failure ([facility] [address] )     [id number] RESOLVED   • Acute tubular necrosis (HCC)    • Anemia     during the pregnancy    • Bowel obstruction ([facility] [address] ) 2017   • Chronic kidney disease    • Fibroadenoma of breast, left    • Fibroadenoma of breast, right    •  1 para 1    • History of transfusion 2015    After delivery    • Hypertension     no medication    • Kidney stone    • Postpartum hemorrhage     UNSPECIFIED TYPE  RESOLVED    • Urinary tract infection     yes in the last ten years   • Vacuum extractor delivery, delivered      daughter with postpartum hemorrhage and DIC   • Varicella     had childhood chickenpox       Past Surgical hx:  Past Surgical History:   Procedure Laterality Date   • BREAST FIBROADENOMA SURGERY      Cryosurgical Ablation of Fibroadenoma   • BREAST LUMPECTOMY Left    • BREAST LUMPECTOMY Right    •  SECTION         • EXPLORATORY LAPAROTOMY W/ BOWEL RESECTION N/A 2017    Procedure: LAPAROTOMY EXPLORATORY W/ lysis of adhesions;  Surgeon: Norah Closs, DO;  [facility];  Service:    • INTRAUTERINE DEVICE INSERTION     • LAPAROTOMY      EXPLORATORY for DIC and hemorrhage postpartum retroperitoneal hematoma   • OR  DELIVERY ONLY N/A 2020    Procedure:  SECTION () REPEAT;  Surgeon: Dipti Patton MD;  [facility]: [facility] ;  Service: Obstetrics   • TOOTH EXTRACTION      Impression: [id number] [facility]  2004       Social Hx:  Alcohol use: no  Tobacco use: no  Other substance use: no  Other: N/A    Allergies   Allergen Reactions   • Nickel        Medications Prior to Admission   Medication   • busPIRone (BUSPAR) 15 mg tablet   • clindamycin (CLEOCIN T) 1 % lotion   • Docusate Sodium (COLACE PO)   • Ferrous Sulfate (Iron) 325 (65 Fe) MG TABS   • Magnesium 400 MG CAPS   • NIFEdipine (PROCARDIA XL) 30 mg 24 hr tablet   • omeprazole (PriLOSEC) 20 mg delayed release capsule   • Prenatal MV-Min-Fe Fum-FA-DHA (PRENATAL 1 PO)   • Probiotic Product (PROBIOTIC PO)       Objective: There is no height or weight on file to calculate BMI    128/85 mmHg  81 bpm    Physical Exam:  Physical Exam  Constitutional:       General: She is not in acute distress  Appearance: Normal appearance  She is not ill-appearing  HENT:      Mouth/Throat:      Mouth: Mucous membranes are moist    Eyes:      Extraocular Movements: Extraocular movements intact  Cardiovascular:      Rate and Rhythm: Normal rate and regular rhythm  Heart sounds: Normal heart sounds  Pulmonary:      Effort: Pulmonary effort is normal       Breath sounds: Normal breath sounds  Abdominal:      General: There is no distension  Palpations: Abdomen is soft  Tenderness: There is no abdominal tenderness  There is no guarding  Musculoskeletal:         General: No swelling  Right lower leg: No edema  Left lower leg: No edema  Neurological:      General: No focal deficit present  Mental Status: She is alert  Skin:     General: Skin is warm and dry  Coloration: Skin is not jaundiced or pale     Psychiatric:         Mood and Affect: Mood normal          Behavior: Behavior normal          Thought Content:  Thought content normal          Judgment: Judgment normal           FHT:  Baseline: 125 bpm  Variability: moderate  Accelerations: present  Decelerations: absent  Category 1    TOCO:   Irritability with irregular contractions    Lab Results   Component Value Date    WBC 10 34 (H) 02/16/2023    HGB 11 4 (L) 02/16/2023    HCT 36 7 02/16/2023     02/16/2023     Lab Results   Component Value Date     09/03/2015    K 3 8 01/12/2023     12/06/2022    CO2 25 12/06/2022    BUN 11 12/06/2022    CREATININE 0 80 12/06/2022    GLUCOSE 92 09/03/2015    AST 8 08/26/2022    ALT 18 08/26/2022     Prenatal Labs: Reviewed      Blood type: A positive  Antibody: negative  GBS: positive  HIV: ngative  Rubella: Immune  VDRL/RPR: Non reactive  HBsAg: Negative  Chlamydia: Negative  Gonorrhea: Negative  Diabetes 1 hour screen: 146  3 hour glucose: 4/4 values wnl  Platelets: 807  Hgb: 13 0  >2 Midnights  INPATIENT     Signature/Title: Alfa Mora MD  Date: 2/24/2023  Time: 11:06 AM

## 2023-02-20 NOTE — ASSESSMENT & PLAN NOTE
mL, Hgb 11 9 --> 10 6  Pain well controlled  Voiding spontaneously  Appropriate bowel function  Tolerating PO  DVT ppx: SCDs (BMI 26)  Breastfeeding  Lochia wnl  · Continue routine postpartum care

## 2023-02-20 NOTE — ASSESSMENT & PLAN NOTE
Baseline preE labs wnl 8/26/23  On Procardia XL 30 mg daily  UPC 0 13, CBC and CMP wnl  Asymptomatic    Systolic (82DDT), MLJ:553 , Min:116 , KCP:774   Diastolic (92LUE), PSQ:93, Min:65, Max:79  · Continue monitoring BP  · Continue monitoring for signs/symptoms of preE  · Continue home Procardia

## 2023-02-20 NOTE — ASSESSMENT & PLAN NOTE
In 2015 she had a 8 pound 1 ounce baby girl with a complicated delivery  She had a vacuum assisted vaginal delivery for nonreassuring fetal testing after she developed hypotension after an epidural bolus she received at 9 cms with quick progression to complete and + 3 station  After delivery she had a manual extraction of the placenta, and then developed significant hypotension postpartum and needed an exploratory laparotomy and IR embolization of the anterior iliac and massive transfusion  The postpartum complications and excessive bleeding raised the concern that she may have had an amniotic fluid embolus just at 9 cms which can also be a cause of the severe hypotension and can lead to significant hemorrhage  During this admission she also developed an acute kidney injury and hypertension

## 2023-02-20 NOTE — LETTER
February 20, 2023     [facility] [address] [place] [address]    Patient: Vandana Rome   YOB: 1985   Date of Visit: 2/20/2023       Dear Dr Mahnaz Kaiser: Thank you for referring Vandana Rome to me for evaluation  Below are my notes for this consultation  If you have questions, please do not hesitate to call me  I look forward to following your patient along with you  Sincerely,        Gio Corrales MD        CC: No Recipients  Gio Corrales MD  2/20/2023  4:45 PM  Sign when Signing Visit  NST is reactive     Gio Corrales MD

## 2023-02-21 ENCOUNTER — TELEMEDICINE (OUTPATIENT)
Dept: BEHAVIORAL/MENTAL HEALTH CLINIC | Facility: CLINIC | Age: 38
End: 2023-02-21

## 2023-02-21 DIAGNOSIS — F41.1 GAD (GENERALIZED ANXIETY DISORDER): Primary | ICD-10-CM

## 2023-02-21 NOTE — PRE-PROCEDURE INSTRUCTIONS
Phone call to patient for pre-operative instructions prior to     Pt was instructed to arrive at 1100 2 hours before her scheduled OR time of 1300 (If the patient is RH negative, she should be told to come in 2 1/2 hours before scheduled OR)    Pt instructed to remain NPO after midnight  *This includes gum, water and hard candy  *If patient takes AM meds (e g hypertensive meds) they may take these meds with a sip of water  *This should not include medications like prenatal vitamins Aspirin or colace  Pt should brush their teeth as usual     Pt was instructed to buy and use a pre-surgical wash containing chlorhexidine the night before and the morning of her scheduled  and to wear clean clothing after the wash  Pt instructed not to shave operative area prior to surgery  Pt was asked not to wear any jewelry and to leave all of her valuables at home  Pt was asked to leave all of her larger bags and suitcases in the car to be brought in after she is assigned a post partum room  Pt was informed that she may have 1 support person in the OR/PACU area and of the current visiting policies

## 2023-02-21 NOTE — PSYCH
Virtual AWV Consent    Verification of patient location: confirmed at home     Patient is located in the following state in which I hold an active license [place]    Reason for visit is therapy follow up     Encounter provider Lester Quiles [facility]    Provider located at [address] [place] [address]  [address] [facility] [address]  [phone]      Recent Visits  No visits were found meeting these conditions  Showing recent visits within past 7 days and meeting all other requirements  Today's Visits  Date Type Provider Dept   02/21/23 [facility]   Showing today's visits and meeting all other requirements  Future Appointments  No visits were found meeting these conditions  Showing future appointments within next 150 days and meeting all other requirements       After connecting through televideo, the patient was identified by name and date of birth  Liudmila Beverly was informed that this is a telemedicine visit and that the visit is being conducted through the "[employer]e Aid  She agrees to proceed  My office door was closed  No one else was in the room  She acknowledged consent and understanding of privacy and security of the video platform  Liudmila Beverly verbally agrees to participate in [employer]  Pt is aware that [employer] could be limited without vital signs or the ability to perform a full hands-on physical exam  Michelle Saxena understands she or the provider may request at any time to terminate the video visit and request the patient to seek care or treatment in person  Patient is aware this is a billable service  Behavioral Health Psychotherapy Progress Note    Psychotherapy Provided: Individual Psychotherapy     No diagnosis found  Goals addressed in session: Goal 1 and Goal 2     DATA: Kelsi Ocampo presented virtually in session   She confirmed that she is at home  The session was focused on review of her anxiety and upcoming  for Friday, 23  She reports some anxiety as she continues to "nest" and get ready for the baby  She reports overall that her mood is more intact since cutting back her hours to weekends, and getting her girls situated with /school  She reports that the "baby" Didier Dee is now in day care and it is helpful to see her flourish  She also reports that now she has a few days a week where she can attend to tasks as she pleases and gets time for herself which is helpful from a mood perspective  Discussed her time off (12 weeks) and she will maintain her weekend option schedule upon return to work  Will follow up in one month to assess for postpartum features  This therapist encouraged Blanka Zee to call if needed to be seen sooner  During this session, this clinician used the following therapeutic modalities: Engagement Strategies and Cognitive Behavioral Therapy    Substance Abuse was not addressed during this session  If the client is diagnosed with a co-occurring substance use disorder, please indicate any changes in the frequency or amount of use: Blanka Zee does not use substances  Stage of change for addressing substance use diagnoses: No substance use/Not applicable    ASSESSMENT:  Riki Downey presents with a Euthymic/ normal mood  her affect is Normal range and intensity, which is congruent, with her mood and the content of the session  The client has made progress on their goals  Blanka Zee is managing her anxiety well  Riki Downey presents with a none risk of suicide, none risk of self-harm, and none risk of harm to others  For any risk assessment that surpasses a "low" rating, a safety plan must be developed  A safety plan was indicated: no  If yes, describe in detail NA    PLAN: Between sessions, Riki Downey will continue to manage mood and anxiety   At the next session, the therapist will use Engagement Strategies and Cognitive Behavioral Therapy to address mood and anxiety       Behavioral Health Treatment Plan and Discharge Planning: Arielle Santiago is aware of and agrees to continue to work on their treatment plan  They have identified and are working toward their discharge goals   yes    Visit start and stop times:    02/21/23  Start Time: 1007

## 2023-02-22 ENCOUNTER — ROUTINE PRENATAL (OUTPATIENT)
Dept: OBGYN CLINIC | Facility: CLINIC | Age: 38
End: 2023-02-22

## 2023-02-22 VITALS — SYSTOLIC BLOOD PRESSURE: 116 MMHG | DIASTOLIC BLOOD PRESSURE: 80 MMHG | BODY MASS INDEX: 26.71 KG/M2 | WEIGHT: 155.6 LBS

## 2023-02-22 DIAGNOSIS — O34.211 MATERNAL CARE DUE TO LOW TRANSVERSE UTERINE SCAR FROM PREVIOUS CESAREAN DELIVERY: ICD-10-CM

## 2023-02-22 DIAGNOSIS — O35.2XX0 HEREDITARY DISEASE IN FAMILY POSSIBLY AFFECTING FETUS, AFFECTING MANAGEMENT OF MOTHER IN PREGNANCY, SINGLE OR UNSPECIFIED FETUS: ICD-10-CM

## 2023-02-22 DIAGNOSIS — Z86.2 HISTORY OF DIC SYNDROME: ICD-10-CM

## 2023-02-22 DIAGNOSIS — B95.1 POSITIVE GBS TEST: ICD-10-CM

## 2023-02-22 DIAGNOSIS — Z3A.38 38 WEEKS GESTATION OF PREGNANCY: ICD-10-CM

## 2023-02-22 DIAGNOSIS — O10.013 PRE-EXISTING ESSENTIAL HYPERTENSION DURING PREGNANCY IN THIRD TRIMESTER: ICD-10-CM

## 2023-02-22 DIAGNOSIS — O09.523 MULTIGRAVIDA OF ADVANCED MATERNAL AGE IN THIRD TRIMESTER: Primary | ICD-10-CM

## 2023-02-22 LAB
SL AMB  POCT GLUCOSE, UA: ABNORMAL
SL AMB POCT URINE PROTEIN: ABNORMAL

## 2023-02-22 NOTE — PROGRESS NOTES
Patient here for PN visit  She denies any complaints  Good fetal movement  Urine neg for glucose, trace of protein

## 2023-02-23 ENCOUNTER — PRE-BIRTH ASSESSMENT (OUTPATIENT)
Dept: LABOR AND DELIVERY | Facility: HOSPITAL | Age: 38
End: 2023-02-23

## 2023-02-23 ENCOUNTER — ANESTHESIA EVENT (INPATIENT)
Dept: LABOR AND DELIVERY | Facility: HOSPITAL | Age: 38
End: 2023-02-23

## 2023-02-23 NOTE — ASSESSMENT & PLAN NOTE
Marisa Kurtz  is a 40 y o  [id number] @38w0d who presents for routine prenatal visit  Scheduled for repeat CS with tubal 2/24/23  Growth US -  2/8/23 - EFW 55%  Going for weekly NST/SHARI  28 wk labs - anemia, resolved with PO iron  Otherwise normal    GBS - positive  Bags packed  Car seat installed  Has pediatrician and breast pump  Reports good fetal movement  Denies LOF, vaginal bleeding, regular uterine contractions, cramping, headaches or visual changes  Reviewed Labor precautions and dVisit

## 2023-02-23 NOTE — PROGRESS NOTES
Problem List Items Addressed This Visit        Cardiovascular and Mediastinum    Pre-existing hypertension during pregnancy in third trimester     Continues procardia XL 30 mg daily  Normotensive and asymptomatic today  Reviewed sx to report  Regular FKC encouraged  Genitourinary    Maternal care due to low transverse uterine scar from previous  delivery       Other    History of DIC syndrome    Multigravida of advanced maternal age in third trimester - Primary    Relevant Orders    POCT urine dip (Completed)    Hereditary disease in family possibly affecting fetus    45 weeks gestation of pregnancy     Geovanni Dean  is a 40 y o  [id number] @38w0d who presents for routine prenatal visit  Scheduled for repeat CS with tubal 23  Growth US -  23 - EFW 55%  Going for weekly NST/SHARI  28 wk labs - anemia, resolved with PO iron  Otherwise normal    GBS - positive  Bags packed  Car seat installed  Has pediatrician and breast pump  Reports good fetal movement  Denies LOF, vaginal bleeding, regular uterine contractions, cramping, headaches or visual changes  Reviewed Labor precautions and [address]               Positive GBS test

## 2023-02-23 NOTE — NURSING NOTE
Phone call to patient for pre-operative instructions prior to     Pt was instructed to arrive 2 hours before her scheduled OR time  Pt instructed to remain NPO after midnight  *This includes gum, water and hard candy  *If patient takes AM meds (e g hypertensive meds) they may take these meds with a sip of water  *This should not include medications like prenatal vitamins Aspirin or colace  *Type 1 diabetics should stay on their normal insulin regime or as ordered by their doctor  Type 2 on insulin should take 1/2 dose of their overnight insulin or as instructed by their doctor  Pt should brush their teeth as usual     Pt was instructed to buy and use a pre-surgical wash containing chlorhexidine the night before and the morning of her scheduled  and to wear clean clothing after the wash  Pt instructed not to shave operative area prior to surgery  Pt was asked not to wear any jewelry and to leave all of her valuables at home  Pt was asked to leave all of her larger bags and suitcases in the car to be brought in after she is assigned a post partum room  Pt was informed that she may have 1 support person in the OR/PACU area and of the current visiting policies

## 2023-02-23 NOTE — ASSESSMENT & PLAN NOTE
Continues procardia XL 30 mg daily  Normotensive and asymptomatic today  Reviewed sx to report  Regular FKC encouraged

## 2023-02-24 ENCOUNTER — ANESTHESIA (INPATIENT)
Dept: LABOR AND DELIVERY | Facility: HOSPITAL | Age: 38
End: 2023-02-24

## 2023-02-24 ENCOUNTER — HOSPITAL ENCOUNTER (INPATIENT)
Facility: HOSPITAL | Age: 38
LOS: 3 days | Discharge: HOME | End: 2023-02-27
Attending: OBSTETRICS & GYNECOLOGY | Admitting: OBSTETRICS & GYNECOLOGY

## 2023-02-24 DIAGNOSIS — O10.919 CHRONIC HYPERTENSION DURING PREGNANCY, ANTEPARTUM: ICD-10-CM

## 2023-02-24 DIAGNOSIS — O34.219 PREVIOUS CESAREAN SECTION COMPLICATING PREGNANCY: ICD-10-CM

## 2023-02-24 DIAGNOSIS — Z98.891 STATUS POST REPEAT LOW TRANSVERSE CESAREAN SECTION: Primary | ICD-10-CM

## 2023-02-24 DIAGNOSIS — Z3A.38 38 WEEKS GESTATION OF PREGNANCY: ICD-10-CM

## 2023-02-24 LAB
ABO GROUP BLD BPU: NORMAL
ABO GROUP BLD BPU: NORMAL
ABO GROUP BLD: NORMAL
ALBUMIN SERPL BCP-MCNC: 3.7 G/DL (ref 3.5–5)
ALP SERPL-CCNC: 89 U/L (ref 34–104)
ALT SERPL W P-5'-P-CCNC: 9 U/L (ref 7–52)
ANION GAP SERPL CALCULATED.3IONS-SCNC: 10 MMOL/L (ref 4–13)
AST SERPL W P-5'-P-CCNC: 13 U/L (ref 13–39)
BASE EXCESS BLDCOA CALC-SCNC: -3 MMOL/L (ref 3–11)
BASE EXCESS BLDCOV CALC-SCNC: -0.3 MMOL/L (ref 1–9)
BILIRUB SERPL-MCNC: 0.48 MG/DL (ref 0.2–1)
BLD GP AB SCN SERPL QL: NEGATIVE
BPU ID: NORMAL
BPU ID: NORMAL
BUN SERPL-MCNC: 8 MG/DL (ref 5–25)
CALCIUM SERPL-MCNC: 9 MG/DL (ref 8.4–10.2)
CHLORIDE SERPL-SCNC: 107 MMOL/L (ref 96–108)
CO2 SERPL-SCNC: 21 MMOL/L (ref 21–32)
CREAT SERPL-MCNC: 0.69 MG/DL (ref 0.6–1.3)
CREAT UR-MCNC: 203.5 MG/DL
CROSSMATCH: NORMAL
CROSSMATCH: NORMAL
ERYTHROCYTE [DISTWIDTH] IN BLOOD BY AUTOMATED COUNT: 13.2 % (ref 11.6–15.1)
GFR SERPL CREATININE-BSD FRML MDRD: 112 ML/MIN/1.73SQ M
GLUCOSE SERPL-MCNC: 75 MG/DL (ref 65–140)
HCO3 BLDCOA-SCNC: 23.4 MMOL/L (ref 17.3–27.3)
HCO3 BLDCOV-SCNC: 24.6 MMOL/L (ref 12.2–28.6)
HCT VFR BLD AUTO: 36 % (ref 34.8–46.1)
HGB BLD-MCNC: 11.9 G/DL (ref 11.5–15.4)
MCH RBC QN AUTO: 29.7 PG (ref 26.8–34.3)
MCHC RBC AUTO-ENTMCNC: 33.1 G/DL (ref 31.4–37.4)
MCV RBC AUTO: 90 FL (ref 82–98)
O2 CT VFR BLDCOA CALC: 9.7 ML/DL
OXYHGB MFR BLDCOA: 47.6 %
OXYHGB MFR BLDCOV: 74.7 %
PCO2 BLDCOA: 46.7 MM[HG] (ref 30–60)
PCO2 BLDCOV: 41.4 MM HG (ref 27–43)
PH BLDCOA: 7.32 [PH] (ref 7.23–7.43)
PH BLDCOV: 7.39 [PH] (ref 7.19–7.49)
PLATELET # BLD AUTO: 152 THOUSANDS/UL (ref 149–390)
PMV BLD AUTO: 11.5 FL (ref 8.9–12.7)
PO2 BLDCOA: 21 MM HG (ref 5–25)
PO2 BLDCOV: 30.6 MM HG (ref 15–45)
POTASSIUM SERPL-SCNC: 3.7 MMOL/L (ref 3.5–5.3)
PROT SERPL-MCNC: 6.7 G/DL (ref 6.4–8.4)
PROT UR-MCNC: 27 MG/DL
PROT/CREAT UR: 0.13 MG/G{CREAT} (ref 0–0.1)
RBC # BLD AUTO: 4.01 MILLION/UL (ref 3.81–5.12)
RH BLD: POSITIVE
SAO2 % BLDCOV: 14.1 ML/DL
SODIUM SERPL-SCNC: 138 MMOL/L (ref 135–147)
SPECIMEN EXPIRATION DATE: NORMAL
TREPONEMA PALLIDUM IGG+IGM AB [PRESENCE] IN SERUM OR PLASMA BY IMMUNOASSAY: NORMAL
UNIT DISPENSE STATUS: NORMAL
UNIT DISPENSE STATUS: NORMAL
UNIT PRODUCT CODE: NORMAL
UNIT PRODUCT CODE: NORMAL
UNIT PRODUCT VOLUME: 350 ML
UNIT PRODUCT VOLUME: 350 ML
UNIT RH: NORMAL
UNIT RH: NORMAL
WBC # BLD AUTO: 10.26 THOUSAND/UL (ref 4.31–10.16)

## 2023-02-24 PROCEDURE — 0UB70ZZ EXCISION OF BILATERAL FALLOPIAN TUBES, OPEN APPROACH: ICD-10-PCS | Performed by: OBSTETRICS & GYNECOLOGY

## 2023-02-24 RX ORDER — BUPIVACAINE HYDROCHLORIDE 7.5 MG/ML
INJECTION, SOLUTION INTRASPINAL AS NEEDED
Status: DISCONTINUED | OUTPATIENT
Start: 2023-02-24 | End: 2023-02-24

## 2023-02-24 RX ORDER — FENTANYL CITRATE/PF 50 MCG/ML
25 SYRINGE (ML) INJECTION
Status: DISCONTINUED | OUTPATIENT
Start: 2023-02-24 | End: 2023-02-27 | Stop reason: HOSPADM

## 2023-02-24 RX ORDER — SODIUM CHLORIDE, SODIUM LACTATE, POTASSIUM CHLORIDE, CALCIUM CHLORIDE 600; 310; 30; 20 MG/100ML; MG/100ML; MG/100ML; MG/100ML
125 INJECTION, SOLUTION INTRAVENOUS CONTINUOUS
Status: DISCONTINUED | OUTPATIENT
Start: 2023-02-24 | End: 2023-02-24

## 2023-02-24 RX ORDER — KETOROLAC TROMETHAMINE 30 MG/ML
30 INJECTION, SOLUTION INTRAMUSCULAR; INTRAVENOUS EVERY 6 HOURS
Status: DISCONTINUED | OUTPATIENT
Start: 2023-02-24 | End: 2023-02-24 | Stop reason: SDUPTHER

## 2023-02-24 RX ORDER — IBUPROFEN 600 MG/1
600 TABLET ORAL EVERY 6 HOURS
Status: DISCONTINUED | OUTPATIENT
Start: 2023-02-26 | End: 2023-02-24

## 2023-02-24 RX ORDER — ONDANSETRON 2 MG/ML
4 INJECTION INTRAMUSCULAR; INTRAVENOUS ONCE AS NEEDED
Status: DISCONTINUED | OUTPATIENT
Start: 2023-02-24 | End: 2023-02-27 | Stop reason: HOSPADM

## 2023-02-24 RX ORDER — NALOXONE HYDROCHLORIDE 0.4 MG/ML
0.1 INJECTION, SOLUTION INTRAMUSCULAR; INTRAVENOUS; SUBCUTANEOUS
Status: ACTIVE | OUTPATIENT
Start: 2023-02-24 | End: 2023-02-25

## 2023-02-24 RX ORDER — SODIUM CHLORIDE, SODIUM LACTATE, POTASSIUM CHLORIDE, CALCIUM CHLORIDE 600; 310; 30; 20 MG/100ML; MG/100ML; MG/100ML; MG/100ML
125 INJECTION, SOLUTION INTRAVENOUS CONTINUOUS
Status: DISCONTINUED | OUTPATIENT
Start: 2023-02-24 | End: 2023-02-27 | Stop reason: HOSPADM

## 2023-02-24 RX ORDER — CALCIUM CARBONATE 200(500)MG
1000 TABLET,CHEWABLE ORAL DAILY PRN
Status: DISCONTINUED | OUTPATIENT
Start: 2023-02-24 | End: 2023-02-27 | Stop reason: HOSPADM

## 2023-02-24 RX ORDER — SODIUM CHLORIDE, SODIUM LACTATE, POTASSIUM CHLORIDE, CALCIUM CHLORIDE 600; 310; 30; 20 MG/100ML; MG/100ML; MG/100ML; MG/100ML
INJECTION, SOLUTION INTRAVENOUS CONTINUOUS PRN
Status: DISCONTINUED | OUTPATIENT
Start: 2023-02-24 | End: 2023-02-24

## 2023-02-24 RX ORDER — CEFAZOLIN SODIUM 1 G/50ML
1000 SOLUTION INTRAVENOUS ONCE
Status: COMPLETED | OUTPATIENT
Start: 2023-02-24 | End: 2023-02-24

## 2023-02-24 RX ORDER — DIPHENHYDRAMINE HCL 25 MG
25 TABLET ORAL EVERY 6 HOURS PRN
Status: DISCONTINUED | OUTPATIENT
Start: 2023-02-24 | End: 2023-02-27 | Stop reason: HOSPADM

## 2023-02-24 RX ORDER — FENTANYL CITRATE 50 UG/ML
INJECTION, SOLUTION INTRAMUSCULAR; INTRAVENOUS AS NEEDED
Status: DISCONTINUED | OUTPATIENT
Start: 2023-02-24 | End: 2023-02-24

## 2023-02-24 RX ORDER — KETOROLAC TROMETHAMINE 30 MG/ML
30 INJECTION, SOLUTION INTRAMUSCULAR; INTRAVENOUS EVERY 6 HOURS
Status: COMPLETED | OUTPATIENT
Start: 2023-02-24 | End: 2023-02-25

## 2023-02-24 RX ORDER — KETOROLAC TROMETHAMINE 30 MG/ML
30 INJECTION, SOLUTION INTRAMUSCULAR; INTRAVENOUS EVERY 6 HOURS SCHEDULED
Status: DISCONTINUED | OUTPATIENT
Start: 2023-02-24 | End: 2023-02-24

## 2023-02-24 RX ORDER — TRISODIUM CITRATE DIHYDRATE AND CITRIC ACID MONOHYDRATE 500; 334 MG/5ML; MG/5ML
30 SOLUTION ORAL ONCE
Status: COMPLETED | OUTPATIENT
Start: 2023-02-24 | End: 2023-02-24

## 2023-02-24 RX ORDER — HYDROMORPHONE HCL/PF 1 MG/ML
0.5 SYRINGE (ML) INJECTION EVERY 2 HOUR PRN
Status: DISCONTINUED | OUTPATIENT
Start: 2023-02-24 | End: 2023-02-27 | Stop reason: HOSPADM

## 2023-02-24 RX ORDER — OXYTOCIN/RINGER'S LACTATE 30/500 ML
PLASTIC BAG, INJECTION (ML) INTRAVENOUS AS NEEDED
Status: DISCONTINUED | OUTPATIENT
Start: 2023-02-24 | End: 2023-02-24

## 2023-02-24 RX ORDER — MORPHINE SULFATE 0.5 MG/ML
INJECTION, SOLUTION EPIDURAL; INTRATHECAL; INTRAVENOUS AS NEEDED
Status: DISCONTINUED | OUTPATIENT
Start: 2023-02-24 | End: 2023-02-24

## 2023-02-24 RX ORDER — ONDANSETRON 2 MG/ML
INJECTION INTRAMUSCULAR; INTRAVENOUS AS NEEDED
Status: DISCONTINUED | OUTPATIENT
Start: 2023-02-24 | End: 2023-02-24

## 2023-02-24 RX ORDER — OXYTOCIN/RINGER'S LACTATE 30/500 ML
PLASTIC BAG, INJECTION (ML) INTRAVENOUS CONTINUOUS PRN
Status: DISCONTINUED | OUTPATIENT
Start: 2023-02-24 | End: 2023-02-24

## 2023-02-24 RX ORDER — KETOROLAC TROMETHAMINE 30 MG/ML
INJECTION, SOLUTION INTRAMUSCULAR; INTRAVENOUS AS NEEDED
Status: DISCONTINUED | OUTPATIENT
Start: 2023-02-24 | End: 2023-02-24

## 2023-02-24 RX ORDER — NALBUPHINE HCL 10 MG/ML
10 AMPUL (ML) INJECTION
Status: COMPLETED | OUTPATIENT
Start: 2023-02-24 | End: 2023-02-24

## 2023-02-24 RX ORDER — ACETAMINOPHEN 325 MG/1
650 TABLET ORAL EVERY 6 HOURS SCHEDULED
Status: DISCONTINUED | OUTPATIENT
Start: 2023-02-24 | End: 2023-02-24 | Stop reason: SDUPTHER

## 2023-02-24 RX ORDER — OXYCODONE HYDROCHLORIDE 5 MG/1
5 TABLET ORAL EVERY 4 HOURS PRN
Status: DISCONTINUED | OUTPATIENT
Start: 2023-02-25 | End: 2023-02-24

## 2023-02-24 RX ORDER — ACETAMINOPHEN 325 MG/1
650 TABLET ORAL EVERY 6 HOURS SCHEDULED
Status: DISCONTINUED | OUTPATIENT
Start: 2023-02-24 | End: 2023-02-24

## 2023-02-24 RX ORDER — OXYCODONE HYDROCHLORIDE 5 MG/1
10 TABLET ORAL EVERY 4 HOURS PRN
Status: DISCONTINUED | OUTPATIENT
Start: 2023-02-25 | End: 2023-02-24

## 2023-02-24 RX ORDER — NIFEDIPINE 30 MG/1
30 TABLET, EXTENDED RELEASE ORAL DAILY
Status: DISCONTINUED | OUTPATIENT
Start: 2023-02-25 | End: 2023-02-24

## 2023-02-24 RX ORDER — ACETAMINOPHEN 325 MG/1
650 TABLET ORAL EVERY 6 HOURS SCHEDULED
Status: DISCONTINUED | OUTPATIENT
Start: 2023-02-24 | End: 2023-02-25

## 2023-02-24 RX ORDER — DOCUSATE SODIUM 100 MG/1
100 CAPSULE, LIQUID FILLED ORAL 2 TIMES DAILY
Status: DISCONTINUED | OUTPATIENT
Start: 2023-02-24 | End: 2023-02-27 | Stop reason: HOSPADM

## 2023-02-24 RX ORDER — NALOXONE HYDROCHLORIDE 0.4 MG/ML
0.1 INJECTION, SOLUTION INTRAMUSCULAR; INTRAVENOUS; SUBCUTANEOUS
Status: DISCONTINUED | OUTPATIENT
Start: 2023-02-24 | End: 2023-02-24 | Stop reason: SDUPTHER

## 2023-02-24 RX ORDER — NIFEDIPINE 30 MG/1
30 TABLET, EXTENDED RELEASE ORAL
Status: DISCONTINUED | OUTPATIENT
Start: 2023-02-24 | End: 2023-02-27 | Stop reason: HOSPADM

## 2023-02-24 RX ORDER — DEXAMETHASONE SODIUM PHOSPHATE 10 MG/ML
INJECTION, SOLUTION INTRAMUSCULAR; INTRAVENOUS AS NEEDED
Status: DISCONTINUED | OUTPATIENT
Start: 2023-02-24 | End: 2023-02-24

## 2023-02-24 RX ORDER — OXYTOCIN/RINGER'S LACTATE 30/500 ML
62.5 PLASTIC BAG, INJECTION (ML) INTRAVENOUS CONTINUOUS
Status: ACTIVE | OUTPATIENT
Start: 2023-02-24 | End: 2023-02-25

## 2023-02-24 RX ORDER — HYDROMORPHONE HCL/PF 1 MG/ML
0.2 SYRINGE (ML) INJECTION
Status: DISCONTINUED | OUTPATIENT
Start: 2023-02-24 | End: 2023-02-27 | Stop reason: HOSPADM

## 2023-02-24 RX ORDER — ONDANSETRON 2 MG/ML
4 INJECTION INTRAMUSCULAR; INTRAVENOUS EVERY 8 HOURS PRN
Status: DISCONTINUED | OUTPATIENT
Start: 2023-02-24 | End: 2023-02-27 | Stop reason: HOSPADM

## 2023-02-24 RX ORDER — PANTOPRAZOLE SODIUM 20 MG/1
20 TABLET, DELAYED RELEASE ORAL DAILY
Status: DISCONTINUED | OUTPATIENT
Start: 2023-02-25 | End: 2023-02-27 | Stop reason: HOSPADM

## 2023-02-24 RX ORDER — ONDANSETRON 2 MG/ML
4 INJECTION INTRAMUSCULAR; INTRAVENOUS EVERY 8 HOURS PRN
Status: DISCONTINUED | OUTPATIENT
Start: 2023-02-24 | End: 2023-02-24

## 2023-02-24 RX ORDER — HYDROMORPHONE HCL/PF 1 MG/ML
0.5 SYRINGE (ML) INJECTION EVERY 2 HOUR PRN
Status: DISCONTINUED | OUTPATIENT
Start: 2023-02-24 | End: 2023-02-24 | Stop reason: SDUPTHER

## 2023-02-24 RX ADMIN — NALBUPHINE HYDROCHLORIDE 10 MG: 10 INJECTION, SOLUTION INTRAMUSCULAR; INTRAVENOUS; SUBCUTANEOUS at 21:42

## 2023-02-24 RX ADMIN — SODIUM CHLORIDE, SODIUM LACTATE, POTASSIUM CHLORIDE, AND CALCIUM CHLORIDE 1000 ML: .6; .31; .03; .02 INJECTION, SOLUTION INTRAVENOUS at 11:48

## 2023-02-24 RX ADMIN — SODIUM CHLORIDE, SODIUM LACTATE, POTASSIUM CHLORIDE, AND CALCIUM CHLORIDE: .6; .31; .03; .02 INJECTION, SOLUTION INTRAVENOUS at 14:45

## 2023-02-24 RX ADMIN — NIFEDIPINE 30 MG: 30 TABLET, FILM COATED, EXTENDED RELEASE ORAL at 19:59

## 2023-02-24 RX ADMIN — MORPHINE SULFATE 0.15 MG: 0.5 INJECTION, SOLUTION EPIDURAL; INTRATHECAL; INTRAVENOUS at 13:39

## 2023-02-24 RX ADMIN — FENTANYL CITRATE 10 MCG: 50 INJECTION INTRAMUSCULAR; INTRAVENOUS at 13:39

## 2023-02-24 RX ADMIN — NALBUPHINE HYDROCHLORIDE 10 MG: 10 INJECTION, SOLUTION INTRAMUSCULAR; INTRAVENOUS; SUBCUTANEOUS at 16:55

## 2023-02-24 RX ADMIN — SODIUM CHLORIDE, SODIUM LACTATE, POTASSIUM CHLORIDE, AND CALCIUM CHLORIDE 125 ML/HR: .6; .31; .03; .02 INJECTION, SOLUTION INTRAVENOUS at 23:12

## 2023-02-24 RX ADMIN — BUPIVACAINE HYDROCHLORIDE IN DEXTROSE 1.6 ML: 7.5 INJECTION, SOLUTION SUBARACHNOID at 13:39

## 2023-02-24 RX ADMIN — BUSPIRONE HYDROCHLORIDE 15 MG: 5 TABLET ORAL at 21:42

## 2023-02-24 RX ADMIN — DOCUSATE SODIUM 100 MG: 100 CAPSULE, LIQUID FILLED ORAL at 17:59

## 2023-02-24 RX ADMIN — CEFAZOLIN SODIUM 1000 MG: 1 SOLUTION INTRAVENOUS at 13:43

## 2023-02-24 RX ADMIN — ONDANSETRON 4 MG: 2 INJECTION INTRAMUSCULAR; INTRAVENOUS at 14:04

## 2023-02-24 RX ADMIN — ACETAMINOPHEN 650 MG: 325 TABLET ORAL at 21:41

## 2023-02-24 RX ADMIN — DEXAMETHASONE SODIUM PHOSPHATE 10 MG: 10 INJECTION, SOLUTION INTRAMUSCULAR; INTRAVENOUS at 14:04

## 2023-02-24 RX ADMIN — KETOROLAC TROMETHAMINE 30 MG: 30 INJECTION, SOLUTION INTRAMUSCULAR at 21:41

## 2023-02-24 RX ADMIN — Medication 250 MILLI-UNITS/MIN: at 14:01

## 2023-02-24 RX ADMIN — KETOROLAC TROMETHAMINE 30 MG: 30 INJECTION, SOLUTION INTRAMUSCULAR at 14:36

## 2023-02-24 RX ADMIN — ROPIVACAINE HYDROCHLORIDE: 2 INJECTION, SOLUTION EPIDURAL; INFILTRATION at 19:02

## 2023-02-24 RX ADMIN — SODIUM CITRATE AND CITRIC ACID MONOHYDRATE 30 ML: 500; 334 SOLUTION ORAL at 12:38

## 2023-02-24 RX ADMIN — PHENYLEPHRINE HYDROCHLORIDE 40 MCG/MIN: 10 INJECTION, SOLUTION INTRAVENOUS at 13:40

## 2023-02-24 RX ADMIN — Medication 62.5 MILLI-UNITS/MIN: at 16:50

## 2023-02-24 RX ADMIN — SODIUM CHLORIDE, SODIUM LACTATE, POTASSIUM CHLORIDE, AND CALCIUM CHLORIDE: .6; .31; .03; .02 INJECTION, SOLUTION INTRAVENOUS at 13:25

## 2023-02-24 NOTE — DISCHARGE SUMMARY
Discharge Summary - OB/GYN   Sushant Ogden 40 y o  female MRN: [id number]  Unit/Bed#: LD OR  Encounter: [id number]      Admission Date: 2023     Discharge Date: 2023    Admitting Diagnosis:   1  Pregnancy at 38w2d  2  CHTN  3  History of DIC, hemorrhage, ex lap, and IR embolization s/p VAVD and manual placental extraction    Discharge Diagnosis:   Same, delivered    Procedures: repeat  section, low transverse incision and bilateral salpingectomy    Admitting Attending: Sean Nolan MD    Delivering Attending: Dr Matheus Fung    Discharging Attending: Dr Genesis Garrett Course:   Sushant Ogden is a 40 y o  Helga Cuff at 38w2d wks who was initially admitted for scheduled RLTCS and BS  She delivered a viable male  on 23 at 1401  Weight 6lbs 4 5oz via repeat  section, low transverse incision  Apgars were 8 (1 min) and 9 (5 min)   was transferred to the NICU for CPAP requirement  Patient tolerated the procedure well and was transferred to recovery in stable condition  Her post-operative course was uncomplicated  Preoperative hemoglobin was 11 9, postoperative was 10 6  Her postoperative pain was well controlled with oral analgesics  On day of discharge, she was ambulating and able to reasonably perform all ADLs  She was voiding and had appropriate bowel function  Pain was well controlled  She was discharged home on post-operative day #3 without complications  Patient was instructed to follow up with her OB as an outpatient and was given appropriate warnings to call provider if she develops signs of infection or uncontrolled pain  Complications: none apparent    Condition at discharge: good     Discharge instructions/Information to patient and family:   See after visit summary for information provided to patient and family        Provisions for Follow-Up Care:  See after visit summary for information related to follow-up care and any pertinent home health orders  Disposition: Home    Planned Readmission: No    Discharge Medications: For a complete list of the patient's medications, please refer to her med rec        Leo Rey MD  OBGYN Resident  02/27/23  6:59 AM

## 2023-02-24 NOTE — ANESTHESIA POSTPROCEDURE EVALUATION
Post-Op Assessment Note    CV Status:  Stable  Pain Score: 0    Pain management: adequate     Mental Status:  Awake and alert   Hydration Status:  Stable   PONV Controlled:  None   Airway Patency:  Patent      Post Op Vitals Reviewed: Yes      Staff: CRNA     Post-op block assessment: catheter intact      No notable events documented      BP  129/78    Temp 97 1   Pulse 52   Resp 17   SpO2 96

## 2023-02-24 NOTE — ANESTHESIA PROCEDURE NOTES
CSE Block    Patient location during procedure: OR  Start time: 2/24/2023 1:41 PM  Reason for block: procedure for pain and at surgeon's request  Staffing  Performed: Anesthesiologist   Anesthesiologist: Willy Noel MD  Preanesthetic Checklist  Completed: patient identified, IV checked, site marked, risks and benefits discussed, surgical consent, monitors and equipment checked, pre-op evaluation and timeout performed  CSE  Patient position: sitting  Prep: ChloraPrep  Patient monitoring: cardiac monitor and continuous pulse ox  Approach: midline  Spinal Needle  Needle type: pencil-tip   Needle gauge: 27 G  Needle length: 10 cm  Epidural Needle  Injection technique: RAHEEL saline  Needle type: Tuohy   Needle gauge: 18 G  Needle length: 10 cm  Needle insertion depth: 5 5 cm  Location: lumbar (1-5)  Catheter  Catheter type: side hole  Catheter size: 20 G  Catheter at skin depth: 12 cm  Block catheter secured with: dressing, tape, device  Test dose: negative  Assessment  Sensory level: T4  Injection Assessment:  negative aspiration for heme, no paresthesia on injection, positive aspiration for clear CSF and no pain on injection

## 2023-02-24 NOTE — ANESTHESIA PREPROCEDURE EVALUATION
Procedure:   SECTION () REPEAT (Uterus)    Relevant Problems   ANESTHESIA (within normal limits)   (-) History of anesthesia complications      CARDIO   (+) Essential hypertension   (+) Pre-existing hypertension during pregnancy in third trimester   (-) Chest pain   (-) GARCIA (dyspnea on exertion)      GI/HEPATIC   (+) Hernia, hiatal      GYN   (+) 38 weeks gestation of pregnancy   (+) Multigravida of advanced maternal age in third trimester      NEURO/PSYCH   (+) SARA (generalized anxiety disorder)   (+) History of DIC syndrome   (+) History of polyhydramnios   (+) History of small bowel obstruction      PULMONARY   (-) Shortness of breath   (-) URI (upper respiratory infection)     Lab Results   Component Value Date    WBC 10 26 (H) 2023    HGB 11 9 2023    HCT 36 0 2023    MCV 90 2023     2023          She has a history of a vacuum assisted vaginal delivery in  that was complicated by post partum hemorrhage (PPH)  Her PPH failed management by IR for intravascular embolization, and required an ex-lap with ICU stay  18 months after delivery pt suffered from SBO related to adhesions and required an ex-lap at that time  Physical Exam    Airway    Mallampati score: II  TM Distance: >3 FB  Neck ROM: full     Dental       Cardiovascular      Pulmonary      Other Findings         No issues with delivery in     Anesthesia Plan  ASA Score- 3     Anesthesia Type- spinal with ASA Monitors  Additional Monitors:   Airway Plan:     Comment: Plan for CSE  Plan Factors-Exercise tolerance (METS): >4 METS  Chart reviewed  Existing labs reviewed  Patient summary reviewed  Induction- intravenous  Postoperative Plan-     Informed Consent- Anesthetic plan and risks discussed with patient  I personally reviewed this patient with the CRNA  Discussed and agreed on the Anesthesia Plan with the CRNA  Candida Galvan

## 2023-02-25 LAB
ERYTHROCYTE [DISTWIDTH] IN BLOOD BY AUTOMATED COUNT: 12.8 % (ref 11.6–15.1)
HCT VFR BLD AUTO: 31.5 % (ref 34.8–46.1)
HGB BLD-MCNC: 10.6 G/DL (ref 11.5–15.4)
MCH RBC QN AUTO: 30 PG (ref 26.8–34.3)
MCHC RBC AUTO-ENTMCNC: 33.7 G/DL (ref 31.4–37.4)
MCV RBC AUTO: 89 FL (ref 82–98)
PLATELET # BLD AUTO: 162 THOUSANDS/UL (ref 149–390)
PMV BLD AUTO: 11.6 FL (ref 8.9–12.7)
RBC # BLD AUTO: 3.53 MILLION/UL (ref 3.81–5.12)
WBC # BLD AUTO: 19.8 THOUSAND/UL (ref 4.31–10.16)

## 2023-02-25 RX ORDER — IBUPROFEN 600 MG/1
600 TABLET ORAL EVERY 6 HOURS SCHEDULED
Status: DISCONTINUED | OUTPATIENT
Start: 2023-02-25 | End: 2023-02-27 | Stop reason: HOSPADM

## 2023-02-25 RX ORDER — ACETAMINOPHEN 325 MG/1
650 TABLET ORAL EVERY 6 HOURS SCHEDULED
Status: DISCONTINUED | OUTPATIENT
Start: 2023-02-25 | End: 2023-02-27 | Stop reason: HOSPADM

## 2023-02-25 RX ORDER — OXYCODONE HYDROCHLORIDE 10 MG/1
10 TABLET ORAL EVERY 4 HOURS PRN
Status: DISCONTINUED | OUTPATIENT
Start: 2023-02-25 | End: 2023-02-27 | Stop reason: HOSPADM

## 2023-02-25 RX ORDER — OXYCODONE HYDROCHLORIDE 5 MG/1
5 TABLET ORAL EVERY 4 HOURS PRN
Status: DISCONTINUED | OUTPATIENT
Start: 2023-02-25 | End: 2023-02-27 | Stop reason: HOSPADM

## 2023-02-25 RX ADMIN — DOCUSATE SODIUM 100 MG: 100 CAPSULE, LIQUID FILLED ORAL at 18:20

## 2023-02-25 RX ADMIN — KETOROLAC TROMETHAMINE 30 MG: 30 INJECTION, SOLUTION INTRAMUSCULAR at 16:00

## 2023-02-25 RX ADMIN — ACETAMINOPHEN 650 MG: 325 TABLET, FILM COATED ORAL at 16:00

## 2023-02-25 RX ADMIN — KETOROLAC TROMETHAMINE 30 MG: 30 INJECTION, SOLUTION INTRAMUSCULAR at 10:26

## 2023-02-25 RX ADMIN — ACETAMINOPHEN 650 MG: 325 TABLET, FILM COATED ORAL at 22:11

## 2023-02-25 RX ADMIN — NIFEDIPINE 30 MG: 30 TABLET, FILM COATED, EXTENDED RELEASE ORAL at 20:09

## 2023-02-25 RX ADMIN — ACETAMINOPHEN 650 MG: 325 TABLET ORAL at 03:58

## 2023-02-25 RX ADMIN — OXYCODONE HYDROCHLORIDE 5 MG: 5 TABLET ORAL at 18:19

## 2023-02-25 RX ADMIN — DIPHENHYDRAMINE HCL 25 MG: 25 TABLET, COATED ORAL at 03:58

## 2023-02-25 RX ADMIN — KETOROLAC TROMETHAMINE 30 MG: 30 INJECTION, SOLUTION INTRAMUSCULAR at 03:58

## 2023-02-25 RX ADMIN — PANTOPRAZOLE SODIUM 20 MG: 20 TABLET, DELAYED RELEASE ORAL at 08:59

## 2023-02-25 RX ADMIN — IBUPROFEN 600 MG: 600 TABLET, FILM COATED ORAL at 22:11

## 2023-02-25 RX ADMIN — DOCUSATE SODIUM 100 MG: 100 CAPSULE, LIQUID FILLED ORAL at 08:59

## 2023-02-25 RX ADMIN — BUSPIRONE HYDROCHLORIDE 15 MG: 5 TABLET ORAL at 23:57

## 2023-02-25 RX ADMIN — ACETAMINOPHEN 650 MG: 325 TABLET ORAL at 10:25

## 2023-02-25 NOTE — PROGRESS NOTES
Marshall Keith  [id number]  2/24/2023  7:18 PM    POST-OP CHECK     S:  Marshall Keith doing well  Pain controlled with epidural, although complaining of lack of sensation in her legs  Denies nausea/vomiting  Denies chest pain, shortness of breath  Has not been able to ambulate  Was able to tolerate dinner    O:  Vitals:    02/24/23 1838   BP:    Pulse:    Resp:    Temp:    SpO2: 93%   Blood pressures:  1645: 150/90  1700: 155/88  1726: 151/80  1833: 151/83    Physical Exam  Constitutional:       General: She is not in acute distress  Appearance: She is normal weight  She is not ill-appearing or toxic-appearing  HENT:      Head: Normocephalic and atraumatic  Cardiovascular:      Rate and Rhythm: Normal rate  Pulses: Normal pulses  Pulmonary:      Effort: Pulmonary effort is normal  No respiratory distress  Abdominal:      General: There is no distension  Palpations: Abdomen is soft  Tenderness: There is no abdominal tenderness  There is no guarding or rebound  Comments: Fundus firm, nontender  Midline vertical incision with steri strips in place, scant serosanguinous drainage on dressing, dressing not saturated   Musculoskeletal:         General: Normal range of motion  Cervical back: Normal range of motion  Skin:     General: Skin is warm and dry  Neurological:      General: No focal deficit present  Mental Status: She is alert  Mental status is at baseline  Psychiatric:         Mood and Affect: Mood normal          Behavior: Behavior normal            A:  Marshall Keith is s/p RLTCS with midline vertical incision, recovering well    P:  Routine postop check  Appreciate APS assistance with epidural management  Regular diet as tolerated  Continue wilburn, adequate UOP  Encourage breastfeeding/pumping  SCDs for DVT prophylaxis    Will prescribe home medications at night, as patient reports she usually takes her daily procardia at night      Discussed with   Ro Blackburn MD  OB/GYN  2/24/2023  7:18 PM

## 2023-02-25 NOTE — ANESTHESIA POSTPROCEDURE EVALUATION
Post-Op Assessment Note    CV Status:  Stable  Pain Score: 0    Pain management: adequate     Mental Status:  Alert and awake   Hydration Status:  Euvolemic   PONV Controlled:  Controlled   Airway Patency:  Patent      Post Op Vitals Reviewed: Yes      Staff: CRNA     Post-op block assessment: site cleaned, no complications and catheter intact      No notable events documented      BP      Temp      Pulse     Resp      SpO2

## 2023-02-25 NOTE — PLAN OF CARE
Problem: PAIN - ADULT  Goal: Verbalizes/displays adequate comfort level or baseline comfort level  Description: Interventions:  - Encourage patient to monitor pain and request assistance  - Assess pain using appropriate pain scale  - Administer analgesics based on type and severity of pain and evaluate response  - Implement non-pharmacological measures as appropriate and evaluate response  - Consider cultural and social influences on pain and pain management  - Notify physician/advanced practitioner if interventions unsuccessful or patient reports new pain  Outcome: Progressing     Problem: INFECTION - ADULT  Goal: Absence or prevention of progression during hospitalization  Description: INTERVENTIONS:  - Assess and monitor for signs and symptoms of infection  - Monitor lab/diagnostic results  - Monitor all insertion sites, i e  indwelling lines, tubes, and drains  - Monitor endotracheal if appropriate and nasal secretions for changes in amount and color  - [place] appropriate cooling/warming therapies per order  - Administer medications as ordered  - Instruct and encourage patient and family to use good hand hygiene technique  - Identify and instruct in appropriate isolation precautions for identified infection/condition  Outcome: Progressing  Goal: Absence of fever/infection during neutropenic period  Description: INTERVENTIONS:  - Monitor WBC    Outcome: Progressing     Problem: SAFETY ADULT  Goal: Patient will remain free of falls  Description: INTERVENTIONS:  - Educate patient/family on patient safety including physical limitations  - Instruct patient to call for assistance with activity   - Consult OT/PT to assist with strengthening/mobility   - Keep Call bell within reach  - Keep bed low and locked with side rails adjusted as appropriate  - Keep care items and personal belongings within reach  - Initiate and maintain comfort rounds  - Make Fall Risk Sign visible to staff  -- Apply yellow socks and bracelet for high fall risk patients  - Consider moving patient to room near nurses station  Outcome: Progressing  Goal: Maintain or return to baseline ADL function  Description: INTERVENTIONS:  -  Assess patient's ability to carry out ADLs; assess patient's baseline for ADL function and identify physical deficits which impact ability to perform ADLs (bathing, care of mouth/teeth, toileting, grooming, dressing, etc )  - Assess/evaluate cause of self-care deficits   - Assess range of motion  - Assess patient's mobility; develop plan if impaired  - Assess patient's need for assistive devices and provide as appropriate  - Encourage maximum independence but intervene and supervise when necessary  - Involve family in performance of ADLs  - Assess for home care needs following discharge   - Consider OT consult to assist with ADL evaluation and planning for discharge  - Provide patient education as appropriate  Outcome: Progressing  Goal: Maintains/Returns to pre admission functional level  Description: INTERVENTIONS:  - Perform BMAT or MOVE assessment daily    - Set and communicate daily mobility goal to care team and patient/family/caregiver  - Collaborate with rehabilitation services on mobility goals if consulted  -- Out of bed for toileting  - Record patient progress and toleration of activity level   Outcome: Progressing     Problem: Knowledge Deficit  Goal: Patient/family/caregiver demonstrates understanding of disease process, treatment plan, medications, and discharge instructions  Description: Complete learning assessment and assess knowledge base    Interventions:  - Provide teaching at level of understanding  - Provide teaching via preferred learning methods  Outcome: Progressing     Problem: DISCHARGE PLANNING  Goal: Discharge to home or other facility with appropriate resources  Description: INTERVENTIONS:  - Identify barriers to discharge w/patient and caregiver  - Arrange for needed discharge resources and transportation as appropriate  - Identify discharge learning needs (meds, wound care, etc )  - Arrange for interpretive services to assist at discharge as needed  - Refer to Case Management Department for coordinating discharge planning if the patient needs post-hospital services based on physician/advanced practitioner order or complex needs related to functional status, cognitive ability, or social support system  Outcome: Progressing     Problem: POSTPARTUM  Goal: Experiences normal postpartum course  Description: INTERVENTIONS:  - Monitor maternal vital signs  - Assess uterine involution and lochia  Outcome: Progressing  Goal: Appropriate maternal -  bonding  Description: INTERVENTIONS:  - Identify family support  - Assess for appropriate maternal/infant bonding   -Encourage maternal/infant bonding opportunities  - Referral to  or  as needed  Outcome: Progressing  Goal: Establishment of infant feeding pattern  Description: INTERVENTIONS:  - Assess breast/bottle feeding  - Refer to lactation as needed  Outcome: Progressing  Goal: Incision(s), wounds(s) or drain site(s) healing without S/S of infection  Description: INTERVENTIONS  - Assess and document dressing, incision, wound bed, drain sites and surrounding tissue  - Provide patient and family education  -Outcome: Progressing

## 2023-02-25 NOTE — PLAN OF CARE
Problem: PAIN - ADULT  Goal: Verbalizes/displays adequate comfort level or baseline comfort level  Description: Interventions:  - Encourage patient to monitor pain and request assistance  - Assess pain using appropriate pain scale  - Administer analgesics based on type and severity of pain and evaluate response  - Implement non-pharmacological measures as appropriate and evaluate response  - Consider cultural and social influences on pain and pain management  - Notify physician/advanced practitioner if interventions unsuccessful or patient reports new pain  Outcome: Progressing     Problem: INFECTION - ADULT  Goal: Absence or prevention of progression during hospitalization  Description: INTERVENTIONS:  - Assess and monitor for signs and symptoms of infection  - Monitor lab/diagnostic results  - Monitor all insertion sites, i e  indwelling lines, tubes, and drains  - Monitor endotracheal if appropriate and nasal secretions for changes in amount and color  - Dillon appropriate cooling/warming therapies per order  - Administer medications as ordered  - Instruct and encourage patient and family to use good hand hygiene technique  - Identify and instruct in appropriate isolation precautions for identified infection/condition  Outcome: Progressing  Goal: Absence of fever/infection during neutropenic period  Description: INTERVENTIONS:  - Monitor WBC    Outcome: Progressing     Problem: SAFETY ADULT  Goal: Patient will remain free of falls  Description: INTERVENTIONS:  - Educate patient/family on patient safety including physical limitations  - Instruct patient to call for assistance with activity   - Consult OT/PT to assist with strengthening/mobility   - Keep Call bell within reach  - Keep bed low and locked with side rails adjusted as appropriate  - Keep care items and personal belongings within reach  - Initiate and maintain comfort rounds  - Make Fall Risk Sign visible to staff  - Offer Toileting every  Hours, in advance of need  - Initiate/Maintain alarm  - Obtain necessary fall risk management equipment:   - Apply yellow socks and bracelet for high fall risk patients  - Consider moving patient to room near nurses station  Outcome: Progressing  Goal: Maintain or return to baseline ADL function  Description: INTERVENTIONS:  -  Assess patient's ability to carry out ADLs; assess patient's baseline for ADL function and identify physical deficits which impact ability to perform ADLs (bathing, care of mouth/teeth, toileting, grooming, dressing, etc )  - Assess/evaluate cause of self-care deficits   - Assess range of motion  - Assess patient's mobility; develop plan if impaired  - Assess patient's need for assistive devices and provide as appropriate  - Encourage maximum independence but intervene and supervise when necessary  - Involve family in performance of ADLs  - Assess for home care needs following discharge   - Consider OT consult to assist with ADL evaluation and planning for discharge  - Provide patient education as appropriate  Outcome: Progressing  Goal: Maintains/Returns to pre admission functional level  Description: INTERVENTIONS:  - Perform BMAT or MOVE assessment daily    - Set and communicate daily mobility goal to care team and patient/family/caregiver  - Collaborate with rehabilitation services on mobility goals if consulted  - Perform Range of Motion  times a day  - Reposition patient every  hours    - Dangle patient  times a day  - Stand patient  times a day  - Ambulate patient  times a day  - Out of bed to chair  times a day   - Out of bed for meals  times a day  - Out of bed for toileting  - Record patient progress and toleration of activity level   Outcome: Progressing     Problem: Knowledge Deficit  Goal: Patient/family/caregiver demonstrates understanding of disease process, treatment plan, medications, and discharge instructions  Description: Complete learning assessment and assess knowledge base   Interventions:  - Provide teaching at level of understanding  - Provide teaching via preferred learning methods  Outcome: Progressing     Problem: DISCHARGE PLANNING  Goal: Discharge to home or other facility with appropriate resources  Description: INTERVENTIONS:  - Identify barriers to discharge w/patient and caregiver  - Arrange for needed discharge resources and transportation as appropriate  - Identify discharge learning needs (meds, wound care, etc )  - Arrange for interpretive services to assist at discharge as needed  - Refer to Case Management Department for coordinating discharge planning if the patient needs post-hospital services based on physician/advanced practitioner order or complex needs related to functional status, cognitive ability, or social support system  Outcome: Progressing     Problem: POSTPARTUM  Goal: Experiences normal postpartum course  Description: INTERVENTIONS:  - Monitor maternal vital signs  - Assess uterine involution and lochia  Outcome: Progressing  Goal: Appropriate maternal -  bonding  Description: INTERVENTIONS:  - Identify family support  - Assess for appropriate maternal/infant bonding   -Encourage maternal/infant bonding opportunities  - Referral to  or  as needed  Outcome: Progressing  Goal: Establishment of infant feeding pattern  Description: INTERVENTIONS:  - Assess breast/bottle feeding  - Refer to lactation as needed  Outcome: Progressing  Goal: Incision(s), wounds(s) or drain site(s) healing without S/S of infection  Description: INTERVENTIONS  - Assess and document dressing, incision, wound bed, drain sites and surrounding tissue  - Provide patient and family education  - Perform skin care/dressing changes every   Outcome: Progressing

## 2023-02-25 NOTE — CONSULTS
Consult Note- Acute Pain Service   Tati Curtis 40 y o  female MRN: [id number]  Unit/Bed#: -01 Encounter: [id number]               Assessment/Plan     Assessment:   Patient Active Problem List   Diagnosis   • Acne   • Elevated rheumatoid factor   • Hernia, hiatal   • History of DIC syndrome   • History of small bowel obstruction   • Essential hypertension   • History of polyhydramnios   • Pre-existing hypertension during pregnancy in third trimester   • Multigravida of advanced maternal age in third trimester   • Maternal care due to low transverse uterine scar from previous  delivery   • Hereditary disease in family possibly affecting fetus   • SARA (generalized anxiety disorder)   • Status post repeat low transverse  section   • Positive GBS test      Tati Curtis is a 40 y o  female with PMhx of HTN, and SBO who is PPD1 s/p repeat C/S with low vertical incision  Epidural was kept in place for post-operative analgesia but removed on  to optimize mobility  APS consulted for post-operative pain control  Upon bedside evaluation, Tao Sheikh was doing well  Pain is controlled on current MMA  Able to get OOB and ambulate with no issues  Denies opioid-induced side effects including nausea/vomiting/itching/constipation  NO other complaints at this time  Plan:   - standard oral opioid regimen with oxycodone 5 mg/10 mg PO q4hrs PRN for moderate/severe pain, Dilaudid 0 5 mg IV q2hrs PRN for breakthrough pain    Multimodal analgesia:  - Tylenol 650 mg PO q6hrs standing   - IV toradol then transition to PO ibuprofen 600mg q6hr     Bowel Regimen:  - Bowel regimen when appropriate from surgical perspective    APS will sign off at this time  Thank you for the consult  All opioids and other analgesics to be written at discretion of primary team  Please contact Acute Pain Service - [facility] via First To File from [phone] with additional questions or concerns   See Radha or Sergio for additional contacts and after hours information  History of Present Illness    Admit Date:  2023  Hospital Day:  1 day  Primary Service:  OB/GYN  Attending Provider:  Dajuan Echavarria MD  Reason for Consult / Principal Problem: Post-operative pain control  HPI: Elie Swann is a 40 y o  female who presents with post-operative abdominal pain after repeat C/S with low vertical incision on   Epidural was kept in place post-operatively for analgesia which was removed today  Current pain location(s): Abdomen  Pain Scale:   2-4/10  Quality: Achy, diffuse  Current Analgesic regimen:  See above    Pain History: N/A  Pain Management Provider:  N/A    I have reviewed the patient's controlled substance dispensing history in the Prescription Drug Monitoring Program in compliance with the [facility] regulations before prescribing any controlled substances  Inpatient consult to Acute Pain Service  Consult performed by: Audra Benitez MD  Consult ordered by: Lulu Gómez MD          Review of Systems   Constitutional: Negative  HENT: Negative  Respiratory: Negative  Cardiovascular: Negative  Gastrointestinal: Positive for abdominal pain  Genitourinary: Negative  Musculoskeletal: Negative  Skin: Negative  Neurological: Negative  Psychiatric/Behavioral: Negative          Historical Information   Past Medical History:   Diagnosis Date   • Acute renal failure ([facility] [address] )     [id number] RESOLVED   • Acute tubular necrosis (HCC)    • Anemia     during the pregnancy    • Bowel obstruction ([facility] [address] ) 2017   • Chronic kidney disease    • Fibroadenoma of breast, left    • Fibroadenoma of breast, right    •  1 para 1    • History of transfusion 2015    After delivery    • Hypertension     no medication    • Kidney stone    • Postpartum hemorrhage     UNSPECIFIED TYPE  RESOLVED    • Urinary tract infection     yes in the last ten years   • Vacuum extractor delivery, delivered     2015 daughter with postpartum hemorrhage and DIC   • Varicella     had childhood chickenpox     Past Surgical History:   Procedure Laterality Date   • BREAST FIBROADENOMA SURGERY      Cryosurgical Ablation of Fibroadenoma   • BREAST LUMPECTOMY Left    • BREAST LUMPECTOMY Right    •  SECTION         • EXPLORATORY LAPAROTOMY W/ BOWEL RESECTION N/A 2017    Procedure: LAPAROTOMY EXPLORATORY W/ lysis of adhesions;  Surgeon: Jorge Menard DO;  Location: [facility];  Service:    • INTRAUTERINE DEVICE INSERTION     • LAPAROTOMY      EXPLORATORY for DIC and hemorrhage postpartum retroperitoneal hematoma   • UT  DELIVERY ONLY N/A 2020    Procedure:  SECTION () REPEAT;  Surgeon: Brooke Reyna MD;  Location: [facility] LD;  Service: Obstetrics   • TOOTH EXTRACTION      Impression:  Odean Actis       Social History   Social History     Substance and Sexual Activity   Alcohol Use Yes   • Alcohol/week: 5 0 standard drinks   • Types: 5 Glasses of wine per week    Comment: none with pregnancy     Social History     Substance and Sexual Activity   Drug Use No    Comment: denies self or FOB  denies family use     Social History     Tobacco Use   Smoking Status Never   Smokeless Tobacco Never     Family History: non-contributory    Meds/Allergies   all current active meds have been reviewed    Allergies   Allergen Reactions   • Nickel Rash     Skin rash       Objective   Temp:  [97 1 °F (36 2 °C)-98 7 °F (37 1 °C)] 98 7 °F (37 1 °C)  HR:  [49-72] 65  Resp:  [18] 18  BP: (120-155)/(64-90) 121/70    Intake/Output Summary (Last 24 hours) at 2023 1434  Last data filed at 2023 1050  Gross per 24 hour   Intake 1000 ml   Output 3182 ml   Net -2182 ml       Physical Exam  Constitutional:       Appearance: Normal appearance  HENT:      Head: Normocephalic  Mouth/Throat:      Mouth: Mucous membranes are moist    Eyes:      Pupils: Pupils are equal, round, and reactive to light  Cardiovascular:      Rate and Rhythm: Normal rate  Pulses: Normal pulses  Pulmonary:      Effort: Pulmonary effort is normal    Abdominal:      Palpations: Abdomen is soft  Tenderness: There is abdominal tenderness  Musculoskeletal:         General: Normal range of motion  Skin:     General: Skin is dry  Neurological:      General: No focal deficit present  Mental Status: She is alert and oriented to person, place, and time  Psychiatric:         Mood and Affect: Mood normal          Lab Results: I have personally reviewed pertinent labs  Imaging Studies: I have personally reviewed pertinent reports  EKG, Pathology, and Other Studies: I have personally reviewed pertinent reports  Counseling / Coordination of Care  Total floor / unit time spent today Level 1 = 20 minutes  Greater than 50% of total time was spent with the patient and / or family counseling and / or coordination of care  Please note that the APS provides consultative services regarding pain management only  With the exception of ketamine and epidural infusions and except when indicated, final decisions regarding starting or changing doses of analgesic medications are at the discretion of the consulting service  Off hours consultation and/or medication management is generally not available      Julien Kong MD  Acute Pain Service

## 2023-02-25 NOTE — OP NOTE
OPERATIVE REPORT  PATIENT NAME: Parag Antoine    :  1985  MRN: [id number]  Pt Location: [facility] L&[facility] OR ROOM 02    SURGERY DATE: 2023    Surgeon(s) and Role:     Rocky Bernal MD - Primary     * Pedro Barker MD - Assisting    Preop Diagnosis:  Previous  section complicating pregnancy [N31 547]  Chronic hypertension during pregnancy, antepartum [O10 919]    Post-Op Diagnosis Codes:     * Previous  section complicating pregnancy [V32 340]     * Chronic hypertension during pregnancy, antepartum [O10 919]    Procedure(s) (LRB):   SECTION () REPEAT (N/A)  LIGATION/COAGULATION TUBAL (Bilateral)    Specimen(s):  ID Type Source Tests Collected by Time Destination   1 :  Tissue (Placenta on Hold) OB Only Placenta TISSUE EXAM OB (PLACENTA) ONLY Sofya Bhandari MD 2023 1404    2 : PRN Tissue Fallopian Tube, Right TISSUE Chyna Fernandez MD 2023 1413    3 : PRN Tissue Fallopian Tube, Left TISSUE EXAM Sofya Bhandari MD 2023 1414    A :  Cord Blood Cord BLOOD GAS, VENOUS, CORD, BLOOD GAS, ARTERIAL, CORD Sofya Bhandari MD 2023 1402        Surgical QBL:  Surgical QBL (mL): 332 mL      Drains:  Urethral Catheter Non-latex 16 Fr   (Active)   Reasons to continue Urinary Catheter  Post-operative urological requirements 23 1820   Goal for Removal Remove POD#1 23 1820   Site Assessment Clean;Skin intact 23 1820   Mary Care Done 23 1820   Collection Container Standard drainage bag 23 1820   Securement Method Securing device (Describe) 23 1820   Number of days: 0       Anesthesia Type:   Spinal    Operative Indications:  Previous  section complicating pregnancy [S26 084]  Chronic hypertension during pregnancy, antepartum [O10 919]       Moe Group Classification System:  No Multiple pregnancy, No Transverse or oblique lie, No Breech lie, Gestational age is > or =37 weeks, Multiparous, Previous uterine scar +  is ALEXIS GROUP 5      Indications: Prior  in the setting of chronic hypertension     Pre-operative Diagnosis:   1  38 week 2 day pregnancy  2  CHTN  3  Prior   4  History of DIC with hemorrhage requiring an exploratory laparotomy    Post-operative Diagnosis: same, delivered    Surgeon: Yeni Fuentes MD    Assistant(s): Rebecca Rutherford MD    Findings:  · Delivery of viable male at 1401, weight 6lbs 4 5oz;  Apgar scores of 8 at one minute and 9 at five minutes  · Normal appearing placenta with centrally-inserted 3 vessel cord  · Clear amniotic fluid  · Grossly normal uterus, tubes, and ovaries  Appendix visualized and appeared normal    · Midline vertical scar from prior exploratory laparotomy  There were no fascial or significant bladder adhesions present  The peritoneum was able to be entered without any issue  A thorough survey of her uterus and abdomen noted no significant adhesions  Specimens:   1  Arterial and venous cord gases  2  Cord blood  3  Segment of umbilical cord  4  Placenta to pathology   5  Bilateral fallopian tubes    Quantified Blood Loss:  332 mL       Drains: Mary catheter, Epidural            Complications:  None; patient tolerated the procedure well  Disposition: PACU            Condition: stable    Procedure Details   Decision was made to proceed with a repeat  section at 38w2d due to chronic hypertension as well as a bilateral salpingectomy for sterilization  Patient was made aware of these findings and the proposed plan  Risks, benefits, possible complications, alternate treatment options, and expected outcomes were discussed with the patient  The patient agreed with the proposed plan and gave informed consent  The patient was taken to the operating room where she was properly identified to the OR staff and attending physician  She received CSE anesthesia preoperatively    Fetal heart tones were appreciated and found to be appropriate  A Mary catheter was aseptically inserted and SCDs were placed  The abdomen was prepped with Chloraprep and following appropriate drying time, the patient was draped in the usual sterile manner  The patient had received Ancef 1g IV pre-operatively for prophylaxis  A Time Out was held and the above information confirmed  The patient was identified as Gordo Carballo and the procedure verified as a repeat  delivery and bilateral salpingectomy  A scalpel was used to make an incision via her prior midline vertical scar and it was carried down through the underlying subcutaneous tissue to the fascia using Bovie electrocautery  Rectus fascia was then incised in the midline and extended using a hemostat for elevation and the Bovie  The superior edge of the fascia was grasped with Kocher clamps, tented upward, and the underlying muscle was bluntly dissected off  All anatomic layers were well-demarcated  The rectus muscles were  and the peritoneum was identified and subsequently entered and extended with the Bovie  No adhesions were noted  The bladder blade was inserted  A low transverse uterine incision was made with the scalpel and extended in a cephalocaudad direction with blunt dissection  The amnion was entered sharply  Surgeon's hand was inserted through the hysterotomy and the fetal head was palpated, elevated, and delivered through the uterine incision with the assistance of fundal pressure  Baby had spontaneous cry with good color and tone  The umbilical cord was clamped and cut  The infant was handed off to the  providers  Arterial and venous cord gases, cord blood, and a segment of umbilical cord were obtained for evaluation and promptly sent to the lab  The placenta delivered spontaneously with uterine fundal massage and was noted to have a centrally inserted 3 vessel cord  This was also sent to the lab for placental pathology       The uterus was exteriorized and a moist lap sponge was used to clear the cavity of clots and products of conception  The uterine incision was closed with a running locked suture of 0 Vicryl  A second layer of the same suture was used to imbricate the first  Good hemostasis was confirmed upon uterine closure  A separate Time Out was performed and the patient confirmed that she desired to proceed with a bilateral salpingectomy  The right fallopian tube was gently grasped and elevated with Paola clamps  Windows were made in the mesosalpinx using the Bovie  2-0 Plain Gut suture was used to suture ligate the vessels  Metzenbaum scissors were used to remove the fallopian tube  The same procedure was performed on the left side  Both fallopian tubes were sent for pathology  All pedicles were noted to be completely hemostatic  A moist lap was used to clear the posterior culdesac and the uterus was returned to the abdomen  The paracolic gutters were inspected and cleared of all clots and debris with moist lap sponges  The tubal pedicles were once again visualized on both sides and noted to be hemostatic  There was an area of slow bleeding noted along the hysterotomy and a figure of eight stitch was placed using 0-Vicryl with hemostasis achieved  Interceed was placed along the hysterotomy as an adhesive barrier  The fascia was closed with a running suture of #1 looped PDS  Subcutaneous tissue was reapproximated in one area using 2-0 Plain Gut suture  The skin was closed with 4-0 Monocryl in a subcuticular fashion  Steri Strips and sterile dressing was applied and an abdominal binder was then placed  At the conclusion of the procedure, all needle, sponge, and instrument counts were noted to be correct x2  The patient tolerated the procedure well and was transferred to her the recovery room in stable condition  Dr Roberto Castellanos was present and participated in all key portions of the case          SIGNATURE: Paul Blank, MD  DATE: February 24, 2023  TIME: 7:39 PM

## 2023-02-25 NOTE — PROGRESS NOTES
Progress Note - OB/GYN  Parag Antoine 40 y o  female MRN: [id number]  Unit/Bed#: -01 Encounter: [id number]    Assessment and Plan     Parag Antoine is a patient of: [facility]  She is PPD# 1 s/p  repeat  section, low transverse incision with vertical skin incision  Recovering well and is stable       * Status post repeat low transverse  section  Assessment & Plan  , Hgb 11 9 --> post op Hgb   Lines: wilburn in place   Pain: Epidural for midline skin incision, anesthesia to remove in AM  FEN: Tolerating regular diet  DVT ppx: SCDs  Passing flatus   Incision C/D/I         Hereditary disease in family possibly affecting fetus  Assessment & Plan  Her 2nd baby has charge syndrome  Her daughter had polyhydramnios and hydronephrosis found antepartum and then postpartum had unilateral choanal atresia feeding issues, facial droop, swelling, patent ductus arteriosus and patent foramen ovale  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  Both parents have been screened and are not carriers  This is an autosomal dominant condition  This occurred as a new mutation but her recurrence risk may be as high as 1% recurrence risk  Current  is in NICU on CPAP    Pre-existing hypertension during pregnancy in third trimester  Assessment & Plan  Baseline preE labs wnl 23  On Procardia XL 30 mg daily  · CBC, CMP wnl,  UPC on admission 0 13      History of DIC syndrome  Assessment & Plan  In  she had a 8 pound 1 ounce baby girl with a complicated delivery  She had a vacuum assisted vaginal delivery for nonreassuring fetal testing after she developed hypotension after an epidural bolus she received at 9 cms with quick progression to complete and + 3 station   After delivery she had a manual extraction of the placenta, and then developed significant hypotension postpartum and needed an exploratory laparotomy and IR embolization of the anterior iliac and massive transfusion  The postpartum complications and excessive bleeding raised the concern that she may have had an amniotic fluid embolus just at 9 cms which can also be a cause of the severe hypotension and can lead to significant hemorrhage  During this admission she also developed an acute kidney injury and hypertension  Disposition    - Anticipate discharge home on PPD# 2-4      Subjective/Objective     Chief Complaint: Postpartum State     Subjective:    Corazon White is PPD/POD#1 s/p  repeat  section, low transverse incision  She has no current complaints  Pain is well controlled  Patient is currently voiding  She is not ambulating due to epidural   Patient is currently passing flatus and has had no bowel movement  She is tolerating PO, and denies nausea or vomitting  Patient denies fever, chills, chest pain, shortness of breath, or calf tenderness  Lochia is minimal  She is  Planning to breastfeed, but baby is in NICU  She is recovering well and is stable         Vitals:   /84 (BP Location: Left arm)   Pulse 66   Temp 97 8 °F (36 6 °C) (Oral)   Resp 18   Ht 5' 4" (1 626 m)   Wt 70 8 kg (156 lb)   LMP 2022   SpO2 97%   Breastfeeding Yes   BMI 26 78 kg/m²       Intake/Output Summary (Last 24 hours) at 2023 193  Last data filed at 2023  Gross per 24 hour   Intake 1000 ml   Output 782 ml   Net 218 ml       Invasive Devices     Peripheral Intravenous Line  Duration           Peripheral IV 23 Right Forearm <1 day          Epidural Line  Duration           Epidural Catheter 23 <1 day          Drain  Duration           Urethral Catheter Non-latex 16 Fr  <1 day                Physical Exam:   GEN: Corazon hWite appears well, alert and oriented x 3, pleasant and cooperative   CARDIO: RRR, no murmurs or rubs  RESP:  CTAB, no wheezes or rales  ABDOMEN: soft, no tenderness, no distention, fundus @ U-2, Steristrips over incision are dry with very small region of oozing at the top steristrip  EXTREMITIES: SCDs on, non tender, no erythema, b/l Angel's sign negative      Labs:     Hemoglobin   Date Value Ref Range Status   02/24/2023 11 9 11 5 - 15 4 g/dL Final   02/16/2023 11 4 (L) 11 5 - 15 4 g/dL Final   07/28/2015 11 1 (L) 11 5 - 15 4 g/dL Final   07/17/2015 9 7 (L) 11 5 - 15 4 g/dL Final     WBC   Date Value Ref Range Status   02/24/2023 10 26 (H) 4 31 - 10 16 Thousand/uL Final   02/16/2023 10 34 (H) 4 31 - 10 16 Thousand/uL Final   07/28/2015 6 70 4 31 - 10 16 Thousand/uL Final   07/17/2015 11 95 (H) 4 31 - 10 16 Thousand/uL Final     Platelets   Date Value Ref Range Status   02/24/2023 152 149 - 390 Thousands/uL Final   02/16/2023 192 149 - 390 Thousands/uL Final   07/28/2015 396 (H) 149 - 390 Thousand/uL Final     Comment:     The above 10 analytes were performed by [facility] [id number]     07/17/2015 636 (H) 149 - 390 Thousand/uL Final     Comment:     The above 10 analytes [place] [id number]       Creatinine   Date Value Ref Range Status   02/24/2023 0 69 0 60 - 1 30 mg/dL Final     Comment:     Standardized to IDMS reference method   12/06/2022 0 80 0 60 - 1 30 mg/dL Final     Comment:     Standardized to IDMS reference method   09/03/2015 0 88 0 60 - 1 30 mg/dL Final     Comment:     Standardized to IDMS reference method   08/19/2015 1 04 0 60 - 1 30 mg/dL Final     Comment:     Standardized to IDMS reference method     AST   Date Value Ref Range Status   02/24/2023 13 13 - 39 U/L Final     Comment:     Specimen collection should occur prior to Sulfasalazine administration due to the potential for falsely depressed results  08/26/2022 8 5 - 45 U/L Final     Comment:     Specimen collection should occur prior to Sulfasalazine administration due to the potential for falsely depressed results      07/10/2015 31 0 - 45 U/L Final   07/09/2015 48 (H) 0 - 45 U/L Final     ALT   Date Value Ref Range Status   02/24/2023 9 7 - 52 U/L Final     Comment:     Specimen collection should occur prior to Sulfasalazine administration due to the potential for falsely depressed results  08/26/2022 18 12 - 78 U/L Final     Comment:     Specimen collection should occur prior to Sulfasalazine and/or Sulfapyridine administration due to the potential for falsely depressed results      07/10/2015 29 14 - 59 U/L Final   07/09/2015 47 14 - 59 U/L Final          Sandor Daily MD  2/25/2023  3:39 AM

## 2023-02-26 RX ADMIN — ACETAMINOPHEN 650 MG: 325 TABLET, FILM COATED ORAL at 21:32

## 2023-02-26 RX ADMIN — ACETAMINOPHEN 650 MG: 325 TABLET, FILM COATED ORAL at 16:18

## 2023-02-26 RX ADMIN — IBUPROFEN 600 MG: 600 TABLET, FILM COATED ORAL at 16:18

## 2023-02-26 RX ADMIN — ACETAMINOPHEN 650 MG: 325 TABLET, FILM COATED ORAL at 04:14

## 2023-02-26 RX ADMIN — ACETAMINOPHEN 650 MG: 325 TABLET, FILM COATED ORAL at 09:50

## 2023-02-26 RX ADMIN — BUSPIRONE HYDROCHLORIDE 15 MG: 5 TABLET ORAL at 21:32

## 2023-02-26 RX ADMIN — OXYCODONE HYDROCHLORIDE 5 MG: 5 TABLET ORAL at 00:01

## 2023-02-26 RX ADMIN — IBUPROFEN 600 MG: 600 TABLET, FILM COATED ORAL at 04:14

## 2023-02-26 RX ADMIN — PANTOPRAZOLE SODIUM 20 MG: 20 TABLET, DELAYED RELEASE ORAL at 09:50

## 2023-02-26 RX ADMIN — IBUPROFEN 600 MG: 600 TABLET, FILM COATED ORAL at 21:32

## 2023-02-26 RX ADMIN — OXYCODONE HYDROCHLORIDE 10 MG: 10 TABLET ORAL at 12:09

## 2023-02-26 RX ADMIN — NIFEDIPINE 30 MG: 30 TABLET, FILM COATED, EXTENDED RELEASE ORAL at 20:07

## 2023-02-26 RX ADMIN — DOCUSATE SODIUM 100 MG: 100 CAPSULE, LIQUID FILLED ORAL at 09:50

## 2023-02-26 RX ADMIN — IBUPROFEN 600 MG: 600 TABLET, FILM COATED ORAL at 09:53

## 2023-02-26 RX ADMIN — DOCUSATE SODIUM 100 MG: 100 CAPSULE, LIQUID FILLED ORAL at 17:31

## 2023-02-26 NOTE — LACTATION NOTE
Met with Sal August who has been pumping for her baby boy in NICU  Salmartínez August states that she has the Ready Set Baby, Discharge Breastfeeding Booklets and increasing supply for NICU baby handout that was given to her on Friday by nursing staff  Sarah López states that she has been pumping every 2-3 hours and is confident with pumping as she pumped for her 2nd baby  Sal August was able to place her baby to the breast for the first time this morning in NICU and states that baby latched well  Did review Positioning and Latch with her and answered all her questions  Encouraged her to call if she has any additional questions or for breastfeeding support as needed  Phone Number provided

## 2023-02-26 NOTE — PLAN OF CARE
Problem: PAIN - ADULT  Goal: Verbalizes/displays adequate comfort level or baseline comfort level  Description: Interventions:  - Encourage patient to monitor pain and request assistance  - Assess pain using appropriate pain scale  - Administer analgesics based on type and severity of pain and evaluate response  - Implement non-pharmacological measures as appropriate and evaluate response  - Consider cultural and social influences on pain and pain management  - Notify physician/advanced practitioner if interventions unsuccessful or patient reports new pain  Outcome: Progressing     Problem: INFECTION - ADULT  Goal: Absence or prevention of progression during hospitalization  Description: INTERVENTIONS:  - Assess and monitor for signs and symptoms of infection  - Monitor lab/diagnostic results  - Monitor all insertion sites, i e  indwelling lines, tubes, and drains  - Monitor endotracheal if appropriate and nasal secretions for changes in amount and color  - [place] appropriate cooling/warming therapies per order  - Administer medications as ordered  - Instruct and encourage patient and family to use good hand hygiene technique  - Identify and instruct in appropriate isolation precautions for identified infection/condition  Outcome: Progressing  Goal: Absence of fever/infection during neutropenic period  Description: INTERVENTIONS:  - Monitor WBC    Outcome: Progressing     Problem: SAFETY ADULT  Goal: Patient will remain free of falls  Description: INTERVENTIONS:  - Educate patient/family on patient safety including physical limitations  - Instruct patient to call for assistance with activity   - Consult OT/PT to assist with strengthening/mobility   - Keep Call bell within reach  - Keep bed low and locked with side rails adjusted as appropriate  - Keep care items and personal belongings within reach  - Initiate and maintain comfort rounds  - Make Fall Risk Sign visible to staff  - Apply yellow socks and bracelet for high fall risk patients  - Consider moving patient to room near nurses station  Outcome: Progressing  Goal: Maintain or return to baseline ADL function  Description: INTERVENTIONS:  -  Assess patient's ability to carry out ADLs; assess patient's baseline for ADL function and identify physical deficits which impact ability to perform ADLs (bathing, care of mouth/teeth, toileting, grooming, dressing, etc )  - Assess/evaluate cause of self-care deficits   - Assess range of motion  - Assess patient's mobility; develop plan if impaired  - Assess patient's need for assistive devices and provide as appropriate  - Encourage maximum independence but intervene and supervise when necessary  - Involve family in performance of ADLs  - Assess for home care needs following discharge   - Consider OT consult to assist with ADL evaluation and planning for discharge  - Provide patient education as appropriate  Outcome: Progressing  Goal: Maintains/Returns to pre admission functional level  Description: INTERVENTIONS:  - Perform BMAT or MOVE assessment daily    - Set and communicate daily mobility goal to care team and patient/family/caregiver  - Collaborate with rehabilitation services on mobility goals if consulted  - Out of bed for toileting  - Record patient progress and toleration of activity level   Outcome: Progressing     Problem: Knowledge Deficit  Goal: Patient/family/caregiver demonstrates understanding of disease process, treatment plan, medications, and discharge instructions  Description: Complete learning assessment and assess knowledge base    Interventions:  - Provide teaching at level of understanding  - Provide teaching via preferred learning methods  Outcome: Progressing     Problem: DISCHARGE PLANNING  Goal: Discharge to home or other facility with appropriate resources  Description: INTERVENTIONS:  - Identify barriers to discharge w/patient and caregiver  - Arrange for needed discharge resources and transportation as appropriate  - Identify discharge learning needs (meds, wound care, etc )  - Arrange for interpretive services to assist at discharge as needed  - Refer to Case Management Department for coordinating discharge planning if the patient needs post-hospital services based on physician/advanced practitioner order or complex needs related to functional status, cognitive ability, or social support system  Outcome: Progressing     Problem: POSTPARTUM  Goal: Experiences normal postpartum course  Description: INTERVENTIONS:  - Monitor maternal vital signs  - Assess uterine involution and lochia  Outcome: Progressing  Goal: Appropriate maternal -  bonding  Description: INTERVENTIONS:  - Identify family support  - Assess for appropriate maternal/infant bonding   -Encourage maternal/infant bonding opportunities  - Referral to  or  as needed  Outcome: Progressing  Goal: Establishment of infant feeding pattern  Description: INTERVENTIONS:  - Assess breast/bottle feeding  - Refer to lactation as needed  Outcome: Progressing  Goal: Incision(s), wounds(s) or drain site(s) healing without S/S of infection  Description: INTERVENTIONS  - Assess and document dressing, incision, wound bed, drain sites and surrounding tissue  - Provide patient and family education  Outcome: Progressing

## 2023-02-26 NOTE — PLAN OF CARE
Problem: PAIN - ADULT  Goal: Verbalizes/displays adequate comfort level or baseline comfort level  Description: Interventions:  - Encourage patient to monitor pain and request assistance  - Assess pain using appropriate pain scale  - Administer analgesics based on type and severity of pain and evaluate response  - Implement non-pharmacological measures as appropriate and evaluate response  - Consider cultural and social influences on pain and pain management  - Notify physician/advanced practitioner if interventions unsuccessful or patient reports new pain  Outcome: Progressing     Problem: INFECTION - ADULT  Goal: Absence or prevention of progression during hospitalization  Description: INTERVENTIONS:  - Assess and monitor for signs and symptoms of infection  - Monitor lab/diagnostic results  - Monitor all insertion sites, i e  indwelling lines, tubes, and drains  - Monitor endotracheal if appropriate and nasal secretions for changes in amount and color  - [place] appropriate cooling/warming therapies per order  - Administer medications as ordered  - Instruct and encourage patient and family to use good hand hygiene technique  - Identify and instruct in appropriate isolation precautions for identified infection/condition  Outcome: Progressing  Goal: Absence of fever/infection during neutropenic period  Description: INTERVENTIONS:  - Monitor WBC    Outcome: Progressing     Problem: SAFETY ADULT  Goal: Patient will remain free of falls  Description: INTERVENTIONS:  - Educate patient/family on patient safety including physical limitations  - Instruct patient to call for assistance with activity   - Consult OT/PT to assist with strengthening/mobility   - Keep Call bell within reach  - Keep bed low and locked with side rails adjusted as appropriate  - Keep care items and personal belongings within reach  - Initiate and maintain comfort rounds-  Make Fall Risk Sign visible to staff  - Apply yellow socks and bracelet for high fall risk patients  - Consider moving patient to room near nurses station  Outcome: Progressing  Goal: Maintain or return to baseline ADL function  Description: INTERVENTIONS:  -  Assess patient's ability to carry out ADLs; assess patient's baseline for ADL function and identify physical deficits which impact ability to perform ADLs (bathing, care of mouth/teeth, toileting, grooming, dressing, etc )  - Assess/evaluate cause of self-care deficits   - Assess range of motion  - Assess patient's mobility; develop plan if impaired  - Assess patient's need for assistive devices and provide as appropriate  - Encourage maximum independence but intervene and supervise when necessary  - Involve family in performance of ADLs  - Assess for home care needs following discharge   - Consider OT consult to assist with ADL evaluation and planning for discharge  - Provide patient education as appropriate  Outcome: Progressing  Goal: Maintains/Returns to pre admission functional level  Description: INTERVENTIONS:  - Perform BMAT or MOVE assessment daily    - Set and communicate daily mobility goal to care team and patient/family/caregiver  - Collaborate with rehabilitation services on mobility goals if consulted  - Out of bed for toileting  - Record patient progress and toleration of activity level   Outcome: Progressing     Problem: Knowledge Deficit  Goal: Patient/family/caregiver demonstrates understanding of disease process, treatment plan, medications, and discharge instructions  Description: Complete learning assessment and assess knowledge base    Interventions:  - Provide teaching at level of understanding  - Provide teaching via preferred learning methods  Outcome: Progressing     Problem: DISCHARGE PLANNING  Goal: Discharge to home or other facility with appropriate resources  Description: INTERVENTIONS:  - Identify barriers to discharge w/patient and caregiver  - Arrange for needed discharge resources and transportation as appropriate  - Identify discharge learning needs (meds, wound care, etc )  - Arrange for interpretive services to assist at discharge as needed  - Refer to Case Management Department for coordinating discharge planning if the patient needs post-hospital services based on physician/advanced practitioner order or complex needs related to functional status, cognitive ability, or social support system  Outcome: Progressing     Problem: POSTPARTUM  Goal: Experiences normal postpartum course  Description: INTERVENTIONS:  - Monitor maternal vital signs  - Assess uterine involution and lochia  Outcome: Progressing  Goal: Appropriate maternal -  bonding  Description: INTERVENTIONS:  - Identify family support  - Assess for appropriate maternal/infant bonding   -Encourage maternal/infant bonding opportunities  - Referral to  or  as needed  Outcome: Progressing  Goal: Establishment of infant feeding pattern  Description: INTERVENTIONS:  - Assess breast/bottle feeding  - Refer to lactation as needed  Outcome: Progressing  Goal: Incision(s), wounds(s) or drain site(s) healing without S/S of infection  Description: INTERVENTIONS  - Assess and document dressing, incision, wound bed, drain sites and surrounding tissue  - Provide patient and family education  Outcome: Progressing

## 2023-02-26 NOTE — PROGRESS NOTES
Progress Note - OB/GYN  Trevon Jonas 40 y o  female MRN: [id number]  Unit/Bed#: -01 Encounter: [id number]    Assessment and Plan     Trevon Jonas is a patient of: [facility]  She is PPD# 2 s/p  repeat  section, low transverse incision with vertical skin incision  Recovering well and is stable since removal of epidural  Baby still in NICU  * Status post repeat low transverse  section  Assessment & Plan  , Hgb 11 9 --> 10 6  Lines: passed VT  Pain: epidural removed 23, pain well controlled with PO meds  FEN: Tolerating regular diet  DVT ppx: SCDs  Passing flatus   Incision C/D/I         Hereditary disease in family possibly affecting fetus  Assessment & Plan  Her 2nd baby has charge syndrome  Her daughter had polyhydramnios and hydronephrosis found antepartum and then postpartum had unilateral choanal atresia feeding issues, facial droop, swelling, patent ductus arteriosus and patent foramen ovale  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  Both parents have been screened and are not carriers  This is an autosomal dominant condition  This occurred as a new mutation but her recurrence risk may be as high as 1% recurrence risk  Current  is in NICU on CPAP    Pre-existing hypertension during pregnancy in third trimester  Assessment & Plan  Baseline preE labs wnl 23  On Procardia XL 30 mg daily  · CBC, CMP wnl,  UPC on admission 0 13      History of DIC syndrome  Assessment & Plan  In  she had a 8 pound 1 ounce baby girl with a complicated delivery  She had a vacuum assisted vaginal delivery for nonreassuring fetal testing after she developed hypotension after an epidural bolus she received at 9 cms with quick progression to complete and + 3 station   After delivery she had a manual extraction of the placenta, and then developed significant hypotension postpartum and needed an exploratory laparotomy and IR embolization of the anterior iliac and massive transfusion  The postpartum complications and excessive bleeding raised the concern that she may have had an amniotic fluid embolus just at 9 cms which can also be a cause of the severe hypotension and can lead to significant hemorrhage  During this admission she also developed an acute kidney injury and hypertension  Disposition    - Anticipate discharge home on PPD# 2-4      Subjective/Objective     Chief Complaint: Postpartum State     Subjective:    Becki Stanley is PPD/POD#2 s/p  repeat  section, low transverse incision  She has no current complaints  Pain is well controlled  Patient is currently voiding  She is not ambulating due to epidural   Patient is currently passing flatus and has had no bowel movement  She is tolerating PO, and denies nausea or vomitting  Patient denies fever, chills, chest pain, shortness of breath, or calf tenderness  Lochia is minimal  She is  Planning to breastfeed, but baby is in NICU  She is recovering well and is stable   Feels well since removal of epidural and on PO meds      Vitals:   /74   Pulse 67   Temp 97 5 °F (36 4 °C) (Oral)   Resp 18   Ht 5' 4" (1 626 m)   Wt 70 8 kg (156 lb)   LMP 2022   SpO2 96%   Breastfeeding Yes   BMI 26 78 kg/m²       Intake/Output Summary (Last 24 hours) at 2023 6574  Last data filed at 2023 1600  Gross per 24 hour   Intake --   Output 2250 ml   Net -2250 ml       Invasive Devices     Peripheral Intravenous Line  Duration           Peripheral IV 23 Right Forearm 1 day                Physical Exam:   GEN: Becki Stanley appears well, alert and oriented x 3, pleasant and cooperative   CARDIO: RRR, no murmurs or rubs  RESP:  CTAB, no wheezes or rales  ABDOMEN: soft, no tenderness, no distention, fundus @ U-2, Steristrips over incision are dry with very small region of oozing at the top steristrip  EXTREMITIES: SCDs on, non tender, no erythema, b/l Angel's sign negative      Labs:     Hemoglobin   Date Value Ref Range Status   02/25/2023 10 6 (L) 11 5 - 15 4 g/dL Final   02/24/2023 11 9 11 5 - 15 4 g/dL Final   07/28/2015 11 1 (L) 11 5 - 15 4 g/dL Final   07/17/2015 9 7 (L) 11 5 - 15 4 g/dL Final     WBC   Date Value Ref Range Status   02/25/2023 19 80 (H) 4 31 - 10 16 Thousand/uL Final   02/24/2023 10 26 (H) 4 31 - 10 16 Thousand/uL Final   07/28/2015 6 70 4 31 - 10 16 Thousand/uL Final   07/17/2015 11 95 (H) 4 31 - 10 16 Thousand/uL Final     Platelets   Date Value Ref Range Status   02/25/2023 162 149 - 390 Thousands/uL Final   02/24/2023 152 149 - 390 Thousands/uL Final   07/28/2015 396 (H) 149 - 390 Thousand/uL Final     Comment:     The above 10 analytes were performed by [facility] [id number]     07/17/2015 636 (H) 149 - 390 Thousand/uL Final     Comment:     The above 10 analytes were performed by [id number]       Creatinine   Date Value Ref Range Status   02/24/2023 0 69 0 60 - 1 30 mg/dL Final     Comment:     Standardized to IDMS reference method   12/06/2022 0 80 0 60 - 1 30 mg/dL Final     Comment:     Standardized to IDMS reference method   09/03/2015 0 88 0 60 - 1 30 mg/dL Final     Comment:     Standardized to IDMS reference method   08/19/2015 1 04 0 60 - 1 30 mg/dL Final     Comment:     Standardized to IDMS reference method     AST   Date Value Ref Range Status   02/24/2023 13 13 - 39 U/L Final     Comment:     Specimen collection should occur prior to Sulfasalazine administration due to the potential for falsely depressed results  08/26/2022 8 5 - 45 U/L Final     Comment:     Specimen collection should occur prior to Sulfasalazine administration due to the potential for falsely depressed results      07/10/2015 31 0 - 45 U/L Final   07/09/2015 48 (H) 0 - 45 U/L Final     ALT   Date Value Ref Range Status   02/24/2023 9 7 - 52 U/L Final     Comment:     Specimen collection should occur prior to Sulfasalazine administration due to the potential for falsely depressed results  08/26/2022 18 12 - 78 U/L Final     Comment:     Specimen collection should occur prior to Sulfasalazine and/or Sulfapyridine administration due to the potential for falsely depressed results      07/10/2015 29 14 - 59 U/L Final   07/09/2015 47 14 - 59 U/L Final          Inell MD Morris  2/26/2023  8:33 AM

## 2023-02-27 VITALS
OXYGEN SATURATION: 96 % | WEIGHT: 156 LBS | SYSTOLIC BLOOD PRESSURE: 122 MMHG | TEMPERATURE: 98.9 F | DIASTOLIC BLOOD PRESSURE: 75 MMHG | HEIGHT: 64 IN | BODY MASS INDEX: 26.63 KG/M2 | HEART RATE: 74 BPM | RESPIRATION RATE: 18 BRPM

## 2023-02-27 LAB
ABO GROUP BLD BPU: NORMAL
ABO GROUP BLD BPU: NORMAL
BPU ID: NORMAL
BPU ID: NORMAL
CROSSMATCH: NORMAL
CROSSMATCH: NORMAL
UNIT DISPENSE STATUS: NORMAL
UNIT DISPENSE STATUS: NORMAL
UNIT PRODUCT CODE: NORMAL
UNIT PRODUCT CODE: NORMAL
UNIT PRODUCT VOLUME: 350 ML
UNIT PRODUCT VOLUME: 350 ML
UNIT RH: NORMAL
UNIT RH: NORMAL

## 2023-02-27 RX ORDER — ACETAMINOPHEN 325 MG/1
650 TABLET ORAL EVERY 6 HOURS PRN
Refills: 0
Start: 2023-02-27

## 2023-02-27 RX ORDER — OXYCODONE HYDROCHLORIDE 5 MG/1
5 TABLET ORAL EVERY 4 HOURS PRN
Qty: 10 TABLET | Refills: 0 | Status: SHIPPED | OUTPATIENT
Start: 2023-02-27 | End: 2023-03-09

## 2023-02-27 RX ORDER — IBUPROFEN 600 MG/1
600 TABLET ORAL EVERY 6 HOURS PRN
Qty: 30 TABLET | Refills: 0
Start: 2023-02-27

## 2023-02-27 RX ADMIN — IBUPROFEN 600 MG: 600 TABLET, FILM COATED ORAL at 03:18

## 2023-02-27 RX ADMIN — ACETAMINOPHEN 650 MG: 325 TABLET, FILM COATED ORAL at 09:43

## 2023-02-27 RX ADMIN — IBUPROFEN 600 MG: 600 TABLET, FILM COATED ORAL at 09:43

## 2023-02-27 RX ADMIN — DOCUSATE SODIUM 100 MG: 100 CAPSULE, LIQUID FILLED ORAL at 09:44

## 2023-02-27 RX ADMIN — OXYCODONE HYDROCHLORIDE 5 MG: 5 TABLET ORAL at 05:43

## 2023-02-27 RX ADMIN — PANTOPRAZOLE SODIUM 20 MG: 20 TABLET, DELAYED RELEASE ORAL at 09:43

## 2023-02-27 RX ADMIN — ACETAMINOPHEN 650 MG: 325 TABLET, FILM COATED ORAL at 03:18

## 2023-02-27 NOTE — UTILIZATION REVIEW
NOTIFICATION OF INPATIENT ADMISSION   MATERNITY/DELIVERY AUTHORIZATION REQUEST   SERVICING FACILITY:   Critical access hospital - L&D, Felt, NICU  [address]  Tax ID: [id number]  NPI: [id number]   ATTENDING PROVIDER:  Attending Name and NPI#: Lorrie Leija, [address]  Address: [address] [place] [address]  Phone: [phone]   ADMISSION INFORMATION:  [facility] Service: [facility]patient [facility]  [facility]  Place of Service Code: 21  Inpatient Admission Date/Time: 23 11:01 AM  Discharge Date/Time: No discharge date for patient encounter  Admitting Diagnosis Code/Description:  Previous  section complicating pregnancy [W98 679]  Chronic hypertension during pregnancy, antepartum [O10 919]  Encounter for  delivery without indication [O82]     Mother: Anuel Pepe 1985 Estimated Date of Delivery: 3/8/23  Delivering clinician: Lorrie Leija    OB History        3    Para   3    Term   3       0    AB   0    Living   3       SAB   0    IAB   0    Ectopic   0    Multiple   0    Live Births   3           Obstetric Comments   7/5/15 She had a vacuum assisted vaginal delivery, manual extraction of the placenta, PPH, retroperitoneal bleeding, exploratory laparotomy and IR embolization of the anterior iliac and massive transfusion  During this admission she also developed an ac Habematolel kidney injury and hypertension   Name & MRN:   Information for the patient's :  Justice Gomez) [[id number]]     Felt Delivery Information:  Sex: male  Delivered 2023 2:01 PM by , Low Transverse; Gestational Age: 36w4d     Measurements:  Weight: 6 lb 4 5 oz (2850 g); Height: 18 5"    APGAR 1 minute 5 minutes 10 minutes   Totals: 8 9      Felt Birth Information: 40 y o  female MRN: [id number] Unit/Bed#: -01   Birthweight: No birth weight on file   Gestational Age: <None> Delivery Type:    APGARS Totals:        UTILIZATION REVIEW CONTACT:  Yaya Renteria Utilization   Network Utilization Review Department  Phone: [phone]  Fax [phone]  Email: [email or web address]  Contact for approvals/pending authorizations, clinical reviews, and discharge  PHYSICIAN ADVISORY SERVICES:  Medical Necessity Denial & [id number] Review  Phone: [phone]  Fax: [phone]  Email: [email or web address]

## 2023-02-27 NOTE — PROGRESS NOTES
Progress Note - OB/GYN  Helen Bland 40 y o  female MRN: [id number]  Unit/Bed#: -01 Encounter: [id number]    Assessment and Plan:  Helen Bland is a patient of: [facility]  She is PPD# 3 s/p repeat  section, low transverse incision with vertical skin incision due to prior ex lap  Recovering well and is stable  By issue:      * Status post repeat low transverse  section  Assessment & Plan   mL, Hgb 11 9 --> 10 6  Pain well controlled  Voiding spontaneously  Appropriate bowel function  Tolerating PO  DVT ppx: SCDs (BMI 26)  Breastfeeding  Lochia wnl  · Continue routine postpartum care      Hereditary disease in family possibly affecting fetus  Assessment & Plan  Her 2nd baby has charge syndrome  Her daughter had polyhydramnios and hydronephrosis found antepartum and then postpartum had unilateral choanal atresia feeding issues, facial droop, swelling, patent ductus arteriosus and patent foramen ovale  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  Both parents have been screened and are not carriers  This is an autosomal dominant condition  This occurred as a new mutation but her recurrence risk may be as high as 1% recurrence risk  Current  is in NICU on CPAP    Pre-existing hypertension during pregnancy in third trimester  Assessment & Plan  Baseline preE labs wnl 23  On Procardia XL 30 mg daily  UPC 0 13, CBC and CMP wnl  Asymptomatic  Systolic (84JHV), TFW:364 , Min:116 , OGJ:207   Diastolic (95GLZ), EHO:56, Min:65, Max:79  · Continue monitoring BP  · Continue monitoring for signs/symptoms of preE  · Continue home Procardia    History of DIC syndrome  Assessment & Plan  In  she had a 8 pound 1 ounce baby girl with a complicated delivery   She had a vacuum assisted vaginal delivery for nonreassuring fetal testing after she developed hypotension after an epidural bolus she received at 9 cms with quick progression to complete and + 3 station  After delivery she had a manual extraction of the placenta, and then developed significant hypotension postpartum and needed an exploratory laparotomy and IR embolization of the anterior iliac and massive transfusion  The postpartum complications and excessive bleeding raised the concern that she may have had an amniotic fluid embolus just at 9 cms which can also be a cause of the severe hypotension and can lead to significant hemorrhage  During this admission she also developed an acute kidney injury and hypertension  Disposition    - Anticipate discharge home on PPD# 3      Subjective/Objective     Chief Complaint: Postpartum State     Subjective:    Sabrina Osullivan is PPD/POD#3 s/p  repeat  section, low transverse incision with vertical skin incision due to prior ex lap  She has no current complaints  Pain is well controlled  Patient is currently voiding  She is ambulating  Patient is currently passing flatus and has had no bowel movement  She is tolerating PO, and denies nausea or vomitting  Patient denies fever, chills, chest pain, shortness of breath, or calf tenderness  Lochia is normal  She is  Breastfeeding  She is recovering well and is stable         Vitals:   /71   Pulse 64   Temp 97 8 °F (36 6 °C) (Oral)   Resp 18   Ht 5' 4" (1 626 m)   Wt 70 8 kg (156 lb)   LMP 2022   SpO2 96%   Breastfeeding Yes   BMI 26 78 kg/m²     No intake or output data in the 24 hours ending 23 0559    Invasive Devices     None                 Physical Exam:   GEN: Sabrina Osullivan appears well, alert and oriented x 3, pleasant and cooperative   CARDIO: warm and well perfused  RESP: non-labored breathing  ABDOMEN: soft, no tenderness, no distention, fundus @ U -3, Incision C/D/I  EXTREMITIES: SCDs on, non tender, no erythema, b/l Angel's sign negative      Labs:     Hemoglobin   Date Value Ref Range Status   2023 10 6 (L) 11 5 - 15 4 g/dL Final   2023 11 9 11 5 - 15 4 g/dL Final   07/28/2015 11 1 (L) 11 5 - 15 4 g/dL Final   07/17/2015 9 7 (L) 11 5 - 15 4 g/dL Final     WBC   Date Value Ref Range Status   02/25/2023 19 80 (H) 4 31 - 10 16 Thousand/uL Final   02/24/2023 10 26 (H) 4 31 - 10 16 Thousand/uL Final   07/28/2015 6 70 4 31 - 10 16 Thousand/uL Final   07/17/2015 11 95 (H) 4 31 - 10 16 Thousand/uL Final     Platelets   Date Value Ref Range Status   02/25/2023 162 149 - 390 Thousands/uL Final   02/24/2023 152 149 - 390 Thousands/uL Final   07/28/2015 396 (H) 149 - 390 Thousand/uL Final     Comment:     The above 10 analytes were performed by [facility] [id number]     07/17/2015 636 (H) 149 - 390 Thousand/uL Final     Comment:     The above 10 analytes were performed by [id number]       Creatinine   Date Value Ref Range Status   02/24/2023 0 69 0 60 - 1 30 mg/dL Final     Comment:     Standardized to IDMS reference method   12/06/2022 0 80 0 60 - 1 30 mg/dL Final     Comment:     Standardized to IDMS reference method   09/03/2015 0 88 0 60 - 1 30 mg/dL Final     Comment:     Standardized to IDMS reference method   08/19/2015 1 04 0 60 - 1 30 mg/dL Final     Comment:     Standardized to IDMS reference method     AST   Date Value Ref Range Status   02/24/2023 13 13 - 39 U/L Final     Comment:     Specimen collection should occur prior to Sulfasalazine administration due to the potential for falsely depressed results  08/26/2022 8 5 - 45 U/L Final     Comment:     Specimen collection should occur prior to Sulfasalazine administration due to the potential for falsely depressed results  07/10/2015 31 0 - 45 U/L Final   07/09/2015 48 (H) 0 - 45 U/L Final     ALT   Date Value Ref Range Status   02/24/2023 9 7 - 52 U/L Final     Comment:     Specimen collection should occur prior to Sulfasalazine administration due to the potential for falsely depressed results      08/26/2022 18 12 - 78 U/L Final Comment:     Specimen collection should occur prior to Sulfasalazine and/or Sulfapyridine administration due to the potential for falsely depressed results      07/10/2015 29 14 - 59 U/L Final   07/09/2015 47 14 - 59 U/L Final          Alfa Mora MD  2/27/2023  5:59 AM

## 2023-02-27 NOTE — LACTATION NOTE
This note was copied from a baby's chart  Discharge Lactation: mom states baby is on each breast approx  15 min  Mom is offering both breasts  Mom is worried that baby is getting sleepy on the breast  Mom excl  Pumped with older children  Baby and me appt created to reassurance and verify milk supply  Reviewed timing of feeds and signs of satiation  Provided 12 mls syringes for any expressed milk if baby's feedings are short in length  Enc  To call lactation to see a latch  Enc  S2s  And reviewed how to achieve adequate milk supply  Provided education on alignment of nose to breast; bring baby to breast and not breast to baby; support head with opp  Hand in cross cradle; use pillows to lift baby to be belly to belly; ear, shoulder, hip alignment; Support mother's back and place self in comfortable position to support bringing baby to the breast  Shoulders should be down and away from ears  - Start feedings on breast that last feeding ended   - allow no more than 3 hours between breast feeding sessions   - time between feedings is counted from the beginning of the first feed to the beginning of the next feeding session    Reviewed early signs of hunger, including tensing of hands and shoulders - no need to wait for open eyes  Crying is a late hunger sign  If baby is crying, soothe baby first and then attempt to latch  Reviewed normal sucking patterns: transition from stimulation to nutritive to release or non-nutritive  The goal is to see and hear lots of swallowing  Reviewed normal nursing pattern: infant could latch on one breast up to 30 minutes or until releases on own  Signs of satiation is open hand with fingers that do not grab your finger  Discussed difference in sensation of non-nutritive v nutritive sucking    Education on alternative feeding methods  Encouraged to call lactation for additional assistance with feedings      Milk Supply:   - Allow for non-nutritive suck at the breast to stimulate supply   - Allow for skin to skin during and after each breastfeeding session   - Use massage, heat, and hand expression prior to feedings to assist with deep latch   - Increase pumping sessions and pump after every feeding    Provided education on growth spurts, when to introduce bottles; paced bottle feeding, and non-nutritive suck at the breast  Provided education on Signs of satiation  Encouraged to call lactation to observe a latch prior to discharge for reassurance  Encouraged to call baby and me with any questions and closely monitor output      Feedings:   - Align nose to nipple, drag chin on breast to achieve a wide deep latch   - Bring baby to breast,not breast to baby (your shoulders down and back - no hunching)   - Baby's ear, shoulder, hip in alignment   - Recognize early feeding cues (opening mouth, hand to mouth)   - Look for signs of satiation (open hand on breast)   - All for non-nutritive suck on the breast   - Allow for breathing breaks and muscle breaks

## 2023-02-27 NOTE — PLAN OF CARE
Problem: PAIN - ADULT  Goal: Verbalizes/displays adequate comfort level or baseline comfort level  Description: Interventions:  - Encourage patient to monitor pain and request assistance  - Assess pain using appropriate pain scale  - Administer analgesics based on type and severity of pain and evaluate response  - Implement non-pharmacological measures as appropriate and evaluate response  - Consider cultural and social influences on pain and pain management  - Notify physician/advanced practitioner if interventions unsuccessful or patient reports new pain  Outcome: Progressing     Problem: INFECTION - ADULT  Goal: Absence or prevention of progression during hospitalization  Description: INTERVENTIONS:  - Assess and monitor for signs and symptoms of infection  - Monitor lab/diagnostic results  - Monitor all insertion sites, i e  indwelling lines, tubes, and drains  - Monitor endotracheal if appropriate and nasal secretions for changes in amount and color  - [place] appropriate cooling/warming therapies per order  - Administer medications as ordered  - Instruct and encourage patient and family to use good hand hygiene technique  - Identify and instruct in appropriate isolation precautions for identified infection/condition  Outcome: Progressing  Goal: Absence of fever/infection during neutropenic period  Description: INTERVENTIONS:  - Monitor WBC    Outcome: Progressing     Problem: SAFETY ADULT  Goal: Patient will remain free of falls  Description: INTERVENTIONS:  - Educate patient/family on patient safety including physical limitations  - Instruct patient to call for assistance with activity   - Consult OT/PT to assist with strengthening/mobility   - Keep Call bell within reach  - Keep bed low and locked with side rails adjusted as appropriate  - Keep care items and personal belongings within reach  - Initiate and maintain comfort rounds  - Make Fall Risk Sign visible to staff  - Apply yellow socks and bracelet for high fall risk patients  - Consider moving patient to room near nurses station  Outcome: Progressing  Goal: Maintain or return to baseline ADL function  Description: INTERVENTIONS:  -  Assess patient's ability to carry out ADLs; assess patient's baseline for ADL function and identify physical deficits which impact ability to perform ADLs (bathing, care of mouth/teeth, toileting, grooming, dressing, etc )  - Assess/evaluate cause of self-care deficits   - Assess range of motion  - Assess patient's mobility; develop plan if impaired  - Assess patient's need for assistive devices and provide as appropriate  - Encourage maximum independence but intervene and supervise when necessary  - Involve family in performance of ADLs  - Assess for home care needs following discharge   - Consider OT consult to assist with ADL evaluation and planning for discharge  - Provide patient education as appropriate  Outcome: Progressing  Goal: Maintains/Returns to pre admission functional level  Description: INTERVENTIONS:  - Perform BMAT or MOVE assessment daily    - Set and communicate daily mobility goal to care team and patient/family/caregiver  - Collaborate with rehabilitation services on mobility goals if consulted  - Out of bed for toileting  - Record patient progress and toleration of activity level   Outcome: Progressing     Problem: Knowledge Deficit  Goal: Patient/family/caregiver demonstrates understanding of disease process, treatment plan, medications, and discharge instructions  Description: Complete learning assessment and assess knowledge base    Interventions:  - Provide teaching at level of understanding  - Provide teaching via preferred learning methods  Outcome: Progressing     Problem: DISCHARGE PLANNING  Goal: Discharge to home or other facility with appropriate resources  Description: INTERVENTIONS:  - Identify barriers to discharge w/patient and caregiver  - Arrange for needed discharge resources and transportation as appropriate  - Identify discharge learning needs (meds, wound care, etc )  - Arrange for interpretive services to assist at discharge as needed  - Refer to Case Management Department for coordinating discharge planning if the patient needs post-hospital services based on physician/advanced practitioner order or complex needs related to functional status, cognitive ability, or social support system  Outcome: Progressing     Problem: POSTPARTUM  Goal: Experiences normal postpartum course  Description: INTERVENTIONS:  - Monitor maternal vital signs  - Assess uterine involution and lochia  Outcome: Progressing  Goal: Appropriate maternal -  bonding  Description: INTERVENTIONS:  - Identify family support  - Assess for appropriate maternal/infant bonding   -Encourage maternal/infant bonding opportunities  - Referral to  or  as needed  Outcome: Progressing  Goal: Establishment of infant feeding pattern  Description: INTERVENTIONS:  - Assess breast/bottle feeding  - Refer to lactation as needed  Outcome: Progressing  Goal: Incision(s), wounds(s) or drain site(s) healing without S/S of infection  Description: INTERVENTIONS  - Assess and document dressing, incision, wound bed, drain sites and surrounding tissue  - Provide patient and family education  Outcome: Progressing

## 2023-02-27 NOTE — PLAN OF CARE
Problem: PAIN - ADULT  Goal: Verbalizes/displays adequate comfort level or baseline comfort level  Description: Interventions:  - Encourage patient to monitor pain and request assistance  - Assess pain using appropriate pain scale  - Administer analgesics based on type and severity of pain and evaluate response  - Implement non-pharmacological measures as appropriate and evaluate response  - Consider cultural and social influences on pain and pain management  - Notify physician/advanced practitioner if interventions unsuccessful or patient reports new pain  Outcome: Completed     Problem: INFECTION - ADULT  Goal: Absence or prevention of progression during hospitalization  Description: INTERVENTIONS:  - Assess and monitor for signs and symptoms of infection  - Monitor lab/diagnostic results  - Monitor all insertion sites, i e  indwelling lines, tubes, and drains  - Monitor endotracheal if appropriate and nasal secretions for changes in amount and color  - [place] appropriate cooling/warming therapies per order  - Administer medications as ordered  - Instruct and encourage patient and family to use good hand hygiene technique  - Identify and instruct in appropriate isolation precautions for identified infection/condition  Outcome: Completed  Goal: Absence of fever/infection during neutropenic period  Description: INTERVENTIONS:  - Monitor WBC    Outcome: Completed     Problem: SAFETY ADULT  Goal: Patient will remain free of falls  Description: INTERVENTIONS:  - Educate patient/family on patient safety including physical limitations  - Instruct patient to call for assistance with activity   - Consult OT/PT to assist with strengthening/mobility   - Keep Call bell within reach  - Keep bed low and locked with side rails adjusted as appropriate  - Keep care items and personal belongings within reach  - Initiate and maintain comfort rounds  - Make Fall Risk Sign visible to staff  - Offer Toileting every  Hours, in advance of need  - Initiate/Maintain alarm  - Obtain necessary fall risk management equipment:   - Apply yellow socks and bracelet for high fall risk patients  - Consider moving patient to room near nurses station  Outcome: Completed  Goal: Maintain or return to baseline ADL function  Description: INTERVENTIONS:  -  Assess patient's ability to carry out ADLs; assess patient's baseline for ADL function and identify physical deficits which impact ability to perform ADLs (bathing, care of mouth/teeth, toileting, grooming, dressing, etc )  - Assess/evaluate cause of self-care deficits   - Assess range of motion  - Assess patient's mobility; develop plan if impaired  - Assess patient's need for assistive devices and provide as appropriate  - Encourage maximum independence but intervene and supervise when necessary  - Involve family in performance of ADLs  - Assess for home care needs following discharge   - Consider OT consult to assist with ADL evaluation and planning for discharge  - Provide patient education as appropriate  Outcome: Completed  Goal: Maintains/Returns to pre admission functional level  Description: INTERVENTIONS:  - Perform BMAT or MOVE assessment daily    - Set and communicate daily mobility goal to care team and patient/family/caregiver  - Collaborate with rehabilitation services on mobility goals if consulted  - Perform Range of Motion  times a day  - Reposition patient every  hours    - Dangle patient  times a day  - Stand patient  times a day  - Ambulate patient  times a day  - Out of bed to chair  times a day   - Out of bed for meals  times a day  - Out of bed for toileting  - Record patient progress and toleration of activity level   Outcome: Completed     Problem: Knowledge Deficit  Goal: Patient/family/caregiver demonstrates understanding of disease process, treatment plan, medications, and discharge instructions  Description: Complete learning assessment and assess knowledge base   Interventions:  - Provide teaching at level of understanding  - Provide teaching via preferred learning methods  Outcome: Completed     Problem: DISCHARGE PLANNING  Goal: Discharge to home or other facility with appropriate resources  Description: INTERVENTIONS:  - Identify barriers to discharge w/patient and caregiver  - Arrange for needed discharge resources and transportation as appropriate  - Identify discharge learning needs (meds, wound care, etc )  - Arrange for interpretive services to assist at discharge as needed  - Refer to Case Management Department for coordinating discharge planning if the patient needs post-hospital services based on physician/advanced practitioner order or complex needs related to functional status, cognitive ability, or social support system  Outcome: Completed     Problem: POSTPARTUM  Goal: Experiences normal postpartum course  Description: INTERVENTIONS:  - Monitor maternal vital signs  - Assess uterine involution and lochia  Outcome: Completed  Goal: Appropriate maternal -  bonding  Description: INTERVENTIONS:  - Identify family support  - Assess for appropriate maternal/infant bonding   -Encourage maternal/infant bonding opportunities  - Referral to  or  as needed  Outcome: Completed  Goal: Establishment of infant feeding pattern  Description: INTERVENTIONS:  - Assess breast/bottle feeding  - Refer to lactation as needed  Outcome: Completed  Goal: Incision(s), wounds(s) or drain site(s) healing without S/S of infection  Description: INTERVENTIONS  - Assess and document dressing, incision, wound bed, drain sites and surrounding tissue  - Provide patient and family education  - Perform skin care/dressing changes every Outcome: Completed

## 2023-03-08 ENCOUNTER — OFFICE VISIT (OUTPATIENT)
Dept: POSTPARTUM | Facility: CLINIC | Age: 38
End: 2023-03-08

## 2023-03-08 VITALS — SYSTOLIC BLOOD PRESSURE: 110 MMHG | DIASTOLIC BLOOD PRESSURE: 92 MMHG

## 2023-03-08 DIAGNOSIS — O92.79 PAIN AGGRAVATED BY BREAST FEEDING: ICD-10-CM

## 2023-03-08 DIAGNOSIS — Z71.89 ENCOUNTER FOR BREAST FEEDING COUNSELING: Primary | ICD-10-CM

## 2023-03-08 DIAGNOSIS — R52 PAIN AGGRAVATED BY BREAST FEEDING: ICD-10-CM

## 2023-03-08 NOTE — PATIENT INSTRUCTIONS
Nurse on demand: when baby gives hunger cues or when your breasts feel full  When sucking and swallowing slow, gently compress the breast to restart flow  If active suck-swallow does not restart, gently remove the baby and offer the other breast; offering up to "four" breasts per feeding   Pay close attention to positioning for a deeper latch  Attaching Your Baby at the [employer] is a great resource for practicing effective positioning an determining if your baby is latching and feeding effectively  Please call with any questions or concerns

## 2023-03-08 NOTE — PROGRESS NOTES
INITIAL BREAST FEEDING EVALUATION    Informant/Relationship: Gil Coe    Discussion of General Lactation Issues: Sabina Albarado is Leticia's third child  She did not breastfeed her two older children  Sabina Albarado is latching since DOL #3  Latch is painful but Gil Coe feels he is drinking effectively and getting enough milk  She does feel he has a shallow latch  Infant is 15days old today   History:  Fertility Problem:no  Breast changes:yes - breasts were aguirre, nipples were darker  : NO  scheduled repeat   Full term:yes - 38 2/7 weeks   labor:no  First nursing/attempt < 1 hour after birth:No   First attempt was on DOL #3 due to Drew's medical issues/  Skin to skin following delivery:No first STS was 3-4 hours after delivery in the NICU  Breast changes after delivery:breasts are aguirre    Transitioned to mature milk on DOL #5  Rooming in (infant in room with mother with exception of procedures, eg  Circumcision: no  Blood sugar issues:yes  NICU stay:yes - for 48 hours due to TTN  Jaundice:yes  Phototherapy:no  Supplement given: (list supplement and method used as well as reason(s):yes - Donor milk via NGT and bottle while in the NICU    Past Medical History:   Diagnosis Date   • Acute renal failure ([address] )     [id number] RESOLVED   • Acute tubular necrosis ([address] )    • Anemia     during the pregnancy    • Bowel obstruction ([address] ) 2017   • Chronic kidney disease    • Fibroadenoma of breast, left    • Fibroadenoma of breast, right    •  1 para 1    • History of transfusion 2015    After delivery    • Hypertension     no medication    • Kidney stone    • Postpartum hemorrhage     UNSPECIFIED TYPE  RESOLVED    • Urinary tract infection     yes in the last ten years   • Vacuum extractor delivery, delivered      daughter with postpartum hemorrhage and DIC   • Varicella     had childhood chickenpox         Current Outpatient Medications:   •  acetaminophen (TYLENOL) 325 mg tablet, Take 2 tablets (650 mg total) by mouth every 6 (six) hours as needed for mild pain or moderate pain, Disp: , Rfl: 0  •  busPIRone (BUSPAR) 15 mg tablet, 1/4-1/2 tab in AM and 1 HS, Disp: 60 tablet, Rfl: 2  •  clindamycin (CLEOCIN T) 1 % lotion, , Disp: , Rfl:   •  Docusate Sodium (COLACE PO), Take by mouth as needed, Disp: , Rfl:   •  Ferrous Sulfate (Iron) 325 (65 Fe) MG TABS, , Disp: , Rfl:   •  ibuprofen (MOTRIN) 600 mg tablet, Take 1 tablet (600 mg total) by mouth every 6 (six) hours as needed for mild pain or moderate pain, Disp: 30 tablet, Rfl: 0  •  Magnesium 400 MG CAPS, Take by mouth, Disp: , Rfl:   •  NIFEdipine (PROCARDIA XL) 30 mg 24 hr tablet, Take 1 tablet (30 mg total) by mouth daily, Disp: 90 tablet, Rfl: 3  •  omeprazole (PriLOSEC) 20 mg delayed release capsule, Take 20 mg by mouth daily, Disp: , Rfl:   •  oxyCODONE (ROXICODONE) 5 immediate release tablet, Take 1 tablet (5 mg total) by mouth every 4 (four) hours as needed for severe pain for up to 10 days Max Daily Amount: 30 mg, Disp: 10 tablet, Rfl: 0  •  Prenatal MV-Min-Fe Fum-FA-DHA (PRENATAL 1 PO), Take by mouth, Disp: , Rfl:   •  Probiotic Product (PROBIOTIC PO), Take by mouth, Disp: , Rfl:     Allergies   Allergen Reactions   • Nickel Rash     Skin rash       Social History     Substance and Sexual Activity   Drug Use No    Comment: denies self or FOB  denies family use       Social History Never a smoker    Interval Breastfeeding History:    Frequency of breast feeding: On demand every 2 hours during the day and up to 3 hours at night    Does mother feel breastfeeding is effective: Yes, but latch is painful  Does infant appear satisfied after nursing:Yes  Stooling pattern normal: Yes  Urinating frequently:Yes  Using shield or shells: No    Alternative/Artificial Feedings:   Bottle: Yes, occasionally  Cup: No  Syringe/Finger: No           Formula Type: none                     Amount: n/a            Breast Milk:                      Amount: 2 ounces            Frequency Q 2-3 Hr between feedings  Elimination Problems: No      Equipment:  Nipple Shield             Type: none             Size: n/a             Frequency of Use: n/a  Pump            Type: Ameda Purely Yours (from oldest child) and Clyde Gazella            Frequency of Use: occasionally  Shells            Type: none            Frequency of use: n/a    Equipment Problems: no    Mom:  Breast: Medium sized symmetrical breasts  Rounded shape  Closely spaced  Firm and full  Nipple Assessment in General: Long, broad, everted nipples bilaterally  Mother's Awareness of Feeding Cues                 Recognizes: Yes                  Verbalizes: Yes  Support System: FOB, extended family  History of Breastfeeding: Did not breastfeed her oldest child due to a traumatic delivery, post partum hemorrhage and ICU admission after delivery  Exclusively pumped for her middle child due to child's medical issues  Pumped for 4 months, over produced and provided milk for 6 months  Changes/Stressors/Violence: Ana Maria Kee is concerned that breastfeeding has been painful  Concerns/Goals: Ana Maria Kee desires to take breastfeeding one day at a time    Problems with Mom: pain with breastfeeding  Physical Exam  Constitutional:       Appearance: Normal appearance  HENT:      Head: Normocephalic and atraumatic  Nose: Nose normal    Cardiovascular:      Rate and Rhythm: Normal rate and regular rhythm  Pulses: Normal pulses  Heart sounds: Normal heart sounds  Pulmonary:      Effort: Pulmonary effort is normal       Breath sounds: Normal breath sounds  Musculoskeletal:         General: Normal range of motion  Cervical back: Normal range of motion and neck supple  Neurological:      Mental Status: She is alert and oriented to person, place, and time  Skin:     General: Skin is warm and dry     Psychiatric:         Mood and Affect: Mood normal          Behavior: Behavior normal          Thought Content: Thought content normal          Judgment: Judgment normal          Infant:  Behaviors: Alert  Color: Pink  Birth weight: 2850gram  Current weight: 3025gram    Problems with infant: shallow latch      General Appearance:  Alert, active, no distress                             Head:  Normocephalic, AFOF, sutures opposed                             Eyes:  Conjunctiva clear, no drainage                              Ears:  Normally placed, no anomolies                             Nose:  no drainage or erythema                           Mouth:  No lesions  Tongue extends beyond the lower lip and lateralizes well  Good cupping of my finger noted while sucking and effective peristalsis of the entire tongue for sustained bursts  Neck:  Supple, symmetrical, trachea midline                 Respiratory:  No grunting, flaring, retractions, breath sounds clear and equal            Cardiovascular:  Regular rate and rhythm  No murmur  Adequate perfusion/capillary refill  Femoral pulse present                    Abdomen:   Soft, non-tender, no masses, bowel sounds present, no HSM             Genitourinary:  Normal male, testes descended, no discharge, swelling, or pain, anus patent                          Spine:   No abnormalities noted        Musculoskeletal:  Full range of motion          Skin/Hair/Nails:   Skin warm, dry, and intact, no rashes or abnormal dyspigmentation or lesions                Neurologic:   No abnormal movement, tone appropriate for gestational age    Woody Latch:  Efficiency:               Lips Flanged: Yes              Depth of latch: good              Audible Swallow: Yes, multiple sustained suckling bursts noted              Visible Milk: Yes              Wide Open/ Asymmetrical: Yes              Suck Swallow Cycle: Breathing: unlabored, Coordinated: yes  Nipple Assessment after latch: very slightly creased  Latch Problems: Harsha Mart needed some help with positioning    After making some adjustments, she was able to guide Drew to a deep, effective latch  Other than brief latch on pain, she reported being comfortable  Elodia Pantoja was able to feed effectively until he was completely content  Position:  Infant's Ergonomics/Body               Body Alignment: Yes, after education               Head Supported: Yes               Close to Mom's body/ Lifted/ Supported: Yes, after education               Mom's Ergonomics/Body: Yes, after education                           Supported: Yes, after education                           Sitting Back: Yes, after education                           Brings Baby to her breast: Yes, after education  Positioning Problems: Initially, [employer] positioned Elodia Pantoja with a space between their bodies with his shoulder rotated away from the breast   She leaned over him and pushed her nipple into his mouth  Handouts:   Latch Check List, Paced Bottle feeding    Education:  Reviewed Latch: Demonstrated how to align the baby so that his nose is just above the nipple with his lower lip and chin touching the breast to encourage the deepest, widest, off-center latch  Reviewed Positioning for Dyad: Demonstrated how to position mom comfortably and supported prior to bringing her baby to her breast  Reviewed Frequency/Supply & Demand: Discussed how milk production is established and maintained  Reviewed Mom/Breast care: Discussed appropriate handling of the breasts to prevent inflammation and trauma in the breasts        Plan:   Reassurance and support given  I encouraged [employer] to continue to feed on demand  We worked on positioning to improve her comfort and confidence and Drew's ability to latch deeply and feed effectively  We discussed how to eventually incorporate some pumping as [employer] prepares to go back to work while preventing over production  I encouraged her to call with any questions or concerns      I have spent 90 minutes with Patient and family today in which greater than 50% of this time was spent in counseling/coordination of care regarding Patient and family education

## 2023-03-13 NOTE — PROGRESS NOTES
I have reviewed the notes, assessments, and/or procedures performed by Dylan Romero RN, IBCLC, I concur with her/his documentation of Dontrell Interiano MD 03/12/23

## 2023-03-13 NOTE — PROGRESS NOTES
Pt is here for postpartum exam   No concerns at this time    Csection on 2/24  Baby Boy   6lbs 4 5oz  APGARS 8/9  Currenlty Breastfeeding  Slight Vaginal Bleeding   Contraception Tubal     EPDS 8

## 2023-03-14 NOTE — PATIENT INSTRUCTIONS
AMBULATORY CARE:   What you need to know about a :  A , or  section, is abdominal surgery to deliver your baby  A  may also be done if you are pregnant with more than 1 baby  How to prepare for a :   Your obstetrician will talk to you about how to prepare if you are having a planned   He or she may tell you not to eat or drink anything after midnight on the day of your surgery  He or she will tell you what medicines to take or not take on the day of your surgery  Tell your healthcare provider if you had an allergic reaction to anesthesia or antibiotics  Antibiotics may be given an hour before surgery or right after it starts  Antibiotics help fight or prevent a bacterial infection  You may need tests for certain infections that can be passed to your baby, such as group B strep (GBS)  If you are a GBS carrier or at increased risk, antibiotics help prevent your baby from being infected during the   What will happen during a :  You will usually be given spinal anesthesia to numb you from the surgery area down  You may still feel pressure or pushing during the , but you should not feel any pain  Your obstetrician will usually make an incision across your lower abdomen  He or she will gently pull your baby or babies out  Your incision will be closed with stitches or staples and covered with a bandage  What will happen after a :   You will be taken to a room to rest for about an hour after you deliver  Do not get out of bed until healthcare providers say it is okay  Call for a healthcare provider if you are holding your baby and start to feel tired  The provider can put him or her in a bassinet near you while you rest or sleep  This will help prevent an accidental drop or fall of your baby  Risks of a :  You may bleed more than expected or develop an infection   Your bladder or intestines may be injured during the procedure  You may get a blood clot in your leg  This may become life-threatening  Call your local emergency number (911 in the [address]) if:   You feel lightheaded, short of breath, and have chest pain  You cough up blood  Call your obstetrician if:   Blood soaks through your bandage  Your stitches come apart  Your arm or leg feels warm, tender, and painful  It may look swollen and red  You have heavy vaginal bleeding that fills 1 or more sanitary pads in 1 hour  You have a fever  Your incision is swollen, red, or draining pus  You have questions or concerns about yourself or your baby  Medicines: You may  need any of the following:  Prescription pain medicine  may be given  Ask your healthcare provider how to take this medicine safely  Some prescription pain medicines contain acetaminophen  Do not take other medicines that contain acetaminophen without talking to your healthcare provider  Too much acetaminophen may cause liver damage  Prescription pain medicine may cause constipation  Ask your healthcare provider how to prevent or treat constipation  Acetaminophen  decreases pain and fever  It is available without a doctor's order  Ask how much to take and how often to take it  Follow directions  Read the labels of all other medicines you are using to see if they also contain acetaminophen, or ask your doctor or pharmacist  Acetaminophen can cause liver damage if not taken correctly  NSAIDs , such as ibuprofen, help decrease swelling, pain, and fever  NSAIDs can cause stomach bleeding or kidney problems in certain people  If you take blood thinner medicine, always ask your healthcare provider if NSAIDs are safe for you  Always read the medicine label and follow directions  Wound care:  Carefully wash your incision wound with soap and water every day  Keep your wound clean and dry  Wear loose, comfortable clothes that do not rub against your wound   Ask about bathing and showering  Limit activity as directed: Ask when it is safe for you to drive, walk up stairs, lift heavy objects, and have sex  Ask when it is okay to exercise, and what types of exercise to do  Start slowly and do more as you get stronger  Drink liquids as directed:  Liquids help keep you hydrated after your procedure and decrease your risk for a blood clot  Ask how much liquid to drink each day and which liquids are best for you  Follow up with your obstetrician as directed: You may need to return to have your stitches or staples removed  Write down your questions so you remember to ask them during your visits  © Copyright Sophia Walton  Information is for End User's use only and may not be sold, redistributed or otherwise used for commercial purposes  The above information is an  only  It is not intended as medical advice for individual conditions or treatments  Talk to your doctor, nurse or pharmacist before following any medical regimen to see if it is safe and effective for you  Postpartum Bleeding   AMBULATORY CARE:   What you need to know about postpartum bleeding:  Postpartum bleeding is vaginal bleeding after childbirth  This bleeding is normal, whether your baby was born vaginally or by   It contains blood and the tissue that lined the inside of your uterus when you were pregnant  What to expect with postpartum bleeding:  Postpartum bleeding usually lasts at least 10 days, and may last longer than 6 weeks  Your bleeding may range from light (barely staining a pad) to heavy (soaking a pad in 1 hour)  Usually, you have heavier bleeding right after childbirth, which slows over the next few weeks until it stops  The bleeding is red or dark brown with clots for the first 1 to 3 days  It then turns pink for several days, and then becomes a white or yellow discharge until it ends    Call your local emergency number (911 in the [address]) if:   You are suddenly short of breath and feel lightheaded  You have sudden chest pain  You are breathing faster than normal     Your heart is beating faster than normal     Call your doctor or obstetrician if:   Your bleeding increases, or you have heavy bleeding that soaks a pad in 1 hour for 2 hours in a row  You have a fever  You pass large blood clots  You feel dizzy  You have a low back ache, abdominal pain or tenderness, or loss of appetite  You are urinating less than usual, or not at all  You have questions or concerns about your condition or care  What to expect with postpartum bleeding:  Postpartum bleeding usually lasts at least 10 days, and may last longer than 6 weeks  Your bleeding may range from light (barely staining a pad) to heavy (soaking a pad in 1 hour)  Usually, you have heavier bleeding right after childbirth, which slows over the next few weeks until it stops  The bleeding is red or dark brown with clots for the first 1 to 3 days  It then turns pink for several days, and then becomes a white or yellow discharge until it ends  Follow up with your obstetrician as directed:  Do not have sex until your obstetrician says it is okay  Write down your questions so you remember to ask them during your visits  © Copyright Michael Calender 2022 Information is for End User's use only and may not be sold, redistributed or otherwise used for commercial purposes  The above information is an  only  It is not intended as medical advice for individual conditions or treatments  Talk to your doctor, nurse or pharmacist before following any medical regimen to see if it is safe and effective for you  Postpartum Depression   AMBULATORY CARE:   Postpartum depression  is a mood disorder that occurs after your baby is born  Your symptoms may last up to 12 months after delivery  Your symptoms may become serious and affect your daily activities and relationships  The exact cause is not known   Hormone levels that increased during pregnancy suddenly drop after your baby is born  This can cause your symptoms  A past episode of postpartum depression or a family history of depression may increase your risk  A  may also be done if you are pregnant with more than 1 baby  Postpartum depression may also be trigged by a lack of support at home, stress, or medical problems  Common signs and symptoms include the following:   Feeling sad, anxious, tearful, discouraged, hopeless, or alone    Thoughts that you are not a good mother    Trouble completing daily tasks, concentrating, or remembering things    Lack of appetite    Lack interest in your baby    Feeling restless, irritable, or withdrawn    An overwhelmed feeling with your new baby and a belief that it will not get better    Feeling unimportant or guilty most of the time    Trouble sleeping, even after the baby is asleep    Call your local emergency number (911 in the [address]) if:   You think about hurting yourself or your baby  Seek care immediately if:   Your feelings of depression or sadness are strong  Call your doctor if:   Your symptoms last most of the day for days in a row  Your symptoms last more than 1 week  You have questions or concerns about your condition or care  How postpartum depression is diagnosed:  Healthcare providers will talk to you about how you are feeling and ask if you have any depression  These talks can happen during appointments for your medical care and for your baby's care, such as well child visits   Talk to your providers about the following:  When you started to feel depressed, and if it is getting worse over time    Problems you are having with daily activities, sleep, or caring for your baby    If anything makes your depression worse, or makes you feel better    Feeling that you are not bonding with your baby the way you want    Any problems your baby has with sleeping or feeding    If your baby is fussy or cries a lot    Support you have from friends, family, or others    Treatment  may include any of the following:  A therapist  can help you find ways to cope with your feelings  This can be done alone or in a group  Antidepressants  help decrease or stop your symptoms  Self-care: You may feel better quickly, or if may take a few weeks to feel better  Be patient with yourself  Do the following to take care of yourself:  Rest as needed  Take a nap or rest while your baby sleeps  Ask someone to watch your baby while you nap  Get emotional support  Share your feelings with your partner, a friend, or another mother  Ask your partner, friends, or family to help with cooking or cleaning  Do only what is needed and let other things wait until later  Exercise when you can  Even 5 or 10 minutes of exercise can help improve your mood  Walk around the block or do some stretching exercises  Eat a variety of healthy foods  Healthy foods include fruits, vegetables, whole-grain breads, low-fat dairy products, beans, lean meats, and fish  Do not skip meals  Take care of yourself  Shower and dress each day  Write in a journal  Celebrate small achievements, even if it is only 1 thing a day  Try to get out of the house a little each day  Meet a friend for coffee  Follow up with your doctor as directed: Your doctor may refer you to a therapist or other specialist  Write down your questions so you remember to ask them during your visits  © Copyright [facility] 2022 Information is for End User's use only and may not be sold, redistributed or otherwise used for commercial purposes  The above information is an  only  It is not intended as medical advice for individual conditions or treatments  Talk to your doctor, nurse or pharmacist before following any medical regimen to see if it is safe and effective for you    Breastfeeding and Breast Engorgement   AMBULATORY CARE:   What you need to know about breast engorgement:  Breast engorgement develops when too much milk builds up in your breast  It is normal for your breasts to feel swollen, heavy, and tender when your milk comes in  This is called breast fullness  When your breast starts to feel painful and hard, the fullness has developed into engorgement  Breast engorgement usually happens 3 to 5 days after you give birth  Engorgement can happen if you are not breastfeeding or expressing milk often, or produce a lot of milk  Your baby may have a hard time latching on (attaching) to your breast to feed  Without treatment, engorgement can lead to plugged milk ducts or a breast infection called mastitis  Common symptoms include the following: A swollen, tender breast    A breast that feels hard to the touch or looks tight or shiny    Warm, red, or throbbing breast    Flat nipple    A low fever    Seek care immediately if:   You have a fever with chills or body aches  You have pain and swelling in one or both breasts that keeps you from breastfeeding  Contact your healthcare provider if:   You have a tender breast lump that grows slowly and usually forms on one side of your breast     You have a small, white bump on your nipple  Your symptoms do not get better within 24 hours  You have questions or concerns about your condition or care  Manage your symptoms:   Breastfeed or pump every 2 or 3 hours  Frequent breastfeeding helps decrease engorgement discomfort  Express or pump milk from your breasts before you breastfeed  This will help soften your breast and your nipple, and allow your baby to latch on better  Massage your breast   Breast massage helps empty your engorged breast and decrease pain  Gently massage your breast before and during breastfeeding to help increase your milk flow  Gently stroke your breast, starting from the outer areas and working your way toward the nipple   Breast massage may also help prevent breast engorgement if done in the first few days after you give birth  Apply a cool compress in between feedings  The cold may help decrease swelling and pain in your engorged breast  Wet a washcloth in cold water, wring it out, and place it on your breast  Ask how long and how often to use a cool compress  Wear a supportive bra  The bra should fit well but not be too tight  Prevent breast engorgement:   Help your baby get a good latch  Hold the nape of his or her neck to help him or her latch onto your breast  Touch his or her top lip with your nipple and wait for him or her to open his or her mouth wide  Your baby's lower lip and chin should touch the areola (dark area around the nipple) first  Help him or her get as much of the areola in his or her mouth as possible  You should feel as if your baby will not separate from your breast easily  Gently break suction and reposition if your baby is only sucking on the nipple  Talk to a lactation consultant if you need help with your baby's latch  Empty your breasts completely  Take your time when you breastfeed to allow your baby to empty your breast  Try not to switch breasts too early  Express or pump after you breastfeed if your baby is not emptying your breasts when he or she feeds  Apply warmth to your breast before you breastfeed  Put a warm, wet cloth on your breast or take a warm shower  This can help increase your milk flow  Follow up with your doctor as directed:  Write down your questions so you remember to ask them during your visits  For more information:   [employer] [address] [facility] , [facility] Rahel Corrigan  Phone: [phone]  Web Address: http://www [employer] [facility]/    [facility]  [address] [place] [address] Stephanie  Phone: 2- [phone] - [phone]  Phone: 8- 241 - [phone]  Web Address: http://www vidya freeman/  org  © Copyright Santosh Lopes 2022 Information is for End User's use only and may not be sold, redistributed or otherwise used for commercial purposes  The above information is an  only  It is not intended as medical advice for individual conditions or treatments  Talk to your doctor, nurse or pharmacist before following any medical regimen to see if it is safe and effective for you  Mastitis   AMBULATORY CARE:   Mastitis  is an infection of breast tissue that most often occurs in women who breastfeed  It can happen any time during breastfeeding, but usually occurs within the first 3 months after giving birth  Usually only one breast is affected  Common signs and symptoms include the following:   Chills and fever    Breast swelling, redness, warmth, and tenderness     Tenderness under your arm    Fatigue and body aches    Contact your healthcare provider if:   Your symptoms do not get better within 2 days  You have a painful lump in your breast      You have swollen and tender lymph nodes in your armpit on the same side as the affected breast     You have questions or concerns about your condition or care  Treatment:  You can continue to breastfeed your baby while you are being treated for mastitis  Breastfeeding when you have mastitis may help speed your recovery  You may need any of the following:  Antibiotics  help treat or prevent a bacterial infection  Acetaminophen  decreases pain and fever  It is available without a doctor's order  Ask how much to take and how often to take it  Follow directions  Acetaminophen can cause liver damage if not taken correctly  NSAIDs , such as ibuprofen, help decrease swelling, pain, and fever  This medicine is available with or without a doctor's order  NSAIDs can cause stomach bleeding or kidney problems in certain people  If you take blood thinner medicine, always ask your healthcare provider if NSAIDs are safe for you  Always read the medicine label and follow directions      Incision and drainage  may be needed if the swelling does not go away and an abscess forms  Your healthcare provider will make a small incision in your breast to drain the pus  Manage your symptoms:   Continue to breastfeed from the affected breast   This will help to prevent an abscess from forming  Breastfeed your baby on the affected side first  Apply a warm, wet cloth on your breast or take a warm shower before you feed your baby  This can help increase your milk flow  If it is painful when you breastfeed from the affected breast, feed your baby from the other breast first  Pump the affected side to completely drain your breast after breastfeeding, if needed  You may save the pumped milk to feed your baby  Use different positions to breastfeed  Change the position of your baby during feedings  This may help to relieve your discomfort  Apply heat on your breast for 20 to 30 minutes every 2 hours for as many days as directed  Heat helps decrease pain  Apply ice after feedings  Apply ice on your breast for 15 to 20 minutes every hour or as directed  Use an ice pack, or put crushed ice in a plastic bag  Cover it with a towel  Ice helps prevent tissue damage and decreases swelling and pain  Massage your breast   Gently massage your breast before and during breastfeeding to help drain your milk  Drink liquids as directed  Ask how much liquid to drink each day and which liquids are best for you  Rest as needed  Do not sleep on your stomach until your infection is gone  Prevent mastitis:   Breastfeed every 2 or 3 hours to prevent engorgement  Breast engorgement develops when too much milk builds up in your breast  Take your time when you breastfeed to allow your baby to empty your breast  Try not to switch breasts too early  Express or pump after you breastfeed if your baby is not emptying your breasts when he or she feeds  Prevent sore and cracked nipples  A good latch prevents sore and cracked nipples   If you have sore nipples after breastfeeding, your baby may not be latched on properly  Gently break suction and reposition if your baby is only sucking on the nipple  Talk to a lactation consultant if you need help with your baby's latch  Care for your breasts  Keep your nipples clean and dry between feedings  Check them for cracks, blisters, or other irritated areas  Ask a lactation specialist or your healthcare provider how to treat sore and cracked nipples  Wash your hands before and after you breastfeed your baby or pump your breasts  Wear a comfortable nursing bra that supports your breasts but is not too tight  Follow up with your doctor as directed:  Write down your questions so you remember to ask them during your visits  © Copyright Darrian Most 2022 Information is for End User's use only and may not be sold, redistributed or otherwise used for commercial purposes  The above information is an  only  It is not intended as medical advice for individual conditions or treatments  Talk to your doctor, nurse or pharmacist before following any medical regimen to see if it is safe and effective for you  Caring for Your Baby   WHAT YOU NEED TO KNOW:   Care for your baby includes keeping him or her safe, clean, and comfortable  Your baby will cry or make noises to let you know when he or she needs something  You will learn to tell what your baby needs by the way he or she cries  Your baby will move in certain ways when he or she needs something, such as sucking on a fist when hungry  DISCHARGE INSTRUCTIONS:   Call your local emergency number (911 in the [address]) if:   You feel like hurting your baby  Call your baby's pediatrician if:   Your baby's abdomen is hard and swollen, even when he or she is calm and resting  You feel depressed and cannot take care of your baby      Your baby's lips or mouth are blue and he or she is breathing faster than usual     Your baby's armpit temperature is higher than 99°F (37  2°C)  Your baby's eyes are red, swollen, or draining yellow pus  Your baby coughs often during the day, or chokes during each feeding  Your baby does not want to eat  Your baby cries more than usual and you cannot calm him or her down  Your baby's skin turns yellow or he or she has a rash  You have questions or concerns about caring for your baby  What to feed your baby:   Breast milk is the only food your baby needs for the first 6 months of life  If possible, only breastfeed (no formula) him or her for the first 6 months  Breastfeeding is recommended for at least the first year of your baby's life, even when he or she starts eating food  You may pump your breasts and feed breast milk from a bottle  You may feed your baby formula from a bottle if breastfeeding is not possible  Talk to your baby's pediatrician about the best formula for your baby  He or she can help you choose one that contains iron  Do not add cereal to the milk or formula  Your baby may get too many calories during a feeding  You can make more if your baby is still hungry after he or she finishes a bottle  How much to feed your baby: Your baby may want different amounts each day  The amount of formula or breast milk your baby drinks may change with each feeding and each day  The amount your baby drinks depends on his or her weight, how fast he or she is growing, and how hungry he or she is  Your baby may want to drink a lot one day and not want to drink much the next  Do not overfeed your baby  Overfeeding means your baby gets too many calories during a feeding  This may cause him or her to gain weight too fast  Your baby may also continue to overeat later in life  Look for signs that your baby is done feeding  Your baby may look around instead of watching you  He or she may chew on the nipple of the bottle rather than suck on it   He or she may also cry and try to wriggle away from the bottle or out of the high chair  Feed your baby each time he or she is hungry:      Babies up to 2 months old  will drink about 2 to 4 ounces at each feeding  He or she will probably want to drink every 3 to 4 hours  Wake your baby to feed him or her if he or she sleeps longer than 4 to 5 hours  Babies 2 to 10 months old  should drink 4 to 5 bottles each day  He or she will drink 4 to 6 ounces at each feeding  When your baby is 2 to 1 months old, he or she may begin to sleep through the night  When this happens, you may stop waking up to give your baby formula or breast milk in the night  If you are giving your baby breast milk, you may still need to wake up to pump your breasts  Store the milk for your baby to drink at a later time  Babies 6 to 13 months old  should drink 3 to 5 bottles every day  He or she may drink up to 8 ounces at each feeding  You may increase the time between feedings if your baby is not hungry  You may also start to feed your baby foods at 6 months  Ask your child's pediatrician for more information about the right foods to feed your baby  How to help your baby latch on correctly for breastfeeding:  Help your baby move his or her head to reach your breast  Hold the nape of his or her neck to help him or her latch onto your breast  Touch his or her top lip with your nipple and wait for him or her to open his or her mouth wide  Your baby's lower lip and chin should touch the areola (dark area around the nipple) first  Help him or her get as much of the areola in his or her mouth as possible  You should feel as if your baby will not separate from your breast easily  A correct latch helps your baby get the right amount of milk at each feeding  Allow your baby to breastfeed for as long as he or she is able  Signs of correct latch-on:   You can hear your baby swallow  Your baby is relaxed and takes slow, deep mouthfuls  Your breast or nipple does not hurt during breastfeeding      Your baby is able to suckle milk right away after he or she latches on  Your nipple is the same shape when your baby is done breastfeeding  Your breast is smooth, with no wrinkles or dimples where your baby is latched on  Feed your baby safely:   Hold your baby upright to feed him or her  Do not prop your baby's bottle  Your baby could choke while you are not watching, especially in a moving vehicle  Do not use a microwave to heat your baby's bottle  The milk or formula will not heat evenly and will have spots that are very hot  Your baby's face or mouth could be burned  You can warm the milk or formula quickly by placing the bottle in a pot of warm water for a few minutes  How to burp your baby:  Lisset Shutter your baby when you switch breasts or after every 2 to 3 ounces from a bottle  Burp him or her again when he or she is finished eating  Your baby may spit up when he or she burps  This is normal  Hold your baby in any of the following positions to help him or her burp:  Hold your baby against your chest or shoulder  Support his or her bottom with one hand  Use your other hand to pat or rub his or her back gently  Sit your baby upright on your lap  Use one hand to support his or her chest and head  Use the other hand to pat or rub his or her back  Place your baby across your lap  He or she should face down with his or her head, chest, and belly resting on your lap  Hold him or her securely with one hand and use your other hand to rub or pat his or her back  How to change your baby's diaper:  Never leave your baby alone when you change his or her diaper  If you need to leave the room, put the diaper back on and take your baby with you  Wash your hands before and after you change your baby's diaper  Put a blanket or changing pad on a safe surface  Emma Sender your baby down on the blanket or pad  Remove the dirty diaper and clean your baby's bottom    If your baby had a bowel movement, use the diaper to wipe off most of the bowel movement  Clean your baby's bottom with a wet washcloth or diaper wipe  Do not use diaper wipes if your baby has a rash or circumcision that has not yet healed  Gently lift both legs and wash the buttocks  Always wipe from front to back  Clean under all skin folds and between creases  Apply ointment or petroleum jelly as directed if your baby has a rash  Put on a clean diaper  Lift both your baby's legs and slide the clean diaper beneath his or her buttocks  Gently direct your baby boy's penis down as the diaper is put on  Fold the diaper down if your baby's umbilical cord has not fallen off  How to care for your baby's skin:  Sponge bathe your baby with warm water and a cleanser made for a baby's skin  Do not use baby oil, creams, or ointments  These may irritate your baby's skin or make skin problems worse  Ask for more information on sponge bathing your baby  Fontanelles  (soft spots) on your baby's head are usually flat  They may bulge when your baby cries or strains  It is normal to see and feel a pulse beating under a soft spot  It is okay to touch and wash your baby's soft spots  Skin peeling  is common in babies who are born after their due date  Peeling does not mean that your baby's skin is too dry  You do not need to put lotions or oils on your 's skin to stop the peeling or to treat rashes  Bumps, a rash, or acne  may appear about 3 days to 5 weeks after birth  Bumps may be white or yellow  Your baby's cheeks may feel rough and may be covered with a red, oily rash  Do not squeeze or scrub the skin  When your baby is 1 to 2 months old, his or her skin pores will begin to naturally open  When this happens, the skin problems will go away  A lip callus (thickened skin)  may form on your baby's upper lip during the first month  It is caused by sucking and should go away within the first year   This callus does not bother your baby, so you do not need to remove it   How to clean your baby's ears and nose:   Use a wet washcloth or cotton ball  to clean the outer part of your baby's ears  Do not put cotton swabs into your baby's ears  These can hurt his or her ears and push earwax in  Earwax should come out of your baby's ear on its own  Talk to your baby's pediatrician if you think your baby has too much earwax  Use a rubber bulb syringe  to suction your baby's nose if he or she is stuffed up  Point the bulb syringe away from his or her face and squeeze the bulb to create a vacuum  Gently put the tip into one of your baby's nostrils  Close the other nostril with your fingers  Release the bulb so that it sucks out the mucus  Repeat if necessary  Boil the syringe for 10 minutes after each use  Do not put your fingers or cotton swabs into your baby's nose  How to care for your baby's eyes:  A  baby's eyes usually make just enough tears to keep his or her eyes wet  By 7 to 7 months old, your baby's eyes will develop so they can make more tears  Tears drain into small ducts at the inside corners of each eye  A blocked tear duct is common in newborns  A possible sign of a blocked tear duct is a yellow sticky discharge in one or both of your baby's eyes  Your baby's pediatrician may show you how to massage your baby's tear ducts to unplug them  How to care for your baby's fingernails and toenails:  Your baby's fingernails are soft, and they grow quickly  You may need to trim them with baby nail clippers 1 or 2 times each week  Be careful not to cut too closely to the skin because you may cut the skin and cause bleeding  It may be easier to cut your baby's fingernails when he or she is asleep  Your baby's toenails may grow much slower  They may be soft and deeply set into each toe  You will not need to trim them as often    How to care for your baby's umbilical cord stump:  Your baby's umbilical cord stump will dry and fall off in about 7 to 21 days, leaving a belly button  If your baby's stump gets dirty from urine or bowel movement, wash it off right away with water  Gently pat the stump dry  This will help prevent infection around your baby's cord stump  Fold the front of the diaper down below the cord stump to let it air dry  Do not cover or pull at the cord stump  How to care for your baby boy's circumcision:  Your baby's penis may have a plastic ring that will come off within 8 days  His penis may be covered with gauze and petroleum jelly  Keep your baby's penis as clean as possible  Clean it with warm water only  Gently blot or squeeze the water from a wet cloth or cotton ball onto the penis  Do not use soap or diaper wipes to clean the circumcision area  This could sting or irritate your baby's penis  Your baby's penis should heal in about 7 to 10 days  What to do when your baby cries:  Your baby may cry because he or she is hungry  He or she may have a wet diaper, or be hot or cold  He or she may cry for no reason you can find  It can be hard to listen to your baby cry and not be able to calm him or her down  Ask for help and take a break if you feel stressed or overwhelmed  Never shake your baby to try to stop his or her crying  This can cause blindness or brain damage  The following may help comfort your baby:  Hold your baby skin to skin and rock him or her, or swaddle him or her in a soft blanket  Gently pat your baby's back or chest  Stroke or rub his or her head  Quietly sing or talk to your baby, or play soft, soothing music  Put your baby in his or her car seat and take him or her for a drive, or go for a stroller ride  Burp your baby to get rid of extra gas  Give your baby a soothing, warm bath  How to keep your baby safe when he or she sleeps:   Always lay your baby on his or her back to sleep  This position can help reduce your baby's risk for sudden infant death syndrome (SIDS)           Keep the room at a temperature that is comfortable for an adult  Do not let the room get too hot or cold  Use a crib or bassinet that has firm sides  Do not let your baby sleep on a soft surface such as a waterbed or couch  He or she could suffocate if his or her face gets caught in a soft surface  Use a firm, flat mattress  Cover the mattress with a fitted sheet that is made especially for the type of mattress you are using  Remove all objects, such as toys, pillows, or blankets, from your baby's bed while he or she sleeps  Ask for more information on childproofing  How to keep your baby safe in the car: Always buckle your baby into a child safety seat  A child safety seat is a padded seat that secures infants and children while they ride in a car  Every child safety seat has age, height, and weight ranges  Keep using the safety seat until your child reaches the maximum of the range  Then he or she is ready for the child safety seat that is the next size up  Only use child safety seats  Do not use a toy chair or prop your child on books or other objects  Make sure you have a safety seat that meets safety standards  Place your child safety seat in the middle of the back seat  The safety seat should not move more than 1 inch in any direction after you secure it  Always follow the instructions provided to help you position the safety seat  The instructions will also guide you on how to secure your child properly  Make sure the child safety seat has a harness and clip  The harness is made of straps that go over your child's shoulders  The straps connect to a buckle that rests over your child's abdomen  These straps keep your child in the seat during an accident  Another strap comes up from the bottom of the seat and connects to the buckle between your child's legs  This strap keeps your child from slipping out of the seat  Slide the clip up and down the shoulder straps to make them tighter or looser   You should be able to slip a finger between your child and the strap  Follow up with your baby's pediatrician as directed:  Write down your questions so you remember to ask them during your visits  © Copyright Kathy 2022 Information is for End User's use only and may not be sold, redistributed or otherwise used for commercial purposes  The above information is an  only  It is not intended as medical advice for individual conditions or treatments  Talk to your doctor, nurse or pharmacist before following any medical regimen to see if it is safe and effective for you  Self Care After Delivery   AMBULATORY CARE:   The postpartum period  is the period of time from delivery to about 6 weeks  During this time you may experience many physical and emotional changes  It is important to understand what is normal and when you need to call your healthcare provider  It is also important to know how to care for yourself during this time  Call your local emergency number (911 in the [address] [facility] [facility]) for any of the following: You see or hear things that are not there, or have thoughts of harming yourself or your baby  You soak through 1 pad in 15 minutes, have blurry vision, clammy or pale skin, and feel faint  You faint or lose consciousness  You have trouble breathing  You cough up blood  Your  incision comes apart  Seek care immediately if:   Your heart is beating faster than usual     You have a bad headache or changes in your vision  Your episiotomy or  incision is red, swollen, bleeding, or draining pus  You have severe abdominal pain  Call your doctor or obstetrician if:   Your leg is painful, red, and larger than usual     You soak through 1 or more pads in an hour, or pass blood clots larger than a quarter from your vagina  You have a fever  You have new or worsening pain in your abdomen or vagina  You continue to have depression 1 to 2 weeks after you deliver      You have trouble sleeping  You have foul-smelling discharge from your vagina  You have pain or burning when you urinate  You do not have a bowel movement for 3 days or more  You have nausea or are vomiting  You have hard lumps or red streaks over your breasts  You have cracked nipples or bleed from your nipples  You have questions or concerns about your condition or care  Physical changes: The following are normal changes after you give birth:  Pain in the area between your anus and vagina    Breast pain    Constipation or hemorrhoids    Hot or cold flashes    Vaginal bleeding or discharge    Mild to moderate abdominal cramping    Difficulty controlling bowel movements or urine    Emotional changes:  A drop in hormone levels after you deliver may cause changes in your emotions  You may feel irritable, sad, or anxious  You may cry easily or for no reason  You may also feel depressed  Depression that continues can be a sign of postpartum depression, a condition that can be treated  Treatment may include talk therapy, medicines, or both  Healthcare providers will ask how you are feeling and if you have any depression  These talks can happen during appointments for your medical care and for your baby's care, such as well child visits  Providers can help you find ways to care for yourself and your baby  Talk to your providers about the following:  When emotional changes or depression started, and if it is getting worse over time    Problems you are having with daily activities, sleep, or caring for your baby    If anything makes you feel worse, or makes you feel better    Feeling that you are not bonding with your baby the way you want    Any problems your baby has with sleeping or feeding    Your baby is fussy or cries a lot    Support you have from friends, family, or others    Breast care for breastfeeding mothers: You may have sore breasts for 3 to 6 days after you give birth   This happens as your milk begins to fill your breasts  You may also have sore breasts if you do not breastfeed frequently  Do the following to care for your breasts:  Apply a moist, warm, compress to your breast as directed  This may help soothe your breasts  Make sure the washcloth is not too hot before you apply it to your breast     Nurse your baby or pump your milk frequently  This may prevent clogged milk ducts  Ask your healthcare provider how often to nurse or pump  Massage your breasts as directed  This may help increase your milk flow  Gently rub your breasts in a circular motion before you breastfeed  You may need to gently squeeze your breast or nipple to help release milk  You can also use a breast pump to help release milk from your breast     Wash your breasts with warm water only  Do not put soap on your nipples  Soap may cause your nipples to become dry  Apply lanolin cream to your nipples as directed  Lanolin cream may add moisture to your skin and prevent nipple dryness  Always  wash off lanolin cream with warm water before you breastfeed  Place pads in your bra  Your nipples may leak milk when you are not breastfeeding  You can place pads inside of your bra to help prevent leaking onto your clothing  Ask your healthcare provider where to purchase bra pads  Get breastfeeding support if needed  Healthcare providers can answer questions about breastfeeding and provide you with support  Ask your healthcare provider who you can contact if you need breastfeeding support  Breast care for non-breastfeeding mothers:  Milk will fill your breasts even if you bottle feed your baby  Do the following to help stop your milk from filling your breasts and causing pain:  Wear a bra with support at all times  A sports bra or a tight-fitting bra will help stop your milk from coming in  Apply ice on each breast for 15 to 20 minutes every hour or as directed  Use an ice pack, or put crushed ice in a plastic bag   Cover it with a towel before you apply it to your breast  Ice helps your milk ducts shrink  Keep your breasts away from warm water  Warm water will make it easier for milk to fill your breasts  Stand with your breasts away from warm water in the shower  Limit how much you touch your breasts  This will prevent them from filling with milk  Perineum care: Your perineum is the area between your rectum and vagina  It is normal to have swelling and pain in this area after you give birth  If you had an episiotomy, your healthcare provider may give you special instructions  Clean your perineum after you use the bathroom  This may prevent infection and help with healing  Use a spray bottle with warm water to clean your perineum  You may also gently spray warm water against your perineum when you urinate  Always wipe front to back  Take a sitz bath as directed  A sitz bath may help relieve swelling and pain  Fill your bath tub or bucket with water up to your hips and sit in the water  Use cold water for 2 days after you deliver  Then use warm water  Ask your healthcare provider for more information about a sitz bath  Apply ice packs for the first 24 hours or as directed  Use a plastic glove filled with ice or buy an ice pack  Wrap the ice pack or plastic glove in a small towel or wash cloth  Place the ice pack on your perineum for 20 minutes at a time  Sit on a donut-shaped pillow  This may relieve pressure on your perineum when you sit  Use wipes that contain medicine or take pills as directed  Your healthcare provider may tell you to use witch hazel pads  You can place witch hazel pads in the refrigerator before you apply them to your perineum  Your provider may also tell you to take NSAIDs  Ask him or her how often to take pills or use the wipes  Do not go swimming or take tub baths for 4 to 6 weeks or as directed  This will help prevent an infection in your vagina or uterus      Bowel and bladder care: It may take 3 to 5 days to have a bowel movement after you deliver your baby  You can do the following to prevent or manage constipation, and get control of your bowel or bladder:  Take stool softeners as directed  A stool softener is medicine that will make your bowel movements softer  This may prevent or relieve constipation  A stool softener may also make bowel movements less painful  Drink plenty of liquids  Ask how much liquid to drink each day and which liquids are best for you  Liquids may help prevent constipation  Eat foods high in fiber  Examples include fruits, vegetables, grains, beans, and lentils  Ask your healthcare provider how much fiber you need each day  Fiber may prevent constipation  Do Kegel exercises as directed  Kegel exercises will help strengthen the muscles that control bowel movements and urination  Ask your healthcare provider for more information on Kegel exercises  Apply cold compresses or medicine to hemorrhoids as directed  This may relieve swelling and pain  Your healthcare provider may tell you to apply ice or wipes that contain medicine to your hemorrhoids  He or she may also tell you to use a sitz bath  Ask your provider for more information on how to manage hemorrhoids  Nutrition:  Good nutrition is important in the postpartum period  It will help you return to a healthy weight, increase your energy levels, and prevent constipation  It will also help you get enough nutrients and calories if you are going to breastfeed your baby  Eat a variety of healthy foods  Healthy foods include fruits, vegetables, whole-grain breads, low-fat dairy products, beans, lean meats, and fish  You may need 500 to 700 extra calories each day if you breastfeed your baby  You may also need extra protein  Limit foods with added sugar and high amounts of fat  These foods are high in calories and low in healthy nutrients   Read food labels so you know how much sugar and fat is in the food you want to eat  Drink 8 to 10 glasses of water per day  Water will help you make plenty of milk for your baby  It will also help prevent constipation  Drink a glass of water every time you breastfeed your baby  Take vitamins as directed  Ask your healthcare provider what vitamins you need  Limit caffeine and alcohol if you are breastfeeding  Caffeine and alcohol can get into your breast milk  Caffeine and alcohol can make your baby fussy  They can also interfere with your baby's sleep  Ask your healthcare provider if you can drink alcohol or caffeine  Rest and sleep: You may feel very tired in the postpartum period  Enough sleep will help you heal and give you energy to care for your baby  The following may help you get sleep and rest:  Nap when your baby naps  Your baby may nap several times during the day  Get rest during this time  Limit visitors  Many people may want to see you and your baby  Ask friends or family to visit on different days  This will give you time to rest     Do not plan too much for one day  Put off household chores so that you have time to rest  Gradually do more each day  Ask for help from family, friends, or neighbors  Ask them to help you with laundry, cleaning, or errands  Also ask someone to watch the baby while you take a nap or relax  Ask your partner to help with the care of your baby  Pump some of your breast milk so your partner can feed your baby during the night  Exercise after delivery:  Wait until your healthcare provider says it is okay to exercise  Exercise can help you lose weight, increase your energy levels, and manage your mood  It can also prevent constipation and blood clots  Start with gentle exercises such as walking  Do more as you have more energy  You may need to avoid abdominal exercises for 1 to 2 weeks after you deliver  Talk to your healthcare provider about an exercise plan that is right for you  Sexual activity after delivery:   Do not have sex until your healthcare provider says it is okay  You may need to wait 4 to 6 weeks before you have sex  This may prevent infection and allow time to heal     Your menstrual cycle may begin as soon as 3 weeks after you deliver  Your period may be delayed if you breastfeed your baby  You can become pregnant before you get your first postpartum period  Talk to your healthcare provider about birth control that is right for you  Some types of birth control are not safe during breastfeeding  For support and more information:  Join a support group for new mothers  Ask for help from family and friends with chores, errands, and care of your baby  Office of Markus Pina, [facility]  [address], [address] [facility]  [address]mo [facility] [address]  [address], [address] [facility]  [facility]  Phone: [phone]  Web Address: www womenshealth gov  [facility] Postpartum [address] [facility] , [address] [place] [address]  [address] [facility] , [address] [place] [address]  Web Address: [employer]  [email or web address]/[email or web address]-care  aspx  Follow up with your doctor or obstetrician as directed: You will need to follow up within 2 to 6 weeks of delivery  Write down your questions so you remember to ask them at your visits  © Copyright Sophia Walton 2022 Information is for End User's use only and may not be sold, redistributed or otherwise used for commercial purposes  The above information is an  only  It is not intended as medical advice for individual conditions or treatments  Talk to your doctor, nurse or pharmacist before following any medical regimen to see if it is safe and effective for you

## 2023-03-14 NOTE — PROGRESS NOTES
Diagnoses and all orders for this visit:    Postpartum exam      May advance activities as tolerated  May resume sexual activity at your discretion   Perineal hygiene reviewed   Weight bearing exercises minium of 150 mins/weekly advised  Kegel exercises recommended  Reviewed  rest, daily exercise and nutrition recommendations  Continue with PNV  Gardisil vaccines recommended up to age 39  Advised to call with any issues,  all concerns & questions addressed  Follow up for annual exam in 3 months   See AVS for further educational information    Gonzales Mc is a 40 y o  [id number] presents for routine postpartum visit  She is s/p RCS & Tubal @ 38 2 on 23   Baby Boy, Apgar's 8/9  Prenatal and intrapartum course was complicated by   Patient Active Problem List   Diagnosis   • Acne   • Elevated rheumatoid factor   • Hernia, hiatal   • History of DIC syndrome   • History of small bowel obstruction   • Essential hypertension   • History of polyhydramnios   • Pre-existing hypertension during pregnancy in third trimester   • Multigravida of advanced maternal age in third trimester   • Maternal care due to low transverse uterine scar from previous  delivery   • Hereditary disease in family possibly affecting fetus   • SARA (generalized anxiety disorder)   • Status post repeat low transverse  section   • Positive GBS test     Since d/c home she has had no complaints  She denies abn bleeding, pelvic pain, breast complaints, bowel/bladder dysfunction, depression/anx  Denies resumption of sexual activity  Baby is thriving  Breast feeding  following with baby ad me EPDS score 8 Good support       Normal postpartum exam  Arley Regan noted per patient     Gardasil: Completed     Past Medical History:   Diagnosis Date   • Acute renal failure ([facility] [address] )     [id number] RESOLVED   • Acute tubular necrosis (HCC)    • Anemia     during the pregnancy    • Bowel obstruction ([facility] [address] ) 2017   • Chronic kidney disease • Fibroadenoma of breast, left    • Fibroadenoma of breast, right    •  1 para 1    • History of transfusion 2015    After delivery    • Hypertension     no medication    • Kidney stone    • Postpartum hemorrhage     UNSPECIFIED TYPE  RESOLVED    • Urinary tract infection     yes in the last ten years   • Vacuum extractor delivery, delivered      daughter with postpartum hemorrhage and DIC   • Varicella     had childhood chickenpox     Past Surgical History:   Procedure Laterality Date   • BREAST FIBROADENOMA SURGERY      Cryosurgical Ablation of Fibroadenoma   • BREAST LUMPECTOMY Left    • BREAST LUMPECTOMY Right    •  SECTION         • EXPLORATORY LAPAROTOMY W/ BOWEL RESECTION N/A 2017    Procedure: LAPAROTOMY EXPLORATORY W/ lysis of adhesions;  Surgeon: Meng Raza DO;  [facility];  Service:    • INTRAUTERINE DEVICE INSERTION     • LAPAROTOMY      EXPLORATORY for DIC and hemorrhage postpartum retroperitoneal hematoma   • [place]  DELIVERY ONLY N/A 2020    Procedure:  SECTION () REPEAT;  Surgeon: Nuris Celaya MD;  [facility];  Service: Obstetrics   • MN  DELIVERY ONLY N/A 2023    Procedure:  SECTION () REPEAT;  Surgeon: Thaddeus Mg MD;  [facility];  Service: Obstetrics   • MN LIG/TRNSXJ FALOPIAN TUBE  DEL/ABDML SURG Bilateral 2023    Procedure: LIGATION/COAGULATION TUBAL;  Surgeon: Thaddeus Mg MD;  [facility];  Service: Obstetrics   • TOOTH EXTRACTION      Impression: [id number] Arpit Kelly         Immunization History   Administered Date(s) Administered   • COVID-19 PFIZER VACCINE 0 3 ML IM 2020, 2021, 2021   • INFLUENZA 2019, 10/05/2022   • Influenza, injectable, quadrivalent, preservative free 0 5 mL 10/09/2020   • MMR 2020   • Tdap 2015, 10/09/2020, 12/15/2022       Family History   Problem Relation Age of Onset   • Hypertension Mother         URINARY INCONTINENCE   • Hyperlipidemia Mother    • Diverticulitis Mother    • Hypothyroidism Mother    • Hypertension Father    • Hepatitis Father         A   • Prostate cancer Father    • No Known Problems Sister    • No Known Problems Brother    • No Known Problems Daughter    • Other Daughter         CHARGE syndrome   • Diverticulitis Maternal Grandmother    • Colon cancer Maternal Grandmother    • Stroke Maternal Grandfather         CVA   • Heart disease Maternal Grandfather    • Diverticulitis Maternal Grandfather    • Hypertension Maternal Grandfather    • Heart attack Maternal Grandfather    • Alzheimer's disease Paternal Grandmother    • Diabetes Paternal Grandfather         INSULIN DEPENDENT     Social History     Tobacco Use   • Smoking status: Never   • Smokeless tobacco: Never   Vaping Use   • Vaping Use: Never used   Substance Use Topics   • Alcohol use: Not Currently     Comment: none with pregnancy   • Drug use: No     Comment: denies self or FOB  denies family use         Current Outpatient Medications:   •  acetaminophen (TYLENOL) 325 mg tablet, Take 2 tablets (650 mg total) by mouth every 6 (six) hours as needed for mild pain or moderate pain, Disp: , Rfl: 0  •  busPIRone (BUSPAR) 15 mg tablet, 1/4-1/2 tab in AM and 1 HS, Disp: 60 tablet, Rfl: 2  •  Docusate Sodium (COLACE PO), Take by mouth as needed, Disp: , Rfl:   •  ibuprofen (MOTRIN) 600 mg tablet, Take 1 tablet (600 mg total) by mouth every 6 (six) hours as needed for mild pain or moderate pain, Disp: 30 tablet, Rfl: 0  •  Magnesium 400 MG CAPS, Take by mouth, Disp: , Rfl:   •  NIFEdipine (PROCARDIA XL) 30 mg 24 hr tablet, Take 1 tablet (30 mg total) by mouth daily, Disp: 90 tablet, Rfl: 3  •  Prenatal MV-Min-Fe Fum-FA-DHA (PRENATAL 1 PO), Take by mouth, Disp: , Rfl:   •  Probiotic Product (PROBIOTIC PO), Take by mouth, Disp: , Rfl:     Patient Active Problem List    Diagnosis Date Noted   • Positive GBS test 02/15/2023   • Status post repeat low transverse  section 2022   • SARA (generalized anxiety disorder) 2022   • History of polyhydramnios 2022   • Pre-existing hypertension during pregnancy in third trimester 2022   • Multigravida of advanced maternal age in third trimester 2022   • Maternal care due to low transverse uterine scar from previous  delivery 2022   • Hereditary disease in family possibly affecting fetus 2022   • Essential hypertension 2021   • History of DIC syndrome 2020   • History of small bowel obstruction 2020   • Elevated rheumatoid factor 2017   • Acne 2017   • Hernia, hiatal 2014     Allergies   Allergen Reactions   • Nickel Rash     Skin rash       OB History    Para Term  AB Living   3 3 3 0 0 3   SAB IAB Ectopic Multiple Live Births   0 0 0 0 3      # Outcome Date GA Lbr Lan/2nd Weight Sex Delivery Anes PTL Lv   3 Term 23 38w2d  2850 g (6 lb 4 5 oz) M CS-LTranv EPI, Spinal  TATIANA   2 Term 20 38w0d  2807 g (6 lb 3 oz) F CS-LTranv Spinal N TATIANA   1 Term 07/05/15 41w0d  3657 g (8 lb 1 oz) F Vag-Vacuum EPI N TATIANA      Birth Comments: Postpartum hemorrhage- see comments      Complications: Other Excessive Bleeding      Obstetric Comments   7/5/15 She had a vacuum assisted vaginal delivery, manual extraction of the placenta, PPH, retroperitoneal bleeding, exploratory laparotomy and IR embolization of the anterior iliac and massive transfusion  During this admission she also developed an acute kidney injury and hypertension  Vitals:    23 0931   BP: 110/62   BP Location: Left arm   Patient Position: Sitting   Cuff Size: Standard   Weight: 66 7 kg (147 lb)   Height: 5' 4" (1 626 m)     Body mass index is 25 23 kg/m²  Review of Systems     Constitutional: Negative for chills, fatigue, fever, headaches, visual disturbances, and unexpected weight change  Respiratory: Negative for cough, & shortness of breath  Cardiovascular: Negative for chest pain       Gastrointestinal: Negative for Abd pain, nausea & vomiting, constipation and diarrhea  Genitourinary: Negative for difficulty urinating, dysuria, hematuria, dyspareunia, unusual vaginal bleeding or discharge  Skin: Negative skin changes    Physical Exam     Constitutional: Alert & Oriented x3, well-developed and well-nourished  No distress  HENT: Atraumatic, Normocephalic, Conjunctivae clear  Neck: Normal range of motion  Pulmonary: Effort normal    Abdominal: Soft  No tenderness or masses, classical incision healing well  Musculoskeletal: Normal ROM  Skin: Warm & Dry  Psychological: Normal mood, thought content, behavior & judgement     Breasts:   Right: tissue soft without masses, tenderness, No areas of erythema or pain  No subclavicular, axillary, pectoral adenopathy  Left:  tissue soft without masses  1 small gutierrez glad with mild erythema, swelling and tenderness   Advised to monitor for increased swelling, pain or tenderness, apply warm compresses as needed and may take tylenol as needed,   No subclavicular, axillary, pectoral adenopathy    Pelvic exam was not performed

## 2023-03-16 ENCOUNTER — POSTPARTUM VISIT (OUTPATIENT)
Dept: OBGYN CLINIC | Facility: CLINIC | Age: 38
End: 2023-03-16

## 2023-03-16 VITALS
BODY MASS INDEX: 25.1 KG/M2 | HEIGHT: 64 IN | DIASTOLIC BLOOD PRESSURE: 62 MMHG | WEIGHT: 147 LBS | SYSTOLIC BLOOD PRESSURE: 110 MMHG

## 2023-03-24 ENCOUNTER — TELEMEDICINE (OUTPATIENT)
Dept: PSYCHIATRY | Facility: CLINIC | Age: 38
End: 2023-03-24

## 2023-03-24 DIAGNOSIS — F33.1 MODERATE EPISODE OF RECURRENT MAJOR DEPRESSIVE DISORDER (HCC): Primary | ICD-10-CM

## 2023-03-24 DIAGNOSIS — F41.1 GAD (GENERALIZED ANXIETY DISORDER): ICD-10-CM

## 2023-03-24 RX ORDER — BUSPIRONE HYDROCHLORIDE 15 MG/1
TABLET ORAL
Qty: 60 TABLET | Refills: 2
Start: 2023-03-24

## 2023-03-24 RX ORDER — SERTRALINE HYDROCHLORIDE 25 MG/1
TABLET, FILM COATED ORAL
Qty: 60 TABLET | Refills: 1 | Status: SHIPPED | OUTPATIENT
Start: 2023-03-24

## 2023-03-24 NOTE — PSYCH
Virtual Regular Visit    Verification of patient location:    Patient is located in the following state in which I hold an active license [place]      Assessment/Plan:    Problem List Items Addressed This Visit        Other    SARA (generalized anxiety disorder)    Relevant Medications    busPIRone (BUSPAR) 15 mg tablet    sertraline (Zoloft) 25 mg tablet   Other Visit Diagnoses     Moderate episode of recurrent major depressive disorder ([facility] [address] )    -  Primary    Relevant Medications    busPIRone (BUSPAR) 15 mg tablet    sertraline (Zoloft) 25 mg tablet          Goals addressed in session: Decrease depression and improve function         Reason for visit is   Chief Complaint   Patient presents with   • Virtual Regular Visit        Encounter provider [facility]    Provider located at [address] [address]      Recent Visits  No visits were found meeting these conditions  Showing recent visits within past 7 days and meeting all other requirements  Today's Visits  Date Type Provider Dept   03/24/23 Telemedicine Jorje Lyn [place] today's visits and meeting all other requirements  Future Appointments  No visits were found meeting these conditions  Showing future appointments within next 150 days and meeting all other requirements       The patient was identified by name and date of birth  Eric Castrejon was informed that this is a telemedicine visit and that the visit is being conducted throughthe [employer] platform  She agrees to proceed     My office door was closed  No one else was in the room  She acknowledged consent and understanding of privacy and security of the video platform  The patient has agreed to participate and understands they can discontinue the visit at any time  Patient is aware this is a billable service  Alejandrina Chester is a 40 y o  female who has a history of major depressive disorder and anxiety disorder  She is a new mom  She delivered her approximately a month ago  He has had a difficult start  He has been hospitalized twice with fever secondary to rhinovirus  Currently he is back at home doing well which has decreased her anxiety significantly  She has a busy mom with a school-age daughter and a toddler and now the new baby   is very supportive and they are working well together  However, she is feeling some depression  Had some good response to Zoloft in the past but developed some stomach side effects  So we will trial her on 25 mg for 2 weeks and see if we can get her up to 50 if not, we will keep her 25 and work with Anygma N niiu  For now the BuSpar will be 15 mg twice daily while we are trying to titrate the Zoloft  Follow-up with me in 2 to 3 weeks or sooner if necessary  Mental status exam: Patient is awake and alert and oriented x3  Mood is mildly depressed  Anxiety persists  Patient is not suicidal, not homicidal and not psychotic  Speech is clear and thoughts are well organized, coherent and goal directed  Attention span is good  Impulse control is good  Judgment and insight are good  Memory is good  The patient will continue on: BuSpar will be 15 mg twice daily  Start Zoloft 25 mg daily for 2 weeks then increase to 50 mg daily  Follow-up with me in 2 to 3 weeks or sooner if necessary               Past Medical History:   Diagnosis Date   • Acute renal failure ([address] )     2017 RESOLVED   • Acute tubular necrosis (HCC)    • Anemia     during the pregnancy    • Bowel obstruction ([address] ) 2017   • Chronic kidney disease    • Fibroadenoma of breast, left    • Fibroadenoma of breast, right    •  1 para 1    • History of transfusion 2015    After delivery    • Hypertension     no medication    • Kidney stone    • Postpartum hemorrhage     UNSPECIFIED TYPE  RESOLVED    • Urinary tract infection     yes in the last ten years   • Vacuum extractor delivery, delivered      daughter with postpartum hemorrhage and DIC   • Varicella     had childhood chickenpox       Past Surgical History:   Procedure Laterality Date   • BREAST FIBROADENOMA SURGERY      Cryosurgical Ablation of Fibroadenoma   • BREAST LUMPECTOMY Left    • BREAST LUMPECTOMY Right    •  SECTION         • EXPLORATORY LAPAROTOMY W/ BOWEL RESECTION N/A 2017    Procedure: LAPAROTOMY EXPLORATORY W/ lysis of adhesions;  Surgeon: Maryann Do DO;  [facility];  Service:    • INTRAUTERINE DEVICE INSERTION     • LAPAROTOMY      EXPLORATORY for DIC and hemorrhage postpartum retroperitoneal hematoma   • [place]  DELIVERY ONLY N/A 2020    Procedure:  SECTION () REPEAT;  Surgeon: Micah Elliott MD;  [facility];  Service: Obstetrics   • [place]  DELIVERY ONLY N/A 2023    Procedure:  SECTION () REPEAT;  Surgeon: Horacio Galloway MD;  [facility];  Service: Obstetrics   • [place] LIG/TRNSXJ FALOPIAN TUBE  DEL/ABDML SURG Bilateral 2023    Procedure: LIGATION/COAGULATION TUBAL;  Surgeon: Horacio Galloway MD;  [facility];  Service: Obstetrics   • TOOTH EXTRACTION      Impression: [id number] Ifrah Hernandez         Current Outpatient Medications   Medication Sig Dispense Refill   • busPIRone (BUSPAR) 15 mg tablet 1 BID 60 tablet 2   • sertraline (Zoloft) 25 mg tablet 1 in AM for 2 weeks, then 2 in AM 60 tablet 1   • acetaminophen (TYLENOL) 325 mg tablet Take 2 tablets (650 mg total) by mouth every 6 (six) hours as needed for mild pain or moderate pain  0   • Docusate Sodium (COLACE PO) Take by mouth as needed     • ibuprofen (MOTRIN) 600 mg tablet Take 1 tablet (600 mg total) by mouth every 6 (six) hours as needed for mild pain or moderate pain 30 tablet 0   • Magnesium 400 MG CAPS Take by mouth     • NIFEdipine (PROCARDIA XL) 30 mg 24 hr tablet Take 1 tablet (30 mg total) by mouth daily 90 tablet 3   • Prenatal MV-Min-Fe Fum-FA-DHA (PRENATAL 1 PO) Take by mouth     • Probiotic Product (PROBIOTIC PO) Take by mouth       No current facility-administered medications for this visit  Allergies   Allergen Reactions   • Nickel Rash     Skin rash       Review of Systems    Video Exam    There were no vitals filed for this visit  Physical Exam     Visit Time    Visit Start Time: 10:00a   Visit Stop Time: 10:20a  Total Visit Duration: Twenty minutes were spent in the visit  Meds and treatment plan reviewed  Time was also spent completing the progress note

## 2023-03-31 ENCOUNTER — TELEMEDICINE (OUTPATIENT)
Dept: BEHAVIORAL/MENTAL HEALTH CLINIC | Facility: CLINIC | Age: 38
End: 2023-03-31

## 2023-03-31 DIAGNOSIS — F41.1 GAD (GENERALIZED ANXIETY DISORDER): Primary | ICD-10-CM

## 2023-03-31 NOTE — PSYCH
Virtual AWV Consent    Verification of patient location: confirmed at home     Patient is located in the following state in which I hold an active license [place]    Reason for visit is therapy follow up     [place] [facility]    [place] at [address]      Recent Visits  No visits were found meeting these conditions  Showing recent visits within past 7 days and meeting all other requirements  Today's Visits  Date Type Provider Dept   03/31/23 [place]medicine [facility]   Showing today's visits and meeting all other requirements  Future Appointments  No visits were found meeting these conditions  Showing future appointments within next 150 days and meeting all other requirements       After connecting through [facility], the patient was identified by name and date of birth  Guy Capellan was informed that this is a telemedicine visit and that the visit is being conducted through the [employer]  She agrees to proceed  My office door was closed  No one else was in the room  She acknowledged consent and understanding of privacy and security of the video platform  Guy Capellan verbally agrees to participate in [employer]  Pt is aware that [employer] could be limited without vital signs or the ability to perform a full hands-on physical exam  Michelle Moeller understands she or the provider may request at any time to terminate the video visit and request the patient to seek care or treatment in person  Patient is aware this is a billable service  Behavioral Health Psychotherapy Progress Note    Psychotherapy Provided: Individual Psychotherapy     No diagnosis found  Goals addressed in session: Goal 1 and Goal 2     DATA: Scooter Cardenas presented in the office for her session  She confirmed that she is at home   Scooter Cardenas is doing well with "adjusting to her new baby  She reports her primary stressor at this time is whether or not she would like to continue with breastfeeding (she is exclusively pumping)  Discussed consequences related to her mental health and evaluating what is the best choice for her  She reports feelings of guilt if Delores Zepeda does not breastfeed\"  This therapist helped Young Cronin to challenge some of those thoughts and worked on identifying ways to help assess if this is for her  Will continue to meet every 6 weeks  Her EPDS: 2  During this session, this clinician used the following therapeutic modalities: Engagement Strategies and Cognitive Behavioral Therapy    Substance Abuse was not addressed during this session  If the client is diagnosed with a co-occurring substance use disorder, please indicate any changes in the frequency or amount of use: Young Cronin is not using substances  Stage of change for addressing substance use diagnoses: No substance use/Not applicable    ASSESSMENT:  Brendan Bello presents with a Euthymic/ normal mood  her affect is Normal range and intensity, which is congruent, with her mood and the content of the session  The client has made progress on their goals  Young Cronin is doing well with managing her mood and anxiety  She is not showing evidence at this point of postpartum depression/anxiety  Brendan Bello presents with a none risk of suicide, none risk of self-harm, and none risk of harm to others  For any risk assessment that surpasses a \"low\" rating, a safety plan must be developed  A safety plan was indicated: no  If yes, describe in detail NA    PLAN: Between sessions, Brendan Bello will continue to work on managing her mood and anxiety  At the next session, the therapist will use Cognitive Behavioral Therapy to address mood and anxiety  Behavioral Health Treatment Plan and Discharge Planning: Brendan Bello is aware of and agrees to continue to work on their treatment plan   They have identified and are working " toward their discharge goals   yes    Visit start and stop times:    03/31/23  Start Time: 1007   Stop Time: 1049  Total Time:  42 minutes

## 2023-06-09 ENCOUNTER — ANNUAL EXAM (OUTPATIENT)
Dept: OBGYN CLINIC | Facility: CLINIC | Age: 38
End: 2023-06-09
Payer: COMMERCIAL

## 2023-06-09 VITALS
HEIGHT: 64 IN | WEIGHT: 143.2 LBS | DIASTOLIC BLOOD PRESSURE: 78 MMHG | SYSTOLIC BLOOD PRESSURE: 118 MMHG | BODY MASS INDEX: 24.45 KG/M2

## 2023-06-09 DIAGNOSIS — Z01.419 ENCOUNTER FOR GYNECOLOGICAL EXAMINATION WITHOUT ABNORMAL FINDING: Primary | ICD-10-CM

## 2023-06-09 PROBLEM — O35.2XX0 HEREDITARY DISEASE IN FAMILY POSSIBLY AFFECTING FETUS: Status: RESOLVED | Noted: 2022-09-02 | Resolved: 2023-06-09

## 2023-06-09 PROBLEM — O10.913 PRE-EXISTING HYPERTENSION DURING PREGNANCY IN THIRD TRIMESTER: Status: RESOLVED | Noted: 2022-09-02 | Resolved: 2023-06-09

## 2023-06-09 PROBLEM — O34.211 MATERNAL CARE DUE TO LOW TRANSVERSE UTERINE SCAR FROM PREVIOUS CESAREAN DELIVERY: Status: RESOLVED | Noted: 2022-09-02 | Resolved: 2023-06-09

## 2023-06-09 PROBLEM — Z98.891 STATUS POST REPEAT LOW TRANSVERSE CESAREAN SECTION: Status: RESOLVED | Noted: 2022-09-22 | Resolved: 2023-06-09

## 2023-06-09 PROBLEM — B95.1 POSITIVE GBS TEST: Status: RESOLVED | Noted: 2023-02-15 | Resolved: 2023-06-09

## 2023-06-09 PROBLEM — O09.523 MULTIGRAVIDA OF ADVANCED MATERNAL AGE IN THIRD TRIMESTER: Status: RESOLVED | Noted: 2022-09-02 | Resolved: 2023-06-09

## 2023-06-09 PROCEDURE — S0612 ANNUAL GYNECOLOGICAL EXAMINA: HCPCS | Performed by: OBSTETRICS & GYNECOLOGY

## 2023-06-09 RX ORDER — SPIRONOLACTONE 50 MG/1
TABLET, FILM COATED ORAL
COMMUNITY
Start: 2023-04-18

## 2023-06-09 NOTE — PROGRESS NOTES
Gilmar Villarreal  1985      CC:  Yearly exam    S:  40 y o  female here for yearly exam  Her cycles are regular, not heavy or crampy  She is back to work and enjoying the professional satisfaction that it brings  She will be able to go back to the gym for the first time since [place] next week as both of her younger children will be in  and her older daughter will be in camp  She is very excited about this  Discussed fitness goals - always wanted to do a half marathon  Sexual activity: She is sexually active without pain, bleeding or dryness  Contraception: She uses her tubal for contraception  Last Pap 6/22/2020 - normal/negative HPV  Last Mammo - never    We reviewed [facility] guidelines for Pap testing today         Current Outpatient Medications:   •  busPIRone (BUSPAR) 15 mg tablet, 1 BID, Disp: 180 tablet, Rfl: 1  •  Docusate Sodium (COLACE PO), Take by mouth as needed, Disp: , Rfl:   •  Magnesium 400 MG CAPS, Take by mouth, Disp: , Rfl:   •  NIFEdipine (PROCARDIA XL) 30 mg 24 hr tablet, Take 1 tablet (30 mg total) by mouth daily (Patient taking differently: Take 30 mg by mouth 2 (two) times a day), Disp: 90 tablet, Rfl: 3  •  Probiotic Product (PROBIOTIC PO), Take by mouth, Disp: , Rfl:   •  sertraline (Zoloft) 50 mg tablet, 1 Daily, Disp: 90 tablet, Rfl: 1  •  spironolactone (ALDACTONE) 50 mg tablet, , Disp: , Rfl:   •  tretinoin (RETIN-A) 0 025 % cream, , Disp: , Rfl:   Social History     Socioeconomic History   • Marital status: /Civil Union     Spouse name: Not on file   • Number of children: Not on file   • Years of education: Not on file   • Highest education level: Not on file   Occupational History   • Not on file   Tobacco Use   • Smoking status: Never   • Smokeless tobacco: Never   Vaping Use   • Vaping Use: Never used   Substance and Sexual Activity   • Alcohol use: Not Currently     Alcohol/week: 2 0 standard drinks of alcohol     Types: 2 Glasses of wine per week Comment: Glass or two a week   • Drug use: No   • Sexual activity: Yes     Partners: Male     Birth control/protection: Female Sterilization   Other Topics Concern   • Not on file   Social History Narrative    Always uses a seatbelt    Drinks Coffee - 2 cups a day    IUD Contraception - mirena    Lack of exercise     (per [employer])     Social Determinants of Health     Financial Resource Strain: Not on file   Food Insecurity: Not on file   Transportation Needs: Not on file   Physical Activity: Not on file   Stress: Not on file   Social Connections: Not on file   Intimate Partner Violence: Not on file   Housing Stability: Not on file     Family History   Problem Relation Age of Onset   • Hypertension Mother         URINARY INCONTINENCE   • Hyperlipidemia Mother    • Diverticulitis Mother    • Hypothyroidism Mother    • Hypertension Father    • Hepatitis Father         A   • Prostate cancer Father    • No Known Problems Sister    • No Known Problems Brother    • No Known Problems Daughter    • Other Daughter         CHARGE syndrome   • Diverticulitis Maternal Grandmother    • Colon cancer Maternal Grandmother    • Stroke Maternal Grandfather         CVA   • Heart disease Maternal Grandfather    • Diverticulitis Maternal Grandfather    • Hypertension Maternal Grandfather    • Heart attack Maternal Grandfather    • Alzheimer's disease Paternal Grandmother    • Diabetes Paternal Grandfather         INSULIN DEPENDENT      Past Medical History:   Diagnosis Date   • Acute renal failure ([facility] [address] )     [id number] RESOLVED   • Acute tubular necrosis (HCC)    • Anemia     during the pregnancy    • Bowel obstruction ([facility] [address] ) 2017   • Chronic kidney disease    • Fibroadenoma of breast, left    • Fibroadenoma of breast, right    •  1 para 1    • History of transfusion 2015    After delivery    • Hypertension     no medication    • Kidney stone    • Postpartum hemorrhage     UNSPECIFIED TYPE  RESOLVED    • "Urinary tract infection     yes in the last ten years   • Vacuum extractor delivery, delivered     2015 daughter with postpartum hemorrhage and DIC   • Varicella     had childhood chickenpox        Review of Systems   Respiratory: Negative  Cardiovascular: Negative  Gastrointestinal: Negative for constipation and diarrhea  Genitourinary: Negative for difficulty urinating, pelvic pain, vaginal bleeding, vaginal discharge, itching or odor  O:  Blood pressure 118/78, height 5' 4\" (1 626 m), weight 65 kg (143 lb 3 2 oz), last menstrual period 05/16/2023, not currently breastfeeding  Patient appears well and is not in distress  Neck is supple without masses  Breasts are symmetrical without mass, tenderness, nipple discharge, skin changes or adenopathy  Abdomen is soft and nontender without masses  External genitals are normal without lesions or rashes  Urethral meatus and urethra are normal  Bladder is normal to palpation  Vagina is normal without discharge or bleeding  Cervix is normal without discharge or lesion  Uterus is normal, mobile, nontender without palpable mass  Adnexa are normal, nontender, without palpable mass  A:   Yearly exam      P:   Pap due 2025   Mammo at age 37    RTO one year for yearly exam or sooner as needed      "

## 2023-06-13 DIAGNOSIS — I10 ESSENTIAL HYPERTENSION: ICD-10-CM

## 2023-06-13 RX ORDER — NIFEDIPINE 30 MG/1
30 TABLET, EXTENDED RELEASE ORAL DAILY
Qty: 90 TABLET | Refills: 3 | Status: SHIPPED | OUTPATIENT
Start: 2023-06-13

## 2023-06-28 ENCOUNTER — APPOINTMENT (OUTPATIENT)
Dept: LAB | Facility: CLINIC | Age: 38
End: 2023-06-28

## 2023-06-28 ENCOUNTER — APPOINTMENT (OUTPATIENT)
Dept: LAB | Facility: CLINIC | Age: 38
End: 2023-06-28
Payer: COMMERCIAL

## 2023-06-28 DIAGNOSIS — I10 ESSENTIAL HYPERTENSION: ICD-10-CM

## 2023-06-28 DIAGNOSIS — Z00.8 ENCOUNTER FOR OTHER GENERAL EXAMINATION: ICD-10-CM

## 2023-06-28 LAB
ANION GAP SERPL CALCULATED.3IONS-SCNC: 2 MMOL/L
BUN SERPL-MCNC: 21 MG/DL (ref 5–25)
CALCIUM SERPL-MCNC: 9.4 MG/DL (ref 8.3–10.1)
CHLORIDE SERPL-SCNC: 107 MMOL/L (ref 96–108)
CHOLEST SERPL-MCNC: 187 MG/DL
CO2 SERPL-SCNC: 28 MMOL/L (ref 21–32)
CREAT SERPL-MCNC: 1.12 MG/DL (ref 0.6–1.3)
CREAT UR-MCNC: 152 MG/DL
ERYTHROCYTE [DISTWIDTH] IN BLOOD BY AUTOMATED COUNT: 12.5 % (ref 11.6–15.1)
EST. AVERAGE GLUCOSE BLD GHB EST-MCNC: 82 MG/DL
GFR SERPL CREATININE-BSD FRML MDRD: 63 ML/MIN/1.73SQ M
GLUCOSE P FAST SERPL-MCNC: 100 MG/DL (ref 65–99)
HBA1C MFR BLD: 4.5 %
HCT VFR BLD AUTO: 42.4 % (ref 34.8–46.1)
HDLC SERPL-MCNC: 95 MG/DL
HGB BLD-MCNC: 14.3 G/DL (ref 11.5–15.4)
LDLC SERPL CALC-MCNC: 74 MG/DL (ref 0–100)
MAGNESIUM SERPL-MCNC: 2.4 MG/DL (ref 1.6–2.6)
MCH RBC QN AUTO: 31.5 PG (ref 26.8–34.3)
MCHC RBC AUTO-ENTMCNC: 33.7 G/DL (ref 31.4–37.4)
MCV RBC AUTO: 93 FL (ref 82–98)
MICROALBUMIN UR-MCNC: 17.1 MG/L (ref 0–20)
MICROALBUMIN/CREAT 24H UR: 11 MG/G CREATININE (ref 0–30)
NONHDLC SERPL-MCNC: 92 MG/DL
PLATELET # BLD AUTO: 298 THOUSANDS/UL (ref 149–390)
PMV BLD AUTO: 11.1 FL (ref 8.9–12.7)
POTASSIUM SERPL-SCNC: 4.2 MMOL/L (ref 3.5–5.3)
RBC # BLD AUTO: 4.54 MILLION/UL (ref 3.81–5.12)
SODIUM SERPL-SCNC: 137 MMOL/L (ref 135–147)
TRIGL SERPL-MCNC: 89 MG/DL
WBC # BLD AUTO: 6.96 THOUSAND/UL (ref 4.31–10.16)

## 2023-06-28 PROCEDURE — 36415 COLL VENOUS BLD VENIPUNCTURE: CPT

## 2023-06-28 PROCEDURE — 80061 LIPID PANEL: CPT

## 2023-06-28 PROCEDURE — 82043 UR ALBUMIN QUANTITATIVE: CPT

## 2023-06-28 PROCEDURE — 83036 HEMOGLOBIN GLYCOSYLATED A1C: CPT

## 2023-06-28 PROCEDURE — 83735 ASSAY OF MAGNESIUM: CPT

## 2023-06-28 PROCEDURE — 80048 BASIC METABOLIC PNL TOTAL CA: CPT

## 2023-06-28 PROCEDURE — 82570 ASSAY OF URINE CREATININE: CPT

## 2023-06-28 PROCEDURE — 85027 COMPLETE CBC AUTOMATED: CPT

## 2023-06-29 ENCOUNTER — TELEPHONE (OUTPATIENT)
Dept: NEPHROLOGY | Facility: CLINIC | Age: 38
End: 2023-06-29

## 2023-06-29 DIAGNOSIS — R79.82 ELEVATED C-REACTIVE PROTEIN: Primary | ICD-10-CM

## 2023-06-29 DIAGNOSIS — R79.89 ELEVATED SERUM CREATININE: ICD-10-CM

## 2023-06-29 DIAGNOSIS — I10 ESSENTIAL HYPERTENSION: ICD-10-CM

## 2023-06-29 NOTE — TELEPHONE ENCOUNTER
Spoke to patient regarding her most recent lab results which shows creatinine increased to 1 1  Her usual baseline around 0 6-0 8  She had delivery in February 2023 and since then seems to be feeling well  Her most recent blood pressure at home well controlled  She denies any symptoms currently  She was restarted back on Aldactone 50 mg twice daily about 6 weeks postpartum and currently remains on the same  Also remains on Procardia XL 30 mg twice daily  Also elevated BUN noted  She is not sure whether she could have mild prerenal component as she may not be drinking enough water  For now encouraged to increase free water intake  We will do repeat BMP, UA with microscopy, urine sodium, urine creatinine in 1 week  If creatinine does not improve, will discuss to reduce Aldactone

## 2023-07-07 ENCOUNTER — APPOINTMENT (OUTPATIENT)
Dept: LAB | Facility: IMAGING CENTER | Age: 38
End: 2023-07-07
Payer: COMMERCIAL

## 2023-07-07 DIAGNOSIS — R79.89 ELEVATED SERUM CREATININE: ICD-10-CM

## 2023-07-07 DIAGNOSIS — I10 ESSENTIAL HYPERTENSION: ICD-10-CM

## 2023-07-07 LAB
ANION GAP SERPL CALCULATED.3IONS-SCNC: 2 MMOL/L
BACTERIA UR QL AUTO: ABNORMAL /HPF
BILIRUB UR QL STRIP: NEGATIVE
BUN SERPL-MCNC: 18 MG/DL (ref 5–25)
CALCIUM SERPL-MCNC: 9.3 MG/DL (ref 8.3–10.1)
CHLORIDE SERPL-SCNC: 108 MMOL/L (ref 96–108)
CLARITY UR: CLEAR
CO2 SERPL-SCNC: 25 MMOL/L (ref 21–32)
COLOR UR: YELLOW
CREAT SERPL-MCNC: 0.87 MG/DL (ref 0.6–1.3)
CREAT UR-MCNC: 255 MG/DL
GFR SERPL CREATININE-BSD FRML MDRD: 85 ML/MIN/1.73SQ M
GLUCOSE P FAST SERPL-MCNC: 124 MG/DL (ref 65–99)
GLUCOSE UR STRIP-MCNC: NEGATIVE MG/DL
HGB UR QL STRIP.AUTO: NEGATIVE
HYALINE CASTS #/AREA URNS LPF: ABNORMAL /LPF
KETONES UR STRIP-MCNC: NEGATIVE MG/DL
LEUKOCYTE ESTERASE UR QL STRIP: NEGATIVE
MUCOUS THREADS UR QL AUTO: ABNORMAL
NITRITE UR QL STRIP: NEGATIVE
NON-SQ EPI CELLS URNS QL MICRO: ABNORMAL /HPF
PH UR STRIP.AUTO: 6.5 [PH]
POTASSIUM SERPL-SCNC: 4.5 MMOL/L (ref 3.5–5.3)
PROT UR STRIP-MCNC: ABNORMAL MG/DL
RBC #/AREA URNS AUTO: ABNORMAL /HPF
SODIUM 24H UR-SCNC: 55 MOL/L
SODIUM SERPL-SCNC: 135 MMOL/L (ref 135–147)
SP GR UR STRIP.AUTO: 1.02 (ref 1–1.03)
UROBILINOGEN UR STRIP-ACNC: <2 MG/DL
WBC #/AREA URNS AUTO: ABNORMAL /HPF

## 2023-07-07 PROCEDURE — 84300 ASSAY OF URINE SODIUM: CPT | Performed by: INTERNAL MEDICINE

## 2023-07-07 PROCEDURE — 82570 ASSAY OF URINE CREATININE: CPT | Performed by: INTERNAL MEDICINE

## 2023-07-07 PROCEDURE — 36415 COLL VENOUS BLD VENIPUNCTURE: CPT

## 2023-07-07 PROCEDURE — 80048 BASIC METABOLIC PNL TOTAL CA: CPT

## 2023-07-07 PROCEDURE — 81001 URINALYSIS AUTO W/SCOPE: CPT | Performed by: INTERNAL MEDICINE

## 2023-07-10 ENCOUNTER — OFFICE VISIT (OUTPATIENT)
Dept: NEPHROLOGY | Facility: CLINIC | Age: 38
End: 2023-07-10
Payer: COMMERCIAL

## 2023-07-10 VITALS
WEIGHT: 144 LBS | BODY MASS INDEX: 24.59 KG/M2 | DIASTOLIC BLOOD PRESSURE: 86 MMHG | SYSTOLIC BLOOD PRESSURE: 124 MMHG | HEIGHT: 64 IN

## 2023-07-10 DIAGNOSIS — E87.6 HYPOKALEMIA: ICD-10-CM

## 2023-07-10 DIAGNOSIS — I10 ESSENTIAL HYPERTENSION: Primary | ICD-10-CM

## 2023-07-10 PROCEDURE — 99213 OFFICE O/P EST LOW 20 MIN: CPT | Performed by: INTERNAL MEDICINE

## 2023-07-10 RX ORDER — NIFEDIPINE 30 MG/1
30 TABLET, EXTENDED RELEASE ORAL 2 TIMES DAILY
Qty: 180 TABLET | Refills: 3 | Status: SHIPPED | OUTPATIENT
Start: 2023-07-10

## 2023-07-10 RX ORDER — B-COMPLEX WITH VITAMIN C
TABLET ORAL 2 TIMES DAILY
COMMUNITY

## 2023-07-10 NOTE — PROGRESS NOTES
NEPHROLOGY OUTPATIENT PROGRESS NOTE   Michael Lovelace 40 y.o. female MRN: [id number]  DATE: 7/10/2023  Reason for visit:   Chief Complaint   Patient presents with   • Follow-up   • Hypertension     ASSESSMENT and PLAN:  baseline creatinine 0.6 to 0.8  -Recently creatinine was slightly elevated 1.1 in June 2023 although improved to 0.8 at baseline in July 2023.   -Most recent UA shows 1+ proteinuria, 2-4 RBCs with hyaline cast, prior UA bland Most recent UACR nonsignificant in July 2023.  -avoid nephrotoxins or NSAIDs. Continue to closely monitor renal function.  -she has history of DEANA with peak creatinine 2.4 in 2015 when she had postpartum hemorrhage and required ex lap.      Hypertension  - prior 24 hour ABPM readings in March 2020:  24 hour BP average 139/92  Daytime BP average 143/95  Nighttime BP average 127/84  MAP night time dipping 9.65%  -She had delivery in February 2023.    -Now remains back on Procardia XL 30 mg twice daily, Aldactone 50 mg twice daily which was restarted 6 weeks postpartum.    -Remains on Aldactone for acne by dermatology  -BP well controlled in the office today.  Home BP readings are 120 to 125/low 80s  -Prior workup include serum aldosterone 11.7 (prior 4.2, 43.9), PRA 3.71 (prior 2.03, 0.2)  -serum a.m. cortisol 15.6  -prior CT scan shows unremarkable adrenal glands.   -plasma metanephrine/catecholamine unremarkable.  -Continue low-salt diet.  -Discussed with radiologist, prior CT scan with IV contrast in 2017 did not show any obvious abnormality/stenosis although noted that this was not specific vascular study.     History of isolated episodes of hypokalemia, most recent lab results shows serum potassium within normal range. Not on any potassium supplements. Diagnoses and all orders for this visit:    Hypokalemia  -     Basic metabolic panel; Future    Essential hypertension  -     NIFEdipine (PROCARDIA XL) 30 mg 24 hr tablet;  Take 1 tablet (30 mg total) by mouth 2 (two) times a day  -     Basic metabolic panel; Future  -     CBC; Future  -     Albumin / creatinine urine ratio; Future  -     UA (URINE) with reflex to Scope; Future    Other orders  -     Multiple Minerals-Vitamins (Jason-Mag-Zinc-D) TABS; Take by mouth 2 (two) times a day        SUBJECTIVE / HPI:  Michelle Gonzales is a 37 y.o. female without significant medical issues except component of anxiety, prior history of ex lap due to small bowel obstruction, also history of postpartum hemorrhage requiring blood transfusion in 2015, hypertension diagnosed in 2019 who presents for regular follow-up of hypertension. She had delivery in February 2013. She was restarted back on Aldactone 50 mg twice daily by dermatology 6 weeks postpartum. More recently her blood pressure seems to be well controlled as mentioned above. Denies any recent headache, leg edema issues. No recent NSAID exposure. Denies any urinary complaint.     History of isolated rheumatoid factor positive, she was evaluated by Rheumatology and No further intervention was done in the past.  She also has history of postpartum hemorrhage although denies having any preeclampsia or eclampsia issues during pregnancy in 2015.      Family history includes both parents having hypertension controlled with single agent diagnosed in their 45s.  Also her brother having hypertension in his early 35s.  No obvious family history of CVA. REVIEW OF SYSTEMS:  More than 10 point review of systems were obtained and discussed in length with the patient. Complete review of systems were negative / unremarkable except mentioned above. PHYSICAL EXAM:  Vitals:    07/10/23 1331 07/10/23 1348   BP: 124/80 124/86   BP Location: Left arm    Patient Position: Sitting    Cuff Size: Adult    Weight: 65.3 kg (144 lb)    Height: 5' 4" (1.626 m)      Body mass index is 24.72 kg/m². Physical Exam  Vitals reviewed. Constitutional:       Appearance: She is well-developed.    HENT:      Head: Normocephalic and atraumatic. Right Ear: External ear normal.      Left Ear: External ear normal.   Eyes:      Conjunctiva/sclera: Conjunctivae normal.   Cardiovascular:      Comments: No significant edema in legs  Pulmonary:      Effort: Pulmonary effort is normal.      Breath sounds: Normal breath sounds. No wheezing or rales. Abdominal:      General: Bowel sounds are normal. There is no distension. Palpations: Abdomen is soft. Tenderness: There is no abdominal tenderness. Musculoskeletal:         General: No deformity. Lymphadenopathy:      Cervical: No cervical adenopathy. Skin:     Findings: No rash. Neurological:      Mental Status: She is alert and oriented to person, place, and time.    Psychiatric:         Behavior: Behavior normal.         PAST MEDICAL HISTORY:  Past Medical History:   Diagnosis Date   • Acute renal failure ([address])     [id number] RESOLVED   • Acute tubular necrosis (HCC)    • Anemia     during the pregnancy    • Bowel obstruction ([address]) 2017   • Chronic kidney disease    • Fibroadenoma of breast, left    • Fibroadenoma of breast, right    •  1 para 1    • History of transfusion 2015    After delivery    • Hypertension     no medication    • Kidney stone    • Postpartum hemorrhage     UNSPECIFIED TYPE  RESOLVED    • Urinary tract infection     yes in the last ten years   • Vacuum extractor delivery, delivered      daughter with postpartum hemorrhage and DIC   • Varicella     had childhood chickenpox       PAST SURGICAL HISTORY:  Past Surgical History:   Procedure Laterality Date   • BREAST FIBROADENOMA SURGERY      Cryosurgical Ablation of Fibroadenoma   • BREAST LUMPECTOMY Left    • BREAST LUMPECTOMY Right    •  SECTION         • EXPLORATORY LAPAROTOMY W/ BOWEL RESECTION N/A 2017    Procedure: LAPAROTOMY EXPLORATORY W/ lysis of adhesions;  Surgeon: Tracey Roa DO;  [facility];  Service:    • INTRAUTERINE DEVICE INSERTION     • LAPAROTOMY      EXPLORATORY for DIC and hemorrhage postpartum retroperitoneal hematoma   • NE  DELIVERY ONLY N/A 2020    Procedure:  SECTION () REPEAT;  Surgeon: Sina Danielle MD;  [facility];  Service: Obstetrics   • NE  DELIVERY ONLY N/A 2023    Procedure:  SECTION () REPEAT;  Surgeon: Tyler Beth MD;  [facility];  Service: Obstetrics   • NE LIG/TRNSXJ [address] DEL/ABDML SURG Bilateral 2023    Procedure: LIGATION/COAGULATION TUBAL;  Surgeon: Tyler Beth MD;  [facility];  Service: Obstetrics   • TOOTH EXTRACTION      Impression: [id number] [address]         SOCIAL HISTORY:  Social History     Substance and Sexual Activity   Alcohol Use Not Currently   • Alcohol/week: 2.0 standard drinks of alcohol   • Types: 2 Glasses of wine per week    Comment: Glass or two a week     Social History     Substance and Sexual Activity   Drug Use No     Social History     Tobacco Use   Smoking Status Never   Smokeless Tobacco Never       FAMILY HISTORY:  Family History   Problem Relation Age of Onset   • Hypertension Mother         URINARY INCONTINENCE   • Hyperlipidemia Mother    • Diverticulitis Mother    • Hypothyroidism Mother    • Hypertension Father    • Hepatitis Father         A   • Prostate cancer Father    • No Known Problems Sister    • No Known Problems Brother    • No Known Problems Daughter    • Other Daughter         CHARGE syndrome   • Diverticulitis Maternal Grandmother    • Colon cancer Maternal Grandmother    • Stroke Maternal Grandfather         CVA   • Heart disease Maternal Grandfather    • Diverticulitis Maternal Grandfather    • Hypertension Maternal Grandfather    • Heart attack Maternal Grandfather    • Alzheimer's disease Paternal Grandmother    • Diabetes Paternal Grandfather         INSULIN DEPENDENT       MEDICATIONS:    Current Outpatient Medications:   • busPIRone (BUSPAR) 15 mg tablet, 1 BID, Disp: 180 tablet, Rfl: 1  •  Docusate Sodium (COLACE PO), Take by mouth as needed, Disp: , Rfl:   •  Magnesium 400 MG CAPS, Take by mouth, Disp: , Rfl:   •  Multiple Minerals-Vitamins (Jason-Mag-Zinc-D) TABS, Take by mouth 2 (two) times a day, Disp: , Rfl:   •  NIFEdipine (PROCARDIA XL) 30 mg 24 hr tablet, Take 1 tablet (30 mg total) by mouth 2 (two) times a day, Disp: 180 tablet, Rfl: 3  •  Probiotic Product (PROBIOTIC PO), Take by mouth, Disp: , Rfl:   •  sertraline (Zoloft) 50 mg tablet, 1 Daily, Disp: 90 tablet, Rfl: 1  •  spironolactone (ALDACTONE) 50 mg tablet, Take 50 mg by mouth 2 (two) times a day, Disp: , Rfl:   •  tretinoin (RETIN-A) 0.025 % cream, Apply topically as needed, Disp: , Rfl:     Lab Results:   Results for orders placed or performed in visit on 59/68/60   Basic metabolic panel   Result Value Ref Range    Sodium 135 135 - 147 mmol/L    Potassium 4.5 3.5 - 5.3 mmol/L    Chloride 108 96 - 108 mmol/L    CO2 25 21 - 32 mmol/L    ANION GAP 2 mmol/L    BUN 18 5 - 25 mg/dL    Creatinine 0.87 0.60 - 1.30 mg/dL    Glucose, Fasting 124 (H) 65 - 99 mg/dL    Calcium 9.3 8.3 - 10.1 mg/dL    eGFR 85 ml/min/1.73sq m

## 2023-07-24 ENCOUNTER — TELEMEDICINE (OUTPATIENT)
Dept: PSYCHIATRY | Facility: CLINIC | Age: 38
End: 2023-07-24
Payer: COMMERCIAL

## 2023-07-24 DIAGNOSIS — F33.0 MAJOR DEPRESSIVE DISORDER, RECURRENT, MILD (HCC): Primary | ICD-10-CM

## 2023-07-24 PROCEDURE — 99214 OFFICE O/P EST MOD 30 MIN: CPT | Performed by: NURSE PRACTITIONER

## 2023-07-25 NOTE — PSYCH
TREATMENT PLAN (Medication Management Only)  Treatment Plan done but not signed at time of office visit due to:  Plan reviewed by phone or in person  and verbal consent given due to [address]    Name and Date of Birth:  Kim Brown 40 y.o. 1985  Date of Treatment Plan: July 24, 2023  Diagnosis/Diagnoses:    1. Major depressive disorder, recurrent, mild (HCC)      Strengths/Personal Resources for Self-Care: supportive family, supportive friends. Area/Areas of need (in own words): I feel much better since taking the Zoloft. 1. Long Term Goal: " Continue to feel well and function at my best.". Target Date: 6 months - 1/24/2024  Person/Persons responsible for completion of goal: Michelle  2. Short Term Objective (s) - How will we reach this goal?:   A. Provider new recommended medication/dosage changes and/or continue medication(s): continue current medications as prescribed. B.  N/A. Target Date: 6 months - 1/24/2024  Person/Persons Responsible for Completion of Goal: Michelle  Progress Towards Goals: stable, continuing treatment  Treatment Modality: medication education at every visit  Review due 6 months from date of this plan: 6 months - 1/24/2024  Expected length of service: ongoing treatment unless revised  My Physician/PA/NP and I have developed this plan together and I agree to work on the goals and objectives. I understand the treatment goals that were developed for my treatment.

## 2023-07-25 NOTE — PSYCH
Virtual Regular Visit    Verification of patient location:    Patient is located at Home in the following state in which I hold an active license [place]      Assessment/Plan:    Problem List Items Addressed This Visit    None      Goals addressed in session: Maintain current level of stability. Reason for visit is   Chief Complaint   Patient presents with   • Virtual Regular Visit        Encounter provider PITO Colunga    Provider located at [address] [id number]      Recent Visits  No visits were found meeting these conditions. Showing recent visits within past 7 days and meeting all other requirements  Today's Visits  Date Type Provider Dept   07/24/23 Telemedicine Daniela Plummer, [address] today's visits and meeting all other requirements  Future Appointments  No visits were found meeting these conditions. Showing future appointments within next 150 days and meeting all other requirements       The patient was identified by name and date of birth. Cornel Mcpherson was informed that this is a telemedicine visit and that the visit is being conducted throughthe [employer] platform. She agrees to proceed. .  My office door was closed. No one else was in the room. She acknowledged consent and understanding of privacy and security of the video platform. The patient has agreed to participate and understands they can discontinue the visit at any time. Patient is aware this is a billable service. Keesha Avila is a 40 y.o. female has a history of anxiety disorder and depressive disorder. Since starting the Zoloft, her mood is improved significantly. She feels "the best since starting Zoloft. She is questioning if she should remain on BuSpar so we will start to titrate the dose down.   She will start with a tablet and a half for 1 week, then 1 tablet for 1 week then half tab for 1 week and then stop. She is agreeable. If in the meantime, she becomes anxious, she can go back on a lower dose. In the meantime she will continue on Zoloft 50 mg daily. .  The patient will continue on: She will titrate off BuSpar over the next 3 to 4 weeks  She will continue Zoloft 50 mg daily  Follow-up with me in 3 months or sooner if necessary. Mental status exam: Patient is awake and alert and oriented x 3. Mood is stable. Patient is not suicidal, not homicidal and not psychotic. Associations are intact. No overt delusions. Speech is clear and thoughts are well organized, coherent and goal directed. Attention span is good. Impulse control good. Judgment and insight are good. Memory is good.         Past Medical History:   Diagnosis Date   • Acute renal failure ([address])     [id number] RESOLVED   • Acute tubular necrosis (HCC)    • Anemia     during the pregnancy    • Bowel obstruction ([address]) 2017   • Chronic kidney disease    • Fibroadenoma of breast, left    • Fibroadenoma of breast, right    •  1 para 1    • History of transfusion 2015    After delivery    • Hypertension     no medication    • Kidney stone    • Postpartum hemorrhage     UNSPECIFIED TYPE  RESOLVED    • Urinary tract infection     yes in the last ten years   • Vacuum extractor delivery, delivered      daughter with postpartum hemorrhage and DIC   • Varicella     had childhood chickenpox       Past Surgical History:   Procedure Laterality Date   • BREAST FIBROADENOMA SURGERY      Cryosurgical Ablation of Fibroadenoma   • BREAST LUMPECTOMY Left    • BREAST LUMPECTOMY Right    •  SECTION         • EXPLORATORY LAPAROTOMY W/ BOWEL RESECTION N/A 2017    Procedure: LAPAROTOMY EXPLORATORY W/ lysis of adhesions;  Surgeon: Kaia Ruiz DO;  [facility];  Service:    • INTRAUTERINE DEVICE INSERTION     • LAPAROTOMY      EXPLORATORY for DIC and hemorrhage postpartum retroperitoneal hematoma   • LA  DELIVERY ONLY N/A 2020    Procedure:  SECTION () REPEAT;  Surgeon: Shana Arreola MD;  [facility];  Service: Obstetrics   • LA  DELIVERY ONLY N/A 2023    Procedure:  SECTION () REPEAT;  Surgeon: Kira Lutz MD;  [facility];  Service: Obstetrics   • LA LIG/TRNSXJ FALOPIAN TUBE  DEL/ABDML SURG Bilateral 2023    Procedure: LIGATION/COAGULATION TUBAL;  Surgeon: Kira Lutz MD;  [facility];  Service: Obstetrics   • TOOTH EXTRACTION      Impression: [id number] Temo Knight         Current Outpatient Medications   Medication Sig Dispense Refill   • busPIRone (BUSPAR) 15 mg tablet 1  tablet 1   • Docusate Sodium (COLACE PO) Take by mouth as needed     • Magnesium 400 MG CAPS Take by mouth     • Multiple Minerals-Vitamins (Jason-Mag-Zinc-D) TABS Take by mouth 2 (two) times a day     • NIFEdipine (PROCARDIA XL) 30 mg 24 hr tablet Take 1 tablet (30 mg total) by mouth 2 (two) times a day 180 tablet 3   • Probiotic Product (PROBIOTIC PO) Take by mouth     • sertraline (Zoloft) 50 mg tablet 1 Daily 90 tablet 1   • spironolactone (ALDACTONE) 50 mg tablet Take 50 mg by mouth 2 (two) times a day     • tretinoin (RETIN-A) 0.025 % cream Apply topically as needed       No current facility-administered medications for this visit. Allergies   Allergen Reactions   • Nickel Rash     Skin rash       Review of Systems    Video Exam    There were no vitals filed for this visit. Physical Exam     Visit Time    Visit Start Time: 11:30a  Visit Stop Time: 52;16G  Total Visit Duration: 15 minutes were spent in the visit. Time was spent reviewing the treatment plan, reviewing patient's improvement with the current medication regimen and completing the progress note.

## 2023-10-11 ENCOUNTER — CLINICAL SUPPORT (OUTPATIENT)
Dept: INTERNAL MEDICINE CLINIC | Facility: OTHER | Age: 38
End: 2023-10-11
Payer: COMMERCIAL

## 2023-10-11 DIAGNOSIS — Z23 ENCOUNTER FOR IMMUNIZATION: Primary | ICD-10-CM

## 2023-10-11 PROCEDURE — 90686 IIV4 VACC NO PRSV 0.5 ML IM: CPT

## 2023-10-11 PROCEDURE — 90471 IMMUNIZATION ADMIN: CPT

## 2023-10-26 ENCOUNTER — TELEMEDICINE (OUTPATIENT)
Dept: PSYCHIATRY | Facility: CLINIC | Age: 38
End: 2023-10-26
Payer: COMMERCIAL

## 2023-10-26 DIAGNOSIS — G47.00 INSOMNIA, UNSPECIFIED TYPE: Primary | ICD-10-CM

## 2023-10-26 DIAGNOSIS — F33.1 MODERATE EPISODE OF RECURRENT MAJOR DEPRESSIVE DISORDER (HCC): ICD-10-CM

## 2023-10-26 PROCEDURE — 99213 OFFICE O/P EST LOW 20 MIN: CPT | Performed by: NURSE PRACTITIONER

## 2023-10-26 RX ORDER — TRAZODONE HYDROCHLORIDE 50 MG/1
TABLET ORAL
Qty: 30 TABLET | Refills: 2 | Status: SHIPPED | OUTPATIENT
Start: 2023-10-26

## 2023-10-26 NOTE — PSYCH
Virtual Brief Visit    This Visit is being completed by telephone. The Patient is located at Home and in the following state in which I hold an active license [place]    The patient was identified by name and date of birth. Conchis Ann was informed that this is a telemedicine visit and that the visit is being conducted through Telephone. My office door was closed. No one else was in the room. She acknowledged consent and understanding of privacy and security of the video platform. The patient has agreed to participate and understands they can discontinue the visit at any time. Patient is aware this is a billable service. Assessment/Plan: Patient is a 52-year-old female has a history of anxiety disorder. We were not able to connect via video because of time constraints on our side. So we did connect by telephone. Patient reports she is doing well overall. Her main complaint is poor sleep. She works shift work and has trouble getting to sleep when she gets home. She is working mostly weekends. So we did have a discussion regarding medication and we are going to try trazodone 25 to 50 mg at bedtime to see if this helps. She will give me a call if it does not and otherwise we will see each other in 6 to 8 weeks or sooner if necessary. She will continue on Zoloft 50 mg daily. And now we have added the trazodone. Follow-up with me as noted above. Mental status exam: Patient is awake and alert and oriented x 3. Her mood is stable. Patient is not suicidal, not homicidal and not psychotic. Associations are intact. No overt delusions. Speech is clear and thoughts are well organized, coherent and goal directed. Attention span is good. Impulse control is good. Judgment and insight are good. Memory is good.       Problem List Items Addressed This Visit    None  Visit Diagnoses       Insomnia, unspecified type    -  Primary    Relevant Medications    traZODone (DESYREL) 50 mg tablet    Moderate episode of recurrent major depressive disorder (HCC)        Relevant Medications    sertraline (Zoloft) 50 mg tablet    traZODone (DESYREL) 50 mg tablet            Recent Visits  No visits were found meeting these conditions. Showing recent visits within past 7 days and meeting all other requirements  Today's Visits  Date Type Provider Dept   10/26/23 [facility] Ozzie McDowell, [address] [place] [address] today's visits and meeting all other requirements  Future Appointments  No visits were found meeting these conditions. Showing future appointments within next 150 days and meeting all other requirements         Visit Time  Total Visit Duration: The visit started at 10:45 AM and ended at 11:05 AM.  Time was spent reviewing the treatment plan, reviewing medications and symptoms and completing the progress note.

## 2023-10-31 ENCOUNTER — OFFICE VISIT (OUTPATIENT)
Dept: OBGYN CLINIC | Facility: CLINIC | Age: 38
End: 2023-10-31
Payer: COMMERCIAL

## 2023-10-31 VITALS — WEIGHT: 147.2 LBS | DIASTOLIC BLOOD PRESSURE: 76 MMHG | SYSTOLIC BLOOD PRESSURE: 120 MMHG | BODY MASS INDEX: 25.27 KG/M2

## 2023-10-31 DIAGNOSIS — N39.3 SUI (STRESS URINARY INCONTINENCE, FEMALE): Primary | ICD-10-CM

## 2023-10-31 DIAGNOSIS — R10.2 PELVIC PRESSURE IN FEMALE: ICD-10-CM

## 2023-10-31 PROCEDURE — 99213 OFFICE O/P EST LOW 20 MIN: CPT | Performed by: OBSTETRICS & GYNECOLOGY

## 2023-10-31 NOTE — PROGRESS NOTES
Assessment/Plan:      Diagnoses and all orders for this visit:    ANISA (stress urinary incontinence, female)  -     Ambulatory Referral to Physical Therapy; Future    Pelvic pressure in female  -     Ambulatory Referral to Physical Therapy; Future      Will refer to urogyn if above not enough. Subjective:     Patient ID: hCio Luke is a 40 y.o. female. Anna Goode presents with a history of ANISA and pelvic pressure. She states the ANISA has been present since before her most recent delivery but was not a priority until now as she was busy with her children. She states it is more bothersome at this point as it will occur as she is climbing stairs in addition to working out. She also notes a sensation of pressure in her vagina, worst with standing/walking. She has a history of chronic constipation which she is trying to treat with miralax. She noticed the pelvic pressure after the deliver but was initially thinking it might improve after delivery. However, it is now six months out and she is still feeling the symptoms. She wanted to make sure it was nothing worrisome and to discuss options.   Discussed pelvic PT and/or referral to urogyn but would favor pelvic PT first.          Review of Systems      Objective:     Physical Exam     +mobility of anterior and posterior vaginal walls into the vagina without significant descent in the lying position

## 2023-12-03 ENCOUNTER — DOCUMENTATION (OUTPATIENT)
Dept: BEHAVIORAL/MENTAL HEALTH CLINIC | Facility: CLINIC | Age: 38
End: 2023-12-03

## 2023-12-04 NOTE — PROGRESS NOTES
Psychotherapy Discharge Summary    Preferred Name: Lonnie Guy  YOB: 1985    Admission date to psychotherapy: July 26, 222    Referred by: OBGYN    Presenting Problem: Anxiety    Course of treatment included : individual therapy     Progress/Outcome of Treatment Goals (brief summary of course of treatment) Rakan Wray presented during her pregnancy for anxiety. She is also under the care of Ning Otto. Rakan Wray was engaged throughout treatment and this therapist followed Rakan Wray into her postpartum period, and Rakan Wray was doing well. Rakan Wray was last seen in March 2023, and felt she was doing well. Treatment Complications (if any): None noted. Treatment Progress: excellent    Current [facility] Psychiatric Provider: PITO Otto    Discharge Medications include: Please see list.    Discharge Date: December 3, 2023    Discharge Diagnosis: No diagnosis found. Criteria for Discharge: completed treatment goals and objectives and is no longer in need of services    Aftercare recommendations include (include specific referral names and phone numbers, if appropriate): Rakan Wray is encouraged to contact her provider if she feels that she needs psychiatric services in the future. If in crisis, please go to the local ED or call 911.     Prognosis: excellent

## 2023-12-06 ENCOUNTER — OFFICE VISIT (OUTPATIENT)
Dept: PSYCHIATRY | Facility: CLINIC | Age: 38
End: 2023-12-06
Payer: COMMERCIAL

## 2023-12-06 DIAGNOSIS — G47.00 INSOMNIA, UNSPECIFIED TYPE: ICD-10-CM

## 2023-12-06 DIAGNOSIS — F33.1 MODERATE EPISODE OF RECURRENT MAJOR DEPRESSIVE DISORDER (HCC): ICD-10-CM

## 2023-12-06 PROCEDURE — 99214 OFFICE O/P EST MOD 30 MIN: CPT | Performed by: NURSE PRACTITIONER

## 2023-12-06 RX ORDER — TRAZODONE HYDROCHLORIDE 50 MG/1
TABLET ORAL
Qty: 90 TABLET | Refills: 2 | Status: SHIPPED | OUTPATIENT
Start: 2023-12-06

## 2023-12-06 NOTE — PSYCH
PROGRESS NOTE        [address] [facility]      Name and Date of Birth:  Chucho Ortiz 40 y.o. 1985    Date of Visit: 12/06/23  Patient is a 73-year-old female has a history of major depressive disorder and anxiety disorder and sleep disturbance. She tells me she is doing very well. Sleeping much better with trazodone. 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 She and her  are getting along very well. She tells me they are actually trying to do more lunch dates when they can when he is off during the week. Right now, there exploring childcare options to help leave him up a little bit in the evening for their oldest daughter's extra afterschool activities. Mood is good. She will follow-up with me in 3-6 months or sooner if necessary. She denies suicidal ideation, intent or plan at present, has no suicidal ideation, intent or plan at present. She denies any auditory hallucinations and visual hallucinations, denies any other delusional thinking, denies any delusional thinking. She denies any side effects from medications  . HPI ROS Appetite Changes and Sleep: normal appetite, normal sleep    Review Of Systems:      Constitutional Negative   ENT Negative   Cardiovascular Negative   Respiratory As Noted in HPI   Gastrointestinal Negative   Genitourinary Negative   Musculoskeletal Negative   Integumentary Negative   Neurological Negative   Endocrine Negative   Other Symptoms Negative and None       Laboratory Results: No results found for this or any previous visit.     Substance Abuse History:    Social History     Substance and Sexual Activity   Drug Use No       Family Psychiatric History:     Family History   Problem Relation Age of Onset    Hypertension Mother         URINARY INCONTINENCE    Hyperlipidemia Mother     Diverticulitis Mother     Hypothyroidism Mother Hypertension Father     Hepatitis Father         A    Prostate cancer Father     No Known Problems Sister     No Known Problems Brother     No Known Problems Daughter     Other Daughter         CHARGE syndrome    Diverticulitis Maternal Grandmother     Colon cancer Maternal Grandmother     Stroke Maternal Grandfather         CVA    Heart disease Maternal Grandfather     Diverticulitis Maternal Grandfather     Hypertension Maternal Grandfather     Heart attack Maternal Grandfather     Alzheimer's disease Paternal Grandmother     Diabetes Paternal Grandfather         INSULIN DEPENDENT       The following portions of the patient's history were reviewed and updated as appropriate: past family history, past medical history, past social history, past surgical history and problem list.    Social History     Socioeconomic History    Marital status: /Civil Union     Spouse name: Not on file    Number of children: Not on file    Years of education: Not on file    Highest education level: Not on file   Occupational History    Not on file   Tobacco Use    Smoking status: Never    Smokeless tobacco: Never   Vaping Use    Vaping Use: Never used   Substance and Sexual Activity    Alcohol use: Not Currently     Alcohol/week: 2.0 standard drinks of alcohol     Types: 2 Glasses of wine per week     Comment: Glass or two a week    Drug use: No    Sexual activity: Yes     Partners: Male     Birth control/protection: Female Sterilization   Other Topics Concern    Not on file   Social History Narrative    Always uses a seatbelt    Drinks Coffee - 2 cups a day    IUD Contraception - mirena    Lack of exercise     (per [employer])     Social Determinants of Health     Financial Resource Strain: Not on file   Food Insecurity: Not on file   Transportation Needs: Not on file   Physical Activity: Not on file   Stress: Not on file   Social Connections: Not on file   Intimate Partner Violence: Not on file   Housing Stability: Not on file     Social History     Social History Narrative    Always uses a seatbelt    Drinks Coffee - 2 cups a day    IUD Contraception - mirena    Lack of exercise     (per [employer])        Social History       Tobacco History       Smoking Status  Never      Smokeless Tobacco Use  Never              Alcohol History       Alcohol Use Status  Not Currently Drinks/Week  2 Glasses of wine, 0 Cans of beer, 0 Shots of liquor, 0 Standard drinks or equivalent per week Amount  2.0 standard drinks of alcohol/wk Comment  Glass or two a week              Drug Use       Drug Use Status  No              Sexual Activity       Sexually Active  Yes Partners  Male Birth Control/Protection  Female Sterilization              Activities of Daily Living    Not Asked                       OBJECTIVE:     Mental Status Evaluation:    Appearance age appropriate, casually dressed   Behavior pleasant, cooperative   Speech normal volume, normal pitch   Mood improved   Affect brighter   Thought Processes logical   Associations intact associations   Thought Content No overt delusions   Perceptual Disturbances: none   Abnormal Thoughts  Risk Potential Suicidal ideation - None  Homicidal ideation - None  Potential for aggression - No   Orientation oriented to person, place, time/date and situation   Memory recent and remote memory grossly intact   Cosciousness alert and awake   Attention Span attention span and concentration are age appropriate   Intellect Appears to be of Average Intelligence   Insight Good   Judgement Good   Muscle Strength and  Gait muscle strength and tone were normal   Language no difficulty naming common objects   Fund of Knowledge displays adequate knowledge of current events   Pain none   Pain Scale 0       Assessment/Plan:       Diagnoses and all orders for this visit:    Moderate episode of recurrent major depressive disorder (HCC)  -     sertraline (Zoloft) 50 mg tablet; 1 Daily    Insomnia, unspecified type  - traZODone (DESYREL) 50 mg tablet; 1/2-1 tab HS PRN for sleep          Treatment Recommendations/Precautions:    Continue current medications  Zoloft 50 mg daily  Trazodone 50 mg at bedtime  Follow-up with me in 3 to 6 months or sooner if necessary. Crisis plan: Patient is aware of the 24-hour on-call service offered by [facility]. Patient is also aware of the 24-hour crisis call service offered by her [facility]    Risks/Benefits      Risks, Benefits And Possible Side Effects Of Medications:    Risks, benefits, and possible side effects of medications explained to patient and patient verbalizes understanding and agreement for treatment. Controlled Medication Discussion:     Not applicable    Psychotherapy Provided:     Individual psychotherapy provided: No.  The visit started at 10:30 AM and ended at 10:50 AM.  Time was spent reviewing the treatment plan, reviewing medications, ordering the same and completing the progress note.

## 2023-12-07 ENCOUNTER — TELEPHONE (OUTPATIENT)
Dept: PSYCHIATRY | Facility: CLINIC | Age: 38
End: 2023-12-07

## 2023-12-27 ENCOUNTER — OFFICE VISIT (OUTPATIENT)
Dept: INTERNAL MEDICINE CLINIC | Facility: OTHER | Age: 38
End: 2023-12-27
Payer: COMMERCIAL

## 2023-12-27 ENCOUNTER — EVALUATION (OUTPATIENT)
Dept: PHYSICAL THERAPY | Facility: REHABILITATION | Age: 38
End: 2023-12-27
Payer: COMMERCIAL

## 2023-12-27 VITALS
WEIGHT: 148 LBS | SYSTOLIC BLOOD PRESSURE: 116 MMHG | OXYGEN SATURATION: 98 % | BODY MASS INDEX: 25.27 KG/M2 | DIASTOLIC BLOOD PRESSURE: 78 MMHG | HEART RATE: 81 BPM | TEMPERATURE: 98.1 F | HEIGHT: 64 IN

## 2023-12-27 DIAGNOSIS — Z00.00 ANNUAL PHYSICAL EXAM: Primary | ICD-10-CM

## 2023-12-27 DIAGNOSIS — N39.46 MIXED STRESS AND URGE URINARY INCONTINENCE: Primary | ICD-10-CM

## 2023-12-27 PROCEDURE — 97112 NEUROMUSCULAR REEDUCATION: CPT | Performed by: PHYSICAL THERAPIST

## 2023-12-27 PROCEDURE — 97161 PT EVAL LOW COMPLEX 20 MIN: CPT | Performed by: PHYSICAL THERAPIST

## 2023-12-27 PROCEDURE — 99395 PREV VISIT EST AGE 18-39: CPT | Performed by: NURSE PRACTITIONER

## 2023-12-27 NOTE — PROGRESS NOTES
PT Evaluation     Today's date: 2023  Patient name: Michelle Gonzales  : 1985  MRN: [id number]  Referring provider: Yolie Ron MD  Dx:   Encounter Diagnosis     ICD-10-CM    1. Mixed stress and urge urinary incontinence  N39.46                      Assessment  Assessment details: Michelle Gonzales is a 38 y.o. year old  female currently 10 months postpartum with complaints of mixed urinary incontinence.  Patient's presentation is consistent with pelvic floor muscle incoordination and weakness with the following impairments found on evaluation: increased IAP with oblique overactivity when attempting PFM contraction; breath holding; poor PF AROM. These impairments contribute to the following functional limitations: decreased tolerance to exercise, stair climbing, fast walking, lifting/carrying.  Michelle Gonzales is a good candidate for physical therapy and would benefit from skilled physical therapy (specifically, PFM coordination training then PFM strengthening progressing toward impact activities, hip strengthening and return to running.) to address the above impairments in order to allow the patient to achieve the goals listed and return to prior level of function.     During initial evaluation, education was provided on anatomy and function of the pelvic floor muscles.  Patient also educated on diagnosis, plan of care and prognosis. Patient provided written and verbal consent for pelvic floor muscle exam. She is in agreement with recommended plan of care and goals for therapy, and demonstrates motivation for active participation in proposed plan of care.    Understanding of Dx/Px/POC: good   Prognosis: good    Goals  1. PFM isolated contraction at least 3/5 without downward bladder movement in 6 weeks.  2. ANISA with stair climbing resolves in 8 weeks.  3. RODRIGO score decreased to <20 to reflect MCID improvement in symptoms in 12 weeks.  4. Able to perform low-impact strengthening exercises with  "resistance without UI in 12 weeks.  5. Able to run 1 mile on treadmill without UI in 16 weeks.    Plan  Frequency: 1x week  Duration in weeks: 16  Plan of Care beginning date: 2023  Plan of Care expiration date: 2024  Treatment plan discussed with: patient and referring physician      PT Pelvic Floor Subjective:   History of Present Illness:   Chronic UI (rarely with hard laughing/coughing) but then worsening after childbirth. Urge UI with \"key in the door\" 2-3 times per week.  UI with movement when she has a full bladder, daily basis- walking up the stairs, coughing with illnesses.  All running/jumping/body weight squats.  GYN:  8yo (vacuum assisted vaginal w 3rd degree laceration, complicated by postpartum hemorrhaging s/p exploratory lapartomy abdominally then small bowel obstruction requiring subsequent surgery), 3yo (scheduled ), 10 months (scheduled ). Stopped lactating approx 8 months ago; regular menstruation. S/p tubal ligation. Denies cysts/ fibroids.  BOWEL: chronic constipation x3 years- infrequent BM with hard stool, straining 50% of the time; currently taking Colace intermittently and this is improved in the last 6 months.   ORTHO: chronic R knee and R hip pain with running, with most recently pregnancy experienced severe tailbone pain but this has resolved postpartum.  Exercise preferences: running, HIIT workouts (previously [employer], now [facility])  Has not returned to running yet, doing body weight and strengthening exercises despite UI with >50% of the time.  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  Pain:     Current pain ratin    At best pain ratin    At worst pain ratin    Location:  R knee/hip    Onset:  More than 2 years ago    Aggravating factors:  Walking and exercise  Patient Goals:     Patient goals for therapy:  Improved bladder or bowel function, improved quality of life and return to sport/leisure activities      Objective "       Abdominal Assessment:      Abdominal Assessment: Well healed vertical midline incision with moderate restrictions of the skin throughout; linea alba is intact and with good tension generation throughout except approx 2-3 inches cephalad to umbilicus-- there is notable bulge with cough and curl up without discrete herniation.      General Perineum Exam:   perineum intact.   Positive for hemorrhoids (latent).   Negative for swelling, gaping introitus and perianal erythema    Visual Inspection of Perineum:   Excursion of perineal body in cephalad direction with contraction of pelvic floor muscles (PFM): bearing down with attempt  Involuntary contraction with coughing: yes  Involuntary relaxation with bearing down: no (mild increased tone of levator ani on bear down)  PFM Contraction Comments: Moderate anterior vaginal wall laxity with cough and bear down. PFM painfree throughout; Resting tone of levator ani mildly hypertonus with layer 1-2 WNL.  Cotton swab test: non-tender  Sensation: intact    Pelvic Organ Prolapse   no pelvic organ prolapse  Perineal body inspection: within normal limits        Pelvic Floor Muscle Exam:     Muscle Contraction: substitution and oblique contraction resulting in increased IAP and bear down   Breathing pattern with contraction: holding breath   Pelvic floor muscle relaxation is complete.         PERFECT Score   Power right: 1/5   Power left: 1/5         Rectal Pelvic Floor Muscle Exam    External anal sphincter: hemorrhoids (latent)    pelvic floor exam consent given by patient    Pelvic exam completed: vaginally            Precautions: standard    Access Code: [email or web address]  URL: [email or web address]  Date: 12/27/2023  Prepared by: Larissa Steimling    Exercises  - Supine Posterior Pelvic Tilt  - 1 x daily - 5 sets - 5 reps  - Supine Inner Thigh Squeeze with Ball  - 1 x daily - 5 sets - 5 reps  POC expires  Auth Status? (BOMN, approved, pending) Unit limit (Daily) Auth  Start date Expiration date PT/OT + Visit Limit?   3/22/24 BOMN         Date of Service 12/27        Visits Used 1        Visits Remaining 99        Patient Education         PFM anatomy and function 5'        Bladder healthy habits                                             Neuro Re-Ed         DB 5'        PF isolation Adductor squeeze 3x5  PPT 3x5        Defecation mechanics                                    Ther Ex                           PFM strengthening         Hip strengthening         PFM stretches                  Ther Activity                                    Manual Ther         PFM exam completed        Ortho exam         PFM STM/MFR         Abdominal MFR                           Modalities

## 2023-12-27 NOTE — ASSESSMENT & PLAN NOTE
-up to date with fasting blood work   -Continue with well-balanced diet, encouraged daily exercise  -She is up-to-date with vaccinations  -Encourage monthly self breast examination  -due for cervical CA screening, has appointment scheduled   -Follow-up in 1 year for annual physical or sooner if needed

## 2023-12-27 NOTE — PROGRESS NOTES
[facility] - [facility] PRIMARY CARE [facility] Adult Female Physical Visit  Patient ID: Michelle Gonzales    : 1985  Age/Gender: 38 y.o. female     DATE: 2023      Assessment/Plan:    Annual physical exam  -up to date with fasting blood work   -Continue with well-balanced diet, encouraged daily exercise  -She is up-to-date with vaccinations  -Encourage monthly self breast examination  -due for cervical CA screening, has appointment scheduled   -Follow-up in 1 year for annual physical or sooner if needed  BMI Counseling: Body mass index is 25.4 kg/m². The BMI is above normal. Nutrition recommendations include decreasing portion sizes, encouraging healthy choices of fruits and vegetables, decreasing fast food intake, consuming healthier snacks, limiting drinks that contain sugar, moderation in carbohydrate intake, increasing intake of lean protein, reducing intake of saturated and trans fat and reducing intake of cholesterol. Exercise recommendations include moderate physical activity 150 minutes/week and exercising 3-5 times per week. Rationale for BMI follow-up plan is due to patient being overweight or obese.     Depression Screening and Follow-up Plan: Patient was screened for depression during today's encounter. They screened negative with a PHQ-2 score of 0.         Diagnoses and all orders for this visit:    Annual physical exam  -     CBC and differential  -     Comprehensive metabolic panel; Future          Subjective:     Michelle Gonzales is a 38 y.o. female who presents to the office on 2023 for a health maintenance physical.    HPI  The following portions of the patient's history were reviewed and updated as appropriate: allergies, current medications, past family history, past medical history, past social history, past surgical history, and problem list.    Pt reports overall health:  good  Last Physical: last year  Last GYN Exam:  2023    Healthy  Diet:  well balanced  Routine Exercise:  2 days a week to the gym   Weight Concerns:  would like to lose some weight    Problems with vision:  denies, wears glasses  Last Eye Exam:  Summer 2023    Problems with Hearing:  denies    Routine Dental Exams:  every 6 months    Smoking History:  denies  ETOH Use:  social  Illegal Drug Use:  denies  Caffeine Use:  2 cups of coffee a day    Reproductive Health:    Last PAP:  2020  History of abnormal PAP:  denies  LMP:  Dec 17th  Sexually Active:  currently  Contraceptive:  tubal  Problems with menstrual cycle:  denies  Vaginal Discharge:  denies    Self Breast Exams: monthly   Denies family history of breast CA    Depression Screening:  PHQ-2/9 Depression Screening    Little interest or pleasure in doing things: 0 - not at all  Feeling down, depressed, or hopeless: 0 - not at all  PHQ-2 Score: 0  PHQ-2 Interpretation: Negative depression screen           Family History of Colon CA: maternal grandmother, diagnosed at age 81    Last Labs:  2023      Review of Systems   Constitutional:  Negative for activity change, appetite change, chills, diaphoresis and fever.   HENT:  Negative for congestion, ear discharge, ear pain, postnasal drip, rhinorrhea, sinus pressure, sinus pain and sore throat.    Eyes:  Negative for pain, discharge, itching and visual disturbance.   Respiratory:  Negative for cough, chest tightness, shortness of breath and wheezing.    Cardiovascular:  Negative for chest pain, palpitations and leg swelling.   Gastrointestinal:  Negative for abdominal pain, blood in stool, constipation, diarrhea, nausea and vomiting.   Endocrine: Negative for polydipsia, polyphagia and polyuria.   Genitourinary:  Negative for difficulty urinating, dysuria, hematuria and urgency.   Musculoskeletal:  Negative for arthralgias, back pain and neck pain.   Skin:  Negative for rash and wound.   Neurological:  Negative for dizziness, weakness, numbness and headaches.         Patient  Active Problem List   Diagnosis    Acne    Elevated rheumatoid factor    Hernia, hiatal    History of DIC syndrome    History of small bowel obstruction    Essential hypertension    History of polyhydramnios    SARA (generalized anxiety disorder)    Annual physical exam       Past Medical History:   Diagnosis Date    Acute renal failure (HCC)     [id number] RESOLVED    Acute tubular necrosis (HCC)     Anemia     during the pregnancy     Bowel obstruction (HCC) 2017    Chronic kidney disease     Fibroadenoma of breast, left     Fibroadenoma of breast, right      1 para 1     History of transfusion 2015    After delivery     Hypertension     no medication     Kidney stone     Postpartum hemorrhage     UNSPECIFIED TYPE  RESOLVED     Urinary tract infection     yes in the last ten years    Vacuum extractor delivery, delivered      daughter with postpartum hemorrhage and DIC    Varicella     had childhood chickenpox       Past Surgical History:   Procedure Laterality Date    BREAST FIBROADENOMA SURGERY      Cryosurgical Ablation of Fibroadenoma    BREAST LUMPECTOMY Left     BREAST LUMPECTOMY Right      SECTION          EXPLORATORY LAPAROTOMY W/ BOWEL RESECTION N/A 2017    Procedure: LAPAROTOMY EXPLORATORY W/ lysis of adhesions;  Surgeon: Felix Suggs DO;  [facility]: [facility];  Service:     INTRAUTERINE DEVICE INSERTION      LAPAROTOMY      EXPLORATORY for DIC and hemorrhage postpartum retroperitoneal hematoma    VT  DELIVERY ONLY N/A 2020    Procedure:  SECTION () REPEAT;  Surgeon: Kena Green MD;  [facility]: [facility];  Service: Obstetrics    VT  DELIVERY ONLY N/A 2023    Procedure:  SECTION () REPEAT;  Surgeon: Yolie Ron MD;  [facility];  Service: Obstetrics    VT LIG/TRNSXJ FALOPIAN TUBE  DEL/ABDML SURG Bilateral 2023    Procedure: LIGATION/COAGULATION TUBAL;  Surgeon:  Yolie Ron MD;  Location: [facility] ;  Service: Obstetrics    TOOTH EXTRACTION      Impression: [id number] Tomasa Enamorado  2004         Current Outpatient Medications:     Docusate Sodium (COLACE PO), Take by mouth as needed, Disp: , Rfl:     NIFEdipine (PROCARDIA XL) 30 mg 24 hr tablet, Take 1 tablet (30 mg total) by mouth 2 (two) times a day, Disp: 180 tablet, Rfl: 3    Probiotic Product (PROBIOTIC PO), Take by mouth, Disp: , Rfl:     sertraline (Zoloft) 50 mg tablet, 1 Daily, Disp: 90 tablet, Rfl: 2    spironolactone (ALDACTONE) 50 mg tablet, Take 50 mg by mouth 2 (two) times a day, Disp: , Rfl:     traZODone (DESYREL) 50 mg tablet, 1/2-1 tab HS PRN for sleep, Disp: 90 tablet, Rfl: 2    tretinoin (RETIN-A) 0.025 % cream, Apply topically as needed, Disp: , Rfl:     Multiple Minerals-Vitamins (Jason-Mag-Zinc-D) TABS, Take by mouth 2 (two) times a day (Patient not taking: Reported on 12/27/2023), Disp: , Rfl:     Allergies   Allergen Reactions    Nickel Rash     Skin rash       Social History     Socioeconomic History    Marital status: /Civil Union     Spouse name: None    Number of children: None    Years of education: None    Highest education level: None   Occupational History    None   Tobacco Use    Smoking status: Never    Smokeless tobacco: Never   Vaping Use    Vaping status: Never Used   Substance and Sexual Activity    Alcohol use: Not Currently     Alcohol/week: 2.0 standard drinks of alcohol     Types: 2 Glasses of wine per week     Comment: Glass or two a week    Drug use: No    Sexual activity: Yes     Partners: Male     Birth control/protection: Female Sterilization   Other Topics Concern    None   Social History Narrative    Always uses a seatbelt    Drinks Coffee - 2 cups a day    IUD Contraception - mirena    Lack of exercise     (per [employer])     Social Determinants of Health     Financial Resource Strain: Not on file   Food Insecurity: Not on file   Transportation Needs: Not on  file   Physical Activity: Not on file   Stress: Not on file   Social Connections: Not on file   Intimate Partner Violence: Not on file   Housing Stability: Not on file       Family History   Problem Relation Age of Onset    Hypertension Mother         URINARY INCONTINENCE    Hyperlipidemia Mother     Diverticulitis Mother     Hypothyroidism Mother     Hypertension Father     Hepatitis Father         A    Prostate cancer Father     No Known Problems Sister     No Known Problems Brother     No Known Problems Daughter     Other Daughter         CHARGE syndrome    Diverticulitis Maternal Grandmother     Colon cancer Maternal Grandmother     Stroke Maternal Grandfather         CVA    Heart disease Maternal Grandfather     Diverticulitis Maternal Grandfather     Hypertension Maternal Grandfather     Heart attack Maternal Grandfather     Alzheimer's disease Paternal Grandmother     Diabetes Paternal Grandfather         INSULIN DEPENDENT       Immunization History   Administered Date(s) Administered    COVID-19 PFIZER VACCINE 0.3 ML IM 12/23/2020, 01/12/2021, 09/28/2021    INFLUENZA 01/23/2019, 10/05/2022    Influenza, injectable, quadrivalent, preservative free 0.5 mL 10/09/2020, 10/11/2023    MMR 12/19/2020    Tdap 05/11/2015, 10/09/2020, 12/15/2022        Health Maintenance   Topic Date Due    BMI: Followup Plan  Never done    Cervical Cancer Screening  06/22/2023    COVID-19 Vaccine (4 - 2023-24 season) 09/01/2023    Depression Screening  12/27/2024    BMI: Adult  12/27/2024    Annual Physical  12/27/2024    DTaP,Tdap,and Td Vaccines (4 - Td or Tdap) 12/15/2032    HIV Screening  Completed    Hepatitis C Screening  Completed    Influenza Vaccine  Completed    Pneumococcal Vaccine: Pediatrics (0 to 5 Years) and At-Risk Patients (6 to 64 Years)  Aged Out    HIB Vaccine  Aged Out    IPV Vaccine  Aged Out    Hepatitis A Vaccine  Aged Out    Meningococcal ACWY Vaccine  Aged Out    HPV Vaccine  Aged Out  "        Objective:  Vitals:    12/27/23 0906   BP: 116/78   BP Location: Left arm   Patient Position: Sitting   Cuff Size: Adult   Pulse: 81   Temp: 98.1 °F (36.7 °C)   SpO2: 98%   Weight: 67.1 kg (148 lb)   Height: 5' 4\" (1.626 m)     Wt Readings from Last 3 Encounters:   12/27/23 67.1 kg (148 lb)   10/31/23 66.8 kg (147 lb 3.2 oz)   07/10/23 65.3 kg (144 lb)     Body mass index is 25.4 kg/m².  No results found.       Physical Exam  Constitutional:       General: She is not in acute distress.     Appearance: She is well-developed. She is not diaphoretic.   HENT:      Head: Normocephalic and atraumatic.      Right Ear: External ear normal.      Left Ear: External ear normal.      Nose: Nose normal.      Mouth/Throat:      Mouth: Mucous membranes are moist.      Pharynx: No oropharyngeal exudate or posterior oropharyngeal erythema.   Eyes:      General:         Right eye: No discharge.         Left eye: No discharge.      Conjunctiva/sclera: Conjunctivae normal.      Pupils: Pupils are equal, round, and reactive to light.   Neck:      Thyroid: No thyromegaly.   Cardiovascular:      Rate and Rhythm: Normal rate and regular rhythm.      Heart sounds: Normal heart sounds. No murmur heard.     No friction rub. No gallop.   Pulmonary:      Effort: Pulmonary effort is normal. No respiratory distress.      Breath sounds: Normal breath sounds. No stridor. No wheezing or rales.   Abdominal:      General: Bowel sounds are normal. There is no distension.      Palpations: Abdomen is soft.      Tenderness: There is no abdominal tenderness.   Musculoskeletal:      Cervical back: Normal range of motion and neck supple.   Lymphadenopathy:      Cervical: No cervical adenopathy.   Skin:     General: Skin is warm and dry.      Findings: No erythema or rash.   Neurological:      Mental Status: She is alert and oriented to person, place, and time.   Psychiatric:         Behavior: Behavior normal.         Thought Content: Thought content " normal.         Judgment: Judgment normal.             Future Appointments   Date Time Provider Department Center   12/27/2023 11:00 AM Larissa Steimling, PT [facility] PT 3 NEW BE 3rd & NEW   1/3/2024  8:45 AM Larissa Steimling, PT BE PT 3 NEW BE 3rd & NEW   1/10/2024  8:00 AM Larissa Steimling, PT BE PT 3 NEW BE 3rd & NEW   1/17/2024  9:00 AM Larissa Steimling, PT BE PT 3 NEW BE 3rd & NEW   6/3/2024 11:00 AM PITO Holder [facility]   6/13/2024 10:20 AM Yolie Ron MD [facility]   12/30/2024  8:00 AM PITO Arredondo [facility]       PITO Arredondo  [facility] PRIMARY CARE [place]    Patient Care Team:  PITO Arredondo as PCP - General (Family Medicine)  MD Yolie Junior MD (Obstetrics and Gynecology)  Che Cano MD (Obstetrics and Gynecology)  Gena Stroud MD (Obstetrics and Gynecology)  Hilary Hill MD (Obstetrics and Gynecology)  Kena Jolly MD (Obstetrics and Gynecology)  Daja Monaco MD (Obstetrics and Gynecology)  Che Cano MD (Obstetrics and Gynecology)  Yolie Ron MD (Obstetrics and Gynecology)  Kita Bravo MD (Obstetrics and Gynecology)  Justin Espitia MD (Nephrology)

## 2024-01-02 NOTE — PROGRESS NOTES
Daily Note     Today's date: 1/3/2024  Patient name: Michelle Gonzales  : 1985  MRN: [id number]  Referring provider: Yolie Ron MD  Dx:   Encounter Diagnosis     ICD-10-CM    1. Mixed stress and urge urinary incontinence  N39.46           Start Time: 0830  Stop Time: 0915  Total time in clinic (min): 45 minutes    Subjective: Pt feels more aware, no other changes. Pt shares their toilet is high due to 's height and feels her positioning might not be ideal.      Objective: See treatment diary below      Assessment: Tolerated treatment well. Patient would benefit from continued PT Pt seen for first visit since IE, reviewed defection strategies including squatty potty. Practiced PFM cueing and mapping, appears to have less awareness on her L side.  Pt benefits from cueing and pacing to improve coordination tendency to engage her abdominals.     Access Code: [email or web address]  URL: [email or web address]  Date: 2024  Prepared by: Ariadna    Exercises  - Supine Bridge with Pelvic Floor Contraction  - 1 x daily - 7 x weekly - 2 sets - 10 reps - 5 hold  - Seated Cough with Pelvic Floor Contraction and Hand to Mouth  - 1 x daily - 7 x weekly - 2 sets - 10 reps - 5 hold  - Sidelying Pelvic Floor Contraction with Hip Abduction  - 1 x daily - 7 x weekly - 2 sets - 10 reps - 5 hold  - Sidelying Clamshell with Pelvic Floor Contraction  - 1 x daily - 7 x weekly - 2 sets - 10 reps - 5 hold  - Quadruped Exhale with Pelvic Floor Contraction  - 1 x daily - 7 x weekly - 2 sets - 10 reps - 5 hold    Plan: Continue per plan of care.      Precautions: standard    Access Code: [email or web address]  URL: [email or web address]  Date: 2023  Prepared by: Larissa Steimling    Exercises  - Supine Posterior Pelvic Tilt  - 1 x daily - 5 sets - 5 reps  - Supine Inner Thigh Squeeze with Ball  - 1 x daily - 5 sets - 5 reps  POC expires  Auth Status? (BOMN, approved, pending) Unit limit (Daily) Auth Start date  Expiration date PT/OT + Visit Limit?   3/22/24 BOMN         Date of Service 12/27 01/03       Visits Used 1        Visits Remaining 99        Patient Education         PFM anatomy and function 5'        Bladder healthy habits         Canister concept  5'       Squatty potty/knack  5'                         Neuro Re-Ed         Intravaginal PFM cueing  15'       DB 5'        PF isolation Adductor squeeze 3x5  PPT 3x5 3x5 PPT       Defecation mechanics  5'       bridge  5x       clamshells  5x       Q-ped  PFM 5x       S/l hip abduction  5x       Ther Ex                           PFM strengthening         Hip strengthening         PFM stretches                  Ther Activity                                    Manual Ther         PFM exam completed        Ortho exam         PFM STM/MFR         Abdominal MFR                           Modalities

## 2024-01-03 ENCOUNTER — OFFICE VISIT (OUTPATIENT)
Dept: PHYSICAL THERAPY | Facility: REHABILITATION | Age: 39
End: 2024-01-03
Payer: COMMERCIAL

## 2024-01-03 DIAGNOSIS — N39.46 MIXED STRESS AND URGE URINARY INCONTINENCE: Primary | ICD-10-CM

## 2024-01-03 PROCEDURE — 97530 THERAPEUTIC ACTIVITIES: CPT

## 2024-01-03 PROCEDURE — 97112 NEUROMUSCULAR REEDUCATION: CPT

## 2024-01-10 ENCOUNTER — OFFICE VISIT (OUTPATIENT)
Dept: PHYSICAL THERAPY | Facility: REHABILITATION | Age: 39
End: 2024-01-10
Payer: COMMERCIAL

## 2024-01-10 DIAGNOSIS — N39.46 MIXED STRESS AND URGE URINARY INCONTINENCE: Primary | ICD-10-CM

## 2024-01-10 PROCEDURE — 97110 THERAPEUTIC EXERCISES: CPT | Performed by: PHYSICAL THERAPIST

## 2024-01-10 PROCEDURE — 97112 NEUROMUSCULAR REEDUCATION: CPT | Performed by: PHYSICAL THERAPIST

## 2024-01-10 NOTE — PROGRESS NOTES
Daily Note     Today's date: 1/10/2024  Patient name: Michelle Gonzales  : 1985  MRN: [id number]  Referring provider: Yolie Ron MD  Dx:   Encounter Diagnosis     ICD-10-CM    1. Mixed stress and urge urinary incontinence  N39.46                      Subjective: Feeling more PF contraction, although she noted ANISA with decline situp at the gym the other day so she limited her reps.    Objective: See treatment diary below      Assessment: Tolerated treatment well. PF isometric has improved with no evidence of downward bladder mobility via TA co-contraction noted on internal exam today. sEMG biofeedback utilized in supine to evaluate endurance, which is moderately impaired. PF+PPT continues to be effective, but PF+ adductor cocontraction appears to inhibit PF and is held. In Quadruped, utilized hip hinge w glute squeeze to encourage earlier PF contraction. Continue to progress BLE and asymmetrical LE strengthening for PF strengthening.    Access Code: [email or web address]  URL: [email or web address]  Date: 2024  Prepared by: Ariadna    Exercises  - Supine Bridge with Pelvic Floor Contraction  - 1 x daily - 7 x weekly - 2 sets - 10 reps - 5 hold  - Seated Cough with Pelvic Floor Contraction and Hand to Mouth  - 1 x daily - 7 x weekly - 2 sets - 10 reps - 5 hold  - Sidelying Pelvic Floor Contraction with Hip Abduction  - 1 x daily - 7 x weekly - 2 sets - 10 reps - 5 hold  - Sidelying Clamshell with Pelvic Floor Contraction  - 1 x daily - 7 x weekly - 2 sets - 10 reps - 5 hold  - Quadruped Exhale with Pelvic Floor Contraction  - 1 x daily - 7 x weekly - 2 sets - 10 reps - 5 hold  - Quadruped Hip Hinge with PF and gentle Glute Squeeze - 1x daily - 30 reps    Plan: Continue per plan of care.      Precautions: standard    Access Code: [email or web address]  URL: [email or web address]  Date: 2023  Prepared by: Larissa Cardimkamaljit    Exercises  - Supine Posterior Pelvic Tilt  - 1 x daily - 5 sets - 5  reps  - Supine Inner Thigh Squeeze with Ball  - 1 x daily - 5 sets - 5 reps  POC expires  Auth Status? (BOMN, approved, pending) Unit limit (Daily) Auth Start date Expiration date PT/OT + Visit Limit?   3/22/24 BOMN         Date of Service 12/27 01/03 1/10      Visits Used 1 2 3      Visits Remaining 99 99 99      Patient Education         PFM anatomy and function 5'        Bladder healthy habits         Canister concept  5'       Squatty potty/knack  5'                         Neuro Re-Ed/Ther Ex         Intravaginal PFM cueing  15' 5'      DB 5'        PF isolation Adductor squeeze 3x5  PPT 3x5 3x5 PPT sEMG 15 min  PPT 2x5      Defecation mechanics  5'       bridge  5x nc      clamshells  5x X15 ea      Q-ped  PFM 5x Isometric x10  Hip hinge w PF and glute x10        S/l hip abduction  5x X15 ea               Ther Activity                                    Manual Ther         PFM exam completed        Ortho exam                           Modalities

## 2024-01-17 ENCOUNTER — OFFICE VISIT (OUTPATIENT)
Dept: PHYSICAL THERAPY | Facility: REHABILITATION | Age: 39
End: 2024-01-17
Payer: COMMERCIAL

## 2024-01-17 DIAGNOSIS — N39.46 MIXED STRESS AND URGE URINARY INCONTINENCE: Primary | ICD-10-CM

## 2024-01-17 PROCEDURE — 97110 THERAPEUTIC EXERCISES: CPT | Performed by: PHYSICAL THERAPIST

## 2024-01-17 NOTE — PROGRESS NOTES
Daily Note     Today's date: 2024  Patient name: Michelle Gonzales  : 1985  MRN: [id number]  Referring provider: Yolie Ron MD  Dx:   Encounter Diagnosis     ICD-10-CM    1. Mixed stress and urge urinary incontinence  N39.46           Subjective: Minimal ANISA with stair climbing and coughing-- this week it occurred mostly with full bladder and shouting while lifting/bending. At the gym, staying away from large abdominal movements but doing small movements like Serbian twist. Notices that she tends to breath hold when doing effortful tasks at the gym, and she is retraining this.    Objective: See treatment diary below    Assessment: Tolerated treatment well. Progressed pelvic girdle strengthening with emphasis on small, slow, controlled movements and breath coordination. Continue to progress tall kneeling exercises with UE/LE perturbations next visit.    Access Code: [email or web address]  URL: [email or web address]  Date: 2024  Prepared by: Ariadna Rodriguez Notes  Leticia, do the sidestepping on your knees :)     Exercises  - Supine Bridge with Pelvic Floor Contraction  - 1 x daily - 7 x weekly - 2 sets - 10 reps - 5 hold  - Seated Cough with Pelvic Floor Contraction and Hand to Mouth  - 1 x daily - 7 x weekly - 2 sets - 10 reps - 5 hold  - Sidelying Pelvic Floor Contraction with Hip Abduction  - 1 x daily - 7 x weekly - 2 sets - 10 reps - 5 hold  - Sidelying Clamshell with Pelvic Floor Contraction  - 1 x daily - 7 x weekly - 2 sets - 10 reps - 5 hold  - Quadruped Exhale with Pelvic Floor Contraction  - 1 x daily - 7 x weekly - 2 sets - 10 reps - 5 hold  - Tall Kneeling Hip Hinge  - 1 x daily - 7 x weekly - 2 sets - 10 reps - 5 hold  - Side Stepping with Resistance at Ankles  - 1 x daily - 7 x weekly - 2 sets - 10 reps - 5 hold    Plan: Continue per plan of care.      Precautions: standard    POC expires  Auth Status? (BOMN, approved, pending) Unit limit (Daily) Auth Start date Expiration date  PT/OT + Visit Limit?   3/22/24 BOMN         Date of Service 12/27 01/03 1/10 1/17     Visits Used 1 2 3 4     Visits Remaining 99 99 99 99     Patient Education         PFM anatomy and function 5'        Bladder healthy habits         Canister concept  5'       Squatty potty/knack  5'                         Neuro Re-Ed/Ther Ex         Intravaginal PFM cueing  15' 5'      DB 5'        PF isolation Adductor squeeze 3x5  PPT 3x5 3x5 PPT sEMG 15 min  PPT 2x5 Supine PPT x20     Defecation mechanics  5'       bridge  5x nc X20  Uni x20 ea     clamshells  5x X15 ea X30 ea     Q-ped  PFM 5x Isometric x10  Hip hinge w PF and glute x10   Iso x30  Hip Hinge w PPT x30  Mini [employer]drant  Hip hike       S/l hip abduction  5x X15 ea X30 ea     Tall kneeling    Tall kneeling x20  Tall kneeling sidestep 5 reps x5 each direction.     Ther Activity                                    Manual Ther         PFM exam completed        Ortho exam                           Modalities

## 2024-01-22 ENCOUNTER — OFFICE VISIT (OUTPATIENT)
Dept: PHYSICAL THERAPY | Facility: REHABILITATION | Age: 39
End: 2024-01-22
Payer: COMMERCIAL

## 2024-01-22 DIAGNOSIS — N39.46 MIXED STRESS AND URGE URINARY INCONTINENCE: Primary | ICD-10-CM

## 2024-01-22 PROCEDURE — 97110 THERAPEUTIC EXERCISES: CPT | Performed by: PHYSICAL THERAPIST

## 2024-01-22 NOTE — PROGRESS NOTES
Daily Note     Today's date: 2024  Patient name: Michelle Gonzales  : 1985  MRN: [id number]  Referring provider: Yolie Ron MD  Dx:   Encounter Diagnosis     ICD-10-CM    1. Mixed stress and urge urinary incontinence  N39.46             Subjective: Tried jumping jacks and felt ANISA and pelvic pressure. No ANISA with coughing this week, but lots of pelvic pressure. I feel that PFM contractions in supine hooklying are improving in strength and endurance. Less so with quadruped and sitting.    Objective: See treatment diary below    Assessment: Tolerated treatment well. Continued with PF and core strengthening.  Will check PFM strength next visit.    Access Code: [email or web address]  URL: [email or web address]  Date: 2024  Prepared by: Ariadna Ramirez  Leticia, do the sidestepping on your knees :)     Exercises  - Supine Bridge with Pelvic Floor Contraction  - 1 x daily - 7 x weekly - 2 sets - 10 reps - 5 hold  - Seated Cough with Pelvic Floor Contraction and Hand to Mouth  - 1 x daily - 7 x weekly - 2 sets - 10 reps - 5 hold  - Sidelying Pelvic Floor Contraction with Hip Abduction  - 1 x daily - 7 x weekly - 2 sets - 10 reps - 5 hold  - Sidelying Clamshell with Pelvic Floor Contraction  - 1 x daily - 7 x weekly - 2 sets - 10 reps - 5 hold  - Quadruped Exhale with Pelvic Floor Contraction  - 1 x daily - 7 x weekly - 2 sets - 10 reps - 5 hold  - Tall Kneeling Hip Hinge  - 1 x daily - 7 x weekly - 2 sets - 10 reps - 5 hold  - Tall Kneeling Side Stepping  - 1 x daily - 7 x weekly - 2 sets - 10 reps - 5 hold    Plan: Continue per plan of care.      Precautions: standard    POC expires  Auth Status? (BOMN, approved, pending) Unit limit (Daily) Auth Start date Expiration date PT/OT + Visit Limit?   3/22/24 BOMN         Date of Service 12/27 01/03 1/10 1/17 1/22    Visits Used 1 2 3 4 5    Visits Remaining 99 99 99 99 99    Patient Education         PFM anatomy and function 5'        Bladder  healthy habits         Canister concept  5'       Squatty potty/knack  5'                         Neuro Re-Ed/Ther Ex         Intravaginal PFM cueing  15' 5'      DB 5'        PF isolation Adductor squeeze 3x5  PPT 3x5 3x5 PPT sEMG 15 min  PPT 2x5 Supine PPT x20     Defecation mechanics  5'       bridge  5x nc X20  Uni x20 ea MAIK x20  Uni x20    clamshells  5x X15 ea X30 ea     Q-ped  PFM 5x Isometric x10  Hip hinge w PF and glute x10   Iso x30  Hip Hinge w PPT x30  Mini Hydrant  Hip hike   Iso x10  Hip hike x30 alt  Hip hinge x30    S/l hip abduction  5x X15 ea X30 ea x30    Tall kneeling    Tall kneeling x20  Tall kneeling sidestep 5 reps x5 each direction. Tall kneeling +PF x20    Incline plank     Static and with UE and LE variations 5'    KB swing     2' 10# KB             Manual Ther         PFM exam completed        Ortho exam                           Modalities

## 2024-01-31 ENCOUNTER — OFFICE VISIT (OUTPATIENT)
Dept: PHYSICAL THERAPY | Facility: REHABILITATION | Age: 39
End: 2024-01-31
Payer: COMMERCIAL

## 2024-01-31 DIAGNOSIS — N39.46 MIXED STRESS AND URGE URINARY INCONTINENCE: Primary | ICD-10-CM

## 2024-01-31 PROCEDURE — 97112 NEUROMUSCULAR REEDUCATION: CPT

## 2024-01-31 NOTE — PROGRESS NOTES
Daily Note     Today's date: 2024  Patient name: Michelle Gonzales  : 1985  MRN: [id number]  Referring provider: Yolie Ron MD  Dx:   Encounter Diagnosis     ICD-10-CM    1. Mixed stress and urge urinary incontinence  N39.46               Subjective: Overall her symptoms continue to improve, especially at work when at home with taking care of children can be more difficult.     Objective: See treatment diary below    Assessment: Tolerated treatment well. Appears to be making gains with PFM strength but continues to be challenged with endurance.  Focussed more on endurance with today's exercises.  While on reformer benefits from cueing to decrease pace, appropriately challenged.     Access Code: [email or web address]  URL: [email or web address]  Date: 2024  Prepared by: Ariadna Ramirez  Leticia, do the sidestepping on your knees :)     Exercises  - Supine Bridge with Pelvic Floor Contraction  - 1 x daily - 7 x weekly - 2 sets - 10 reps - 5 hold  - Seated Cough with Pelvic Floor Contraction and Hand to Mouth  - 1 x daily - 7 x weekly - 2 sets - 10 reps - 5 hold  - Sidelying Pelvic Floor Contraction with Hip Abduction  - 1 x daily - 7 x weekly - 2 sets - 10 reps - 5 hold  - Sidelying Clamshell with Pelvic Floor Contraction  - 1 x daily - 7 x weekly - 2 sets - 10 reps - 5 hold  - Quadruped Exhale with Pelvic Floor Contraction  - 1 x daily - 7 x weekly - 2 sets - 10 reps - 5 hold  - Tall Kneeling Hip Hinge  - 1 x daily - 7 x weekly - 2 sets - 10 reps - 5 hold  - Tall Kneeling Side Stepping  - 1 x daily - 7 x weekly - 2 sets - 10 reps - 5 hold    Plan: Continue per plan of care.      Precautions: standard    POC expires  Auth Status? (BOMN, approved, pending) Unit limit (Daily) Auth Start date Expiration date PT/OT + Visit Limit?   3/22/24 BOMN         Date of Service 12/27 01/03 1/10 1/17 1/22 1/31   Visits Used 1 2 3 4 5    Visits Remaining 99 99 99 99 99    Patient Education         PFM  anatomy and function 5'        Bladder healthy habits         Canister concept  5'       Squatty potty/knack  5'                         Neuro Re-Ed/Ther Ex         Intravaginal PFM cueing  15' 5'   2+ w/ cueing - fatigues quickly 15'   DB 5'        Reformer supine      1B 1W heel slides with 50% kegel heels together, heels at the end of bar, 1 leg at a time   Reformer quadriped      1B wheelbarrows   Reformer standing      1B reverse & side   PF isolation Adductor squeeze 3x5  PPT 3x5 3x5 PPT sEMG 15 min  PPT 2x5 Supine PPT x20     Defecation mechanics  5'       bridge  5x nc X20  Uni x20 ea MAIK x20  Uni x20    clamshells  5x X15 ea X30 ea     Q-ped  PFM 5x Isometric x10  Hip hinge w PF and glute x10   Iso x30  Hip Hinge w PPT x30  Mini Hydrant  Hip hike   Iso x10  Hip hike x30 alt  Hip hinge x30    S/l hip abduction  5x X15 ea X30 ea x30    Tall kneeling    Tall kneeling x20  Tall kneeling sidestep 5 reps x5 each direction. Tall kneeling +PF x20    Incline plank     Static and with UE and LE variations 5'    KB swing     2' 10# KB             Manual Ther         PFM exam completed        Ortho exam                           Modalities

## 2024-02-06 NOTE — PROGRESS NOTES
Patient had cramping recently, but denies any LOF or bleeding  The cramping has also stopped  Urine neg for protein and glucose  She completed the 2nd part of sequential screening this morning  U/S scheduled 8/20  She is having a girl! She is starting to feel fetal movement  [Abnormal] : Examination of extremities for edema and/or varicosities: Abnormal [Absent] : Adnexa(ae): Absent [Normal] : Recto-Vaginal Exam: Normal [Fully active, able to carry on all pre-disease performance without restriction] : Status 0 - Fully active, able to carry on all pre-disease performance without restriction [Chaperone Present] : A chaperone was present in the examining room during all aspects of the physical examination [de-identified] : some swelling of her lower extremities not tender

## 2024-02-07 ENCOUNTER — OFFICE VISIT (OUTPATIENT)
Dept: PHYSICAL THERAPY | Facility: REHABILITATION | Age: 39
End: 2024-02-07
Payer: COMMERCIAL

## 2024-02-07 DIAGNOSIS — N39.46 MIXED STRESS AND URGE URINARY INCONTINENCE: Primary | ICD-10-CM

## 2024-02-07 PROCEDURE — 97112 NEUROMUSCULAR REEDUCATION: CPT

## 2024-02-07 PROCEDURE — 97750 PHYSICAL PERFORMANCE TEST: CPT

## 2024-02-07 NOTE — PROGRESS NOTES
Daily Note     Today's date: 2024  Patient name: Michelle Gonzales  : 1985  MRN: [id number]  Referring provider: Yolie Ron MD  Dx:   Encounter Diagnosis     ICD-10-CM    1. Mixed stress and urge urinary incontinence  N39.46                 Subjective: Pt was sore after last session.  Is pleased to report she is no longer leaking but has not returned to TM which is her goal.     Objective: See treatment diary below    Running Analysis: Performed with permission to use patient's cell phone. Slow motion videos were utilized.   Sagittal Plane:  Noted heel strike initial contact of left lower extremity. Increased anterior pelvic tilt noted, with increased forward lean. Reduced arm swing B/L which is limiting landy progression. Landy ~130-140 bpm.   Frontal Plane: (+) left Trendelenburg with L stance time. Slight  (+) right Trendelenburg with landing (less than left LE)  Transverse Plane: Increased pelvic rotation, reducing energy efficiency.   Education: Educated patient on the benefits of improving landy to reduce injury risk and improve energy efficiency. Patient will benefit from further proximal hip stability, specifically hip abduction to assist with SLS stability and coordination which is optimal for running mechanics.     Postpartum Return to Run Checklist  Baseline symptoms  No symptoms of incontinence or pelvic pain with daily life  No measureable pelvic organ prolapse on vaginal exam  No abdominal wall doming/bulge (diastasis rectus abdominis) with Active Single Leg Raise and Modified Curl Up tests  Pelvic floor muscle strength (via intra-vaginal exam)  Rated at least a 3 out of 5 on a scale of 0-5 by your physical therapist via intra-vaginal assessment.  Pelvic floor muscle endurance (in standing)  10 fast repetitions  8-12 repetitions, holding each for 6-8 seconds with maximal strength  60 second contraction holding at least 50% strength  Leg Strength  No deficits in all key hip and  abdominal muscles, measured isometrically.  Single-leg heel raise: 20 repetitions  Single leg bridge: 20 repetitions PRACTICED 15X  Single leg sit-to-stand: 20 repetitions PRACTICED 15  Side-lying leg raise (hip abduction): 20 repetitions GOAL MET  Load and impact assessment with no symptoms of pain, pressure, or incontinence:  Walking 30 minutes at a fast pace (at least 3.5 mph)  Single leg balance 10 sec each leg  Single leg squat 10 rep each side  Jog on the spot 1 min  Forward bounds 10 rep  Hop in place 10 rep each leg  Single leg 'running man' (opposite arm and hip flexion/extension with bent knee) for 10 rep each side    Current HEP: 2/7/2024  1. Single leg heel raise  2. Single leg squat  3. Single leg sit to stand  4. Sl/ hip abduction      Assessment: Tolerated treatment well. Introduced to back to running program with her goal of returning to TM running. Running analysis performed by Odalis Arteaga DPT (findings reported above). Also added agility ladder drills and steamboats to program.  Fatigued by the end of the session but remained dry. More challenged with single leg exercises through her L LE.    Access Code: [email or web address]  URL: [email or web address]  Date: 02/07/2024  Prepared by: Ariadna Rodriguez Notes  Leticia, do the sidestepping on your knees :)    Exercises  - Sidelying Pelvic Floor Contraction with Hip Abduction  - 1 x daily - 7 x weekly - 2 sets - 10 reps - 5 hold  - Sidelying Clamshell with Pelvic Floor Contraction  - 1 x daily - 7 x weekly - 2 sets - 10 reps - 5 hold  - Quadruped Exhale with Pelvic Floor Contraction  - 1 x daily - 7 x weekly - 2 sets - 10 reps - 5 hold  - Tall Kneeling Hip Hinge  - 1 x daily - 7 x weekly - 2 sets - 10 reps - 5 hold  - Side Stepping with Resistance at Ankles  - 1 x daily - 7 x weekly - 2 sets - 10 reps - 5 hold  - Single Leg Bridge  - 1 x daily - 7 x weekly - 2 sets - 10 reps - 5 hold  - Standing Repeated Hip Abduction with Resistance  - 1 x daily  - 7 x weekly - 2 sets - 10 reps - 5 hold  - Standing Repeated Hip Extension with Resistance  - 1 x daily - 7 x weekly - 2 sets - 10 reps - 5 hold  - Standing Repeated Hip Abduction on Foam Pad  - 1 x daily - 7 x weekly - 2 sets - 10 reps - 5 hold  Plan: Continue per plan of care.      Precautions: standard    POC expires  Auth Status? (BOMN, approved, pending) Unit limit (Daily) Auth Start date Expiration date PT/OT + Visit Limit?   3/22/24 BOMN         Date of Service 12/27 01/03 1/10 1/17 1/22 1/31 2/7   Visits Used 1 2 3 4 5 6 7   Visits Remaining 99 99 99 99 99 99 99   Patient Education          PFM anatomy and function 5'         Bladder healthy habits          Canister concept  5'        Squatty potty/knack  5'                            Neuro Re-Ed/Ther Ex          Running analysis       NATY 15'    Back to running program       Exercises - reported above 30'   Intravaginal PFM cueing  15' 5'   2+ w/ cueing - fatigues quickly 15'    DB 5'         Reformer supine      1B 1W heel slides with 50% kegel heels together, heels at the end of bar, 1 leg at a time    Reformer quadriped      1B wheelbarrows    Reformer standing      1B reverse & side    PF isolation Adductor squeeze 3x5  PPT 3x5 3x5 PPT sEMG 15 min  PPT 2x5 Supine PPT x20      Defecation mechanics  5'        bridge  5x nc X20  Uni x20 ea MAIK x20  Uni x20     clamshells  5x X15 ea X30 ea      Q-ped  PFM 5x Isometric x10  Hip hinge w PF and glute x10   Iso x30  Hip Hinge w PPT x30  Mini Hydrant  Hip hike   Iso x10  Hip hike x30 alt  Hip hinge x30     S/l hip abduction  5x X15 ea X30 ea x30     Tall kneeling    Tall kneeling x20  Tall kneeling sidestep 5 reps x5 each direction. Tall kneeling +PF x20     Incline plank     Static and with UE and LE variations 5'     KB swing     2' 10# KB               Manual Ther          PFM exam completed         Ortho exam                              Modalities

## 2024-02-14 ENCOUNTER — OFFICE VISIT (OUTPATIENT)
Dept: PHYSICAL THERAPY | Facility: REHABILITATION | Age: 39
End: 2024-02-14
Payer: COMMERCIAL

## 2024-02-14 DIAGNOSIS — N39.46 MIXED STRESS AND URGE URINARY INCONTINENCE: Primary | ICD-10-CM

## 2024-02-14 PROCEDURE — 97112 NEUROMUSCULAR REEDUCATION: CPT

## 2024-02-14 PROCEDURE — 97110 THERAPEUTIC EXERCISES: CPT

## 2024-02-14 NOTE — PROGRESS NOTES
Daily Note     Today's date: 2024  Patient name: Michelle Gonzales  : 1985  MRN: [id number]  Referring provider: Yolie Ron MD  Dx:   Encounter Diagnosis     ICD-10-CM    1. Mixed stress and urge urinary incontinence  N39.46                   Subjective: Pt denies any leakage, is very consistent with her exercises. Currently menstruating.    Objective: See treatment diary below    RODRIGO  At IE 26.39   9.72      2024  Running   Analysis: Performed with permission to use patient's cell phone. Slow motion videos were utilized.   Sagittal Plane:  Noted heel strike initial contact of left lower extremity. Increased anterior pelvic tilt noted, with increased forward lean. Reduced arm swing B/L which is limiting adri progression. Adri ~130-140 bpm.   Frontal Plane: (+) left Trendelenburg with L stance time. Slight  (+) right Trendelenburg with landing (less than left LE)  Transverse Plane: Increased pelvic rotation, reducing energy efficiency.   Education: Educated patient on the benefits of improving adri to reduce injury risk and improve energy efficiency. Patient will benefit from further proximal hip stability, specifically hip abduction to assist with SLS stability and coordination which is optimal for running mechanics.     Postpartum Return to Run Checklist  Baseline symptoms  No symptoms of incontinence or pelvic pain with daily life  No measureable pelvic organ prolapse on vaginal exam  No abdominal wall doming/bulge (diastasis rectus abdominis) with Active Single Leg Raise and Modified Curl Up tests  Pelvic floor muscle strength (via intra-vaginal exam)  Rated at least a 3 out of 5 on a scale of 0-5 by your physical therapist via intra-vaginal assessment.  Pelvic floor muscle endurance (in standing)  10 fast repetitions  8-12 repetitions, holding each for 6-8 seconds with maximal strength  60 second contraction holding at least 50% strength  Leg Strength  No deficits in all key  hip and abdominal muscles, measured isometrically.  Single-leg heel raise: 20 repetitions  Single leg bridge: 20 repetitions PRACTICED 15X  Single leg sit-to-stand: 20 repetitions PRACTICED 15  Side-lying leg raise (hip abduction): 20 repetitions GOAL MET  Load and impact assessment with no symptoms of pain, pressure, or incontinence:  Walking 30 minutes at a fast pace (at least 3.5 mph)  Single leg balance 10 sec each leg  Single leg squat 10 rep each side  Jog on the spot 1 min  Forward bounds 10 rep  Hop in place 10 rep each leg  Single leg 'running man' (opposite arm and hip flexion/extension with bent knee) for 10 rep each side    Current HEP: 2/14/2024  1. Single leg heel raise  2. Single leg squat  3. Single leg sit to stand  4. Sl/ hip abduction      Assessment: Tolerated treatment well. Discussed intravaginal cueing but pt deferred due to menstruating, may benefit next session.  Increased her landy on TM from 130-140 to 155 which she felt comfortable doing. Pt demonstrates good form and able to perform w/o any leakage or fatigue. Added blaze pods for LE quick movements.     Goals  1. PFM isolated contraction at least 3/5 without downward bladder movement in 6 weeks.  2. ANISA with stair climbing resolves in 8 weeks. GOAL MET 02/14  3. RODRIGO score decreased to <20 to reflect MCID improvement in symptoms in 12 weeks. PROGRESSING FROM 26.39 TO 9.72 02/14  4. Able to perform low-impact strengthening exercises with resistance without UI in 12 weeks.  5. Able to run 1 mile on treadmill without UI in 16 weeks.    Access Code: [email or web address]  URL: [email or web address]  Date: 02/07/2024  Prepared by: Ariadna Kelly, do the sidestepping on your knees :)    Exercises  - Sidelying Pelvic Floor Contraction with Hip Abduction  - 1 x daily - 7 x weekly - 2 sets - 10 reps - 5 hold  - Sidelying Clamshell with Pelvic Floor Contraction  - 1 x daily - 7 x weekly - 2 sets - 10 reps - 5 hold  - Quadruped  "Exhale with Pelvic Floor Contraction  - 1 x daily - 7 x weekly - 2 sets - 10 reps - 5 hold  - Tall Kneeling Hip Hinge  - 1 x daily - 7 x weekly - 2 sets - 10 reps - 5 hold  - Side Stepping with Resistance at Ankles  - 1 x daily - 7 x weekly - 2 sets - 10 reps - 5 hold  - Single Leg Bridge  - 1 x daily - 7 x weekly - 2 sets - 10 reps - 5 hold  - Standing Repeated Hip Abduction with Resistance  - 1 x daily - 7 x weekly - 2 sets - 10 reps - 5 hold  - Standing Repeated Hip Extension with Resistance  - 1 x daily - 7 x weekly - 2 sets - 10 reps - 5 hold  - Standing Repeated Hip Abduction on Foam Pad  - 1 x daily - 7 x weekly - 2 sets - 10 reps - 5 hold  Plan: Continue per plan of care.      Precautions: standard    POC expires  Auth Status? (BOMN, approved, pending) Unit limit (Daily) Auth Start date Expiration date PT/OT + Visit Limit?   3/22/24 BOMN         Date of Service 12/27 01/03 1/10 1/17 1/22 1/31 2/7 2/14   Visits Used 1 2 3 4 5 6 7 8   Visits Remaining 99 99 99 99 99 99 99 99   Patient Education           PFM anatomy and function 5'          Bladder healthy habits           Canister concept  5'         Squatty potty/knack  5'                               Neuro Re-Ed/Ther Ex           Running analysis       NATY 15'  TM w/ metronome at 155 30\" & 45 sec intervals (video on her phone)   Back to running program       Exercises - reported above 30'    Intravaginal PFM cueing  15' 5'   2+ w/ cueing - fatigues quickly 15'     DB 5'          Reformer supine      1B 1W heel slides with 50% kegel heels together, heels at the end of bar, 1 leg at a time     Reformer quadriped      1B wheelbarrows     Reformer standing      1B reverse & side     PF isolation Adductor squeeze 3x5  PPT 3x5 3x5 PPT sEMG 15 min  PPT 2x5 Supine PPT x20       Defecation mechanics  5'         Therapeutic exercises           TM        Before 8'    bridge  5x nc X20  Uni x20 ea MAIK x20  Uni x20   Single leg bridge 5x   clamshells  5x X15 ea X30 ea   " "    Q-ped  PFM 5x Isometric x10  Hip hinge w PF and glute x10   Iso x30  Hip Hinge w PPT x30  Mini Hydrant  Hip hike   Iso x10  Hip hike x30 alt  Hip hinge x30      S/l hip abduction  5x X15 ea X30 ea x30   S/l hip abduction 10  Cw/ccw 20x  S/l w/ ER 10x   sliders        10x hip abd/extension   Running man        On 4\" step 20x each   Step down        10x each (6\" step)   Blaze pods        6 pods star formation reach with hand, 40\"[id number] layout with foot 30\" x 4   Tall kneeling    Tall kneeling x20  Tall kneeling sidestep 5 reps x5 each direction. Tall kneeling +PF x20      Incline plank     Static and with UE and LE variations 5'      KB swing     2' 10# KB                 Manual Ther           PFM exam completed          Ortho exam                                 Modalities                                      "

## 2024-02-20 ENCOUNTER — OFFICE VISIT (OUTPATIENT)
Dept: INTERNAL MEDICINE CLINIC | Age: 39
End: 2024-02-20
Payer: COMMERCIAL

## 2024-02-20 VITALS
TEMPERATURE: 98.3 F | HEIGHT: 64 IN | OXYGEN SATURATION: 98 % | SYSTOLIC BLOOD PRESSURE: 140 MMHG | DIASTOLIC BLOOD PRESSURE: 88 MMHG | HEART RATE: 118 BPM | WEIGHT: 156 LBS | BODY MASS INDEX: 26.63 KG/M2

## 2024-02-20 DIAGNOSIS — R68.89 FLU-LIKE SYMPTOMS: Primary | ICD-10-CM

## 2024-02-20 DIAGNOSIS — Z20.828 EXPOSURE TO THE FLU: ICD-10-CM

## 2024-02-20 PROBLEM — J02.9 SORE THROAT: Status: ACTIVE | Noted: 2024-02-20

## 2024-02-20 LAB
S PYO AG THROAT QL: NEGATIVE
SL AMB POCT RAPID FLU A: NEGATIVE
SL AMB POCT RAPID FLU B: NEGATIVE

## 2024-02-20 PROCEDURE — 87880 STREP A ASSAY W/OPTIC: CPT

## 2024-02-20 PROCEDURE — 87070 CULTURE OTHR SPECIMN AEROBIC: CPT

## 2024-02-20 PROCEDURE — 87636 SARSCOV2 & INF A&B AMP PRB: CPT

## 2024-02-20 PROCEDURE — 87804 INFLUENZA ASSAY W/OPTIC: CPT

## 2024-02-20 PROCEDURE — 99213 OFFICE O/P EST LOW 20 MIN: CPT

## 2024-02-20 RX ORDER — OSELTAMIVIR PHOSPHATE 75 MG/1
75 CAPSULE ORAL EVERY 12 HOURS SCHEDULED
Qty: 10 CAPSULE | Refills: 0 | Status: SHIPPED | OUTPATIENT
Start: 2024-02-20 | End: 2024-02-25

## 2024-02-20 RX ORDER — DIPHENOXYLATE HYDROCHLORIDE AND ATROPINE SULFATE 2.5; .025 MG/1; MG/1
1 TABLET ORAL DAILY
COMMUNITY

## 2024-02-20 NOTE — PATIENT INSTRUCTIONS
Advised Flonase twice daily, second-generation antihistamine such as Zyrtec daily, decongestant daily  Advised increased rest, fluid intake  Discussed symptomatic care including salt water gargles for sore throat, steam showers for congestion, warm liquids   Patient can take Tylenol/ibuprofen for fevers and body aches  Discussed red flag symptoms including going to the ER with chest pain or shortness of breath  Discussed course of illness could be 1 to 2 weeks.   Return to the office for reevaluation if symptoms do not improve in 1-2 weeks

## 2024-02-20 NOTE — PROGRESS NOTES
Assessment/Plan:    1. Flu-like symptoms  Assessment & Plan:  URI symptoms x approx 36 hrs  COVID-negative at home, rapid flu and strep negative in office today  Will send for culture  Discussed with patient risk versus benefit of treating with Tamiflu due to known flu exposure.  She is still within 48 hours of symptom onset  Discussed side effects of the medication, discussed it can shorten duration of symptoms by approx 1 day  Patient would like to begin Tamiflu, Rx sent to pharmacy  Advised Flonase twice daily, second-generation antihistamine such as Zyrtec daily, decongestant daily  Advised increased rest, fluid intake  Discussed symptomatic care including salt water gargles for sore throat, steam showers for congestion, warm liquids   Patient can take Tylenol/ibuprofen for fevers and body aches  Discussed red flag symptoms including going to the ER with chest pain or shortness of breath  Discussed course of illness could be 1 to 2 weeks.   Return to the office for reevaluation if symptoms do not improve in 1-2 weeks       Orders:  -     POCT rapid flu A and B  -     POCT rapid strep A  -     Throat culture; Future  -     COVID/FLU; Future  -     COVID/FLU  -     Throat culture    2. Exposure to the flu  -     POCT rapid flu A and B  -     COVID/FLU; Future  -     COVID/FLU  -     oseltamivir (TAMIFLU) 75 mg capsule; Take 1 capsule (75 mg total) by mouth every 12 (twelve) hours for 5 days              M*Nutmeg software was used to dictate this note.  It may contain errors with dictating incorrect words or incorrect spelling. Please contact the provider directly with any questions.    Subjective:      Patient ID: Michelle Gonzales is a 38 y.o. female.    Patient is a 38-year-old female presenting to the office for URI symptoms x 1 day  Noting body aches, chills, sinus congestion, fatigue, sore throat, fever  Denies shortness of breath, endorses episode of palpitations when fever and heart rate were elevated    Home  COVID-negative 12-19-24  Notes that she was exposed to flu positive patient, patient is a critical care PA   Rapid flu A/B and rapid strep test today negative    Tx: iburpofen and tylenol- has not taken prior to appointment    URI   This is a new problem. The current episode started yesterday. The maximum temperature recorded prior to her arrival was 102 - 102.9 F. The fever has been present for 1 to 2 days. Associated symptoms include congestion and a sore throat. Pertinent negatives include no abdominal pain, chest pain, coughing, diarrhea, headaches, nausea, rhinorrhea, sinus pain, vomiting or wheezing. She has tried acetaminophen and NSAIDs for the symptoms. The treatment provided moderate relief.       The following portions of the patient's history were reviewed and updated as appropriate: allergies, current medications, past family history, past medical history, past social history, past surgical history, and problem list.    Review of Systems   Constitutional:  Positive for chills, fatigue and fever.   HENT:  Positive for congestion, postnasal drip and sore throat. Negative for rhinorrhea, sinus pressure, sinus pain and trouble swallowing.    Eyes:  Negative for discharge, redness and itching.   Respiratory:  Negative for cough, chest tightness, shortness of breath and wheezing.    Cardiovascular:  Positive for palpitations (yesterday). Negative for chest pain and leg swelling.   Gastrointestinal:  Negative for abdominal pain, diarrhea, nausea and vomiting.   Musculoskeletal:  Positive for arthralgias and myalgias.   Neurological:  Negative for dizziness, weakness, light-headedness, numbness and headaches.         Past Medical History:   Diagnosis Date    Acute renal failure (HCC)     [id number] RESOLVED    Acute tubular necrosis (HCC)     Anemia     during the pregnancy     Anxiety     Bowel obstruction (HCC) 01/31/2017    Chronic kidney disease     Fibroadenoma of breast, left     Fibroadenoma of breast,  right      1 para 1     History of transfusion 2015    After delivery     Hypertension     no medication     Kidney stone     Postpartum hemorrhage     UNSPECIFIED TYPE  RESOLVED     Urinary tract infection     yes in the last ten years    Vacuum extractor delivery, delivered      daughter with postpartum hemorrhage and DIC    Varicella     had childhood chickenpox         Current Outpatient Medications:     Docusate Sodium (COLACE PO), Take by mouth as needed, Disp: , Rfl:     multivitamin (THERAGRAN) TABS, Take 1 tablet by mouth daily, Disp: , Rfl:     NIFEdipine (PROCARDIA XL) 30 mg 24 hr tablet, Take 1 tablet (30 mg total) by mouth 2 (two) times a day, Disp: 180 tablet, Rfl: 3    oseltamivir (TAMIFLU) 75 mg capsule, Take 1 capsule (75 mg total) by mouth every 12 (twelve) hours for 5 days, Disp: 10 capsule, Rfl: 0    Probiotic Product (PROBIOTIC PO), Take by mouth, Disp: , Rfl:     sertraline (Zoloft) 50 mg tablet, 1 Daily, Disp: 90 tablet, Rfl: 2    spironolactone (ALDACTONE) 50 mg tablet, Take 50 mg by mouth 2 (two) times a day, Disp: , Rfl:     traZODone (DESYREL) 50 mg tablet, 1/2-1 tab HS PRN for sleep, Disp: 90 tablet, Rfl: 2    tretinoin (RETIN-A) 0.025 % cream, Apply topically as needed, Disp: , Rfl:     Multiple Minerals-Vitamins (Jason-Mag-Zinc-D) TABS, Take by mouth 2 (two) times a day, Disp: , Rfl:     Allergies   Allergen Reactions    Nickel Rash     Skin rash       Social History   Past Surgical History:   Procedure Laterality Date    BREAST FIBROADENOMA SURGERY      Cryosurgical Ablation of Fibroadenoma    BREAST LUMPECTOMY Left     BREAST LUMPECTOMY Right      SECTION          EXPLORATORY LAPAROTOMY W/ BOWEL RESECTION N/A 2017    Procedure: LAPAROTOMY EXPLORATORY W/ lysis of adhesions;  Surgeon: Felix Suggs DO;  Location: [facility];  Service:     INTRAUTERINE DEVICE INSERTION      LAPAROTOMY      EXPLORATORY for DIC and hemorrhage postpartum  "retroperitoneal hematoma    [place]  DELIVERY ONLY N/A 2020    Procedure:  SECTION () REPEAT;  Surgeon: Kena Green MD;  [facility]: [facility];  Service: Obstetrics    [place]  DELIVERY ONLY N/A 2023    Procedure:  SECTION () REPEAT;  Surgeon: Yolie Ron MD;  [facility];  Service: Obstetrics    [place] LIG/TRNSXJ FALOPIAN TUBE  DEL/ABDML SURG Bilateral 2023    Procedure: LIGATION/COAGULATION TUBAL;  Surgeon: Yolie Ron MD;  [facility];  Service: Obstetrics    TOOTH EXTRACTION      Impression: 2014 Tomasa Enamorado       Family History   Problem Relation Age of Onset    Hypertension Mother         URINARY INCONTINENCE    Hyperlipidemia Mother     Diverticulitis Mother     Hypothyroidism Mother     Alcohol abuse Mother     Hypertension Father     Hepatitis Father         A    Prostate cancer Father     Alcohol abuse Father     No Known Problems Sister     No Known Problems Brother     No Known Problems Daughter     Other Daughter         CHARGE syndrome    Diverticulitis Maternal Grandmother     Colon cancer Maternal Grandmother     Stroke Maternal Grandfather         CVA    Heart disease Maternal Grandfather     Diverticulitis Maternal Grandfather     Hypertension Maternal Grandfather     Heart attack Maternal Grandfather     Alzheimer's disease Paternal Grandmother     Dementia Paternal Grandmother     Diabetes Paternal Grandfather         INSULIN DEPENDENT       Objective:  /88 (BP Location: Left arm, Patient Position: Sitting, Cuff Size: Standard)   Pulse (!) 118   Temp 98.3 °F (36.8 °C) (Temporal)   Ht 5' 4\" (1.626 m)   Wt 70.8 kg (156 lb)   SpO2 98% Comment: room air  BMI 26.78 kg/m²      Physical Exam  Vitals and nursing note reviewed.   Constitutional:       General: She is not in acute distress.     Appearance: She is ill-appearing. She is not toxic-appearing or diaphoretic.      Comments: Patient appears " fatigued and congested   HENT:      Head: Normocephalic and atraumatic.      Right Ear: Tympanic membrane, ear canal and external ear normal. No middle ear effusion.      Left Ear: Tympanic membrane, ear canal and external ear normal.  No middle ear effusion.      Nose: Nose normal.      Mouth/Throat:      Mouth: Mucous membranes are moist.      Palate: No lesions.      Pharynx: Oropharynx is clear. Uvula midline. Posterior oropharyngeal erythema present. No oropharyngeal exudate.      Tonsils: No tonsillar exudate.   Eyes:      General: No scleral icterus.        Right eye: No discharge.         Left eye: No discharge.      Conjunctiva/sclera: Conjunctivae normal.   Cardiovascular:      Rate and Rhythm: Regular rhythm. Tachycardia present.      Heart sounds: Normal heart sounds. No murmur heard.     No friction rub. No gallop.   Pulmonary:      Effort: Pulmonary effort is normal. No respiratory distress.      Breath sounds: Normal breath sounds. No wheezing, rhonchi or rales.   Chest:      Chest wall: No tenderness.   Musculoskeletal:      Right lower leg: No edema.      Left lower leg: No edema.   Lymphadenopathy:      Cervical: Cervical adenopathy present.   Skin:     General: Skin is warm and dry.      Coloration: Skin is not pale.      Findings: No erythema.   Neurological:      General: No focal deficit present.      Mental Status: She is alert and oriented to person, place, and time. Mental status is at baseline.      Coordination: Coordination normal.   Psychiatric:         Mood and Affect: Mood normal.         Behavior: Behavior normal.

## 2024-02-20 NOTE — ASSESSMENT & PLAN NOTE
URI symptoms x approx 36 hrs  COVID-negative at home, rapid flu and strep negative in office today  Will send for culture  Discussed with patient risk versus benefit of treating with Tamiflu due to known flu exposure.  She is still within 48 hours of symptom onset  Discussed side effects of the medication, discussed it can shorten duration of symptoms by approx 1 day  Patient would like to begin Tamiflu, Rx sent to pharmacy  Advised Flonase twice daily, second-generation antihistamine such as Zyrtec daily, decongestant daily  Advised increased rest, fluid intake  Discussed symptomatic care including salt water gargles for sore throat, steam showers for congestion, warm liquids   Patient can take Tylenol/ibuprofen for fevers and body aches  Discussed red flag symptoms including going to the ER with chest pain or shortness of breath  Discussed course of illness could be 1 to 2 weeks.   Return to the office for reevaluation if symptoms do not improve in 1-2 weeks

## 2024-02-21 ENCOUNTER — TELEPHONE (OUTPATIENT)
Dept: INTERNAL MEDICINE CLINIC | Facility: OTHER | Age: 39
End: 2024-02-21

## 2024-02-21 ENCOUNTER — APPOINTMENT (OUTPATIENT)
Dept: PHYSICAL THERAPY | Facility: REHABILITATION | Age: 39
End: 2024-02-21
Payer: COMMERCIAL

## 2024-02-21 NOTE — TELEPHONE ENCOUNTER
Flu/COVID PCR negative  Advised patient to continue with supportive care  Due to flu like symptoms and known flu exposure, patient can continue tamiflu to completion  Call office if symptoms not improving for reevaluation

## 2024-02-22 LAB — BACTERIA THROAT CULT: NORMAL

## 2024-02-28 ENCOUNTER — OFFICE VISIT (OUTPATIENT)
Dept: PHYSICAL THERAPY | Facility: REHABILITATION | Age: 39
End: 2024-02-28
Payer: COMMERCIAL

## 2024-02-28 DIAGNOSIS — N39.46 MIXED STRESS AND URGE URINARY INCONTINENCE: Primary | ICD-10-CM

## 2024-02-28 PROCEDURE — 97140 MANUAL THERAPY 1/> REGIONS: CPT | Performed by: PHYSICAL THERAPIST

## 2024-02-28 PROCEDURE — 97110 THERAPEUTIC EXERCISES: CPT

## 2024-02-28 NOTE — PROGRESS NOTES
"Daily Note     Today's date: 2024  Patient name: Michelle Gonzales  : 1985  MRN: [id number]  Referring provider: Yolie Ron MD  Dx:   Encounter Diagnosis     ICD-10-CM    1. Mixed stress and urge urinary incontinence  N39.46                     Subjective: Pt was \"very\" sick, now has recovered except for her voice.  Did have more leakage with coughing. Was feeling very good about running after our last visit \"I have not ran leak free in years, would need to wear a pad\".     Objective: See treatment diary below    RODRIGO  At IE 26.39   9.72      2024  Running   Analysis: Performed with permission to use patient's cell phone. Slow motion videos were utilized.   Sagittal Plane:  Noted heel strike initial contact of left lower extremity. Increased anterior pelvic tilt noted, with increased forward lean. Reduced arm swing B/L which is limiting adri progression. Adri ~130-140 bpm.   Frontal Plane: (+) left Trendelenburg with L stance time. Slight  (+) right Trendelenburg with landing (less than left LE)  Transverse Plane: Increased pelvic rotation, reducing energy efficiency.   Education: Educated patient on the benefits of improving adri to reduce injury risk and improve energy efficiency. Patient will benefit from further proximal hip stability, specifically hip abduction to assist with SLS stability and coordination which is optimal for running mechanics.     Postpartum Return to Run Checklist  Baseline symptoms  No symptoms of incontinence or pelvic pain with daily life  No measureable pelvic organ prolapse on vaginal exam  No abdominal wall doming/bulge (diastasis rectus abdominis) with Active Single Leg Raise and Modified Curl Up tests  Pelvic floor muscle strength (via intra-vaginal exam)  Rated at least a 3 out of 5 on a scale of 0-5 by your physical therapist via intra-vaginal assessment.  Pelvic floor muscle endurance (in standing)  10 fast repetitions  8-12 repetitions, holding " each for 6-8 seconds with maximal strength  60 second contraction holding at least 50% strength  Leg Strength  No deficits in all key hip and abdominal muscles, measured isometrically.  Single-leg heel raise: 20 repetitions  Single leg bridge: 20 repetitions PRACTICED 15X  Single leg sit-to-stand: 20 repetitions PRACTICED 15  Side-lying leg raise (hip abduction): 20 repetitions GOAL MET  Load and impact assessment with no symptoms of pain, pressure, or incontinence:  Walking 30 minutes at a fast pace (at least 3.5 mph)  Single leg balance 10 sec each leg  Single leg squat 10 rep each side  Jog on the spot 1 min  Forward bounds 10 rep  Hop in place 10 rep each leg  Single leg 'running man' (opposite arm and hip flexion/extension with bent knee) for 10 rep each side    Current HEP: 2/28/2024  1. Single leg heel raise  2. Single leg squat  3. Single leg sit to stand  4. Sl/ hip abduction      Assessment: Tolerated treatment well. PFM assessment performed by primary PT.  Did 2 45 seconds of running at 155 bpm but did experience pressure, discontinued for today and focus on strengthening.     Goals  1. PFM isolated contraction at least 3/5 without downward bladder movement in 6 weeks.  2. ANISA with stair climbing resolves in 8 weeks. GOAL MET 02/14  3. RODRIGO score decreased to <20 to reflect MCID improvement in symptoms in 12 weeks. PROGRESSING FROM 26.39 TO 9.72 02/14  4. Able to perform low-impact strengthening exercises with resistance without UI in 12 weeks.  5. Able to run 1 mile on treadmill without UI in 16 weeks.    Access Code: [email or web address]  URL: [email or web address]  Date: 02/07/2024  Prepared by: Ariadna Kelly, do the sidestepping on your knees :)    Exercises  - Sidelying Pelvic Floor Contraction with Hip Abduction  - 1 x daily - 7 x weekly - 2 sets - 10 reps - 5 hold  - Sidelying Clamshell with Pelvic Floor Contraction  - 1 x daily - 7 x weekly - 2 sets - 10 reps - 5 hold  -  "Quadruped Exhale with Pelvic Floor Contraction  - 1 x daily - 7 x weekly - 2 sets - 10 reps - 5 hold  - Tall Kneeling Hip Hinge  - 1 x daily - 7 x weekly - 2 sets - 10 reps - 5 hold  - Side Stepping with Resistance at Ankles  - 1 x daily - 7 x weekly - 2 sets - 10 reps - 5 hold  - Single Leg Bridge  - 1 x daily - 7 x weekly - 2 sets - 10 reps - 5 hold  - Standing Repeated Hip Abduction with Resistance  - 1 x daily - 7 x weekly - 2 sets - 10 reps - 5 hold  - Standing Repeated Hip Extension with Resistance  - 1 x daily - 7 x weekly - 2 sets - 10 reps - 5 hold  - Standing Repeated Hip Abduction on Foam Pad  - 1 x daily - 7 x weekly - 2 sets - 10 reps - 5 hold  Plan: Continue per plan of care.      Precautions: standard    POC expires  Auth Status? (BOMN, approved, pending) Unit limit (Daily) Auth Start date Expiration date PT/OT + Visit Limit?   3/22/24 BOMN         Date of Service 12/27 01/03 1/10 1/17 1/22 1/31 2/7 2/14 2/28   Visits Used 1 2 3 4 5 6 7 8    Visits Remaining 99 [phone] 99 99 99 99 99 99    Patient Education            PFM anatomy and function 5'           Bladder healthy habits            Canister concept  5'          Squatty potty/knack  5'                                  Neuro Re-Ed/Ther Ex            Running analysis       NATY 15'  TM w/ metronome at 155 30\" & 45 sec intervals (video on her phone) TM 2 45 second jog 155 bpm spotify playlist   Back to running program       Exercises - reported above 30'     Intravaginal PFM cueing  15' 5'   2+ w/ cueing - fatigues quickly 15'      DB 5'           Reformer supine      1B 1W heel slides with 50% kegel heels together, heels at the end of bar, 1 leg at a time      Reformer quadriped      1B wheelbarrows      Reformer standing      1B reverse & side      PF isolation Adductor squeeze 3x5  PPT 3x5 3x5 PPT sEMG 15 min  PPT 2x5 Supine PPT x20        Defecation mechanics  5'          Therapeutic exercises            TM        Before 8'     bridge  5x nc X20  Uni " "x20 ea MAIK x20  Uni x20   Single leg bridge 5x Single leg bridge 20x   clamshells  5x X15 ea X30 ea        Q-ped  PFM 5x Isometric x10  Hip hinge w PF and glute x10   Iso x30  Hip Hinge w PPT x30  Mini Hydrant  Hip hike   Iso x10  Hip hike x30 alt  Hip hinge x30    Fire hydrant w/ multifidi 2x10   S/l hip abduction  5x X15 ea X30 ea x30   S/l hip abduction 10  Cw/ccw 20x  S/l w/ ER 10x    sliders        10x hip abd/extension    Running man        On 4\" step 20x each    Step down        10x each (6\" step)    Blaze pods        6 pods star formation reach with hand, 40\"[id number] layout with foot 30\" x 4 Fron planks 2x45\" w/ 3 pods  Standing with resistance above knees 3 pods semi [place]   Tall kneeling    Tall kneeling x20  Tall kneeling sidestep 5 reps x5 each direction. Tall kneeling +PF x20       Incline plank     Static and with UE and LE variations 5'       KB swing     2' 10# KB                   Manual Ther            PFM exam completed        10' MS   Ortho exam                                    Modalities                                         "

## 2024-03-04 ENCOUNTER — OFFICE VISIT (OUTPATIENT)
Dept: PHYSICAL THERAPY | Facility: REHABILITATION | Age: 39
End: 2024-03-04
Payer: COMMERCIAL

## 2024-03-04 DIAGNOSIS — N39.46 MIXED STRESS AND URGE URINARY INCONTINENCE: Primary | ICD-10-CM

## 2024-03-04 PROCEDURE — 97110 THERAPEUTIC EXERCISES: CPT | Performed by: PHYSICAL THERAPIST

## 2024-03-04 NOTE — PROGRESS NOTES
"Daily Note     Today's date: 3/4/2024  Patient name: Michelle Gonzales  : 1985  MRN: [id number]  Referring provider: Yolie Ron MD  Dx:   Encounter Diagnosis     ICD-10-CM    1. Mixed stress and urge urinary incontinence  N39.46           Subjective: Tried Orange Theory this week without symptoms with 2 x 1 minute running @ 6.0 and 7.0 mph. I feel good with walk-jogging and will continue this at the gym. (Current routine = gym 2x/week= strength and PT HEP and cardio; and PT 1x/week, OT is occasionally).     Objective: See treatment diary below    RODRIGO  At IE 26.39   9.72    Current HEP: 3/4/2024  1. Single leg hop  2. Running man with hop  3. Side lunge  4. Jump squat    Assessment: Tolerated treatment well. Progressed plyometrics with symptoms with drop jumps and with in-out hopping and uni hopping.     Goals  1. PFM isolated contraction at least 3/5 without downward bladder movement in 6 weeks.  2. ANISA with stair climbing resolves in 8 weeks. GOAL MET   3. RODRIGO score decreased to <20 to reflect MCID improvement in symptoms in 12 weeks. PROGRESSING FROM 26.39 TO 9.72   4. Able to perform low-impact strengthening exercises with resistance without UI in 12 weeks.  5. Able to run 1 mile on treadmill without UI in 16 weeks.    Plan: Continue per plan of care.      Precautions: standard    POC expires  Auth Status? (BOMN, approved, pending) Unit limit (Daily) Auth Start date Expiration date PT/OT + Visit Limit?   3/22/24 BOMN         Date of Service 1/31 2/7 2/14 2/28 3/4   Visits Used 6 7 8 9 10   Visits Remaining 99 99 99 99 99   Patient Education        PFM anatomy and function        Bladder healthy habits        Canister concept        Squatty linda/johnathan                        Neuro Re-Ed/Ther Ex        Running analysis  NATY 15'  TM w/ metronome at 155 30\" & 45 sec intervals (video on her phone) TM 2 45 second jog 155 bpm spotify playlist    Back to running program  Exercises - reported " "above 30'      Intravaginal PFM cueing 2+ w/ cueing - fatigues quickly 15'       DB        Reformer supine 1B 1W heel slides with 50% kegel heels together, heels at the end of bar, 1 leg at a time       Reformer quadriped 1B wheelbarrows    Wheelbarrow 1W 2x10 ea   Reformer standing 1B reverse & side       PF isolation        Defecation mechanics        Therapeutic exercises        TM   Before 8'   Incline walking 8' 3.5 mph  Walk-jog 6/0 2x1' and 2x45\"   bridge   Single leg bridge 5x Single leg bridge 20x    clamshells        Q-ped    Fire hydrant w/ multifidi 2x10    S/l hip abduction   S/l hip abduction 10  Cw/ccw 20x  S/l w/ ER 10x  Side lunge w march quick x10 ea   sliders   10x hip abd/extension     Running man   On 4\" step 20x each  X10 w jump   Step down   10x each (6\" step)     Blaze pods   6 pods star formation reach with hand, 40\"[id number] layout with foot 30\" x 4 Fron planks 2x45\" w/ 3 pods  Standing with resistance above knees 3 pods semi [place]    Tall kneeling        Incline plank        KB swing        Ladder drills     15'   hopping     MAIK in-out 2x10  Uni x10 ea   jumps     6\" box up x10  6\" box down x6   Manual Ther        PFM exam    10' MS    Ortho exam                                     "

## 2024-03-04 NOTE — PROGRESS NOTES
"Daily Note     Today's date: 3/4/2024  Patient name: Michelle Gonzales  : 1985  MRN: [id number]  Referring provider: Yolie Ron MD  Dx:   Encounter Diagnosis     ICD-10-CM    1. Mixed stress and urge urinary incontinence  N39.46                     Subjective: Pt was \"very\" sick, now has recovered except for her voice.  Did have more leakage with coughing. Was feeling very good about running after our last visit \"I have not ran leak free in years, would need to wear a pad\".     Objective: See treatment diary below    RODRIGO  At IE 26.39   9.72      2024  Running   Analysis: Performed with permission to use patient's cell phone. Slow motion videos were utilized.   Sagittal Plane:  Noted heel strike initial contact of left lower extremity. Increased anterior pelvic tilt noted, with increased forward lean. Reduced arm swing B/L which is limiting adri progression. Adri ~130-140 bpm.   Frontal Plane: (+) left Trendelenburg with L stance time. Slight  (+) right Trendelenburg with landing (less than left LE)  Transverse Plane: Increased pelvic rotation, reducing energy efficiency.   Education: Educated patient on the benefits of improving adri to reduce injury risk and improve energy efficiency. Patient will benefit from further proximal hip stability, specifically hip abduction to assist with SLS stability and coordination which is optimal for running mechanics.     Postpartum Return to Run Checklist  Baseline symptoms  No symptoms of incontinence or pelvic pain with daily life  No measureable pelvic organ prolapse on vaginal exam  No abdominal wall doming/bulge (diastasis rectus abdominis) with Active Single Leg Raise and Modified Curl Up tests  Pelvic floor muscle strength (via intra-vaginal exam)  Rated at least a 3 out of 5 on a scale of 0-5 by your physical therapist via intra-vaginal assessment.  Pelvic floor muscle endurance (in standing)  10 fast repetitions  8-12 repetitions, holding " each for 6-8 seconds with maximal strength  60 second contraction holding at least 50% strength  Leg Strength  No deficits in all key hip and abdominal muscles, measured isometrically.  Single-leg heel raise: 20 repetitions  Single leg bridge: 20 repetitions PRACTICED 15X  Single leg sit-to-stand: 20 repetitions PRACTICED 15  Side-lying leg raise (hip abduction): 20 repetitions GOAL MET  Load and impact assessment with no symptoms of pain, pressure, or incontinence:  Walking 30 minutes at a fast pace (at least 3.5 mph)  Single leg balance 10 sec each leg  Single leg squat 10 rep each side  Jog on the spot 1 min  Forward bounds 10 rep  Hop in place 10 rep each leg  Single leg 'running man' (opposite arm and hip flexion/extension with bent knee) for 10 rep each side    Current HEP: 3/4/2024  1. Single leg heel raise  2. Single leg squat  3. Single leg sit to stand  4. Sl/ hip abduction      Assessment: Tolerated treatment well. PFM assessment performed by primary PT.  Did 2 45 seconds of running at 155 bpm but did experience pressure, discontinued for today and focus on strengthening.     Goals  1. PFM isolated contraction at least 3/5 without downward bladder movement in 6 weeks.  2. ANISA with stair climbing resolves in 8 weeks. GOAL MET 02/14  3. RODRIGO score decreased to <20 to reflect MCID improvement in symptoms in 12 weeks. PROGRESSING FROM 26.39 TO 9.72 02/14  4. Able to perform low-impact strengthening exercises with resistance without UI in 12 weeks.  5. Able to run 1 mile on treadmill without UI in 16 weeks.    Access Code: [email or web address]  URL: [email or web address]  Date: 02/07/2024  Prepared by: Ariadna Kelly, do the sidestepping on your knees :)    Exercises  - Sidelying Pelvic Floor Contraction with Hip Abduction  - 1 x daily - 7 x weekly - 2 sets - 10 reps - 5 hold  - Sidelying Clamshell with Pelvic Floor Contraction  - 1 x daily - 7 x weekly - 2 sets - 10 reps - 5 hold  - Quadruped  "Exhale with Pelvic Floor Contraction  - 1 x daily - 7 x weekly - 2 sets - 10 reps - 5 hold  - Tall Kneeling Hip Hinge  - 1 x daily - 7 x weekly - 2 sets - 10 reps - 5 hold  - Side Stepping with Resistance at Ankles  - 1 x daily - 7 x weekly - 2 sets - 10 reps - 5 hold  - Single Leg Bridge  - 1 x daily - 7 x weekly - 2 sets - 10 reps - 5 hold  - Standing Repeated Hip Abduction with Resistance  - 1 x daily - 7 x weekly - 2 sets - 10 reps - 5 hold  - Standing Repeated Hip Extension with Resistance  - 1 x daily - 7 x weekly - 2 sets - 10 reps - 5 hold  - Standing Repeated Hip Abduction on Foam Pad  - 1 x daily - 7 x weekly - 2 sets - 10 reps - 5 hold  Plan: Continue per plan of care.      Precautions: standard    POC expires  Auth Status? (BOMN, approved, pending) Unit limit (Daily) Auth Start date Expiration date PT/OT + Visit Limit?   3/22/24 BOMN         Date of Service 1/10 1/17 1/22 1/31 2/7 2/14 2/28 3/4   Visits Used 3 4 5 6 7 8 9 10   Visits Remaining 99 99 99 99 99 99 99 99   Patient Education           PFM anatomy and function           Bladder healthy habits           Canister concept           Squatty potty/knack                                 Neuro Re-Ed/Ther Ex           Running analysis     NATY 15'  TM w/ metronome at 155 30\" & 45 sec intervals (video on her phone) TM 2 45 second jog 155 bpm spotify playlist ***   Back to running program     Exercises - reported above 30'      Intravaginal PFM cueing 5'   2+ w/ cueing - fatigues quickly 15'       DB           Reformer supine    1B 1W heel slides with 50% kegel heels together, heels at the end of bar, 1 leg at a time       Reformer quadriped    1B wheelbarrows       Reformer standing    1B reverse & side       PF isolation sEMG 15 min  PPT 2x5 Supine PPT x20         Defecation mechanics           Therapeutic exercises           TM      Before 8'      bridge nc X20  Uni x20 ea MAIK x20  Uni x20   Single leg bridge 5x Single leg bridge 20x ***   clamshells " "X15 ea X30 ea         Q-ped Isometric x10  Hip hinge w PF and glute x10   Iso x30  Hip Hinge w PPT x30  Mini Hydrant  Hip hike   Iso x10  Hip hike x30 alt  Hip hinge x30    Fire hydrant w/ multifidi 2x10 ***   S/l hip abduction X15 ea X30 ea x30   S/l hip abduction 10  Cw/ccw 20x  S/l w/ ER 10x  ***   sliders      10x hip abd/extension     Running man      On 4\" step 20x each  *** lateral?   Step down      10x each (6\" step)     Blaze pods      6 pods star formation reach with hand, 40\"[id number] layout with foot 30\" x 4 Fron planks 2x45\" w/ 3 pods    Standing with resistance above knees   3 pods semi [place] ***   Tall kneeling  Tall kneeling x20  Tall kneeling sidestep 5 reps x5 each direction. Tall kneeling +PF x20        Incline plank   Static and with UE and LE variations 5'        KB swing   2' 10# KB                   Manual Ther           PFM exam       10' MS    Ortho exam                                 Modalities                                      "

## 2024-03-06 ENCOUNTER — APPOINTMENT (OUTPATIENT)
Dept: PHYSICAL THERAPY | Facility: REHABILITATION | Age: 39
End: 2024-03-06
Payer: COMMERCIAL

## 2024-03-11 ENCOUNTER — APPOINTMENT (OUTPATIENT)
Dept: PHYSICAL THERAPY | Facility: REHABILITATION | Age: 39
End: 2024-03-11
Payer: COMMERCIAL

## 2024-03-13 ENCOUNTER — OFFICE VISIT (OUTPATIENT)
Dept: PHYSICAL THERAPY | Facility: REHABILITATION | Age: 39
End: 2024-03-13
Payer: COMMERCIAL

## 2024-03-13 DIAGNOSIS — N39.46 MIXED STRESS AND URGE URINARY INCONTINENCE: Primary | ICD-10-CM

## 2024-03-13 PROCEDURE — 97110 THERAPEUTIC EXERCISES: CPT

## 2024-03-13 NOTE — PROGRESS NOTES
"Daily Note     Today's date: 3/13/2024  Patient name: Michelle Gonzales  : 1985  MRN: [id number]  Referring provider: Yolie Ron MD  Dx:   Encounter Diagnosis     ICD-10-CM    1. Mixed stress and urge urinary incontinence  N39.46             Subjective: Continues to feel good. .    Objective: See treatment diary below    RODRIGO  At IE 26.39   9.72    Current HEP: 3/13/2024  1. Single leg hop  2. Running man with hop  3. Side lunge  4. Jump squat    Assessment: Tolerated treatment well. Fatigued with the progression to 1:30 but did well & remained dry.  Challenged with pod workout, reformer planks and ended session with light self stretching on mat. Will be away for the next week weeks, plan return in April.     Goals  1. PFM isolated contraction at least 3/5 without downward bladder movement in 6 weeks.  2. ANISA with stair climbing resolves in 8 weeks. GOAL MET   3. RODRIGO score decreased to <20 to reflect MCID improvement in symptoms in 12 weeks. PROGRESSING FROM 26.39 TO 9.72   4. Able to perform low-impact strengthening exercises with resistance without UI in 12 weeks.  5. Able to run 1 mile on treadmill without UI in 16 weeks.    Plan: Continue per plan of care.      Precautions: standard    POC expires  Auth Status? (BOMN, approved, pending) Unit limit (Daily) Auth Start date Expiration date PT/OT + Visit Limit?   3/22/24 BOMN         Date of Service 1/31 2/7 2/14 2/28 3/4 3/13   Visits Used 6 7 8 9 10 11   Visits Remaining 99 99 99 99 99 99   Patient Education         PFM anatomy and function         Bladder healthy habits         Canister concept         Squatty potty/ashelyack                           Neuro Re-Ed/Ther Ex         Running analysis  NATY 15'  TM w/ metronome at 155 30\" & 45 sec intervals (video on her phone) TM 2 45 second jog 155 bpm spotify playlist     Back to running program  Exercises - reported above 30'       Intravaginal PFM cueing 2+ w/ cueing - fatigues quickly 15'      " "  DB         Reformer supine 1B 1W heel slides with 50% kegel heels together, heels at the end of bar, 1 leg at a time        Reformer quadriped 1B wheelbarrows    Wheelbarrow 1W 2x10 ea Wheelbarrow 2x1'30  Plank with  & leg lifts 2x each side (8-10)   Reformer standing 1B reverse & side        PF isolation         Defecation mechanics         Therapeutic exercises         TM   Before 8'   Incline walking 8' 3.5 mph  Walk-jog 6/0 2x1' and 2x45\" 8' 3.5 6.0 mph run 2x45\", 2x1' & 1x1:30   bridge   Single leg bridge 5x Single leg bridge 20x     clamshells         Q-ped    Fire hydrant w/ multifidi 2x10     S/l hip abduction   S/l hip abduction 10  Cw/ccw 20x  S/l w/ ER 10x  Side lunge w march quick x10 ea    sliders   10x hip abd/extension      Running man   On 4\" step 20x each  X10 w jump    Step down   10x each (6\" step)      Blaze pods   6 pods star formation reach with hand, 40\"[id number] layout with foot 30\" x 4 Fron planks 2x45\" w/ 3 pods  Standing with resistance above knees 3 pods semi [place]  Shuffle, skip, crawl, high knees 6x45\" 4 pods including 1 master   Tall kneeling         Incline plank         KB swing         Ladder drills     15'    hopping     MAIK in-out 2x10  Uni x10 ea    jumps     6\" box up x10  6\" box down x6    Manual Ther         PFM exam    10' MS     Ortho exam                                         "

## 2024-04-03 ENCOUNTER — OFFICE VISIT (OUTPATIENT)
Dept: PHYSICAL THERAPY | Facility: REHABILITATION | Age: 39
End: 2024-04-03
Payer: COMMERCIAL

## 2024-04-03 DIAGNOSIS — N39.46 MIXED STRESS AND URGE URINARY INCONTINENCE: Primary | ICD-10-CM

## 2024-04-03 PROCEDURE — 97110 THERAPEUTIC EXERCISES: CPT

## 2024-04-03 NOTE — PROGRESS NOTES
"Daily Note     Today's date: 4/3/2024  Patient name: Michelle Gonzales  : 1985  MRN: [id number]  Referring provider: Yolie Ron MD  Dx:   Encounter Diagnosis     ICD-10-CM    1. Mixed stress and urge urinary incontinence  N39.46               Subjective: Pt just returned from vacation, did not workout and does have more R hip pain due holding her son on that side.    Objective: See treatment diary below    RODRIGO  At IE 26.39   9.72    Current HEP: 4/3/2024  1. Single leg hop  2. Running man with hop  3. Side lunge  4. Jump squat    Assessment: Tolerated treatment well. MET successful in improving assymetry but not fully corrected. Started running program on TM but discontinued after 1 45 seconds and 1 30 seconds as it did not feel right today. Could jump 10 seconds on the trampoline prior to symptoms. Focussed on strengthening exercises which were well tolerated.     Goals  1. PFM isolated contraction at least 3/5 without downward bladder movement in 6 weeks.  2. ANISA with stair climbing resolves in 8 weeks. GOAL MET   3. RODRIGO score decreased to <20 to reflect MCID improvement in symptoms in 12 weeks. PROGRESSING FROM 26.39 TO 9.72   4. Able to perform low-impact strengthening exercises with resistance without UI in 12 weeks.  5. Able to run 1 mile on treadmill without UI in 16 weeks.    Plan: Continue per plan of care.      Precautions: standard    POC expires  Auth Status? (BOMN, approved, pending) Unit limit (Daily) Auth Start date Expiration date PT/OT + Visit Limit?   3/22/24 BOMN         Date of Service 1/31 2/7 2/14 2/28 3/4 3/13 4/3   Visits Used 6 7 8 9 10 11 12   Visits Remaining 99 99 99 99 99 99 99   Patient Education          PFM anatomy and function          Bladder healthy habits          Canister concept          Squatty linda/johnathan                              Neuro Re-Ed/Ther Ex          Running analysis  NATY 15'  TM w/ metronome at 155 30\" & 45 sec intervals (video on her " "phone) TM 2 45 second jog 155 bpm spotify playlist      Back to running program  Exercises - reported above 30'        Intravaginal PFM cueing 2+ w/ cueing - fatigues quickly 15'         DB          Reformer supine 1B 1W heel slides with 50% kegel heels together, heels at the end of bar, 1 leg at a time         Reformer quadriped 1B wheelbarrows    Wheelbarrow 1W 2x10 ea Wheelbarrow 2x1'30  Plank with  & leg lifts 2x each side (8-10)    Reformer standing 1B reverse & side         PF isolation          Defecation mechanics          Therapeutic exercises          TM   Before 8'   Incline walking 8' 3.5 mph  Walk-jog 6/0 2x1' and 2x45\" 8' 3.5 6.0 mph run 2x45\", 2x1' & 1x1:30 8' 1 45\" & 1 30\" d/c for today due to pressure symptoms   bridge   Single leg bridge 5x Single leg bridge 20x   Single leg bridge 10x   clamshells       Planks 2x30 w/ & w/o toe tap, side planks 3x 15\"   LE strengthening       Resistance band, steamboats 10x, monster walk & side steps 2x20'  Walking lunges 10# each hand 20'x2   Q-ped    Fire hydrant w/ multifidi 2x10      S/l hip abduction   S/l hip abduction 10  Cw/ccw 20x  S/l w/ ER 10x  Side lunge w march quick x10 ea     sliders   10x hip abd/extension       Running man   On 4\" step 20x each  X10 w jump     Step down   10x each (6\" step)       Blaze pods   6 pods star formation reach with hand, 40\"[id number] layout with foot 30\" x 4 Fron planks 2x45\" w/ 3 pods  Standing with resistance above knees 3 pods semi [place]  Shuffle, skip, crawl, high knees 6x45\" 4 pods including 1 master    Tall kneeling          Incline plank          KB swing          Ladder drills     15'  Trampoline 3x10\"   hopping     MAIK in-out 2x10  Uni x10 ea  MAIK in-out 2x10  Uni x10 ea      jumps     6\" box up x10  6\" box down x6  6\" box up x10  6\" box down x10, 8x 8\"   Manual Ther          PFM exam    10' MS      Ortho exam                                             "

## 2024-04-10 ENCOUNTER — OFFICE VISIT (OUTPATIENT)
Dept: PHYSICAL THERAPY | Facility: REHABILITATION | Age: 39
End: 2024-04-10
Payer: COMMERCIAL

## 2024-04-10 DIAGNOSIS — N39.46 MIXED STRESS AND URGE URINARY INCONTINENCE: Primary | ICD-10-CM

## 2024-04-10 PROCEDURE — 97110 THERAPEUTIC EXERCISES: CPT

## 2024-04-10 NOTE — PROGRESS NOTES
"Daily Note / Discharge note    Today's date: 4/10/2024  Patient name: Michelle Gonzales  : 1985  MRN: [id number]  Referring provider: Yolie Ron MD  Dx:   Encounter Diagnosis     ICD-10-CM    1. Mixed stress and urge urinary incontinence  N39.46               Subjective: Pt feels ready to be discharged. Able to do more intensive workouts and has found the sweet spot for running on the treadmill 5.5 mph.    Objective: See treatment diary below    RODRIGO  At IE 26.39   9.72  04/10 5.52    Assessment: Although pt continues to have some leakage with trampoline jumping after 8-10 seconds, pt did very well t/o rest of program.  Ended session early at pt's request. Denies any other needs and feels confident with her HEP.  As per primary PT, pt was discharged following today's session.     Goals  1. PFM isolated contraction at least 3/5 without downward bladder movement in 6 weeks. DEFERS ON 04/10  2. ANISA with stair climbing resolves in 8 weeks. GOAL MET   3. RODRIGO score decreased to <20 to reflect MCID improvement in symptoms in 12 weeks. GOAL MET  4. Able to perform low-impact strengthening exercises with resistance without UI in 12 weeks. GOAL MET  5. Able to run 1 mile on treadmill without UI in 16 weeks. GOAL MET AT A SLOWER PACE I.E. 5.2    Plan: Discontinue POC     Precautions: standard    POC expires  Auth Status? (BOMN, approved, pending) Unit limit (Daily) Auth Start date Expiration date PT/OT + Visit Limit?   3/22/24 BOMN         Date of Service 1/31 2/7 2/14 2/28 3/4 3/13 4/3 4/10   Visits Used 6 7 8 9 10 11 12 13   Visits Remaining 99 99 99 99 99 99 99 99   Patient Education           PFM anatomy and function           Bladder healthy habits           Canister concept           Nasim mckeon/johnathan                                 Neuro Re-Ed/Ther Ex           Running analysis  NATY 15'  TM w/ metronome at 155 30\" & 45 sec intervals (video on her phone) TM 2 45 second jog 155 bpm spotify playlist   " "    Back to running program  Exercises - reported above 30'         Intravaginal PFM cueing 2+ w/ cueing - fatigues quickly 15'          DB           Reformer supine 1B 1W heel slides with 50% kegel heels together, heels at the end of bar, 1 leg at a time          Reformer quadriped 1B wheelbarrows    Wheelbarrow 1W 2x10 ea Wheelbarrow 2x1'30  Plank with  & leg lifts 2x each side (8-10)     Reformer standing 1B reverse & side          PF isolation           Defecation mechanics           Therapeutic exercises           TM   Before 8'   Incline walking 8' 3.5 mph  Walk-jog 6/0 2x1' and 2x45\" 8' 3.5 6.0 mph run 2x45\", 2x1' & 1x1:30 8' 1 45\" & 1 30\" d/c for today due to pressure symptoms 45\" at 5.5 mph x6   bridge   Single leg bridge 5x Single leg bridge 20x   Single leg bridge 10x    clamshells       Planks 2x30 w/ & w/o toe tap, side planks 3x 15\"    LE strengthening       Resistance band, steamboats 10x, monster walk & side steps 2x20'  Walking lunges 10# each hand 20'x2    Q-ped    Fire hydrant w/ multifidi 2x10       S/l hip abduction   S/l hip abduction 10  Cw/ccw 20x  S/l w/ ER 10x  Side lunge w march quick x10 ea      sliders   10x hip abd/extension        Running man   On 4\" step 20x each  X10 w jump      Step down   10x each (6\" step)        Blaze pods   6 pods star formation reach with hand, 40\"[id number] layout with foot 30\" x 4 Fron planks 2x45\" w/ 3 pods  Standing with resistance above knees 3 pods semi [place]  Shuffle, skip, crawl, high knees 6x45\" 4 pods including 1 master  Shuffle, skip, crawl, high knees 6x45\" 4 pods including 1 master   Tall kneeling           Incline plank           KB swing           Ladder drills     15'  Trampoline 3x10\" Trampoline 10\", 8\"   hopping     MAIK in-out 2x10  Uni x10 ea  MAIK in-out 2x10  Uni x10 ea       jumps     6\" box up x10  6\" box down x6  6\" box up x10  6\" box down x10, 8x 8\" 6\" & 9\" box up and down 20x   Manual Ther           PFM exam    10' MS       Ortho " exam

## 2024-04-15 ENCOUNTER — APPOINTMENT (OUTPATIENT)
Dept: PHYSICAL THERAPY | Facility: REHABILITATION | Age: 39
End: 2024-04-15
Payer: COMMERCIAL

## 2024-04-24 ENCOUNTER — APPOINTMENT (OUTPATIENT)
Dept: PHYSICAL THERAPY | Facility: REHABILITATION | Age: 39
End: 2024-04-24
Payer: COMMERCIAL

## 2024-05-22 DIAGNOSIS — Z00.6 ENCOUNTER FOR EXAMINATION FOR NORMAL COMPARISON OR CONTROL IN CLINICAL RESEARCH PROGRAM: ICD-10-CM

## 2024-05-24 ENCOUNTER — APPOINTMENT (OUTPATIENT)
Dept: LAB | Facility: MEDICAL CENTER | Age: 39
End: 2024-05-24
Payer: COMMERCIAL

## 2024-05-24 DIAGNOSIS — Z00.6 ENCOUNTER FOR EXAMINATION FOR NORMAL COMPARISON OR CONTROL IN CLINICAL RESEARCH PROGRAM: ICD-10-CM

## 2024-05-24 DIAGNOSIS — Z00.00 ANNUAL PHYSICAL EXAM: ICD-10-CM

## 2024-05-24 DIAGNOSIS — E87.6 HYPOKALEMIA: ICD-10-CM

## 2024-05-24 DIAGNOSIS — I10 ESSENTIAL HYPERTENSION: ICD-10-CM

## 2024-05-24 LAB
ALBUMIN SERPL BCP-MCNC: 4.9 G/DL (ref 3.5–5)
ALP SERPL-CCNC: 54 U/L (ref 34–104)
ALT SERPL W P-5'-P-CCNC: 25 U/L (ref 7–52)
ANION GAP SERPL CALCULATED.3IONS-SCNC: 11 MMOL/L (ref 4–13)
AST SERPL W P-5'-P-CCNC: 23 U/L (ref 13–39)
BACTERIA UR QL AUTO: ABNORMAL /HPF
BASOPHILS # BLD AUTO: 0.08 THOUSANDS/ÂΜL (ref 0–0.1)
BASOPHILS NFR BLD AUTO: 1 % (ref 0–1)
BILIRUB SERPL-MCNC: 0.28 MG/DL (ref 0.2–1)
BILIRUB UR QL STRIP: NEGATIVE
BUN SERPL-MCNC: 12 MG/DL (ref 5–25)
CALCIUM SERPL-MCNC: 9.3 MG/DL (ref 8.4–10.2)
CHLORIDE SERPL-SCNC: 104 MMOL/L (ref 96–108)
CLARITY UR: CLEAR
CO2 SERPL-SCNC: 24 MMOL/L (ref 21–32)
COLOR UR: ABNORMAL
CREAT SERPL-MCNC: 0.77 MG/DL (ref 0.6–1.3)
CREAT UR-MCNC: 106 MG/DL
EOSINOPHIL # BLD AUTO: 0.45 THOUSAND/ÂΜL (ref 0–0.61)
EOSINOPHIL NFR BLD AUTO: 8 % (ref 0–6)
ERYTHROCYTE [DISTWIDTH] IN BLOOD BY AUTOMATED COUNT: 12.5 % (ref 11.6–15.1)
GFR SERPL CREATININE-BSD FRML MDRD: 98 ML/MIN/1.73SQ M
GLUCOSE P FAST SERPL-MCNC: 86 MG/DL (ref 65–99)
GLUCOSE UR STRIP-MCNC: NEGATIVE MG/DL
HCT VFR BLD AUTO: 42.9 % (ref 34.8–46.1)
HGB BLD-MCNC: 14 G/DL (ref 11.5–15.4)
HGB UR QL STRIP.AUTO: NEGATIVE
HYALINE CASTS #/AREA URNS LPF: ABNORMAL /LPF
IMM GRANULOCYTES # BLD AUTO: 0.02 THOUSAND/UL (ref 0–0.2)
IMM GRANULOCYTES NFR BLD AUTO: 0 % (ref 0–2)
KETONES UR STRIP-MCNC: NEGATIVE MG/DL
LEUKOCYTE ESTERASE UR QL STRIP: NEGATIVE
LYMPHOCYTES # BLD AUTO: 1.91 THOUSANDS/ÂΜL (ref 0.6–4.47)
LYMPHOCYTES NFR BLD AUTO: 32 % (ref 14–44)
MCH RBC QN AUTO: 30.4 PG (ref 26.8–34.3)
MCHC RBC AUTO-ENTMCNC: 32.6 G/DL (ref 31.4–37.4)
MCV RBC AUTO: 93 FL (ref 82–98)
MICROALBUMIN UR-MCNC: 7.6 MG/L
MICROALBUMIN/CREAT 24H UR: 7 MG/G CREATININE (ref 0–30)
MONOCYTES # BLD AUTO: 0.49 THOUSAND/ÂΜL (ref 0.17–1.22)
MONOCYTES NFR BLD AUTO: 8 % (ref 4–12)
MUCOUS THREADS UR QL AUTO: ABNORMAL
NEUTROPHILS # BLD AUTO: 2.95 THOUSANDS/ÂΜL (ref 1.85–7.62)
NEUTS SEG NFR BLD AUTO: 51 % (ref 43–75)
NITRITE UR QL STRIP: NEGATIVE
NON-SQ EPI CELLS URNS QL MICRO: ABNORMAL /HPF
NRBC BLD AUTO-RTO: 0 /100 WBCS
PH UR STRIP.AUTO: 6.5 [PH]
PLATELET # BLD AUTO: 265 THOUSANDS/UL (ref 149–390)
PMV BLD AUTO: 11.1 FL (ref 8.9–12.7)
POTASSIUM SERPL-SCNC: 4.2 MMOL/L (ref 3.5–5.3)
PROT SERPL-MCNC: 7.8 G/DL (ref 6.4–8.4)
PROT UR STRIP-MCNC: ABNORMAL MG/DL
RBC # BLD AUTO: 4.6 MILLION/UL (ref 3.81–5.12)
RBC #/AREA URNS AUTO: ABNORMAL /HPF
SODIUM SERPL-SCNC: 139 MMOL/L (ref 135–147)
SP GR UR STRIP.AUTO: 1.02 (ref 1–1.03)
UROBILINOGEN UR STRIP-ACNC: <2 MG/DL
WBC # BLD AUTO: 5.9 THOUSAND/UL (ref 4.31–10.16)
WBC #/AREA URNS AUTO: ABNORMAL /HPF

## 2024-05-24 PROCEDURE — 36415 COLL VENOUS BLD VENIPUNCTURE: CPT

## 2024-05-24 PROCEDURE — 81001 URINALYSIS AUTO W/SCOPE: CPT

## 2024-05-24 PROCEDURE — 80053 COMPREHEN METABOLIC PANEL: CPT

## 2024-05-24 PROCEDURE — 82043 UR ALBUMIN QUANTITATIVE: CPT

## 2024-05-24 PROCEDURE — 82570 ASSAY OF URINE CREATININE: CPT

## 2024-05-29 ENCOUNTER — TELEPHONE (OUTPATIENT)
Dept: OTHER | Facility: HOSPITAL | Age: 39
End: 2024-05-29

## 2024-05-30 NOTE — TELEPHONE ENCOUNTER
Called patient and left a voicemail going over the following information:    Patients creatinine remained stable. Urinalysis overall unremarkable.    I asked the patient please call the office with further questions.

## 2024-06-03 ENCOUNTER — TELEMEDICINE (OUTPATIENT)
Dept: PSYCHIATRY | Facility: CLINIC | Age: 39
End: 2024-06-03
Payer: COMMERCIAL

## 2024-06-03 DIAGNOSIS — F33.1 MODERATE EPISODE OF RECURRENT MAJOR DEPRESSIVE DISORDER (HCC): ICD-10-CM

## 2024-06-03 PROCEDURE — 99213 OFFICE O/P EST LOW 20 MIN: CPT | Performed by: NURSE PRACTITIONER

## 2024-06-03 NOTE — PSYCH
"TREATMENT PLAN (Medication Management Only)        [facility]    Name and Date of Birth:  Michelle Gonzales 38 y.o. 1985  Date of Treatment Plan: Anjali 3, 2024  Diagnosis/Diagnoses:    1. Moderate episode of recurrent major depressive disorder (HCC)      Strengths/Personal Resources for Self-Care: supportive family, supportive friends.  Area/Areas of need (in own words): \" I am doing well.  I feel good.  I am sleeping so much better.\".  1. Long Term Goal: \" To continue to feel well and function at my best\".   Target Date: 6 months - 12/3/2024  Person/Persons responsible for completion of goal: Michelle  2.  Short Term Objective (s) - How will we reach this goal?:   A.  Provider new recommended medication/dosage changes and/or continue medication(s): continue current medications as prescribed.  B.  N/A.    Target Date: 6 months - 12/3/2024  Person/Persons Responsible for Completion of Goal: Michelle  Progress Towards Goals: stable, continuing treatment  Treatment Modality: medication education at every visit  Review due 6 months from date of this plan: 6 months - 12/3/2024  Expected length of service: ongoing treatment unless revised  My Physician/PA/NP and I have developed this plan together and I agree to work on the goals and objectives. I understand the treatment goals that were developed for my treatment.  "

## 2024-06-03 NOTE — PSYCH
Virtual Regular Visit    Verification of patient location:    Patient is located at Home in the following state in which I hold an active license [place]      Assessment/Plan:    Problem List Items Addressed This Visit          Behavioral Health    Moderate episode of recurrent major depressive disorder (HCC)    Relevant Medications    sertraline (Zoloft) 50 mg tablet       Goals addressed in session: Maintain current level of stability         Reason for visit is   Chief Complaint   Patient presents with    Virtual Regular Visit          Encounter provider [facility] Holder      Recent Visits  No visits were found meeting these conditions.  Showing recent visits within past 7 days and meeting all other requirements  Today's Visits  Date Type Provider Dept   06/03/24 Telemedicine [facility]   Showing today's visits and meeting all other requirements  Future Appointments  No visits were found meeting these conditions.  Showing future appointments within next 150 days and meeting all other requirements       The patient was identified by name and date of birth. Michelle Gonzales was informed that this is a telemedicine visit and that the visit is being conducted throughthe [employer] platform. She agrees to proceed..  My office door was closed. No one else was in the room.  She acknowledged consent and understanding of privacy and security of the video platform. The patient has agreed to participate and understands they can discontinue the visit at any time.    Patient is aware this is a billable service.     Subjective  Michelle Gonzales is a 38 y.o. female who has a history of major depressive disorder and insomnia.  Insomnia has been resolved since she has started trazodone as needed at bedtime.  Depression and anxiety are under excellent control with Zoloft 50 mg daily.  Reporting no other concerns or complaints.  Follow going well at home.  She and her  will be  celebrating their fifth  anniversary this week and they have plans on spending the entire day together on Saturday and enjoying..  No other clinical issues or concerns were expressed by patient.  Follow-up with me concerns or sooner if necessary.  Mental status exam: Patient is an alert and oriented x 3.  Mood is stable.  Patient is not suicidal, not homicidal and not psychotic.  Associations are intact.  No overt delusions.  Speech is clear and thoughts are well organized, coherent and goal directed.  Attention span is good.  Impulse control is good.  Judgment and insight are good.  Memory is good.  The patient will continue on:  Zoloft 50mg daily  Trazodone 25 to 50 mg at bedtime as needed to help with sleep  Follow-up with me in 6 months or sooner if necessary              Past Medical History:   Diagnosis Date    Acute renal failure (HCC)     [id number] RESOLVED    Acute tubular necrosis (HCC)     Anemia     during the pregnancy     Anxiety     Bowel obstruction (HCC) 2017    Chronic kidney disease     Fibroadenoma of breast, left     Fibroadenoma of breast, right      1 para 1     History of transfusion 2015    After delivery     Hypertension     no medication     Kidney stone     Postpartum hemorrhage     UNSPECIFIED TYPE  RESOLVED     Urinary tract infection     yes in the last ten years    Vacuum extractor delivery, delivered      daughter with postpartum hemorrhage and DIC    Varicella     had childhood chickenpox       Past Surgical History:   Procedure Laterality Date    BREAST FIBROADENOMA SURGERY      Cryosurgical Ablation of Fibroadenoma    BREAST LUMPECTOMY Left     BREAST LUMPECTOMY Right      SECTION          EXPLORATORY LAPAROTOMY W/ BOWEL RESECTION N/A 2017    Procedure: LAPAROTOMY EXPLORATORY W/ lysis of adhesions;  Surgeon: Felix Suggs DO;  [facility]: [facility];  Service:     INTRAUTERINE DEVICE INSERTION      LAPAROTOMY       EXPLORATORY for DIC and hemorrhage postpartum retroperitoneal hematoma    [place]  DELIVERY ONLY N/A 2020    Procedure:  SECTION () REPEAT;  Surgeon: Kena Green MD;  [facility]: [facility];  Service: Obstetrics    [place]  DELIVERY ONLY N/A 2023    Procedure:  SECTION () REPEAT;  Surgeon: Yolie Ron MD;  [facility];  Service: Obstetrics    [place] LIG/TRNSXJ FALOPIAN TUBE  DEL/ABDML SURG Bilateral 2023    Procedure: LIGATION/COAGULATION TUBAL;  Surgeon: Yolie Ron MD;  [facility];  Service: Obstetrics    TOOTH EXTRACTION      Impression: [id number] Tomasa Enamorado         Current Outpatient Medications   Medication Sig Dispense Refill    sertraline (Zoloft) 50 mg tablet 1 Daily 90 tablet 2    traZODone (DESYREL) 50 mg tablet 1/2-1 tab HS PRN for sleep 90 tablet 2    Docusate Sodium (COLACE PO) Take by mouth as needed      Multiple Minerals-Vitamins (Jason-Mag-Zinc-D) TABS Take by mouth 2 (two) times a day      multivitamin (THERAGRAN) TABS Take 1 tablet by mouth daily      NIFEdipine (PROCARDIA XL) 30 mg 24 hr tablet Take 1 tablet (30 mg total) by mouth 2 (two) times a day 180 tablet 3    Probiotic Product (PROBIOTIC PO) Take by mouth      spironolactone (ALDACTONE) 50 mg tablet Take 50 mg by mouth 2 (two) times a day      tretinoin (RETIN-A) 0.025 % cream Apply topically as needed       No current facility-administered medications for this visit.        Allergies   Allergen Reactions    Nickel Rash     Skin rash       Review of Systems    Video Exam    There were no vitals filed for this visit.    Physical Exam     Visit Time  Visit Start Time: 11:00a  Visit Stop Time: 11:20a  Total Visit Duration: 20 minutes were spent in the visit.  Time was spent reviewing the treatment plan, reviewing medications, ordering medications and completing the progress note.

## 2024-06-13 ENCOUNTER — ANNUAL EXAM (OUTPATIENT)
Dept: OBGYN CLINIC | Facility: CLINIC | Age: 39
End: 2024-06-13
Payer: COMMERCIAL

## 2024-06-13 VITALS
WEIGHT: 149.4 LBS | HEIGHT: 64 IN | SYSTOLIC BLOOD PRESSURE: 110 MMHG | DIASTOLIC BLOOD PRESSURE: 68 MMHG | BODY MASS INDEX: 25.51 KG/M2

## 2024-06-13 DIAGNOSIS — Z01.419 ENCOUNTER FOR GYNECOLOGICAL EXAMINATION WITHOUT ABNORMAL FINDING: Primary | ICD-10-CM

## 2024-06-13 LAB
APOB+LDLR+PCSK9 GENE MUT ANL BLD/T: NOT DETECTED
BRCA1+BRCA2 DEL+DUP + FULL MUT ANL BLD/T: NOT DETECTED
MLH1+MSH2+MSH6+PMS2 GN DEL+DUP+FUL M: NOT DETECTED

## 2024-06-13 PROCEDURE — S0612 ANNUAL GYNECOLOGICAL EXAMINA: HCPCS | Performed by: OBSTETRICS & GYNECOLOGY

## 2024-06-13 RX ORDER — TRETINOIN 0.5 MG/G
CREAM TOPICAL
COMMUNITY
Start: 2024-04-24

## 2024-06-13 RX ORDER — ONDANSETRON 4 MG/1
TABLET, FILM COATED ORAL
COMMUNITY
Start: 2024-03-12

## 2024-06-13 NOTE — PROGRESS NOTES
Michelle Gonzales  1985      CC:  Yearly exam    S:  38 y.o. female here for yearly exam. Her cycles are regular, not heavy or crampy.     She has been going to the gym and focusing more on self care.  She recently enrolled in the [employer] project and waiting for her results.  She has a family history of colon cancer in her MGM but no gyn malignancies.  She is aware to start colonoscopy at age 46.     Sexual activity: She is sexually active without pain, bleeding or dryness.     Contraception: She uses her tubal for contraception.     Last Pap 6/22/2020 - normal/negative HPV  Last Mammo - never    We reviewed ASCCP guidelines for Pap testing today.       Current Outpatient Medications:     Docusate Sodium (COLACE PO), Take by mouth as needed, Disp: , Rfl:     NIFEdipine (PROCARDIA XL) 30 mg 24 hr tablet, Take 1 tablet (30 mg total) by mouth 2 (two) times a day, Disp: 180 tablet, Rfl: 3    ondansetron (ZOFRAN) 4 mg tablet, , Disp: , Rfl:     Probiotic Product (PROBIOTIC PO), Take by mouth, Disp: , Rfl:     sertraline (Zoloft) 50 mg tablet, 1 Daily, Disp: 90 tablet, Rfl: 2    spironolactone (ALDACTONE) 50 mg tablet, Take 50 mg by mouth 2 (two) times a day, Disp: , Rfl:     traZODone (DESYREL) 50 mg tablet, 1/2-1 tab HS PRN for sleep, Disp: 90 tablet, Rfl: 2    tretinoin (REFISSA) 0.05 % cream, , Disp: , Rfl:     tretinoin (RETIN-A) 0.025 % cream, Apply topically as needed, Disp: , Rfl:     Multiple Minerals-Vitamins (Jason-Mag-Zinc-D) TABS, Take by mouth 2 (two) times a day (Patient not taking: Reported on 6/13/2024), Disp: , Rfl:     multivitamin (THERAGRAN) TABS, Take 1 tablet by mouth daily (Patient not taking: Reported on 6/13/2024), Disp: , Rfl:   Social History     Socioeconomic History    Marital status: /Civil Union     Spouse name: Not on file    Number of children: Not on file    Years of education: Not on file    Highest education level: Not on file   Occupational History    Not on file    Tobacco Use    Smoking status: Never    Smokeless tobacco: Never   Vaping Use    Vaping status: Never Used   Substance and Sexual Activity    Alcohol use: Not Currently     Alcohol/week: 2.0 standard drinks of alcohol     Comment: Glass or two a week    Drug use: No    Sexual activity: Yes     Partners: Male     Birth control/protection: None   Other Topics Concern    Not on file   Social History Narrative    Always uses a seatbelt    Drinks Coffee - 2 cups a day    IUD Contraception - mirena    Lack of exercise     (per [employer])     Social Determinants of Health     Financial Resource Strain: Not on file   Food Insecurity: Not on file   Transportation Needs: Not on file   Physical Activity: Not on file   Stress: Not on file   Social Connections: Not on file   Intimate Partner Violence: Not on file   Housing Stability: Not on file     Family History   Problem Relation Age of Onset    Hypertension Mother         URINARY INCONTINENCE    Hyperlipidemia Mother     Diverticulitis Mother     Hypothyroidism Mother     Alcohol abuse Mother     Hypertension Father     Hepatitis Father         A    Prostate cancer Father     Alcohol abuse Father     No Known Problems Sister     No Known Problems Brother     No Known Problems Daughter     Other Daughter         CHARGE syndrome    Diverticulitis Maternal Grandmother     Colon cancer Maternal Grandmother     Stroke Maternal Grandfather         CVA    Heart disease Maternal Grandfather     Diverticulitis Maternal Grandfather     Hypertension Maternal Grandfather     Heart attack Maternal Grandfather     Alzheimer's disease Paternal Grandmother     Dementia Paternal Grandmother     Diabetes Paternal Grandfather         INSULIN DEPENDENT      Past Medical History:   Diagnosis Date    Acute renal failure (HCC)     [id number] RESOLVED    Acute tubular necrosis (HCC)     Anemia     during the pregnancy     Anxiety     Bowel obstruction (HCC) 01/31/2017    Chronic kidney  "disease     Fibroadenoma of breast, left     Fibroadenoma of breast, right      1 para 1     History of transfusion 2015    After delivery     Hypertension     no medication     Kidney stone     Postpartum hemorrhage     UNSPECIFIED TYPE  RESOLVED     Urinary tract infection     yes in the last ten years    Vacuum extractor delivery, delivered      daughter with postpartum hemorrhage and DIC    Varicella     had childhood chickenpox        Review of Systems   Respiratory: Negative.    Cardiovascular: Negative.    Gastrointestinal: Negative for constipation and diarrhea.   Genitourinary: Negative for difficulty urinating, pelvic pain, vaginal bleeding, vaginal discharge, itching or odor.    O:  Blood pressure 110/68, height 5' 3.5\" (1.613 m), weight 67.8 kg (149 lb 6.4 oz), last menstrual period 2024, not currently breastfeeding.    Patient appears well and is not in distress  Neck is supple without masses  Breasts are symmetrical without mass, tenderness, nipple discharge, skin changes or adenopathy.   Abdomen is soft and nontender without masses.   External genitals are normal without lesions or rashes.  Urethral meatus and urethra are normal  Bladder is normal to palpation  Vagina is normal without discharge or bleeding.   Cervix is normal without discharge or lesion.   Uterus is normal, mobile, nontender without palpable mass.  Adnexa are normal, nontender, without palpable mass.     A:   Yearly exam.     P:   Pap due    Mammo at age 41    RTO one year for yearly exam or sooner as needed.     "

## 2024-08-23 ENCOUNTER — HOSPITAL ENCOUNTER (EMERGENCY)
Facility: HOSPITAL | Age: 39
Discharge: HOME | End: 2024-08-23
Attending: EMERGENCY MEDICINE
Payer: OTHER MISCELLANEOUS

## 2024-08-23 VITALS
DIASTOLIC BLOOD PRESSURE: 88 MMHG | HEART RATE: 89 BPM | RESPIRATION RATE: 18 BRPM | OXYGEN SATURATION: 98 % | SYSTOLIC BLOOD PRESSURE: 153 MMHG
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
## 2024-08-23 LAB
ALT SERPL W P-5'-P-CCNC: 20 U/L (ref 7–52)
HBV SURFACE AB SER-ACNC: >500 MIU/ML
HBV SURFACE AG SER QL: NORMAL
HCV AB SER QL: NORMAL
HIV 1+2 AB+HIV1 P24 AG SERPL QL IA: NORMAL
HIV 2 AB SERPL QL IA: NORMAL
HIV1 AB SERPL QL IA: NORMAL
HIV1 P24 AG SERPL QL IA: NORMAL

## 2024-08-23 PROCEDURE — 87340 HEPATITIS B SURFACE AG IA: CPT

## 2024-08-23 PROCEDURE — 86803 HEPATITIS C AB TEST: CPT

## 2024-08-23 PROCEDURE — 99282 EMERGENCY DEPT VISIT SF MDM: CPT

## 2024-08-23 PROCEDURE — 36415 COLL VENOUS BLD VENIPUNCTURE: CPT

## 2024-08-23 PROCEDURE — 86706 HEP B SURFACE ANTIBODY: CPT

## 2024-08-23 PROCEDURE — 99284 EMERGENCY DEPT VISIT MOD MDM: CPT | Performed by: EMERGENCY MEDICINE

## 2024-08-23 PROCEDURE — 87389 HIV-1 AG W/HIV-1&-2 AB AG IA: CPT

## 2024-08-23 PROCEDURE — 84460 ALANINE AMINO (ALT) (SGPT): CPT

## 2024-08-23 NOTE — DISCHARGE INSTRUCTIONS
You have been evaluated for a sharp exposure injury with exposure to bodily fluids.  You should follow-up with employee health. A exposure panel has been ordered for the source patient, and an exposure panel has been drawn on you prior to your discharge. Employee health will contact you to discuss these results and if any postexposure prophylaxis is indicated in your case.

## 2024-08-23 NOTE — ED ATTENDING ATTESTATION
8/23/2024  I, Ru Villalpando DO, saw and evaluated the patient. I have discussed the patient with the resident/non-physician practitioner and agree with the resident's/non-physician practitioner's findings, Plan of Care, and MDM as documented in the resident's/non-physician practitioner's note, except where noted. All available labs and Radiology studies were reviewed.  I was present for key portions of any procedure(s) performed by the resident/non-physician practitioner and I was immediately available to provide assistance.       At this point I agree with the current assessment done in the Emergency Department.  I have conducted an independent evaluation of this patient a history and physical is as follows:    38F with scalpel cut, scalpel was used to perform chest tube procedure. Noted as a significant exposure at this time. Will send laps on patient and source patient. Hold on anti-virals at this time. Wound was cleansed with soap and water. Recommend f/u with outpatient [facility] per the protocol.       ED Course         Critical Care Time  Procedures

## 2024-08-23 NOTE — ED PROVIDER NOTES
Emergency Department Employee Health Note  Michelle Gonzales 38 y.o. female MRN: [id number]  Unit/Bed#: QCF/QCF Encounter: [id number]        History of Present Illness     Chief Complaint:   Chief Complaint   Patient presents with    Infectious Exposure - Needlestick     Pt reports she was placing a chest tube and the safety slipped and she cut her R hand.     HPI:  Michelle Gonzales is a 38 y.o. female who presents following an accidental sharps injury with exposure to bodily fluid.  Mechanism:           Patient states that she was responding to a CODE BLUE in room 306 earlier today, and was performing a chest tube procedure when she accidentally cut herself on the palmar aspect of the right hand, inflicting a superficial laceration with a bloody scalpel.  She states that she immediately irrigated the wound for several minutes afterwards, but noted that it bled a small amount.  She endorses that she is up-to-date on all vaccinations.  She states that she has no chronic medical conditions or comorbidities other than hypertension, and is not taking any immunosuppressant or immunomodulatory medications.  Denies any other complaints.        Review of Systems   Constitutional:  Negative for chills and fever.   HENT:  Negative for ear pain and sore throat.    Eyes:  Negative for pain and visual disturbance.   Respiratory:  Negative for cough and shortness of breath.    Cardiovascular:  Negative for chest pain and palpitations.   Gastrointestinal:  Negative for abdominal pain and vomiting.   Genitourinary:  Negative for dysuria and hematuria.   Musculoskeletal:  Negative for arthralgias and back pain.   Skin:  Negative for color change and rash.   Neurological:  Negative for seizures and syncope.   All other systems reviewed and are negative.      Historical Information     Immunizations:   Immunization History   Administered Date(s) Administered    COVID-19 PFIZER VACCINE 0.3 ML IM 12/23/2020, 01/12/2021, 09/28/2021    COVID-19  Pfizer Vac BIVALENT David-sucrose 12 Yr+ IM 10/05/2022    INFLUENZA 2019, 10/05/2022    Influenza, injectable, quadrivalent, preservative free 0.5 mL 10/09/2020, 10/11/2023    MMR 2020    Tdap 2015, 10/09/2020, 12/15/2022       Past Medical History:   Diagnosis Date    Acute renal failure (HCC)     [id number] RESOLVED    Acute tubular necrosis (HCC)     Anemia     during the pregnancy     Anxiety     Bowel obstruction (HCC) 2017    Chronic kidney disease     Fibroadenoma of breast, left     Fibroadenoma of breast, right      1 para 1     History of transfusion 2015    After delivery     Hypertension     no medication     Kidney stone     Postpartum hemorrhage     UNSPECIFIED TYPE  RESOLVED     Urinary tract infection     yes in the last ten years    Vacuum extractor delivery, delivered      daughter with postpartum hemorrhage and DIC    Varicella     had childhood chickenpox       Family History   Problem Relation Age of Onset    Hypertension Mother         URINARY INCONTINENCE    Hyperlipidemia Mother     Diverticulitis Mother     Hypothyroidism Mother     Alcohol abuse Mother     Hypertension Father     Hepatitis Father         A    Prostate cancer Father     Alcohol abuse Father     No Known Problems Sister     No Known Problems Brother     No Known Problems Daughter     Other Daughter         CHARGE syndrome    Diverticulitis Maternal Grandmother     Colon cancer Maternal Grandmother     Stroke Maternal Grandfather         CVA    Heart disease Maternal Grandfather     Diverticulitis Maternal Grandfather     Hypertension Maternal Grandfather     Heart attack Maternal Grandfather     Alzheimer's disease Paternal Grandmother     Dementia Paternal Grandmother     Diabetes Paternal Grandfather         INSULIN DEPENDENT     Past Surgical History:   Procedure Laterality Date    BREAST FIBROADENOMA SURGERY      Cryosurgical Ablation of Fibroadenoma    BREAST LUMPECTOMY Left      BREAST LUMPECTOMY Right      SECTION          EXPLORATORY LAPAROTOMY W/ BOWEL RESECTION N/A 2017    Procedure: LAPAROTOMY EXPLORATORY W/ lysis of adhesions;  Surgeon: Felix Suggs DO;  [facility]: [facility];  Service:     INTRAUTERINE DEVICE INSERTION      LAPAROTOMY      EXPLORATORY for DIC and hemorrhage postpartum retroperitoneal hematoma    [place]  DELIVERY ONLY N/A 2020    Procedure:  SECTION () REPEAT;  Surgeon: Kena Green MD;  [facility]: [facility];  Service: Obstetrics    [place]  DELIVERY ONLY N/A 2023    Procedure:  SECTION () REPEAT;  Surgeon: Yolie Ron MD;  [facility];  Service: Obstetrics    [place] LIG/TRNSXJ FALOPIAN TUBE  DEL/ABDML SURG Bilateral 2023    Procedure: LIGATION/COAGULATION TUBAL;  Surgeon: Yolie Ron MD;  [facility]: [facility];  Service: Obstetrics    TOOTH EXTRACTION      Impression: 2014 [facility]       Social History     Tobacco Use    Smoking status: Never    Smokeless tobacco: Never   Vaping Use    Vaping status: Never Used   Substance Use Topics    Alcohol use: Not Currently     Alcohol/week: 2.0 standard drinks of alcohol     Comment: Glass or two a week    Drug use: No     E-Cigarette/Vaping    E-Cigarette Use Never User      E-Cigarette/Vaping Substances    Nicotine No     THC No     CBD No     Flavoring No     Other No     Unknown No          Meds/Allergies   Prior to Admission Medications   Prescriptions Last Dose Informant Patient Reported? Taking?   Docusate Sodium (COLACE PO)  Self Yes No   Sig: Take by mouth as needed   Multiple Minerals-Vitamins (Jason-Mag-Zinc-D) TABS  Self Yes No   Sig: Take by mouth 2 (two) times a day   Patient not taking: Reported on 2024   NIFEdipine (PROCARDIA XL) 30 mg 24 hr tablet  Self No No   Sig: Take 1 tablet (30 mg total) by mouth 2 (two) times a day   Probiotic Product (PROBIOTIC PO)  Self Yes No   Sig: Take by mouth    multivitamin (THERAGRAN) TABS  Self Yes No   Sig: Take 1 tablet by mouth daily   Patient not taking: Reported on 2024   ondansetron (ZOFRAN) 4 mg tablet   Yes No   sertraline (Zoloft) 50 mg tablet   No No   Si Daily   spironolactone (ALDACTONE) 50 mg tablet  Self Yes No   Sig: Take 50 mg by mouth 2 (two) times a day   traZODone (DESYREL) 50 mg tablet  Self No No   Si/2-1 tab HS PRN for sleep   tretinoin (REFISSA) 0.05 % cream   Yes No   tretinoin (RETIN-A) 0.025 % cream  Self Yes No   Sig: Apply topically as needed      Facility-Administered Medications: None       Allergies   Allergen Reactions    Nickel Rash     Skin rash       PHYSICAL EXAM  Physical Exam  Vitals and nursing note reviewed.   Constitutional:       General: She is not in acute distress.     Appearance: She is well-developed.   HENT:      Head: Normocephalic and atraumatic.   Eyes:      Conjunctiva/sclera: Conjunctivae normal.   Cardiovascular:      Rate and Rhythm: Normal rate and regular rhythm.      Heart sounds: No murmur heard.  Pulmonary:      Effort: Pulmonary effort is normal. No respiratory distress.      Breath sounds: Normal breath sounds.   Abdominal:      Palpations: Abdomen is soft.      Tenderness: There is no abdominal tenderness.   Musculoskeletal:         General: No swelling.      Cervical back: Neck supple.   Skin:     General: Skin is warm and dry.      Capillary Refill: Capillary refill takes less than 2 seconds.   Neurological:      Mental Status: She is alert.   Psychiatric:         Mood and Affect: Mood normal.         Vital Signs  ED Triage Vitals [24 1250]   Temp Pulse Respirations Blood Pressure SpO2   -- 89 18 153/88 98 %      Temp src Heart Rate Source Patient Position - Orthostatic VS BP Location FiO2 (%)   -- Monitor -- -- --      Pain Score       --           Vitals:    24 1250   BP: 153/88   Pulse: 89         Certification of Exposure:    (link to Employee Health Services: [facility]  "Employee Health Services ([email or web address]): find link to BBP Exposure Instructions under important links on center of the page)    The patient is stable and has a history and physical exam consistent with an Sharp Injury and based on my assessment this exposure is Significant     If “NonSignificant” nothing further needs to be filled out.  If \"Significant\" please continue with the following questions    For Significant Exposures All of the following items must be completed:     Source Patient Name: Aayush Stringer   Source Patient MRN: [id number]  Was the Source Patient's Provider contacted Yes, Providers name: Leticia Gonzales  Was \"Exposure Panel - Source\" ordered (including Rapid HIV, HBsAg and anti-HCV) for Source Patient: Yes    Exposure Information    Type of Body Fluid Exposure: blood    Estimated volume of fluid: small up to 5cc     Exposure area:  intact skin    Skin Integrity: lacerated    ED provider should review source patient chart for known history of HIV, Hepatitis B and Hepatitis C infection    Source patient HIV positive: No  Was the On-call Infectious Disease Provider contacted: No  Discussed treatment options with/for employee: Yes    Immunization History   Administered Date(s) Administered    COVID-19 PFIZER VACCINE 0.3 ML IM 12/23/2020, 01/12/2021, 09/28/2021    COVID-19 Pfizer Vac BIVALENT David-sucrose 12 Yr+ IM 10/05/2022    INFLUENZA 01/23/2019, 10/05/2022    Influenza, injectable, quadrivalent, preservative free 0.5 mL 10/09/2020, 10/11/2023    MMR 12/19/2020    Tdap 05/11/2015, 10/09/2020, 12/15/2022       Hepatitis B Vaccine History:    Immunization History   Administered Date(s) Administered    COVID-19 PFIZER VACCINE 0.3 ML IM 12/23/2020, 01/12/2021, 09/28/2021    COVID-19 Pfizer Vac BIVALENT David-sucrose 12 Yr+ IM 10/05/2022    INFLUENZA 01/23/2019, 10/05/2022    Influenza, injectable, quadrivalent, preservative free 0.5 mL 10/09/2020, 10/11/2023    MMR 12/19/2020    Tdap 05/11/2015, 10/09/2020, " "12/15/2022       The patient has Previously been vaccinated for Hepatitis B.     HEPATITIS B IMMUNE GLOBULIN:  Not Indicated    Tetanus Vaccine History:    TDAP vaccination date: 12/15/2022    The patient has Tdap reported as up to date    Employee/Patient post exposure testing Has been performed.     \"Exposure Panel - Employee Baseline\"  (includes HBsAg, HBsAb, anti-HCV, ALT, HIV) with verbal consent and pretesting counseling for the patient Has been ordered.    Post-exposure Prophylaxis treatment options were discussed today: Not Indicated      Visual Acuity      ED Medications  Medications - No data to display    Diagnostic Studies  Results Reviewed       Procedure Component Value Units Date/Time    Hepatitis C antibody [[id number]] Collected: 08/23/24 1312    Lab Status: In process Specimen: Blood from Arm, Left Updated: 08/23/24 1320    Hepatitis B surface antibody [[id number]] Collected: 08/23/24 1312    Lab Status: In process Specimen: Blood from Arm, Left Updated: 08/23/24 1320    ALT [[id number]] Collected: 08/23/24 1312    Lab Status: In process Specimen: Blood from Arm, Left Updated: 08/23/24 1320    Hepatitis B surface antigen [[id number]] Collected: 08/23/24 1312    Lab Status: In process Specimen: Blood from Arm, Left Updated: 08/23/24 1320    HIV 1/2 AG/AB w Reflex [facility] for 2 yr old and above [[id number]] Collected: 08/23/24 1312    Lab Status: In process Specimen: Blood from Arm, Left Updated: 08/23/24 1320               No orders to display              Medical Decision Making  Patient is a 38 y.o. female  who presents to the ED following a sharp exposure with bodily fluid contact.    Vital signs remained stable throughout ED course. Exam as listed above    Exposure panels were drawn on both the source patient and the exposed patient.    Plan     Patient will follow-up with employee health regarding the results of pertinent testing.    View ED course above for further discussion on patient workup. "     On review of previous records patient is fully up-to-date on vaccinations, and does not have any chronic comorbidities or immunocompromising conditions or medications.    All labs reviewed and utilized in the medical decision making process  All radiology studies independently viewed by me and interpreted by the radiologist.  I reviewed all testing with the patient.         Amount and/or Complexity of Data Reviewed  Labs: ordered.        Disposition  Final diagnoses:   Needlestick injury accident with exposure to body fluid     Time reflects when diagnosis was documented in both MDM as applicable and the Disposition within this note       Time User Action Codes Description Comment    8/23/2024  1:00 PM Basim Stephens Alexander [W46.1XXA] Needlestick injury accident with exposure to body fluid           ED Disposition       ED Disposition   Discharge    Condition   Stable    Date/Time   Fri Aug 23, 2024  1:12 PM    Comment   Michelle Gonzales discharge to home/self care.                   Follow-up Information    None         Discharge Medication List as of 8/23/2024  1:14 PM        CONTINUE these medications which have NOT CHANGED    Details   Docusate Sodium (COLACE PO) Take by mouth as needed, Historical Med      Multiple Minerals-Vitamins (Jason-Mag-Zinc-D) TABS Take by mouth 2 (two) times a day, Historical Med      multivitamin (THERAGRAN) TABS Take 1 tablet by mouth daily, Historical Med      NIFEdipine (PROCARDIA XL) 30 mg 24 hr tablet Take 1 tablet (30 mg total) by mouth 2 (two) times a day, Starting Mon 7/10/2023, Normal      ondansetron (ZOFRAN) 4 mg tablet Historical Med      Probiotic Product (PROBIOTIC PO) Take by mouth, Historical Med      sertraline (Zoloft) 50 mg tablet 1 Daily, Normal      spironolactone (ALDACTONE) 50 mg tablet Take 50 mg by mouth 2 (two) times a day, Starting Tue 4/18/2023, Historical Med      traZODone (DESYREL) 50 mg tablet 1/2-1 tab HS PRN for sleep, Normal      tretinoin (REFISSA) 0.05 %  cream Historical Med      tretinoin (RETIN-A) 0.025 % cream Apply topically as needed, Starting Tue 4/18/2023, Historical Med             No discharge procedures on file.    PDMP Review         Value Time User    PDMP Reviewed  Yes 12/19/2020  1:26 PM Gena Stroud MD              ED Provider  Electronically Signed by               Basim Stephens DO  08/23/24 [id number]

## 2024-08-27 NOTE — TELEPHONE ENCOUNTER
Patient is calling regarding cancelling an appointment  Date/Time: 12/2/2022 at 9:00 a m  Reason: daughter is sick    Patient was rescheduled: YES [x] NO []  If yes, when was Patient reschedule for: 12/12/2022 at 2:00 p m      Patient requesting call back to reschedule: YES [] NO [x] [facility]   Cardiac Electrophysiology Consultation   Date: 8/29/2024  Reason for Consultation: AF  Consult Requesting Physician: No att. providers found     Chief Complaint:   Chief Complaint   Patient presents with    Follow-up     Afib ablation PFA 3 Month follow up       HPI: Mitra Ornelas is a 74 y.o. female with PMH significant for HTN, AMNA, palpitations with normal 14 day monitor (6/2022), AF RVR found at PCP office (7/2022) after p/w palpitations, chest discomfort, and dizziness, spontaneous conversion to SR in ED, placed on Eliquis and Toprol increased. MPI and echo were both normal. 7 day CAM (1/2023) showed 11% AF and 9% AFL, placed on flecainide, repeat monitor (7/2023) showed single episode of AFL lasting 1.9 hrs, s/p RFA/PVI of AF/tAFL (5/31/24, myself).    Interval History:   Today, she is here for f/u post ablation, presenting in SR with stable QRS. She has been feeling better since the ablation. Denies SOB, palpitations, chest discomfort, eating difficulties, odynophagia, fever or chills since the ablation. She had palpitations following the procedure, but they have been decreasing significantly and now very brief.    She uses [employer] mobile to monitor her rhythm,    Assessment:  PAF/AFL, on Xarelto, flecainide, Toprol  S/p AF/AFL ablation (5/2024)  HTN, stable on Toprol, Maxide, follows Dr. Jara  AMNA, compliant with CPAP    Plan:  No changes today  Continue to monitor for rhythm with [employer]  Continue to f/u with Dr. Jara as scheduled  Follow up with EP NP in 6 months      Atrial Fibrillation:    BMI    :   Body mass index is 34.11 kg/m².    Duration   :   1/2023    Symptoms   :   Shortness of breath, palpitations, easy fatigability, dyspnea on exertion, decline in his functional capacity    Previous DCCV :  none    Previous AAD             :   flecainide 1/2023    Beta blocker  :   Toprol    ACE / ARB  :   losartan    OAC   :   Xarelto    LVEF    :   60-65%    Left atrial

## 2024-10-15 ENCOUNTER — OFFICE VISIT (OUTPATIENT)
Dept: NEPHROLOGY | Facility: CLINIC | Age: 39
End: 2024-10-15
Payer: COMMERCIAL

## 2024-10-15 VITALS
WEIGHT: 155 LBS | SYSTOLIC BLOOD PRESSURE: 130 MMHG | HEART RATE: 69 BPM | OXYGEN SATURATION: 98 % | BODY MASS INDEX: 26.46 KG/M2 | HEIGHT: 64 IN | DIASTOLIC BLOOD PRESSURE: 90 MMHG

## 2024-10-15 DIAGNOSIS — I10 ESSENTIAL HYPERTENSION: Primary | ICD-10-CM

## 2024-10-15 PROCEDURE — 99213 OFFICE O/P EST LOW 20 MIN: CPT | Performed by: INTERNAL MEDICINE

## 2024-10-15 RX ORDER — NIFEDIPINE 60 MG/1
60 TABLET, EXTENDED RELEASE ORAL DAILY
Qty: 90 TABLET | Refills: 3 | Status: SHIPPED | OUTPATIENT
Start: 2024-10-15

## 2024-10-15 RX ORDER — SPIRONOLACTONE 100 MG/1
100 TABLET, FILM COATED ORAL DAILY
Qty: 90 TABLET | Refills: 3 | Status: SHIPPED | OUTPATIENT
Start: 2024-10-15

## 2024-10-15 NOTE — PROGRESS NOTES
NEPHROLOGY OUTPATIENT PROGRESS NOTE   Michelle Gonzales 38 y.o. female MRN: [id number]  DATE: 10/15/2024  Reason for visit:   Chief Complaint   Patient presents with    Follow-up    Hypertension     ASSESSMENT and PLAN:  baseline creatinine 0.6 to 0.8  -Last creatinine 0.7 in May 2024 stable.    -UA in May 2024 shows trace proteinuria, no hematuria.  UACR non significant in May 2024  -avoid nephrotoxins or NSAIDs. Continue to closely monitor renal function.  -she has history of DEANA with peak creatinine 2.4 in 2015 when she had postpartum hemorrhage and required ex lap.      Hypertension  - prior 24 hour ABPM readings in March 2020:  24 hour BP average 139/92  Daytime BP average 143/95  Nighttime BP average 127/84  MAP night time dipping 9.65%  -Current regimen includes Procardia XL 30 mg twice daily, Aldactone 50 mg twice daily.  -She frequently forgets a.m. dose of Procardia XL and Aldactone about 3 days a week.  -Recommend to be more compliant taking antihypertensive regimen.  Will change Procardia XL to 60 mg daily and Aldactone 100 mg daily in the evening which seems to be better for compliance as per patient.  -Remains on Aldactone for acne by dermatology  -BP slightly higher in the office today.  Her home BP readings 120s to low 130s/80s   -Noted occasional high readings at home.    -She may have hidden salt intake with fair amount of processed food intake. Recommend to follow a low-salt diet.  -Prior workup include serum aldosterone 11.7 (prior 4.2, 43.9), PRA 3.71 (prior 2.03, 0.2)  -serum a.m. cortisol 15.6  -prior CT scan shows unremarkable adrenal glands.   -plasma metanephrine/catecholamine unremarkable.  -Continue low-salt diet.  -Discussed with radiologist, prior CT scan with IV contrast in 2017 did not show any obvious abnormality/stenosis although noted that this was not specific vascular study.     History of isolated episodes of hypokalemia, most recent lab results shows serum potassium within normal  "range.  Not on any potassium supplements.    Diagnoses and all orders for this visit:    Essential hypertension  -     NIFEdipine (PROCARDIA XL) 60 mg 24 hr tablet; Take 1 tablet (60 mg total) by mouth daily  -     spironolactone (ALDACTONE) 100 mg tablet; Take 1 tablet (100 mg total) by mouth daily  -     Albumin / creatinine urine ratio; Future  -     Basic metabolic panel; Future          SUBJECTIVE / HPI:  Michelle Gonzales is a 38 y.o. female without significant medical issues except component of anxiety, prior history of ex lap due to small bowel obstruction, also history of postpartum hemorrhage requiring blood transfusion in 2015, hypertension diagnosed in 2019 who presents for regular follow-up of hypertension.  She admits to be forgetting a.m. dose of nifedipine XL and Aldactone 3 times a week.  Remains on Aldactone by dermatology due to acne.   Blood pressure at home 120s to low 130s/80s with occasional high readings. Denies any recent headache, leg edema issues. No recent NSAID exposure. Denies any urinary complaint.     History of isolated rheumatoid factor positive, she was evaluated by Rheumatology and No further intervention was done in the past.  She also has history of postpartum hemorrhage although denies having any preeclampsia or eclampsia issues during pregnancy in 2015.      Family history includes both parents having hypertension controlled with single agent diagnosed in their 40s.  Also her brother having hypertension in his early 30s.  No obvious family history of CVA.    REVIEW OF SYSTEMS:  More than 10 point review of systems were obtained and discussed in length with the patient. Complete review of systems were negative / unremarkable except mentioned above.     PHYSICAL EXAM:  Vitals:    10/15/24 1026 10/15/24 1045   BP: 136/88 130/90   BP Location: Left arm    Patient Position: Sitting    Cuff Size: Adult    Pulse: 69    SpO2: 98%    Weight: 70.3 kg (155 lb)    Height: 5' 3.5\" (1.613 m)  "     Body mass index is 27.03 kg/m².    Physical Exam  Vitals reviewed.   Constitutional:       Appearance: She is well-developed.   HENT:      Head: Normocephalic and atraumatic.      Right Ear: External ear normal.      Left Ear: External ear normal.   Eyes:      Conjunctiva/sclera: Conjunctivae normal.   Cardiovascular:      Comments: No significant edema in legs  Pulmonary:      Effort: Pulmonary effort is normal.      Breath sounds: Normal breath sounds. No wheezing or rales.   Abdominal:      General: Bowel sounds are normal. There is no distension.      Palpations: Abdomen is soft.      Tenderness: There is no abdominal tenderness.   Musculoskeletal:         General: No deformity.   Lymphadenopathy:      Cervical: No cervical adenopathy.   Skin:     Findings: No rash.   Neurological:      Mental Status: She is alert and oriented to person, place, and time.   Psychiatric:         Behavior: Behavior normal.         PAST MEDICAL HISTORY:  Past Medical History:   Diagnosis Date    Acute renal failure (HCC)     [id number] RESOLVED    Acute tubular necrosis (HCC)     Anemia     during the pregnancy     Anxiety     Bowel obstruction (HCC) 2017    Chronic kidney disease     Fibroadenoma of breast, left     Fibroadenoma of breast, right      1 para 1     History of transfusion 2015    After delivery     Hypertension     no medication     Kidney stone     Postpartum hemorrhage     UNSPECIFIED TYPE  RESOLVED     Urinary tract infection     yes in the last ten years    Vacuum extractor delivery, delivered      daughter with postpartum hemorrhage and DIC    Varicella     had childhood chickenpox       PAST SURGICAL HISTORY:  Past Surgical History:   Procedure Laterality Date    BREAST FIBROADENOMA SURGERY      Cryosurgical Ablation of Fibroadenoma    BREAST LUMPECTOMY Left     BREAST LUMPECTOMY Right      SECTION          EXPLORATORY LAPAROTOMY W/ BOWEL RESECTION N/A 2017     Procedure: LAPAROTOMY EXPLORATORY W/ lysis of adhesions;  Surgeon: Felix Suggs DO;  Location: [facility];  Service:     INTRAUTERINE DEVICE INSERTION      LAPAROTOMY      EXPLORATORY for DIC and hemorrhage postpartum retroperitoneal hematoma    [place]  DELIVERY ONLY N/A 2020    Procedure:  SECTION () REPEAT;  Surgeon: Kena Green MD;  [facility];  Service: Obstetrics    [place]  DELIVERY ONLY N/A 2023    Procedure:  SECTION () REPEAT;  Surgeon: Yolie Ron MD;  [facility];  Service: Obstetrics    [place] LIG/TRNSXJ FALOPIAN TUBE  DEL/ABDML SURG Bilateral 2023    Procedure: LIGATION/COAGULATION TUBAL;  Surgeon: Yolie Ron MD;  [facility];  Service: Obstetrics    TOOTH EXTRACTION      Impression: [id number] Tomasa Enamorado         SOCIAL HISTORY:  Social History     Substance and Sexual Activity   Alcohol Use Not Currently    Alcohol/week: 2.0 standard drinks of alcohol    Comment: Glass or two a week     Social History     Substance and Sexual Activity   Drug Use No     Social History     Tobacco Use   Smoking Status Never   Smokeless Tobacco Never       FAMILY HISTORY:  Family History   Problem Relation Age of Onset    Hypertension Mother         URINARY INCONTINENCE    Hyperlipidemia Mother     Diverticulitis Mother     Hypothyroidism Mother     Alcohol abuse Mother     Hypertension Father     Hepatitis Father         A    Prostate cancer Father     Alcohol abuse Father     No Known Problems Sister     No Known Problems Brother     No Known Problems Daughter     Other Daughter         CHARGE syndrome    Diverticulitis Maternal Grandmother     Colon cancer Maternal Grandmother     Stroke Maternal Grandfather         CVA    Heart disease Maternal Grandfather     Diverticulitis Maternal Grandfather     Hypertension Maternal Grandfather     Heart attack Maternal Grandfather     Alzheimer's disease Paternal  Grandmother     Dementia Paternal Grandmother     Diabetes Paternal Grandfather         INSULIN DEPENDENT       MEDICATIONS:    Current Outpatient Medications:     Docusate Sodium (COLACE PO), Take by mouth as needed, Disp: , Rfl:     NIFEdipine (PROCARDIA XL) 60 mg 24 hr tablet, Take 1 tablet (60 mg total) by mouth daily, Disp: 90 tablet, Rfl: 3    ondansetron (ZOFRAN) 4 mg tablet, , Disp: , Rfl:     Probiotic Product (PROBIOTIC PO), Take by mouth, Disp: , Rfl:     sertraline (Zoloft) 50 mg tablet, 1 Daily, Disp: 90 tablet, Rfl: 2    spironolactone (ALDACTONE) 100 mg tablet, Take 1 tablet (100 mg total) by mouth daily, Disp: 90 tablet, Rfl: 3    traZODone (DESYREL) 50 mg tablet, 1/2-1 tab HS PRN for sleep, Disp: 90 tablet, Rfl: 2    tretinoin (REFISSA) 0.05 % cream, , Disp: , Rfl:     tretinoin (RETIN-A) 0.025 % cream, Apply topically as needed, Disp: , Rfl:     Multiple Minerals-Vitamins (Jason-Mag-Zinc-D) TABS, Take by mouth 2 (two) times a day (Patient not taking: Reported on 6/13/2024), Disp: , Rfl:     multivitamin (THERAGRAN) TABS, Take 1 tablet by mouth daily (Patient not taking: Reported on 6/13/2024), Disp: , Rfl:     Lab Results:   Results for orders placed or performed during the hospital encounter of 08/23/24   Hepatitis B surface antigen    Collection Time: 08/23/24  1:12 PM   Result Value Ref Range    Hepatitis B Surface Ag Non-reactive Non-Reactive   Hepatitis C antibody    Collection Time: 08/23/24  1:12 PM   Result Value Ref Range    Hepatitis C Ab Non-reactive Non-Reactive   HIV 1/2 AG/AB w Reflex [facility] for 2 yr old and above    Collection Time: 08/23/24  1:12 PM   Result Value Ref Range    HIV-1 p24 Antigen Non-Reactive Non-Reactive    HIV-1 Antibody Non-Reactive Non-Reactive    HIV-2 Antibody Non-Reactive Non-Reactive    HIV Ag-Ab 5th Gen Non-Reactive Non-Reactive   ALT    Collection Time: 08/23/24  1:12 PM   Result Value Ref Range    ALT 20 7 - 52 U/L   Hepatitis B surface antibody    Collection  Time: 08/23/24  1:12 PM   Result Value Ref Range    Hep B S Ab >500.00 3-500 mIU/mL mIU/mL

## 2024-10-17 ENCOUNTER — PATIENT MESSAGE (OUTPATIENT)
Dept: OBGYN CLINIC | Facility: CLINIC | Age: 39
End: 2024-10-17

## 2024-10-18 ENCOUNTER — NURSE TRIAGE (OUTPATIENT)
Age: 39
End: 2024-10-18

## 2024-10-18 DIAGNOSIS — B37.31 YEAST INFECTION INVOLVING THE VAGINA AND SURROUNDING AREA: Primary | ICD-10-CM

## 2024-10-18 RX ORDER — FLUCONAZOLE 150 MG/1
150 TABLET ORAL DAILY
Qty: 1 TABLET | Refills: 0 | Status: SHIPPED | OUTPATIENT
Start: 2024-10-18 | End: 2024-10-19

## 2024-10-18 NOTE — TELEPHONE ENCOUNTER
"Patient calling to report possible yeast infection symptoms. Notes creamy white discharge with vulvovaginal itching. Tried OTC monistat with slight improvement. 1 dose diflucan sent to pharmacy. Review vulvar care.    Advised no underwear, especially for sleep, or cotton only if necessary, daily gentle cleansing with unscented soap and warm water, change out of wet or sweaty clothing timely, loose vs. tight fitting clothing, nothing inside vagina/no douching, ice for itching/swelling, Sitz bath (4 tbsp baking soda to bath tub and soak for 10-15 mins, pat dry apply ointment), and aquaphor PRN.      Reason for Disposition  • Symptoms of a yeast infection (i.e., itchy, white discharge, not bad smelling) and feels like prior vaginal yeast infections    Answer Assessment - Initial Assessment Questions  1. SYMPTOM: \"What's the main symptom you're concerned about?\" (e.g., pain, itching, dryness)      Vulvar itching, creamy white discharge, denies odor  2. LOCATION: \"Where is the  s/s located?\" (e.g., inside/outside, left/right)      vulvovaginal  3. ONSET: \"When did the  s/s  start?\"      2nd week of September  4. PAIN: \"Is there any pain?\" If Yes, ask: \"How bad is it?\" (Scale: 1-10; mild, moderate, severe)      Denies  5. ITCHING: \"Is there any itching?\" If Yes, ask: \"How bad is it?\" (Scale: 1-10; mild, moderate, severe)      Mild-mod  6. CAUSE: \"What do you think is causing the discharge?\" \"Have you had the same problem before? What happened then?\"      Possible yeast  7. OTHER SYMPTOMS: \"Do you have any other symptoms?\" (e.g., fever, itching, vaginal bleeding, pain with urination, injury to genital area, vaginal foreign body)      Denies fever, vaginal bleeding, urinary concerns  8. PREGNANCY: \"Is there any chance you are pregnant?\" \"When was your last menstrual period?\"      Denies    Protocols used: Vaginal Symptoms-ADULT-[place]    "

## 2024-10-18 NOTE — TELEPHONE ENCOUNTER
Pt sent a myc message that she has a yeast infection. She is feeling itchy. She tried otc monistat but not having relief. She said in the past she has used a longer course of diflucan which worked.

## 2024-11-12 ENCOUNTER — TELEPHONE (OUTPATIENT)
Age: 39
End: 2024-11-12

## 2024-11-12 NOTE — TELEPHONE ENCOUNTER
Patient is calling regarding cancelling an appointment.    Date/Time: 12/2/24 at 1pm    Reason: no reason given    Patient was rescheduled: YES [x] NO []  If yes, when was Patient reschedule for: 12/5/24 at 9am    Patient requesting call back to reschedule: YES [] NO [x]

## 2024-11-26 ENCOUNTER — TELEPHONE (OUTPATIENT)
Age: 39
End: 2024-11-26

## 2024-11-26 NOTE — TELEPHONE ENCOUNTER
Spoke with patient appointment made 12/3/2024 with Che for form completion and labs to be ordered

## 2024-11-26 NOTE — TELEPHONE ENCOUNTER
Pt needs to have a TB test done prior to employment along with paper work that needs to be filled out by December 10. Please add order to chart and call pt with an OV .

## 2024-12-03 ENCOUNTER — OFFICE VISIT (OUTPATIENT)
Age: 39
End: 2024-12-03
Payer: COMMERCIAL

## 2024-12-03 VITALS
BODY MASS INDEX: 27.43 KG/M2 | SYSTOLIC BLOOD PRESSURE: 126 MMHG | WEIGHT: 154.8 LBS | OXYGEN SATURATION: 98 % | HEART RATE: 87 BPM | HEIGHT: 63 IN | DIASTOLIC BLOOD PRESSURE: 84 MMHG | TEMPERATURE: 98 F

## 2024-12-03 DIAGNOSIS — Z11.1 SCREENING FOR TUBERCULOSIS: Primary | ICD-10-CM

## 2024-12-03 DIAGNOSIS — Z00.00 ANNUAL PHYSICAL EXAM: ICD-10-CM

## 2024-12-03 PROCEDURE — 86580 TB INTRADERMAL TEST: CPT

## 2024-12-03 PROCEDURE — 99395 PREV VISIT EST AGE 18-39: CPT | Performed by: NURSE PRACTITIONER

## 2024-12-03 NOTE — PROGRESS NOTES
[facility] - [facility] PRIMARY CARE [place]  Well Adult Female Physical Visit  Patient ID: Michelle Gonzales    : 1985  Age/Gender: 38 y.o. female     DATE: 12/3/2024      Assessment/Plan:    Annual physical exam  -up to date with fasting blood work   -Continue with well-balanced diet, encouraged daily exercise  -She is up-to-date with vaccinations  -Encourage monthly self breast examination  -Up-to-date with cervical cancer screening  -Follow-up in 1 year for annual physical or sooner if needed    -Forms filled out, PPD placed    Depression Screening and Follow-up Plan: Patient was screened for depression during today's encounter. They screened negative with a PHQ-9 score of 0.         Diagnoses and all orders for this visit:    Screening for tuberculosis  -     TB Skin Test    Annual physical exam          Subjective:     Michelle Gonzales is a 38 y.o. female who presents to the office on 12/3/2024 for a health maintenance physical.    HPI  The following portions of the patient's history were reviewed and updated as appropriate: allergies, current medications, past family history, past medical history, past social history, past surgical history, and problem list.    Pt reports overall health:  good  Last Physical:  Last GYN Exam:  yearly     Healthy Diet:  well balanced  Routine Exercise:  2 -3 days a week to the gym   Weight Concerns:  would like to lose some weight    Problems with vision:  denies, wears glasses and contacts  Last Eye Exam:  yearly     Problems with Hearing:  denies    Routine Dental Exams:  every 6 months    Smoking History:  denies  ETOH Use:  social  Illegal Drug Use:  denies  Caffeine Use:  2 cups of coffee a day    Reproductive Health:    Last PAP:    History of abnormal PAP:  denies  LMP:  current   Sexually Active:  currently  Contraceptive:  tubal  Problems with menstrual cycle:  denies  Vaginal Discharge:  denies    Self Breast Exams: monthly    Denies family history of breast CA    Depression Screening:  PHQ-2/9 Depression Screening    Little interest or pleasure in doing things: 0 - not at all  Feeling down, depressed, or hopeless: 0 - not at all  Trouble falling or staying asleep, or sleeping too much: 0 - not at all  Feeling tired or having little energy: 0 - not at all  Poor appetite or overeatin - not at all  Feeling bad about yourself - or that you are a failure or have let yourself or your family down: 0 - not at all  Trouble concentrating on things, such as reading the newspaper or watching television: 0 - not at all  Moving or speaking so slowly that other people could have noticed. Or the opposite - being so fidgety or restless that you have been moving around a lot more than usual: 0 - not at all  Thoughts that you would be better off dead, or of hurting yourself in some way: 0 - not at all  PHQ-9 Score: 0  PHQ-9 Interpretation: No or Minimal depression           Family History of Colon CA: maternal grandmother, diagnosed at age 81    Last Labs:        Review of Systems   Constitutional:  Negative for activity change, appetite change, chills, diaphoresis and fever.   HENT:  Negative for congestion, ear discharge, ear pain, postnasal drip, rhinorrhea, sinus pressure, sinus pain and sore throat.    Eyes:  Negative for pain, discharge, itching and visual disturbance.   Respiratory:  Negative for cough, chest tightness, shortness of breath and wheezing.    Cardiovascular:  Negative for chest pain, palpitations and leg swelling.   Gastrointestinal:  Negative for abdominal pain, blood in stool, constipation, diarrhea, nausea and vomiting.   Endocrine: Negative for polydipsia, polyphagia and polyuria.   Genitourinary:  Negative for difficulty urinating, dysuria, hematuria and urgency.   Musculoskeletal:  Negative for arthralgias, back pain and neck pain.   Skin:  Negative for rash and wound.   Neurological:  Negative for dizziness, weakness,  numbness and headaches.         Patient Active Problem List   Diagnosis    Acne    Elevated rheumatoid factor    Hernia, hiatal    History of DIC syndrome    History of small bowel obstruction    Essential hypertension    History of polyhydramnios    SARA (generalized anxiety disorder)    Annual physical exam    Flu-like symptoms    Moderate episode of recurrent major depressive disorder (HCC)       Past Medical History:   Diagnosis Date    Acute renal failure (HCC)     [id number] RESOLVED    Acute tubular necrosis (HCC)     Anemia     during the pregnancy     Anxiety     Bowel obstruction (HCC) 2017    Chronic kidney disease     Fibroadenoma of breast, left     Fibroadenoma of breast, right      1 para 1     History of transfusion 2015    After delivery     Hypertension     no medication     Kidney stone     Postpartum hemorrhage     UNSPECIFIED TYPE  RESOLVED     Urinary tract infection     yes in the last ten years    Vacuum extractor delivery, delivered      daughter with postpartum hemorrhage and DIC    Varicella     had childhood chickenpox       Past Surgical History:   Procedure Laterality Date    BREAST FIBROADENOMA SURGERY      Cryosurgical Ablation of Fibroadenoma    BREAST LUMPECTOMY Left     BREAST LUMPECTOMY Right      SECTION          EXPLORATORY LAPAROTOMY W/ BOWEL RESECTION N/A 2017    Procedure: LAPAROTOMY EXPLORATORY W/ lysis of adhesions;  Surgeon: Felix Suggs DO;  [facility];  Service:     INTRAUTERINE DEVICE INSERTION      LAPAROTOMY      EXPLORATORY for DIC and hemorrhage postpartum retroperitoneal hematoma    [place]  DELIVERY ONLY N/A 2020    Procedure:  SECTION () REPEAT;  Surgeon: Kena Green MD;  [facility];  Service: Obstetrics    [place]  DELIVERY ONLY N/A 2023    Procedure:  SECTION () REPEAT;  Surgeon: Yolie Ron MD;  [facility];  Service:  Obstetrics    [place] LIG/TRNSXJ FALOPIAN TUBE  DEL/ABDML SURG Bilateral 2023    Procedure: LIGATION/COAGULATION TUBAL;  Surgeon: Yolie Ron MD;  [facility] ;  Service: Obstetrics    TOOTH EXTRACTION      Impression: 2014 FeiTomasa mclain           Current Outpatient Medications:     Multiple Minerals-Vitamins (Jason-Mag-Zinc-D) TABS, Take by mouth 2 (two) times a day, Disp: , Rfl:     NIFEdipine (PROCARDIA XL) 60 mg 24 hr tablet, Take 1 tablet (60 mg total) by mouth daily, Disp: 90 tablet, Rfl: 3    ondansetron (ZOFRAN) 4 mg tablet, , Disp: , Rfl:     sertraline (Zoloft) 50 mg tablet, 1 Daily, Disp: 90 tablet, Rfl: 2    spironolactone (ALDACTONE) 100 mg tablet, Take 1 tablet (100 mg total) by mouth daily, Disp: 90 tablet, Rfl: 3    traZODone (DESYREL) 50 mg tablet, 1/2-1 tab HS PRN for sleep, Disp: 90 tablet, Rfl: 2    tretinoin (REFISSA) 0.05 % cream, , Disp: , Rfl:     Docusate Sodium (COLACE PO), Take by mouth as needed, Disp: , Rfl:     multivitamin (THERAGRAN) TABS, Take 1 tablet by mouth daily, Disp: , Rfl:     Probiotic Product (PROBIOTIC PO), Take by mouth, Disp: , Rfl:     tretinoin (RETIN-A) 0.025 % cream, Apply topically as needed, Disp: , Rfl:     Allergies   Allergen Reactions    Nickel Rash     Skin rash       Social History     Socioeconomic History    Marital status: /Civil Union     Spouse name: None    Number of children: None    Years of education: None    Highest education level: None   Occupational History    None   Tobacco Use    Smoking status: Never    Smokeless tobacco: Never   Vaping Use    Vaping status: Never Used   Substance and Sexual Activity    Alcohol use: Yes     Alcohol/week: 2.0 standard drinks of alcohol     Types: 2 Standard drinks or equivalent per week     Comment: occasional    Drug use: No    Sexual activity: Yes     Partners: Male     Birth control/protection: None   Other Topics Concern    None   Social History Narrative    Always uses a  seatbelt    Drinks Coffee - 2 cups a day    IUD Contraception - mirena    Lack of exercise     (per [employer])     Social Drivers of Health     Financial Resource Strain: Not on file   Food Insecurity: Not on file   Transportation Needs: Not on file   Physical Activity: Not on file   Stress: Not on file   Social Connections: Not on file   Intimate Partner Violence: Not on file   Housing Stability: Not on file       Family History   Problem Relation Age of Onset    Hypertension Mother         URINARY INCONTINENCE    Hyperlipidemia Mother     Diverticulitis Mother     Hypothyroidism Mother     Alcohol abuse Mother     Hypertension Father     Hepatitis Father         A    Prostate cancer Father     Alcohol abuse Father     No Known Problems Sister     No Known Problems Brother     No Known Problems Daughter     Other Daughter         CHARGE syndrome    Diverticulitis Maternal Grandmother     Colon cancer Maternal Grandmother     Stroke Maternal Grandfather         CVA    Heart disease Maternal Grandfather     Diverticulitis Maternal Grandfather     Hypertension Maternal Grandfather     Heart attack Maternal Grandfather     Alzheimer's disease Paternal Grandmother     Dementia Paternal Grandmother     Diabetes Paternal Grandfather         INSULIN DEPENDENT       Immunization History   Administered Date(s) Administered    COVID-19 PFIZER VACCINE 0.3 ML IM 12/23/2020, 01/12/2021, 09/28/2021    COVID-19 Pfizer Vac BIVALENT David-sucrose 12 Yr+ IM 10/05/2022    INFLUENZA 01/23/2019, 10/05/2022    Influenza, injectable, quadrivalent, preservative free 0.5 mL 10/09/2020, 10/11/2023    MMR 12/19/2020    Tdap 05/11/2015, 10/09/2020, 12/15/2022    Tuberculin Skin Test-PPD Intradermal 12/03/2024        Health Maintenance   Topic Date Due    Influenza Vaccine (1) 09/01/2024    COVID-19 Vaccine (5 - 2024-25 season) 09/01/2024    Annual Physical  12/27/2024    Cervical Cancer Screening  06/22/2025    Depression Screening   "12/03/2025    DTaP,Tdap,and Td Vaccines (4 - Td or Tdap) 12/15/2032    Zoster Vaccine (1 of 2) 12/18/2036    RSV Vaccine Age 60+ Years (1 - 1-dose 75+ series) 12/18/2061    HIV Screening  Completed    Hepatitis C Screening  Completed    RSV Vaccine age 0-20 Months  Aged Out    Pneumococcal Vaccine: Pediatrics (0 to 5 Years) and At-Risk Patients (6 to 64 Years)  Aged Out    HIB Vaccine  Aged Out    IPV Vaccine  Aged Out    Hepatitis A Vaccine  Aged Out    Meningococcal ACWY Vaccine  Aged Out    HPV Vaccine  Aged Out         Objective:  Vitals:    12/03/24 1421   BP: 126/84   BP Location: Left arm   Patient Position: Sitting   Cuff Size: Standard   Pulse: 87   Temp: 98 °F (36.7 °C)   TempSrc: Temporal   SpO2: 98%   Weight: 70.2 kg (154 lb 12.8 oz)   Height: 5' 3.24\" (1.606 m)     Wt Readings from Last 3 Encounters:   12/03/24 70.2 kg (154 lb 12.8 oz)   10/15/24 70.3 kg (155 lb)   06/13/24 67.8 kg (149 lb 6.4 oz)     Body mass index is 27.21 kg/m².  No results found.       Physical Exam  Constitutional:       General: She is not in acute distress.     Appearance: She is well-developed. She is not diaphoretic.   HENT:      Head: Normocephalic and atraumatic.      Right Ear: External ear normal.      Left Ear: External ear normal.      Nose: Nose normal.      Mouth/Throat:      Mouth: Mucous membranes are moist.      Pharynx: No oropharyngeal exudate or posterior oropharyngeal erythema.   Eyes:      General:         Right eye: No discharge.         Left eye: No discharge.      Conjunctiva/sclera: Conjunctivae normal.      Pupils: Pupils are equal, round, and reactive to light.   Neck:      Thyroid: No thyromegaly.   Cardiovascular:      Rate and Rhythm: Normal rate and regular rhythm.      Heart sounds: Normal heart sounds. No murmur heard.     No friction rub. No gallop.   Pulmonary:      Effort: Pulmonary effort is normal. No respiratory distress.      Breath sounds: Normal breath sounds. No stridor. No wheezing or " rales.   Abdominal:      General: Bowel sounds are normal. There is no distension.      Palpations: Abdomen is soft.      Tenderness: There is no abdominal tenderness.   Musculoskeletal:      Cervical back: Normal range of motion and neck supple.   Lymphadenopathy:      Cervical: No cervical adenopathy.   Skin:     General: Skin is warm and dry.      Findings: No erythema or rash.   Neurological:      Mental Status: She is alert and oriented to person, place, and time.   Psychiatric:         Behavior: Behavior normal.         Thought Content: Thought content normal.         Judgment: Judgment normal.             Future Appointments   Date Time Provider Department Center   12/5/2024  9:00 AM P[facility] Holder [facility]   12/6/2024  9:30 AM [facility]UR[facility]   6/18/2025 10:45 AM Yolie Ron MD [facility]   12/22/2025  9:20 AM [facility]       P[facility]O Arredondo  [facility] PRIMARY CARE [place]    Patient Care Team:  PITO Arredondo as PCP - General (Family Medicine)  MD Yolie Junior MD (Obstetrics and Gynecology)  Justin Espitia MD (Nephrology)

## 2024-12-03 NOTE — ASSESSMENT & PLAN NOTE
-up to date with fasting blood work   -Continue with well-balanced diet, encouraged daily exercise  -She is up-to-date with vaccinations  -Encourage monthly self breast examination  -Up-to-date with cervical cancer screening  -Follow-up in 1 year for annual physical or sooner if needed    -Forms filled out, PPD placed

## 2024-12-05 ENCOUNTER — TELEMEDICINE (OUTPATIENT)
Dept: PSYCHIATRY | Facility: CLINIC | Age: 39
End: 2024-12-05
Payer: COMMERCIAL

## 2024-12-05 DIAGNOSIS — F41.1 GAD (GENERALIZED ANXIETY DISORDER): ICD-10-CM

## 2024-12-05 DIAGNOSIS — G47.00 INSOMNIA, UNSPECIFIED TYPE: ICD-10-CM

## 2024-12-05 DIAGNOSIS — F33.1 MODERATE EPISODE OF RECURRENT MAJOR DEPRESSIVE DISORDER (HCC): Primary | ICD-10-CM

## 2024-12-05 PROCEDURE — 99214 OFFICE O/P EST MOD 30 MIN: CPT | Performed by: NURSE PRACTITIONER

## 2024-12-05 RX ORDER — TRAZODONE HYDROCHLORIDE 50 MG/1
TABLET, FILM COATED ORAL
Qty: 30 TABLET | Refills: 2 | Status: SHIPPED | OUTPATIENT
Start: 2024-12-05

## 2024-12-05 NOTE — ASSESSMENT & PLAN NOTE
Zoloft 50 mg daily  Trazodone 50 mg tablet, half to a whole tab at bedtime as needed to help with sleep

## 2024-12-05 NOTE — PSYCH
"TREATMENT PLAN (Medication Management Only)        [facility]    Name and Date of Birth:  Michelle Gonzales 38 y.o. 1985  Date of Treatment Plan: December 5, 2024  Diagnosis/Diagnoses:    1. Moderate episode of recurrent major depressive disorder (HCC)    2. SARA (generalized anxiety disorder)    3. Insomnia, unspecified type      Strengths/Personal Resources for Self-Care: supportive family, supportive friends.  Area/Areas of need (in own words): \" I feel well.  Everything is running smoothly at home.\".  1. Long Term Goal: \" To continue to feel well and function at my best\".   Target Date: 6 months - 6/5/2025  Person/Persons responsible for completion of goal: Michelle  2.  Short Term Objective (s) - How will we reach this goal?:   A.  Provider new recommended medication/dosage changes and/or continue medication(s): continue current medications as prescribed.  B.  N/A.    Target Date: 6 months - 6/5/2025  Person/Persons Responsible for Completion of Goal: Michelle  Progress Towards Goals: stable, continuing treatment  Treatment Modality: medication education at every visit  Review due 6 months from date of this plan: 6 months - 6/5/2025  Expected length of service: ongoing treatment unless revised  My Physician/PA/NP and I have developed this plan together and I agree to work on the goals and objectives. I understand the treatment goals that were developed for my treatment.  "

## 2024-12-05 NOTE — PSYCH
Virtual Regular Visit    Verification of patient location:    Patient is located at Home in the following state in which I hold an active license [place]      Assessment/Plan:    Problem List Items Addressed This Visit       SARA (generalized anxiety disorder)    Moderate episode of recurrent major depressive disorder (HCC) - Primary       Goals addressed in session: Maintain current level of stability.         Reason for visit is   Chief Complaint   Patient presents with    Anxiety    Insomnia    Medication Management    Virtual Regular Visit          Encounter provider P[facility] Holder      Recent Visits  Date Type Provider Dept   12/03/24 Office Visit [facility] [place]   Showing recent visits within past 7 days and meeting all other requirements  Today's Visits  Date Type Provider Dept   12/05/24 Telemedicine [facility] [place]   Showing today's visits and meeting all other requirements  Future Appointments  No visits were found meeting these conditions.  Showing future appointments within next 150 days and meeting all other requirements       The patient was identified by name and date of birth. Michelle Gonzales was informed that this is a telemedicine visit and that the visit is being conducted throughthe [employer] platform. She agrees to proceed..  My office door was closed. No one else was in the room.  She acknowledged consent and understanding of privacy and security of the video platform. The patient has agreed to participate and understands they can discontinue the visit at any time.    Patient is aware this is a billable service.     Subjective  Michelle Gonzales is a 38 y.o. female who has a history of depressive disorder and generalized anxiety disorder and insomnia secondary to working shift work on a weekend plan.  She looks well.  She reports she is feeling good and managing a very busy household.  She has 3 young  children and they are all in some sort of activity and she is able to manage all of it and work the weekend program as a physicians assistant.  She and her , Pato, are getting along very well and they are working together to make it all function.  She is sleeping much better with trazodone.  Eating well.  No complaints.  Follow-up with me in 3 to 6 months or sooner if necessary.  Status exam: Patient is awake and alert and oriented x 3.  Mood is stable.  Patient is not suicidal, not homicidal and not psychotic.  Associations are intact.  No overt delusions.  Speech is clear and thoughts are well-organized, coherent and goal-directed.  Attention span is good.  Impulse control is good.  Judgment and insight are good.  Memory is good.  The patient will continue on:  Zoloft 50 mg daily  Trazodone 50 mg at bedtime as needed to help with sleep  Follow-up with me in 3 to 6 months or sooner if necessary.    We have discussed their safety plan and pt agrees that if they experience unsafe thoughts that they will reach out to their supports including this office, the suicide hotline, and emergency services if necessary.     Assessment & Plan  Moderate episode of recurrent major depressive disorder (HCC)  Zoloft 50 mg daily  Trazodone 50 mg tablet, half to a whole tab at bedtime as needed to help with sleep       SARA (generalized anxiety disorder)  Zoloft 50 mg daily         .          Past Medical History:   Diagnosis Date    Acute renal failure (HCC)     [id number] RESOLVED    Acute tubular necrosis (HCC)     Anemia     during the pregnancy     Anxiety     Bowel obstruction (HCC) 2017    Chronic kidney disease     Fibroadenoma of breast, left     Fibroadenoma of breast, right      1 para 1     History of transfusion 2015    After delivery     Hypertension     no medication     Kidney stone     Postpartum hemorrhage     UNSPECIFIED TYPE  RESOLVED     Urinary tract infection     yes in the last ten  years    Vacuum extractor delivery, delivered      daughter with postpartum hemorrhage and DIC    Varicella     had childhood chickenpox       Past Surgical History:   Procedure Laterality Date    BREAST FIBROADENOMA SURGERY      Cryosurgical Ablation of Fibroadenoma    BREAST LUMPECTOMY Left     BREAST LUMPECTOMY Right      SECTION          EXPLORATORY LAPAROTOMY W/ BOWEL RESECTION N/A 2017    Procedure: LAPAROTOMY EXPLORATORY W/ lysis of adhesions;  Surgeon: Felix Suggs DO;  [facility];  Service:     INTRAUTERINE DEVICE INSERTION      LAPAROTOMY      EXPLORATORY for DIC and hemorrhage postpartum retroperitoneal hematoma    [place]  DELIVERY ONLY N/A 2020    Procedure:  SECTION () REPEAT;  Surgeon: Kena Green MD;  [facility];  Service: Obstetrics    [place]  DELIVERY ONLY N/A 2023    Procedure:  SECTION () REPEAT;  Surgeon: Yolie Ron MD;  [facility];  Service: Obstetrics    [place] LIG/TRNSXJ FALOPIAN TUBE  DEL/ABDML SURG Bilateral 2023    Procedure: LIGATION/COAGULATION TUBAL;  Surgeon: Yolie Ron MD;  [facility];  Service: Obstetrics    TOOTH EXTRACTION      Impression: [id number] Tomasa Enamorado         Current Outpatient Medications   Medication Sig Dispense Refill    Docusate Sodium (COLACE PO) Take by mouth as needed      Multiple Minerals-Vitamins (Jason-Mag-Zinc-D) TABS Take by mouth 2 (two) times a day      multivitamin (THERAGRAN) TABS Take 1 tablet by mouth daily      NIFEdipine (PROCARDIA XL) 60 mg 24 hr tablet Take 1 tablet (60 mg total) by mouth daily 90 tablet 3    ondansetron (ZOFRAN) 4 mg tablet       Probiotic Product (PROBIOTIC PO) Take by mouth      sertraline (Zoloft) 50 mg tablet 1 Daily 90 tablet 2    spironolactone (ALDACTONE) 100 mg tablet Take 1 tablet (100 mg total) by mouth daily 90 tablet 3    traZODone (DESYREL) 50 mg tablet 1/2-1 tab HS PRN for  sleep 90 tablet 2    tretinoin (REFISSA) 0.05 % cream       tretinoin (RETIN-A) 0.025 % cream Apply topically as needed       No current facility-administered medications for this visit.        Allergies   Allergen Reactions    Nickel Rash     Skin rash       Review of Systems    Video Exam    There were no vitals filed for this visit.    Physical Exam     Visit Time    Visit Start Time: 9:00a  Visit Stop Time: 9:25a  Total Visit Duration: 25 minutes were spent in the visit.  Time was spent updating the treatment plan, reviewing patient's record, reviewing patient's symptoms, ordering medications and completing the progress note.

## 2024-12-06 ENCOUNTER — CLINICAL SUPPORT (OUTPATIENT)
Dept: INTERNAL MEDICINE CLINIC | Facility: OTHER | Age: 39
End: 2024-12-06

## 2024-12-06 DIAGNOSIS — Z11.1 ENCOUNTER FOR PPD SKIN TEST READING: Primary | ICD-10-CM

## 2024-12-06 LAB
INDURATION: 0 MM
TB SKIN TEST: NEGATIVE

## 2025-01-02 ENCOUNTER — TELEPHONE (OUTPATIENT)
Dept: NEPHROLOGY | Facility: CLINIC | Age: 40
End: 2025-01-02

## 2025-01-02 NOTE — TELEPHONE ENCOUNTER
Called pt and scheduled follow up appt on 8/26/25 at 1pm with Dr Espitia in the [facility] from the recall list.

## 2025-01-15 ENCOUNTER — DOCUMENTATION (OUTPATIENT)
Dept: DERMATOLOGY | Facility: CLINIC | Age: 40
End: 2025-01-15

## 2025-01-15 DIAGNOSIS — L81.1 MELASMA: Primary | ICD-10-CM

## 2025-01-15 RX ORDER — HYDROQUINONE 40 MG/G
CREAM TOPICAL
Qty: 30 G | Refills: 3 | Status: SHIPPED | OUTPATIENT
Start: 2025-01-15

## 2025-01-16 NOTE — PROGRESS NOTES
Seen at [facility], Rx'ed compounded HX lightening cream with tretinoin to [facility].

## 2025-02-25 ENCOUNTER — OFFICE VISIT (OUTPATIENT)
Dept: INTERNAL MEDICINE CLINIC | Facility: OTHER | Age: 40
End: 2025-02-25
Payer: COMMERCIAL

## 2025-02-25 VITALS
TEMPERATURE: 98 F | WEIGHT: 152 LBS | OXYGEN SATURATION: 98 % | BODY MASS INDEX: 25.95 KG/M2 | HEART RATE: 86 BPM | DIASTOLIC BLOOD PRESSURE: 84 MMHG | SYSTOLIC BLOOD PRESSURE: 126 MMHG | HEIGHT: 64 IN

## 2025-02-25 DIAGNOSIS — M79.671 RIGHT FOOT PAIN: Primary | ICD-10-CM

## 2025-02-25 PROCEDURE — 99213 OFFICE O/P EST LOW 20 MIN: CPT

## 2025-02-25 NOTE — PROGRESS NOTES
Name: Michelle Gonzales      : 1985      MRN: [id number]  Encounter Provider: Denae Love PA-C  Encounter Date: 2025   Encounter department: [facility]  :  Assessment & Plan  Right foot pain  S/Sx x 2 weeks  Order XR of  R foot and 4th toe  Offered podiatry referral, however will defer until x-ray results come back  Recommend ice, elevation, wearing supportive shoes  Weight-bear as tolerated  Patient can take Tylenol/ibuprofen as needed for pain  Follow-up if not improved or sooner symptoms worsen    Orders:    XR foot 3+ vw right; Future    XR toe right fourth min 2 views; Future           History of Present Illness   Patient is a 39-year-old female presenting to the office for right foot pain x 2 weeks  Patient reports that 2 weeks ago, she did stub her 4th toe into a piece of furniture   She had swelling and bruising at the time.  Swelling is improved, however slight bruising remains  Patient reports that she is having difficulty putting shoes on due to pain and swelling    Tx tried: ibuprofen first day    Ankle Pain   The incident occurred more than 1 week ago. The incident occurred at home. Injury mechanism: Stubbed toe. The pain is present in the right foot and right toes. The pain is at a severity of 3/10. The pain is mild. The pain has been Fluctuating since onset. Pertinent negatives include no inability to bear weight, loss of motion, loss of sensation, muscle weakness, numbness or tingling. The symptoms are aggravated by movement and weight bearing. She has tried NSAIDs for the symptoms.     Review of Systems   Constitutional:  Negative for appetite change, chills, fatigue and fever.   Gastrointestinal:  Negative for nausea and vomiting.   Musculoskeletal:  Positive for arthralgias (R foot pain) and joint swelling. Negative for back pain and gait problem.   Skin:  Positive for color change. Negative for rash and wound.   Neurological:  Negative for  "tingling and numbness.       Objective   /84 (BP Location: Left arm, Patient Position: Sitting, Cuff Size: Adult)   Pulse 86   Temp 98 °F (36.7 °C)   Ht 5' 3.5\" (1.613 m)   Wt 68.9 kg (152 lb)   SpO2 98%   BMI 26.50 kg/m²      Physical Exam  Vitals and nursing note reviewed.   Constitutional:       General: She is not in acute distress.     Appearance: Normal appearance. She is not ill-appearing.   HENT:      Head: Normocephalic and atraumatic.      Right Ear: External ear normal.      Left Ear: External ear normal.      Nose: Nose normal.      Mouth/Throat:      Mouth: Mucous membranes are moist.   Eyes:      General: No scleral icterus.     Conjunctiva/sclera: Conjunctivae normal.   Cardiovascular:      Rate and Rhythm: Normal rate.      Pulses:           Dorsalis pedis pulses are 2+ on the right side and 2+ on the left side.        Posterior tibial pulses are 2+ on the right side and 2+ on the left side.   Pulmonary:      Effort: Pulmonary effort is normal. No respiratory distress.   Musculoskeletal:      Right lower leg: No edema.      Left lower leg: No edema.      Right foot: Normal range of motion and normal capillary refill. Swelling (4th toe), tenderness (4th toe and 4th MTP) and bony tenderness present. No deformity. Normal pulse.   Skin:     General: Skin is warm and dry.      Coloration: Skin is not pale.      Findings: No erythema.   Neurological:      General: No focal deficit present.      Mental Status: She is alert and oriented to person, place, and time. Mental status is at baseline.   Psychiatric:         Mood and Affect: Mood normal.         Behavior: Behavior normal.           Disclaimer: This note was generated with voice recognition software.  Phonetic, grammatical, and spelling errors may be present as a result.  Please contact provider with any concerns or questions    "

## 2025-02-26 ENCOUNTER — HOSPITAL ENCOUNTER (OUTPATIENT)
Dept: RADIOLOGY | Facility: IMAGING CENTER | Age: 40
Discharge: HOME | End: 2025-02-26
Payer: COMMERCIAL

## 2025-02-26 DIAGNOSIS — M79.671 RIGHT FOOT PAIN: ICD-10-CM

## 2025-02-26 PROCEDURE — 73630 X-RAY EXAM OF FOOT: CPT

## 2025-02-27 ENCOUNTER — RESULTS FOLLOW-UP (OUTPATIENT)
Dept: INTERNAL MEDICINE CLINIC | Facility: OTHER | Age: 40
End: 2025-02-27

## 2025-02-27 DIAGNOSIS — S92.514A CLOSED NONDISPLACED FRACTURE OF PROXIMAL PHALANX OF LESSER TOE OF RIGHT FOOT, INITIAL ENCOUNTER: Primary | ICD-10-CM

## 2025-05-20 ENCOUNTER — TELEPHONE (OUTPATIENT)
Age: 40
End: 2025-05-20

## 2025-05-20 NOTE — TELEPHONE ENCOUNTER
Patient is calling regarding cancelling an appointment.    Date/Time: 6/10/25 @ 9am    Reason: conflicting appointments    Patient was rescheduled: YES [x] NO []  If yes, when was Patient reschedule for: 6/17/25 @ 8:30a

## 2025-05-28 ENCOUNTER — APPOINTMENT (OUTPATIENT)
Dept: LAB | Facility: IMAGING CENTER | Age: 40
End: 2025-05-28
Payer: COMMERCIAL

## 2025-05-28 DIAGNOSIS — I10 ESSENTIAL HYPERTENSION: ICD-10-CM

## 2025-05-28 DIAGNOSIS — Z00.8 HEALTH EXAMINATION IN POPULATION SURVEY: ICD-10-CM

## 2025-05-28 LAB
ANION GAP SERPL CALCULATED.3IONS-SCNC: 7 MMOL/L (ref 4–13)
BUN SERPL-MCNC: 17 MG/DL (ref 5–25)
CALCIUM SERPL-MCNC: 9.8 MG/DL (ref 8.4–10.2)
CHLORIDE SERPL-SCNC: 105 MMOL/L (ref 96–108)
CHOLEST SERPL-MCNC: 149 MG/DL (ref ?–200)
CO2 SERPL-SCNC: 25 MMOL/L (ref 21–32)
CREAT SERPL-MCNC: 0.93 MG/DL (ref 0.6–1.3)
CREAT UR-MCNC: 147.3 MG/DL
EST. AVERAGE GLUCOSE BLD GHB EST-MCNC: 103 MG/DL
GFR SERPL CREATININE-BSD FRML MDRD: 78 ML/MIN/1.73SQ M
GLUCOSE SERPL-MCNC: 112 MG/DL (ref 65–140)
HBA1C MFR BLD: 5.2 %
HDLC SERPL-MCNC: 55 MG/DL
LDLC SERPL CALC-MCNC: 80 MG/DL (ref 0–100)
MICROALBUMIN UR-MCNC: <7 MG/L
NONHDLC SERPL-MCNC: 94 MG/DL
POTASSIUM SERPL-SCNC: 3.8 MMOL/L (ref 3.5–5.3)
SODIUM SERPL-SCNC: 137 MMOL/L (ref 135–147)
TRIGL SERPL-MCNC: 68 MG/DL (ref ?–150)

## 2025-05-28 PROCEDURE — 82570 ASSAY OF URINE CREATININE: CPT

## 2025-05-28 PROCEDURE — 80061 LIPID PANEL: CPT

## 2025-05-28 PROCEDURE — 36415 COLL VENOUS BLD VENIPUNCTURE: CPT

## 2025-05-28 PROCEDURE — 82043 UR ALBUMIN QUANTITATIVE: CPT

## 2025-05-28 PROCEDURE — 80048 BASIC METABOLIC PNL TOTAL CA: CPT

## 2025-05-28 PROCEDURE — 83036 HEMOGLOBIN GLYCOSYLATED A1C: CPT

## 2025-05-29 ENCOUNTER — RESULTS FOLLOW-UP (OUTPATIENT)
Dept: NEPHROLOGY | Facility: CLINIC | Age: 40
End: 2025-05-29

## 2025-06-17 ENCOUNTER — TELEMEDICINE (OUTPATIENT)
Dept: PSYCHIATRY | Facility: CLINIC | Age: 40
End: 2025-06-17
Payer: COMMERCIAL

## 2025-06-17 DIAGNOSIS — F33.1 MODERATE EPISODE OF RECURRENT MAJOR DEPRESSIVE DISORDER (HCC): Primary | ICD-10-CM

## 2025-06-17 DIAGNOSIS — F41.1 GAD (GENERALIZED ANXIETY DISORDER): ICD-10-CM

## 2025-06-17 DIAGNOSIS — G47.00 INSOMNIA, UNSPECIFIED TYPE: ICD-10-CM

## 2025-06-17 PROCEDURE — 99213 OFFICE O/P EST LOW 20 MIN: CPT | Performed by: NURSE PRACTITIONER

## 2025-06-17 RX ORDER — TRAZODONE HYDROCHLORIDE 50 MG/1
TABLET ORAL
Qty: 90 TABLET | Refills: 1 | Status: SHIPPED | OUTPATIENT
Start: 2025-06-17

## 2025-06-17 NOTE — PSYCH
"TREATMENT PLAN (Medication Management Only)        [facility]    Name and Date of Birth:  Michelle Gonzales 39 y.o. 1985  Date of Treatment Plan: June 17, 2025  Diagnosis/Diagnoses:    1. Moderate episode of recurrent major depressive disorder (HCC)    2. Insomnia, unspecified type    3. SARA (generalized anxiety disorder)      Strengths/Personal Resources for Self-Care: supportive family, supportive friends.  Area/Areas of need (in own words): \" I am doing well.  I am sleeping great with trazodone.\".  1. Long Term Goal: \" To continue to do well and function at my best\".   Target Date: 1 year - 6/17/2026  Person/Persons responsible for completion of goal: PITO Smith  2.  Short Term Objective (s) - How will we reach this goal?:   A.  Provider new recommended medication/dosage changes and/or continue medication(s): continue current medications as prescribed.  B.  N/A.    Target Date: 3 months - 9/17/2025  Person/Persons Responsible for Completion of Goal: PITO Smith  Progress Towards Goals: Continuing Treatment  Treatment Modality: medication management every 3 months  Review due 6 months from date of this plan: 6 months - 12/17/2025  Expected length of service: maintenance unless revised  My Physician/PA/NP and I have developed this plan together and I agree to work on the goals and objectives. I understand the treatment goals that were developed for my treatment.  "

## 2025-06-17 NOTE — PSYCH
MEDICATION MANAGEMENT NOTE    Name: Michelle Gonzales      : 1985      MRN: [id number]  Encounter Provider: PITO Greenwood  Encounter Date: 2025   Encounter department: [facility]    Insurance: Payor: [employer] / Plan: [facility] NETWORK / Product Type: [facility] /      Reason for Visit:   Chief Complaint   Patient presents with    Medication Management    Follow-up   :  Assessment & Plan  Moderate episode of recurrent major depressive disorder (HCC)    Orders:    sertraline (Zoloft) 50 mg tablet; 1 Daily    Insomnia, unspecified type    Orders:    traZODone (DESYREL) 50 mg tablet; 1/2-1 tab HS PRN for sleep    Follow-up with me in 6 months or sooner if necessary    Treatment Recommendations:    Educated about diagnosis and treatment modalities. Verbalizes understanding and agreement with the treatment plan.  Discussed self monitoring of symptoms, and symptom monitoring tools.  Discussed medications and if treatment adjustment was needed or desired.  Aware of 24 hour and weekend coverage for urgent situations accessed by calling [facility] main practice number  I am scheduling this patient out for greater than 3 months: Yes - Patient's stability of symptoms warrant this length of time or no significant changes to treatment plan    Medications Risks/Benefits:      Risks, Benefits And Possible Side Effects Of Medications:    Risks, benefits, and possible side effects of medications explained to Leticia and she (or legal representative) verbalizes understanding and agreement for treatment.    Controlled Medication Discussion:     Not applicable      History of Present Illness     CC: Leticia presents today for follow up on 2025 for treatment of depressive disorder and insomnia     Leticia presents today looking well.  Reporting no new issues.  Anxiety and depression are under good control with the Zoloft and she is sleeping perfectly  with trazodone using it as needed.  Most times she uses just 25 mg at bedtime.  This gets her through the night as she is working shift work on weekends.  Also balancing life better with 3 young children.   is very supportive.  Continue current care and treatment and follow-up with me in 6 months or sooner if necessary.    Med Compliance: yes    Since our last visit, overall symptoms have been stable.       HPI ROS:     Medication Side Effects: None  Depression: 0 /10 (10 worst)  Anxiety: 2 /10 (10 worst)  Safety concerns (SI, HI, others): None  Sleep: Adequate.  Patient sleeping well with trazodone.  She works the overnight shift and trazodone helps her sleep during the day.  Energy: Adequate  Appetite: Adequate  Weight Change: No reports of weight changes or fluctuations    Leticia denies any side effects from medications unless noted above.    Review Of Systems: A review of systems is obtained and is negative except for the pertinent positives listed in HPI/Subjective above.      Current Rating Scores:     None completed today.    Areas of Improvement: reviewed in HPI/Subjective Section and reviewed in Assessment and Plan Section      Past Medical History[1]  Past Surgical History[2]  Allergies: Allergies[3]    Current Outpatient Medications   Medication Instructions    hydroquinone 4 % cream Topical, Daily at bedtime, Alternate use for two months on, two months off. On off months use plain tretinoin 0.025%. Can repeat cycles.    Multiple Minerals-Vitamins (Jason-Mag-Zinc-D) TABS 2 times daily    NIFEdipine (PROCARDIA XL) 60 mg, Oral, Daily    ondansetron (ZOFRAN) 4 mg tablet     sertraline (Zoloft) 50 mg tablet 1 Daily    spironolactone (ALDACTONE) 100 mg, Oral, Daily    traZODone (DESYREL) 50 mg tablet 1/2-1 tab HS PRN for sleep    tretinoin (REFISSA) 0.05 % cream         Substance Abuse History:    Tobacco, Alcohol and Drug Use History     Tobacco Use    Smoking status: Never    Smokeless tobacco: Never    Vaping Use    Vaping status: Never Used   Substance Use Topics    Alcohol use: Not Currently     Alcohol/week: 2.0 standard drinks of alcohol     Types: 2 Glasses of wine per week     Comment: occasional    Drug use: No     Alcohol Use: Not At Risk (4/30/2021)    AUDIT-C     Frequency of Alcohol Consumption: 2-3 times a week     Average Number of Drinks: 1 or 2     Frequency of Binge Drinking: Never       Social History:    Social History     Socioeconomic History    Marital status: /Civil Union     Spouse name: Not on file    Number of children: Not on file    Years of education: Not on file    Highest education level: Not on file   Occupational History    Not on file   Other Topics Concern    Not on file   Social History Narrative    Always uses a seatbelt    Drinks Coffee - 2 cups a day    IUD Contraception - mirena    Lack of exercise     (per [employer])        Family Psychiatric History:     Family History[4]    Medical History Reviewed by provider this encounter:  Tobacco  Allergies  Meds  Problems  Med Hx  Surg Hx  Fam Hx          Objective   There were no vitals taken for this visit.     Mental Status Evaluation:    Appearance age appropriate, casually dressed, dressed appropriately   Behavior cooperative, calm   Speech normal rate, normal volume, normal pitch, spontaneous   Mood euthymic   Affect normal range and intensity, appropriate, reactive   Thought Processes organized, goal directed, linear   Thought Content no overt delusions   Perceptual Disturbances: no auditory hallucinations, no visual hallucinations   Abnormal Thoughts  Risk Potential Suicidal ideation - None  Homicidal ideation - None  Potential for aggression - No   Orientation oriented to person, place, time/date, and situation   Memory recent and remote memory grossly intact   Consciousness alert and awake   Attention Span Concentration Span attention span and concentration are age appropriate   Intellect appears to  be of average intelligence   Insight intact and good   Judgement intact and good   Muscle Strength and  Gait normal muscle strength and normal muscle tone, normal gait and normal balance   Motor activity no abnormal movements   Language no difficulty naming common objects, no difficulty repeating a phrase, no difficulty writing a sentence   Fund of Knowledge adequate knowledge of current events  adequate fund of knowledge regarding past history  adequate fund of knowledge regarding vocabulary        Laboratory Results: I have personally reviewed all pertinent laboratory/tests results    Recent Labs (last 6 months):   Appointment on 05/28/2025   Component Date Value    Hemoglobin A1C 05/28/2025 5.2     EAG 05/28/2025 103     Cholesterol 05/28/2025 149     Triglycerides 05/28/2025 68     HDL, Direct 05/28/2025 55     LDL Calculated 05/28/2025 80     Non-HDL-Chol (CHOL-HDL) 05/28/2025 94    Appointment on 05/28/2025   Component Date Value    Creatinine, Ur 05/28/2025 147.3     Albumin,U,Random 05/28/2025 <7.0     Albumin Creat Ratio 05/28/2025      Sodium 05/28/2025 137     Potassium 05/28/2025 3.8     Chloride 05/28/2025 105     CO2 05/28/2025 25     ANION GAP 05/28/2025 7     BUN 05/28/2025 17     Creatinine 05/28/2025 0.93     Glucose 05/28/2025 112     Calcium 05/28/2025 9.8     eGFR 05/28/2025 78        Suicide/Homicide Risk Assessment:    Risk of Harm to Self:  Based on today's assessment, Leticia presents the following risk of harm to self: none    Risk of Harm to Others:  Based on today's assessment, Leticia presents the following risk of harm to others: none    The following interventions are recommended: ChangesContinue medication management. No other intervention changes indicated at this time.    Psychotherapy Provided:     Individual psychotherapy provided: No    Treatment Plan:    Completed and signed during the session: Yes - Treatment Plan done but not signed at time of office visit due to: Plan reviewed by  "video and verbal consent given due to virtual visit.    Goals: Progress towards Treatment Plan goals - Yes, progressing, as evidenced by subjective findings in HPI/Subjective Section and in Assessment and Plan Section    Depression Follow-up Plan Completed: Yes    Note Share:    This note was shared with patient.    Administrative Statements   Administrative Statements   Encounter provider PIOT Greenwood    The Patient is located at Home and in the following state in which I hold an active license [place].    The patient was identified by name and date of birth. Michelle Gonzales was informed that this is a telemedicine visit and that the visit is being conducted through the Epic Embedded platform. She agrees to proceed..  My office door was closed. No one else was in the room.  She acknowledged consent and understanding of privacy and security of the video platform. The patient has agreed to participate and understands they can discontinue the visit at any time.    I have spent a total time of 25 minutes in caring for this patient on the day of the visit/encounter including Counseling / Coordination of care, not including the time spent for establishing the audio/video connection.    Visit Time  Visit Start Time: 8:30AM  Visit Stop Time: 8:55AM  Total Visit Duration: 25 minutes    Portions of the record may have been created with voice recognition software. Occasional wrong word or \"sound a like\" substitutions may have occurred due to the inherent limitations of voice recognition software. Read the chart carefully and recognize, using context, where substitutions have occurred.    PITO Greenwood 06/17/25       [1]   Past Medical History:  Diagnosis Date    Acute renal failure (HCC)     [id number] RESOLVED    Acute tubular necrosis (HCC)     Anemia     during the pregnancy     Anxiety     Bowel obstruction (HCC) 01/31/2017    Chronic kidney disease     Fibroadenoma of breast, left     Fibroadenoma of breast, " right      1 para 1     History of transfusion 2015    After delivery     Hypertension     no medication     Kidney stone     Postpartum hemorrhage     UNSPECIFIED TYPE  RESOLVED     Urinary tract infection     yes in the last ten years    Vacuum extractor delivery, delivered      daughter with postpartum hemorrhage and DIC    Varicella     had childhood chickenpox   [2]   Past Surgical History:  Procedure Laterality Date    BREAST FIBROADENOMA SURGERY      Cryosurgical Ablation of Fibroadenoma    BREAST LUMPECTOMY Left     BREAST LUMPECTOMY Right      SECTION          EXPLORATORY LAPAROTOMY W/ BOWEL RESECTION N/A 2017    Procedure: LAPAROTOMY EXPLORATORY W/ lysis of adhesions;  Surgeon: Felix Suggs DO;  Location: [facility];  Service:     INTRAUTERINE DEVICE INSERTION      LAPAROTOMY      EXPLORATORY for DIC and hemorrhage postpartum retroperitoneal hematoma    UT  DELIVERY ONLY N/A 2020    Procedure:  SECTION () REPEAT;  Surgeon: Kena Green MD;  [facility]: [facility];  Service: Obstetrics    UT  DELIVERY ONLY N/A 2023    Procedure:  SECTION () REPEAT;  Surgeon: Yolie Ron MD;  [facility];  Service: Obstetrics    UT LIG/TRNSXJ FALOPIAN TUBE  DEL/ABDML SURG Bilateral 2023    Procedure: LIGATION/COAGULATION TUBAL;  Surgeon: Yolie Ron MD;  [facility]: [facility];  Service: Obstetrics    TOOTH EXTRACTION      Impression: [id number] Tomasa Enamorado  2004   [3]   Allergies  Allergen Reactions    Nickel Rash     Skin rash   [4]   Family History  Problem Relation Name Age of Onset    Hypertension Mother Nirmala         URINARY INCONTINENCE    Hyperlipidemia Mother Nirmala     Diverticulitis Mother Nirmala     Hypothyroidism Mother Nirmala     Alcohol abuse Mother Nirmala     Hypertension Father Yandel     Hepatitis Father Yandel         A    Prostate cancer Father Yandel     Alcohol abuse Father Yandel      No Known Problems Sister      No Known Problems Brother      No Known Problems Daughter      Other Daughter          CHARGE syndrome    Diverticulitis Maternal Grandmother Grandma     Colon cancer Maternal Grandmother Grandma     Stroke Maternal Grandfather Kaiden         CVA    Heart disease Maternal Grandfather Kaiden     Diverticulitis Maternal Grandfather Kaiden     Hypertension Maternal Grandfather Kaiden     Heart attack Maternal Grandfather Kaiden     Alzheimer's disease Paternal Grandmother Read     Dementia Paternal Grandmother Read     Diabetes Paternal Grandfather Read         INSULIN DEPENDENT

## 2025-07-03 ENCOUNTER — ANNUAL EXAM (OUTPATIENT)
Dept: OBGYN CLINIC | Facility: CLINIC | Age: 40
End: 2025-07-03
Payer: COMMERCIAL

## 2025-07-03 VITALS
HEIGHT: 63 IN | SYSTOLIC BLOOD PRESSURE: 120 MMHG | BODY MASS INDEX: 27.46 KG/M2 | DIASTOLIC BLOOD PRESSURE: 78 MMHG | WEIGHT: 155 LBS

## 2025-07-03 DIAGNOSIS — Z01.419 ENCNTR FOR GYN EXAM (GENERAL) (ROUTINE) W/O ABN FINDINGS: Primary | ICD-10-CM

## 2025-07-03 PROBLEM — R68.89 FLU-LIKE SYMPTOMS: Status: RESOLVED | Noted: 2024-02-20 | Resolved: 2025-07-03

## 2025-07-03 PROBLEM — Z00.00 ANNUAL PHYSICAL EXAM: Status: RESOLVED | Noted: 2023-12-27 | Resolved: 2025-07-03

## 2025-07-03 PROBLEM — Z86.2 HISTORY OF DIC SYNDROME: Status: RESOLVED | Noted: 2020-06-22 | Resolved: 2025-07-03

## 2025-07-03 PROCEDURE — S0612 ANNUAL GYNECOLOGICAL EXAMINA: HCPCS | Performed by: OBSTETRICS & GYNECOLOGY

## 2025-07-03 PROCEDURE — G0476 HPV COMBO ASSAY CA SCREEN: HCPCS | Performed by: OBSTETRICS & GYNECOLOGY

## 2025-07-03 PROCEDURE — G0145 SCR C/V CYTO,THINLAYER,RESCR: HCPCS | Performed by: OBSTETRICS & GYNECOLOGY

## 2025-07-03 NOTE — PROGRESS NOTES
"Annual Wellness Visit  Name: Michelle Gonzales      : 1985      MRN: [id number]  Encounter Provider: Yolie Ron MD  Encounter Date: 7/3/2025   Encounter department: [facility] [place]    39 y.o.  yo presents today for her annual exam.:  Assessment & Plan  Encntr for gyn exam (general) (routine) w/o abn findings  Pap collected - will message with results  Mammogram at 41  Return for annual or PRN  Orders:    Liquid-based pap, screening             History of Present Illness     Michelle Gonzales is a 39 y.o. female who presents for annual well woman exam.  She has been doing well.  Just got back from vacation.  She will be at [place] once the new cardiac tower opens.      GYN:  denies vaginal discharge, labial erythema or lesions, dyspareunia.  Menses are regular, not heavy  Contraception: Tubal sterilization.  Patient is sexually active with male partner.  Hx gynecologic surgeries: ex-lap for DIC/hemorrhage - retroperitoneal;  x 2 with tubal sterilization after 2nd    OB:   female    :  denies dysuria, urinary frequency or urgency.  denies hematuria, flank pain, incontinence.  denies constipation, diarrhea    Breast:  denies breast mass, skin changes, dimpling, reddening, nipple retraction.  denies breast discharge.  Patient does not have a family history of breast, endometrial, or ovarian ca.     General:  ETOH use: 2 drinks/wk  Tobacco use: denies  Recreational drug use: denies    Screening:  Cervical cancer: last pap smear in 2020. Results were normal/negative HPV.  Breast cancer: n/a  Colon cancer: n/a  Depression screening: negative     Review of Systems as per [facility]  Medical History Reviewed by provider this encounter:  Tobacco  Med Hx  Surg Hx  Fam Hx  Soc Hx    .     Objective   /78   Ht 5' 3.39\" (1.61 m)   Wt 70.3 kg (155 lb)   LMP 2025   Breastfeeding No   BMI 27.12 kg/m²      Physical Exam  Constitutional:       " Appearance: Normal appearance. She is well-developed.   Neck:      Thyroid: No thyromegaly.   Chest:   Breasts:     Breasts are symmetrical.      Right: Normal. No swelling, bleeding, inverted nipple, mass, nipple discharge, skin change or tenderness.      Left: Normal. No swelling, bleeding, inverted nipple, mass, nipple discharge, skin change or tenderness.   Genitourinary:     General: Normal vulva.      Labia:         Right: No rash, tenderness or lesion.         Left: No rash, tenderness or lesion.       Urethra: No prolapse, urethral pain, urethral swelling or urethral lesion.      Vagina: Normal.      Cervix: Normal.      Uterus: Normal. Not enlarged, not fixed and not tender.       Adnexa: Right adnexa normal and left adnexa normal.        Right: No mass or tenderness.          Left: No mass or tenderness.       Musculoskeletal:      Cervical back: Neck supple.   Lymphadenopathy:      Cervical: No cervical adenopathy.      Upper Body:      Right upper body: No axillary adenopathy.      Left upper body: No axillary adenopathy.     Neurological:      Mental Status: She is alert.

## 2025-07-06 LAB
HPV HR 12 DNA CVX QL NAA+PROBE: NEGATIVE
HPV16 DNA CVX QL NAA+PROBE: NEGATIVE
HPV18 DNA CVX QL NAA+PROBE: NEGATIVE

## 2025-07-09 LAB
LAB AP GYN PRIMARY INTERPRETATION: NORMAL
Lab: NORMAL

## 2025-08-21 ENCOUNTER — TELEPHONE (OUTPATIENT)
Dept: NEPHROLOGY | Facility: CLINIC | Age: 40
End: 2025-08-21

## 2025-08-21 DIAGNOSIS — I10 ESSENTIAL HYPERTENSION: Primary | ICD-10-CM

## (undated) DEVICE — SUT PDS II 3-0 SH 27 IN Z316H

## (undated) DEVICE — SUT VICRYL 2-0 REEL 54 IN J286G

## (undated) DEVICE — GAUZE SPONGES,16 PLY: Brand: CURITY

## (undated) DEVICE — 1820 FOAM BLOCK NEEDLE COUNTER: Brand: DEVON

## (undated) DEVICE — 3000CC GUARDIAN II: Brand: GUARDIAN

## (undated) DEVICE — SUT SILK 0 SH 30 IN K834H

## (undated) DEVICE — CHLORAPREP HI-LITE 26ML ORANGE

## (undated) DEVICE — TRAY FOLEY 16FR URIMETER SURESTEP

## (undated) DEVICE — SUT MONOCRYL 2-0 SH 27 IN Y417H

## (undated) DEVICE — INTENDED FOR TISSUE SEPARATION, AND OTHER PROCEDURES THAT REQUIRE A SHARP SURGICAL BLADE TO PUNCTURE OR CUT.: Brand: BARD-PARKER SAFETY BLADES SIZE 10, STERILE

## (undated) DEVICE — Device

## (undated) DEVICE — GLOVE SRG BIOGEL ECLIPSE 7

## (undated) DEVICE — SKIN MARKER DUAL TIP WITH RULER CAP, FLEXIBLE RULER AND LABELS: Brand: DEVON

## (undated) DEVICE — SUT VICRYL 0 CT-1 27 IN J260H

## (undated) DEVICE — GLOVE PI ULTRA TOUCH SZ.7.0

## (undated) DEVICE — GRADUATE PLASTIC 1000 ML

## (undated) DEVICE — PACK C-SECTION PBDS

## (undated) DEVICE — CHLORAPREP HI-LITE 10.5ML ORANGE

## (undated) DEVICE — GLOVE SRG BIOGEL ORTHOPEDIC 8

## (undated) DEVICE — 3M™ STERI-STRIP™ REINFORCED ADHESIVE SKIN CLOSURES, R1547, 1/2 IN X 4 IN (12 MM X 100 MM), 6 STRIPS/ENVELOPE: Brand: 3M™ STERI-STRIP™

## (undated) DEVICE — SUT PLAIN 3-0 CT-1 27 IN 842H

## (undated) DEVICE — TELFA NON-ADHERENT ABSORBENT DRESSING: Brand: TELFA

## (undated) DEVICE — LIGHT HANDLE COVER SLEEVE DISP BLUE STELLAR

## (undated) DEVICE — ABDOMINAL PAD: Brand: DERMACEA

## (undated) DEVICE — GLOVE INDICATOR PI UNDERGLOVE SZ 7 BLUE

## (undated) DEVICE — ADHESIVE SKN CLSR HISTOACRYL FLEX 0.5ML LF

## (undated) DEVICE — INTENDED FOR TISSUE SEPARATION, AND OTHER PROCEDURES THAT REQUIRE A SHARP SURGICAL BLADE TO PUNCTURE OR CUT.: Brand: BARD-PARKER SAFETY BLADES SIZE 15, STERILE

## (undated) DEVICE — BULB SYRINGE,IRRIGATION WITH PROTECTIVE CAP: Brand: DOVER

## (undated) DEVICE — SUT VICRYL 2-0 CT-1 36 IN VR945

## (undated) DEVICE — INTERCEED

## (undated) DEVICE — SEPRA FILM 6 X 5

## (undated) DEVICE — SUT PDS II 1 CTX 36 IN Z371T

## (undated) DEVICE — SUT MONOCRYL 4-0 PS-2 27 IN Y426H

## (undated) DEVICE — SCD SEQUENTIAL COMPRESSION COMFORT SLEEVE MEDIUM KNEE LENGTH: Brand: KENDALL SCD

## (undated) DEVICE — SUT PDS II 1 XLH 96 IN LOOPED Z881G

## (undated) DEVICE — SUT MONOCRYL 3-0 UNDYED KS CS-1 Y523H

## (undated) DEVICE — MEDI-VAC YANKAUER SUCTION HANDLE W/STRAIGHT TIP & CONTROL VENT: Brand: CARDINAL HEALTH

## (undated) DEVICE — POOLE SUCTION HANDLE: Brand: CARDINAL HEALTH

## (undated) DEVICE — STERILE MAJOR GENERAL PACK: Brand: CARDINAL HEALTH

## (undated) DEVICE — SUT VICRYL 0 CTX 36 IN J978H

## (undated) DEVICE — 3M™ IOBAN™ 2 ANTIMICROBIAL INCISE DRAPE 6650EZ: Brand: IOBAN™ 2

## (undated) DEVICE — PLUMEPEN PRO 10FT

## (undated) DEVICE — TUBING SUCTION 5MM X 12 FT

## (undated) DEVICE — SUT PLAIN 2-0 CTX 27 IN 872H

## (undated) DEVICE — SUT VICRYL 0 REEL 54 IN J287G

## (undated) DEVICE — SUT SILK 2-0 SH 30 IN K833H

## (undated) DEVICE — SPONGE LAP 18 X 18 IN